# Patient Record
Sex: FEMALE | Race: WHITE | NOT HISPANIC OR LATINO | Employment: OTHER | URBAN - METROPOLITAN AREA
[De-identification: names, ages, dates, MRNs, and addresses within clinical notes are randomized per-mention and may not be internally consistent; named-entity substitution may affect disease eponyms.]

---

## 2017-01-09 RX ORDER — HYDROCHLOROTHIAZIDE 12.5 MG/1
CAPSULE ORAL
Qty: 30 CAPSULE | Refills: 0 | Status: SHIPPED | OUTPATIENT
Start: 2017-01-09 | End: 2017-02-05 | Stop reason: SDUPTHER

## 2017-01-17 ENCOUNTER — OFFICE VISIT (OUTPATIENT)
Dept: INTERNAL MEDICINE | Facility: CLINIC | Age: 47
End: 2017-01-17
Payer: MEDICARE

## 2017-01-17 VITALS
TEMPERATURE: 98 F | DIASTOLIC BLOOD PRESSURE: 88 MMHG | WEIGHT: 192.69 LBS | HEIGHT: 64 IN | BODY MASS INDEX: 32.9 KG/M2 | SYSTOLIC BLOOD PRESSURE: 130 MMHG | HEART RATE: 68 BPM | RESPIRATION RATE: 16 BRPM

## 2017-01-17 DIAGNOSIS — R60.9 EDEMA, UNSPECIFIED TYPE: ICD-10-CM

## 2017-01-17 DIAGNOSIS — M47.816 LUMBAR ARTHROPATHY: ICD-10-CM

## 2017-01-17 DIAGNOSIS — Z00.00 ANNUAL PHYSICAL EXAM: Primary | ICD-10-CM

## 2017-01-17 DIAGNOSIS — G89.29 CHRONIC LOW BACK PAIN WITH LEFT-SIDED SCIATICA, UNSPECIFIED BACK PAIN LATERALITY: ICD-10-CM

## 2017-01-17 DIAGNOSIS — G43.009 MIGRAINE WITHOUT AURA AND WITHOUT STATUS MIGRAINOSUS, NOT INTRACTABLE: ICD-10-CM

## 2017-01-17 DIAGNOSIS — M46.1 SACROILIITIS: ICD-10-CM

## 2017-01-17 DIAGNOSIS — F13.20 SEDATIVE DEPENDENCE: ICD-10-CM

## 2017-01-17 DIAGNOSIS — E66.9 OBESITY (BMI 30-39.9): ICD-10-CM

## 2017-01-17 DIAGNOSIS — F41.9 ANXIETY DISORDER, UNSPECIFIED TYPE: ICD-10-CM

## 2017-01-17 DIAGNOSIS — M54.42 CHRONIC LOW BACK PAIN WITH LEFT-SIDED SCIATICA, UNSPECIFIED BACK PAIN LATERALITY: ICD-10-CM

## 2017-01-17 PROCEDURE — 99499 UNLISTED E&M SERVICE: CPT | Mod: S$GLB,,, | Performed by: INTERNAL MEDICINE

## 2017-01-17 PROCEDURE — 99396 PREV VISIT EST AGE 40-64: CPT | Mod: S$GLB,,, | Performed by: INTERNAL MEDICINE

## 2017-01-17 PROCEDURE — 99999 PR PBB SHADOW E&M-EST. PATIENT-LVL IV: CPT | Mod: PBBFAC,,, | Performed by: INTERNAL MEDICINE

## 2017-01-17 NOTE — PROGRESS NOTES
Subjective:       Patient ID: Mary Sanches is a 46 y.o. female.    Chief Complaint: Annual Exam    HPI   46 y.o. with chronic LBP/arthropathy with right sided sciatica, Sacroiliitis, Migraines, Obesity, anxiety, sedative dependence is here for annual exam.     Cholesterol: (needs)  Vaccines: Influenza (declined); Tetanus (declined)  Eye exam: declined  Mammogram: 2016(normal per pt)  Gyn exam: 2016    Exercise: no  Diet: regular    Past Medical History:    Anxiety                                                       Chronic back pain                                             Migraines                                                     Sacroiliitis                                                  Scoliosis                                                     Tobacco use                                                 Past Surgical History:    HYSTERECTOMY                                                   HAND SURGERY                                    Left             Social History    Marital status:              Spouse name:                       Years of education:                 Number of children:               Occupational History  Occupation          Employer            Comment               Unemployed                                  Social History Main Topics    Smoking status: Current Some Day Smoker                                                      Packs/day: 0.00      Years: 0.00           Smokeless status: Not on file                       Alcohol use: No              Drug use: No              Sexual activity: Not Currently     Partners with: Male    Review of patient's allergies indicates:   -- Cranberry     --  Kidney Pain   -- Gabapentin -- Swelling    --  Throat Swelling, Hard to breathe   -- Hydroxyzine -- Swelling    --  Throat Swelling, shortness of breath   -- Ibuprofen -- Swelling    --  Stomach Bloated, Swelling Throat   -- Meloxicam -- Swelling    --  Swelling of the  Throat   -- Toradol [ketorolac] -- Swelling    --  Throat Swelling difficulty breathing   -- Amoxicillin -- Hives   -- Lasix [furosemide] -- Swelling    --  Bloating and swelling  Ms. Sanches does not currently have medications on file.    Review of Systems   Constitutional: Negative for activity change, appetite change, chills, diaphoresis, fatigue, fever and unexpected weight change.   HENT: Negative for postnasal drip, rhinorrhea, sinus pressure, sneezing, sore throat, trouble swallowing and voice change.    Respiratory: Negative for cough, shortness of breath and wheezing.    Cardiovascular: Negative for chest pain, palpitations and leg swelling.   Gastrointestinal: Negative for abdominal pain, blood in stool, constipation, diarrhea, nausea and vomiting.   Genitourinary: Negative for dysuria.   Musculoskeletal: Positive for arthralgias and back pain. Negative for myalgias.   Skin: Negative for rash and wound.   Allergic/Immunologic: Negative for environmental allergies and food allergies.   Hematological: Negative for adenopathy. Does not bruise/bleed easily.   Psychiatric/Behavioral: Negative for self-injury, sleep disturbance and suicidal ideas. The patient is nervous/anxious.        Objective:      Physical Exam   Constitutional: She is oriented to person, place, and time. She appears well-developed and well-nourished. No distress.   HENT:   Head: Normocephalic and atraumatic.   Right Ear: External ear normal.   Left Ear: External ear normal.   Nose: Nose normal.   Mouth/Throat: Oropharynx is clear and moist. No oropharyngeal exudate.   Eyes: Conjunctivae and EOM are normal. Pupils are equal, round, and reactive to light. Right eye exhibits no discharge. Left eye exhibits no discharge. No scleral icterus.   Neck: Neck supple. No JVD present. No thyromegaly present.   Cardiovascular: Normal rate, regular rhythm, normal heart sounds and intact distal pulses.    No murmur heard.  Pulmonary/Chest: Effort normal  and breath sounds normal. No respiratory distress. She has no wheezes. She has no rales. She exhibits no tenderness.   Abdominal: Soft. Bowel sounds are normal. She exhibits no distension. There is no tenderness. There is no guarding.   Musculoskeletal: She exhibits no edema.        Lumbar back: She exhibits tenderness.   Lymphadenopathy:     She has no cervical adenopathy.   Neurological: She is alert and oriented to person, place, and time.   Skin: Skin is warm and dry. No rash noted. She is not diaphoretic. No pallor.   Psychiatric: She has a normal mood and affect. Judgment normal.   Nursing note and vitals reviewed.      Assessment:       1. Annual physical exam    2. Chronic low back pain with left-sided sciatica, unspecified back pain laterality    3. Lumbar arthropathy    4. Migraine without aura and without status migrainosus, not intractable    5. Obesity (BMI 30-39.9)    6. Anxiety disorder, unspecified type    7. Edema, unspecified type    8. Sacroiliitis    9. Sedative dependence        Plan:    Complete blood work, UA   Vaccines: Influenza (declined); Tetanus (declined)   Eye exam: declined   Mammogram: 2016   Gyn exam: 2016      1. LBP/arthropathy with left sided sciatica- not well controlled on current meds(pt aware I will not treat her chronic pain)       Pt has been evaluated by Pain Management and is s/p PT/OT(no improvement)       Referral to PM&R   2. Migraines without aura- stable on Fioricet PRN   3. Obesity- pt advised on proper diet/exercise for weight loss   4. Anxiety- stable on Xanax, followed by Ps[facility]   5. Edema- Rx HCTZ 12.5 mg daily PRN   6. Sacroiliitis- stable, continue to monitor   7. Sedative dependence- stable, continue to monitor   8. F/u in 1 yr or PRN

## 2017-01-17 NOTE — MR AVS SNAPSHOT
[facility] Internal Medicine   [facility]  [place] [address]  Phone: [phone]  Fax: [phone]                  Mary Sanches   2017 3:00 PM   Office Visit    Description:  Female : 1970   Provider:  Ángel Lozano DO   Department:  [facility] - Internal Medicine           Reason for Visit     Annual Exam           Diagnoses this Visit        Comments    Annual physical exam    -  Primary     Chronic low back pain with left-sided sciatica, unspecified back pain laterality         Lumbar arthropathy         Migraine without aura and without status migrainosus, not intractable         Obesity (BMI 30-39.9)         Anxiety disorder, unspecified type         Edema, unspecified type         Sacroiliitis         Sedative dependence                To Do List           Future Appointments        Provider Department Dept Phone    2017 3:00 PM HRA, MET 1 [facility] Internal Medicine [phone]    2017 8:30 AM LAB, [facility] - Laboratory [phone]    2017 8:45 AM SPECIMEN, [facility] - Specimen Lab [phone]      Goals (5 Years of Data)     None      Follow-Up and Disposition     Return in about 1 year (around 2018), or if symptoms worsen or fail to improve.    Follow-up and Disposition History      [facility] On Call     [facility] On Call Nurse Care Line -  Assistance  Registered nurses in the [facility] On Call Center provide clinical advisement, health education, appointment booking, and other advisory services.  Call for this free service at [phone].             Medications           Message regarding Medications     Verify the changes and/or additions to your medication regime listed below are the same as discussed with your clinician today.  If any of these changes or additions are incorrect, please notify your healthcare provider.             Verify that the below list of medications is an accurate representation of the  "medications you are currently taking.  If none reported, the list may be blank. If incorrect, please contact your healthcare provider. Carry this list with you in case of emergency.           Current Medications     alprazolam (XANAX) 1 MG tablet Take 1 mg by mouth 3 (three) times daily.    butalbital-acetaminophen-caffeine -40 mg (FIORICET, ESGIC) -40 mg per tablet TAKE 1 TO 2 TABLETS BY MOUTH EVERY 6 HOURS AS NEEDED FOR PAIN    cloNIDine (CATAPRES) 0.2 MG tablet TAKE 1 TABLET BY MOUTH EVERY EVENING    cyclobenzaprine (FLEXERIL) 10 MG tablet Take 10 mg by mouth 3 (three) times daily as needed.    diphenhydrAMINE (BENADRYL) 50 MG capsule Take 50 mg by mouth every evening.    hydrochlorothiazide (MICROZIDE) 12.5 mg capsule TAKE ONE CAPSULE BY MOUTH EVERY DAY AS NEEDED FOR SWELLING    hydrocodone-acetaminophen 7.5-325mg (NORCO) 7.5-325 mg per tablet Take 1 tablet by mouth every 6 (six) hours as needed.    lidocaine-prilocaine (EMLA) cream     methocarbamol (ROBAXIN) 750 MG Tab TAKE 1 TABLET (750 MG TOTAL) BY MOUTH 4 (FOUR) TIMES DAILY.    metronidazole (METROGEL) 0.75 % vaginal gel Place 1 applicator vaginally every evening.    naproxen (NAPROSYN) 500 MG tablet TK 1 T PO BID FOR 10 DAYS           Clinical Reference Information           Vital Signs - Last Recorded  Most recent update: 1/17/2017  2:42 PM by Cheryle Ho MA    BP Pulse Temp Resp Ht Wt    130/88 (BP Location: Left arm, Patient Position: Sitting, BP Method: Manual) 68 98 °F (36.7 °C) (Oral) 16 5' 4" (1.626 m) 87.4 kg (192 lb 10.9 oz)    BMI                33.07 kg/m2          Blood Pressure          Most Recent Value    BP  130/88      Allergies as of 1/17/2017     Cranberry    Gabapentin    Hydroxyzine    Ibuprofen    Meloxicam    Toradol [Ketorolac]    Amoxicillin    Lasix [Furosemide]      Immunizations Administered on Date of Encounter - 1/17/2017     None      Orders Placed During Today's Visit      Normal Orders This Visit    " Ambulatory Referral to Physical Medicine Rehab     Future Labs/Procedures Expected by Expires    CBC auto differential  1/17/2017 1/17/2018    Lipid panel  1/17/2017 1/17/2018    TSH  1/17/2017 3/18/2018    Comprehensive metabolic panel  4/18/2017 4/18/2018    Urinalysis  As directed 1/17/2018      [facility] Sign-Up     Activating your [facility] account is as easy as 1-2-3!     1) Visit [email or web address], select Sign Up Now, enter this activation code and your date of birth, then select Next.  [email or web address]  Expires: 3/3/2017  3:04 PM      2) Create a username and password to use when you visit [facility] in the future and select a security question in case you lose your password and select Next.    3) Enter your e-mail address and click Sign Up!    Additional Information  If you have questions, please e-mail [email or web address] or call [phone] to talk to our [facility] staff. Remember, [facility] is NOT to be used for urgent needs. For medical emergencies, dial 911.         Smoking Cessation     If you would like to quit smoking:   You may be eligible for free services if you are a [place] resident and started smoking cigarettes before September 1, 1988.  Call the [employer]oking [employer]essation [employer] ([employer]) toll free at (256) 843-5525 or (372) 536-1906.   Call -800-QUIT-NOW if you do not meet the above criteria.

## 2017-01-18 ENCOUNTER — INITIAL CONSULT (OUTPATIENT)
Dept: PHYSICAL MEDICINE AND REHAB | Facility: CLINIC | Age: 47
End: 2017-01-18
Payer: MEDICARE

## 2017-01-18 VITALS
SYSTOLIC BLOOD PRESSURE: 127 MMHG | BODY MASS INDEX: 32.74 KG/M2 | DIASTOLIC BLOOD PRESSURE: 77 MMHG | HEART RATE: 60 BPM | HEIGHT: 64 IN | WEIGHT: 191.81 LBS

## 2017-01-18 DIAGNOSIS — M54.42 CHRONIC MIDLINE LOW BACK PAIN WITH LEFT-SIDED SCIATICA: Primary | ICD-10-CM

## 2017-01-18 DIAGNOSIS — E66.9 OBESITY (BMI 30.0-34.9): ICD-10-CM

## 2017-01-18 DIAGNOSIS — M70.62 TROCHANTERIC BURSITIS, LEFT HIP: ICD-10-CM

## 2017-01-18 DIAGNOSIS — G89.29 CHRONIC MIDLINE LOW BACK PAIN WITH LEFT-SIDED SCIATICA: Primary | ICD-10-CM

## 2017-01-18 DIAGNOSIS — M54.16 LEFT LUMBAR RADICULOPATHY: ICD-10-CM

## 2017-01-18 DIAGNOSIS — M47.26 OSTEOARTHRITIS OF SPINE WITH RADICULOPATHY, LUMBAR REGION: ICD-10-CM

## 2017-01-18 PROCEDURE — 99204 OFFICE O/P NEW MOD 45 MIN: CPT | Mod: S$GLB,,, | Performed by: PHYSICAL MEDICINE & REHABILITATION

## 2017-01-18 PROCEDURE — 1159F MED LIST DOCD IN RCRD: CPT | Mod: S$GLB,,, | Performed by: PHYSICAL MEDICINE & REHABILITATION

## 2017-01-18 PROCEDURE — 99999 PR PBB SHADOW E&M-EST. PATIENT-LVL III: CPT | Mod: PBBFAC,,, | Performed by: PHYSICAL MEDICINE & REHABILITATION

## 2017-01-18 RX ORDER — GABAPENTIN 300 MG/1
300 CAPSULE ORAL NIGHTLY
Qty: 90 CAPSULE | Refills: 1 | Status: SHIPPED | OUTPATIENT
Start: 2017-01-18 | End: 2017-02-20 | Stop reason: SDUPTHER

## 2017-01-18 RX ORDER — TRAMADOL HYDROCHLORIDE 50 MG/1
50 TABLET ORAL EVERY 6 HOURS PRN
Qty: 120 TABLET | Refills: 1 | Status: SHIPPED | OUTPATIENT
Start: 2017-01-18 | End: 2017-02-20 | Stop reason: SDUPTHER

## 2017-01-18 RX ORDER — MELOXICAM 15 MG/1
15 TABLET ORAL DAILY
Qty: 30 TABLET | Refills: 1 | Status: SHIPPED | OUTPATIENT
Start: 2017-01-18 | End: 2017-01-30 | Stop reason: SINTOL

## 2017-01-18 NOTE — LETTER
January 18, 2017      Ángel Lozano, DO  [address] [facility] [place] [address]           [facility]  [address]  [facility] [address]  Phone: [phone]          Patient: Mary Sanches   MR Number: [id number]   YOB: 1970   Date of Visit: 1/18/2017       Dear Dr. Ángel Lozano:    Thank you for referring Mary Sanches to me for evaluation. Attached you will find relevant portions of my assessment and plan of care.    If you have questions, please do not hesitate to call me. I look forward to following Mary Sanches along with you.    Sincerely,    Lilia Andrews MD    Enclosure  CC:  No Recipients    If you would like to receive this communication electronically, please contact [email or web address] or [phone] to request more information on [employer] access.    For providers and/or their staff who would like to refer a patient to [facility], please contact us through our one-stop-shop provider referral line, [facility], at [phone].    If you feel you have received this communication in error or would no longer like to receive these types of communications, please e-mail [email or web address]

## 2017-01-18 NOTE — MR AVS SNAPSHOT
[facility]  [address]  [facility] [address]  Phone: [phone]                  Mary Sanches   2017 1:00 PM   Initial consult    Description:  Female : 1970   Provider:  Lilia Andrews MD   Department:  [facility]           Diagnoses this Visit        Comments    Chronic midline low back pain with left-sided sciatica    -  Primary     Osteoarthritis of spine with radiculopathy, lumbar region         Left lumbar radiculopathy         Trochanteric bursitis, left hip         Obesity (BMI 30.0-34.9)                To Do List           Future Appointments        Provider Department Dept Phone    2017 3:00 PM HRA, [facility] 1 [place] - Internal Medicine [phone]    2017 8:30 AM LAB, [facility] [place] - Laboratory [phone]    2017 8:45 AM SPECIMEN, [facility] [place] - Specimen Lab [phone]    3/17/2017 11:40 AM Lilia Andrews MD [facility] [phone]      Goals (5 Years of Data)     None      Follow-Up and Disposition     Return in about 2 months (around 3/18/2017).       These Medications        Disp Refills Start End    meloxicam (MOBIC) 15 MG tablet 30 tablet 1 2017    Take 1 tablet (15 mg total) by mouth once daily. - Oral    Pharmacy: [facility]/pharmacy #[id number] - [place]  [address] [place] [address] Ph #: [phone]       gabapentin (NEURONTIN) 300 MG capsule 90 capsule 1 2017    Take 1 capsule (300 mg total) by mouth every evening. In 1 wk, if no relief, increase to twice daily.In another wk, may increase to 3 times. - Oral    Pharmacy: [facility]/pharmacy #[id number] [place]  [address] [facility] Ph #: [phone]       tramadol (ULTRAM) 50 mg tablet 120 tablet 1 2017    Take 1 tablet (50 mg total) by mouth every 6 (six) hours as needed for Pain. - Oral    Pharmacy: [facility]/pharmacy #[id number] - [place] Farias - [address] [facility] Ph #: [phone]          [facility]     [facility] - 24/7 Assistance  Registered nurses in the [facility] provide clinical advisement, health education, appointment booking, and other advisory services.  Call for this free service at [phone].             Medications           Message regarding Medications     Verify the changes and/or additions to your medication regime listed below are the same as discussed with your clinician today.  If any of these changes or additions are incorrect, please notify your healthcare provider.        START taking these NEW medications        Refills    meloxicam (MOBIC) 15 MG tablet 1    Sig: Take 1 tablet (15 mg total) by mouth once daily.    Class: Normal    Route: Oral    gabapentin (NEURONTIN) 300 MG capsule 1    Sig: Take 1 capsule (300 mg total) by mouth every evening. In 1 wk, if no relief, increase to twice daily.In another wk, may increase to 3 times.    Class: Normal    Route: Oral    tramadol (ULTRAM) 50 mg tablet 1    Sig: Take 1 tablet (50 mg total) by mouth every 6 (six) hours as needed for Pain.    Class: Normal    Route: Oral           Verify that the below list of medications is an accurate representation of the medications you are currently taking.  If none reported, the list may be blank. If incorrect, please contact your healthcare provider. Carry this list with you in case of emergency.           Current Medications     alprazolam (XANAX) 1 MG tablet Take 1 mg by mouth 3 (three) times daily.    butalbital-acetaminophen-caffeine -40 mg (FIORICET, ESGIC) -40 mg per tablet TAKE 1 TO 2 TABLETS BY MOUTH EVERY 6 HOURS AS NEEDED FOR PAIN    cloNIDine (CATAPRES) 0.2 MG tablet TAKE 1 TABLET BY MOUTH EVERY EVENING    cyclobenzaprine (FLEXERIL) 10 MG tablet Take 10 mg by mouth 3 (three) times daily as needed.    diphenhydrAMINE (BENADRYL) 50 MG capsule Take 50 mg by mouth every evening.    gabapentin (NEURONTIN) 300 MG capsule Take 1  "capsule (300 mg total) by mouth every evening. In 1 wk, if no relief, increase to twice daily.In another wk, may increase to 3 times.    hydrochlorothiazide (MICROZIDE) 12.5 mg capsule TAKE ONE CAPSULE BY MOUTH EVERY DAY AS NEEDED FOR SWELLING    hydrocodone-acetaminophen 7.5-325mg (NORCO) 7.5-325 mg per tablet Take 1 tablet by mouth every 6 (six) hours as needed.    lidocaine-prilocaine (EMLA) cream     meloxicam (MOBIC) 15 MG tablet Take 1 tablet (15 mg total) by mouth once daily.    methocarbamol (ROBAXIN) 750 MG Tab TAKE 1 TABLET (750 MG TOTAL) BY MOUTH 4 (FOUR) TIMES DAILY.    metronidazole (METROGEL) 0.75 % vaginal gel Place 1 applicator vaginally every evening.    naproxen (NAPROSYN) 500 MG tablet TK 1 T PO BID FOR 10 DAYS    tramadol (ULTRAM) 50 mg tablet Take 1 tablet (50 mg total) by mouth every 6 (six) hours as needed for Pain.           Clinical Reference Information           Vital Signs - Last Recorded  Most recent update: 1/18/2017  1:09 PM by Suzanne Molina MA    BP Pulse Ht Wt BMI    127/77 60 5' 4" (1.626 m) 87 kg (191 lb 12.8 oz) 32.92 kg/m2      Blood Pressure          Most Recent Value    BP  127/77      Allergies as of 1/18/2017     Cranberry    Gabapentin    Hydroxyzine    Ibuprofen    Meloxicam    Toradol [Ketorolac]    Amoxicillin    Lasix [Furosemide]      Immunizations Administered on Date of Encounter - 1/18/2017     None      [facility] Sign-Up     Activating your [facility] account is as easy as 1-2-3!     1) Visit [email or web address], select Sign Up Now, enter this activation code and your date of birth, then select Next.  [email or web address]  Expires: 3/3/2017  3:04 PM      2) Create a username and password to use when you visit [facility] in the future and select a security question in case you lose your password and select Next.    3) Enter your e-mail address and click Sign Up!    Additional Information  If you have questions, please e-mail [email or web address] or call [phone] to " talk to our [facility] staff. Remember, [facility] is NOT to be used for urgent needs. For medical emergencies, dial 911.         Instructions      Neck/Back Pain [General]    Both neck and back pain are usually caused by injury to the muscles or ligaments of the spine. Sometimes the disks that separate each bone of the spine may cause pain by putting pressure on a nearby nerve. Back and neck pain may appear after a sudden twisting/bending force (such as in a car accident), or sometimes after a simple awkward movement. In either case, muscle spasm is often present and adds to the pain.  Acute neck and back pain usually gets better in one to two weeks. Pain related to disk disease, arthritis in the spinal joints or spinal stenosis (narrowing of the spinal canal) can become chronic and last for months or years.  Home Care:  · FOR NECK PAIN: Use a comfortable pillow that supports the head and keeps the spine in a neutral position. The position of the head should not be tilted forward or backward.  · FOR BACK PAIN: You may need to stay in bed the first few days. But, as soon as possible, begin sitting or walking to avoid problems with prolonged bed rest (muscle weakness, worsening back stiffness and pain, blood clots in the legs).  · When in bed, try to find a position of comfort. A firm mattress is best. Try lying flat on your back with pillows under your knees. You can also try lying on your side with your knees bent up towards your chest and a pillow between your knees.  · Avoid prolonged sitting. This puts more stress on the lower back than standing or walking.  · During the first two days after injury, apply an ICE PACK to the painful area for 20 minutes every 2-4 hours. This will reduce swelling and pain. HEAT (hot shower, hot bath or heating pad) works well for muscle spasm. You can start with ice, then switch to heat after two days. Some patients feel best alternating ice and heat treatments. Use the one method  that feels the best to you.  · You may use acetaminophen (Tylenol) or ibuprofen (Motrin, Advil) to control pain, unless another pain medicine was prescribed. [NOTE: If you have chronic liver or kidney disease or ever had a stomach ulcer or GI bleeding, talk with your doctor before using these medicines.]  · Be aware of safe lifting methods and do not lift anything over 15 pounds until all the pain is gone.  Follow Up  with your physician or this facility if your symptoms do not start to improve after one week. Physical therapy or further tests may be needed.  [NOTE: If X-rays were taken, they will be reviewed by a radiologist. You will be notified of any new findings that may affect your care.]  Get Prompt Medical Attention  if any of the following occur:  · Pain becomes worse or spreads into your arms or legs  · Weakness, numbness or pain in one or both arms or legs  · Loss of bowel or bladder control  · Numbness in the groin area  · Difficulty walking  · Fever of 100.4ºF (38ºC) or higher, or as directed by your healthcare provider  © 7839-8515 [facility] [facility], [address] [place] [address]. All rights reserved. This information is not intended as a substitute for professional medical care. Always follow your healthcare professional's instructions.    Back Exercises: Back Press  Do this exercise on your hands and knees. Keep your knees under your hips and your hands under your shoulders. Keep your spine in a neutral position (not arched or sagging). Be sure to maintain your necks natural curve.  · Tighten your abdominal and buttocks muscles to press your back upward. Let your head drop slightly.  · Hold for 5 seconds. Return to starting position.  · Repeat 5 times.     © 1389-5670 [employer] [facility], [address] [place] [address]. All rights reserved. This information is not intended as a substitute for professional medical care. Always follow your healthcare professional's  instructions.    Back Exercises: Back Release  Do this exercise on your hands and knees. Keep your knees under your hips and your hands under your shoulders. Keep your spine in a neutral position (not arched or sagging). Be sure to maintain your necks natural curve.  · Relax your abdominal and buttocks muscles, lift your head, and let your back sag. Be sure to keep your weight evenly distributed. Dont sit back on your hips.   · Hold for 5 seconds.  · Return to starting position.  · Repeat 5 times.  © 9448-01[address] [place] [address]. All rights reserved. This information is not intended as a substitute for professional medical care. Always follow your healthcare professional's instructions.    Back Exercises: Bridge  The Bridge exercise strengthens your abdominal, buttocks, and hamstring muscles. This helps keep your back stable and aligned when you walk.  · Lie on the floor with your back and palms flat. Bend your knees. Keep your feet flat on the floor.  · Contract your abdominal and buttocks muscles. Slowly lift your buttocks off the floor until there is a straight line from your knees to your shoulders.  · Hold for 5  seconds. Repeat 10 times.    © 1085-9601 [address] [address]. All rights reserved. This information is not intended as a substitute for professional medical care. Always follow your healthcare professional's instructions.    Back Exercises: Elbow Press    To start, lie face down on your stomach, feet slightly apart, forehead on the floor. Breathe deeply. You should feel comfortable and relaxed in this position.  · Press up on your forearms. Keep your abdomen and hips on the floor.  · Hold for 20 seconds. Lower slowly.  · Repeat 2 times.  · Return to starting position.  © 1726-3280 [facility], [address] [place] [address]. All rights reserved. This information is not intended as a substitute for  professional medical care. Always follow your healthcare professional's instructions.    Back Exercises: Pelvic Tilt  To start, lie on your back with your knees bent and feet flat on the floor. Dont press your neck or lower back to the floor. Breathe deeply. You should feel comfortable and relaxed in this position.  · Tighten your abdomen and buttocks, and press your lower back toward the floor. This should be a small, subtle movement.  · Hold for 5 seconds. Release.  · Repeat 5 times.         © 9062-0820 [facility], [address] [place] [address]. All rights reserved. This information is not intended as a substitute for professional medical care. Always follow your healthcare professional's instructions.    Back Exercises: Partial Curl-Ups          To start, lie on your back with your knees bent and feet flat on the floor. Dont press your neck or lower back to the floor. Breathe deeply. You should feel comfortable and relaxed in this position.  · Cross your arms loosely.  · Tighten your abdomen and curl retirement up, keeping your head in line with your shoulders.  · Hold for 5 seconds. Uncurl to lie down.  · Repeat 5 times.   © 9420-5509 [facility], [address] [place] [address]. All rights reserved. This information is not intended as a substitute for professional medical care. Always follow your healthcare professional's instructions.    Back Exercises: Lower Back Stretch                            To start, sit in a chair with your feet flat on the floor. Shift your weight slightly forward to avoid rounding your back. Relax, and keep your ears, shoulders, and hips aligned.  · Sit with your feet well apart.  · Bend forward and touch the floor with the backs of your hands. Relax and let your body drop.  · Hold for 20 seconds. Return to starting position.  · Repeat 2 times.   © 6048-5606 [facility], [address] [place] [address]. All rights reserved. This  information is not intended as a substitute for professional medical care. Always follow your healthcare professional's instructions.    Back Exercises: Seated Rotation      To start, sit in a chair with your feet flat on the floor. Shift your weight slightly forward to avoid rounding your back. Relax, and keep your ears, shoulders, and hips aligned.  · Fold your arms, elbows just below shoulder height.  · Turn from the waist with hips forward. Turn your head last.  · Hold for a count of 5. Return to starting position.  · Repeat 5 times on one side. Then switch sides.  © 3003-3415 [facility], [address] [place] [address]. All rights reserved. This information is not intended as a substitute for professional medical care. Always follow your healthcare professional's instructions.    Back Exercises: Side Stretch  To start, sit in a chair with your feet flat on the floor. Shift your weight slightly forward to avoid rounding your back. Relax. Keep your ears, shoulders, and hips aligned.  · Stretch your right arm overhead.  · Slowly bend to the left. Dont twist your torso.  · Hold for 20 seconds. Return to starting position.  · Repeat 2 times. Then, switch to the other side.  © 4452-7567 [employer] [facility], [address] [place] [address]. All rights reserved. This information is not intended as a substitute for professional medical care. Always follow your healthcare professional's instructions         Smoking Cessation     If you would like to quit smoking:   You may be eligible for free services if you are a [place] resident and started smoking cigarettes before September 1, 1988.  Call the [employer]ess[employer] ([employer]) toll free at (402) 252-7834 or [phone]) [phone].   Call -800-QUIT-NOW if you do not meet the above criteria.

## 2017-01-18 NOTE — PATIENT INSTRUCTIONS
Neck/Back Pain [General]    Both neck and back pain are usually caused by injury to the muscles or ligaments of the spine. Sometimes the disks that separate each bone of the spine may cause pain by putting pressure on a nearby nerve. Back and neck pain may appear after a sudden twisting/bending force (such as in a car accident), or sometimes after a simple awkward movement. In either case, muscle spasm is often present and adds to the pain.  Acute neck and back pain usually gets better in one to two weeks. Pain related to disk disease, arthritis in the spinal joints or spinal stenosis (narrowing of the spinal canal) can become chronic and last for months or years.  Home Care:  · FOR NECK PAIN: Use a comfortable pillow that supports the head and keeps the spine in a neutral position. The position of the head should not be tilted forward or backward.  · FOR BACK PAIN: You may need to stay in bed the first few days. But, as soon as possible, begin sitting or walking to avoid problems with prolonged bed rest (muscle weakness, worsening back stiffness and pain, blood clots in the legs).  · When in bed, try to find a position of comfort. A firm mattress is best. Try lying flat on your back with pillows under your knees. You can also try lying on your side with your knees bent up towards your chest and a pillow between your knees.  · Avoid prolonged sitting. This puts more stress on the lower back than standing or walking.  · During the first two days after injury, apply an ICE PACK to the painful area for 20 minutes every 2-4 hours. This will reduce swelling and pain. HEAT (hot shower, hot bath or heating pad) works well for muscle spasm. You can start with ice, then switch to heat after two days. Some patients feel best alternating ice and heat treatments. Use the one method that feels the best to you.  · You may use acetaminophen (Tylenol) or ibuprofen (Motrin, Advil) to control pain, unless another pain medicine was  prescribed. [NOTE: If you have chronic liver or kidney disease or ever had a stomach ulcer or GI bleeding, talk with your doctor before using these medicines.]  · Be aware of safe lifting methods and do not lift anything over 15 pounds until all the pain is gone.  Follow Up  with your physician or this facility if your symptoms do not start to improve after one week. Physical therapy or further tests may be needed.  [NOTE: If X-rays were taken, they will be reviewed by a radiologist. You will be notified of any new findings that may affect your care.]  Get Prompt Medical Attention  if any of the following occur:  · Pain becomes worse or spreads into your arms or legs  · Weakness, numbness or pain in one or both arms or legs  · Loss of bowel or bladder control  · Numbness in the groin area  · Difficulty walking  · Fever of 100.4ºF (38ºC) or higher, or as directed by your healthcare provider  © 6878-1398 [facility] [facility], [address] [place] [address]. All rights reserved. This information is not intended as a substitute for professional medical care. Always follow your healthcare professional's instructions.    Back Exercises: Back Press  Do this exercise on your hands and knees. Keep your knees under your hips and your hands under your shoulders. Keep your spine in a neutral position (not arched or sagging). Be sure to maintain your necks natural curve.  · Tighten your abdominal and buttocks muscles to press your back upward. Let your head drop slightly.  · Hold for 5 seconds. Return to starting position.  · Repeat 5 times.     © 6068-3091 [employer] [facility], [address] [place] [address]. All rights reserved. This information is not intended as a substitute for professional medical care. Always follow your healthcare professional's instructions.    Back Exercises: Back Release  Do this exercise on your hands and knees. Keep your knees under your hips and your hands under your shoulders. Keep  your spine in a neutral position (not arched or sagging). Be sure to maintain your necks natural curve.  · Relax your abdominal and buttocks muscles, lift your head, and let your back sag. Be sure to keep your weight evenly distributed. Dont sit back on your hips.   · Hold for 5 seconds.  · Return to starting position.  · Repeat 5 times.  © 8571-0192 [facility] [place] [address]. All rights reserved. This information is not intended as a substitute for professional medical care. Always follow your healthcare professional's instructions.    Back Exercises: Bridge  The Bridge exercise strengthens your abdominal, buttocks, and hamstring muscles. This helps keep your back stable and aligned when you walk.  · Lie on the floor with your back and palms flat. Bend your knees. Keep your feet flat on the floor.  · Contract your abdominal and buttocks muscles. Slowly lift your buttocks off the floor until there is a straight line from your knees to your shoulders.  · Hold for 5  seconds. Repeat 10 times.    © 3726-0487 [place] [address]. All rights reserved. This information is not intended as a substitute for professional medical care. Always follow your healthcare professional's instructions.    Back Exercises: Elbow Press    To start, lie face down on your stomach, feet slightly apart, forehead on the floor. Breathe deeply. You should feel comfortable and relaxed in this position.  · Press up on your forearms. Keep your abdomen and hips on the floor.  · Hold for 20 seconds. Lower slowly.  · Repeat 2 times.  · Return to starting position.  © 3140-1387 [facility], [address] [place] [address]. All rights reserved. This information is not intended as a substitute for professional medical care. Always follow your healthcare professional's instructions.    Back Exercises: Pelvic Tilt  To start, lie on your back with your knees bent and  feet flat on the floor. Dont press your neck or lower back to the floor. Breathe deeply. You should feel comfortable and relaxed in this position.  · Tighten your abdomen and buttocks, and press your lower back toward the floor. This should be a small, subtle movement.  · Hold for 5 seconds. Release.  · Repeat 5 times.         © 8384-1891 [facility], [address] [place] [address]. All rights reserved. This information is not intended as a substitute for professional medical care. Always follow your healthcare professional's instructions.    Back Exercises: Partial Curl-Ups          To start, lie on your back with your knees bent and feet flat on the floor. Dont press your neck or lower back to the floor. Breathe deeply. You should feel comfortable and relaxed in this position.  · Cross your arms loosely.  · Tighten your abdomen and curl FDC up, keeping your head in line with your shoulders.  · Hold for 5 seconds. Uncurl to lie down.  · Repeat 5 times.   © 1558-2553 [employer] [facility], [address] [place] [address]. All rights reserved. This information is not intended as a substitute for professional medical care. Always follow your healthcare professional's instructions.    Back Exercises: Lower Back Stretch                            To start, sit in a chair with your feet flat on the floor. Shift your weight slightly forward to avoid rounding your back. Relax, and keep your ears, shoulders, and hips aligned.  · Sit with your feet well apart.  · Bend forward and touch the floor with the backs of your hands. Relax and let your body drop.  · Hold for 20 seconds. Return to starting position.  · Repeat 2 times.   © 1797-7580 [employer] [facility], [address] [place] [address]. All rights reserved. This information is not intended as a substitute for professional medical care. Always follow your healthcare professional's instructions.    Back Exercises: Seated Rotation      To  start, sit in a chair with your feet flat on the floor. Shift your weight slightly forward to avoid rounding your back. Relax, and keep your ears, shoulders, and hips aligned.  · Fold your arms, elbows just below shoulder height.  · Turn from the waist with hips forward. Turn your head last.  · Hold for a count of 5. Return to starting position.  · Repeat 5 times on one side. Then switch sides.  © 4166-2919 [employer], [address] [place] [address]. All rights reserved. This information is not intended as a substitute for professional medical care. Always follow your healthcare professional's instructions.    Back Exercises: Side Stretch  To start, sit in a chair with your feet flat on the floor. Shift your weight slightly forward to avoid rounding your back. Relax. Keep your ears, shoulders, and hips aligned.  · Stretch your right arm overhead.  · Slowly bend to the left. Dont twist your torso.  · Hold for 20 seconds. Return to starting position.  · Repeat 2 times. Then, switch to the other side.  © 6164-8641 [employer], [address] [place] [address]. All rights reserved. This information is not intended as a substitute for professional medical care. Always follow your healthcare professional's instructions

## 2017-01-23 RX ORDER — BUTALBITAL, ACETAMINOPHEN AND CAFFEINE 50; 325; 40 MG/1; MG/1; MG/1
TABLET ORAL
Qty: 30 TABLET | Refills: 3 | Status: SHIPPED | OUTPATIENT
Start: 2017-01-23 | End: 2017-03-27 | Stop reason: SDUPTHER

## 2017-01-30 ENCOUNTER — TELEPHONE (OUTPATIENT)
Dept: PHYSICAL MEDICINE AND REHAB | Facility: CLINIC | Age: 47
End: 2017-01-30

## 2017-01-30 DIAGNOSIS — M70.62 TROCHANTERIC BURSITIS, LEFT HIP: ICD-10-CM

## 2017-01-30 DIAGNOSIS — M47.26 OSTEOARTHRITIS OF SPINE WITH RADICULOPATHY, LUMBAR REGION: ICD-10-CM

## 2017-01-30 DIAGNOSIS — G89.29 CHRONIC MIDLINE LOW BACK PAIN WITH LEFT-SIDED SCIATICA: Primary | ICD-10-CM

## 2017-01-30 DIAGNOSIS — M54.42 CHRONIC MIDLINE LOW BACK PAIN WITH LEFT-SIDED SCIATICA: Primary | ICD-10-CM

## 2017-01-30 RX ORDER — CELECOXIB 200 MG/1
200 CAPSULE ORAL DAILY
Qty: 60 CAPSULE | Refills: 1 | Status: SHIPPED | OUTPATIENT
Start: 2017-01-30 | End: 2017-03-30 | Stop reason: SDUPTHER

## 2017-01-30 NOTE — TELEPHONE ENCOUNTER
----- Message from Suzanne Contreras MA sent at 1/30/2017  7:59 AM CST -----  Contact: pt      ----- Message -----     From: Agustin Miller     Sent: 1/27/2017   3:34 PM       To: [facility] Staff    The pt called because after taking Meloxicam, it is too strong for her, she think it may be because she lost weight. The pt can be reached at [phone]

## 2017-02-05 RX ORDER — HYDROCHLOROTHIAZIDE 12.5 MG/1
CAPSULE ORAL
Qty: 30 CAPSULE | Refills: 0 | Status: SHIPPED | OUTPATIENT
Start: 2017-02-05 | End: 2017-03-07 | Stop reason: SDUPTHER

## 2017-02-06 DIAGNOSIS — Z12.31 OTHER SCREENING MAMMOGRAM: ICD-10-CM

## 2017-02-20 DIAGNOSIS — M54.16 LEFT LUMBAR RADICULOPATHY: ICD-10-CM

## 2017-02-20 DIAGNOSIS — G89.29 CHRONIC MIDLINE LOW BACK PAIN WITH LEFT-SIDED SCIATICA: ICD-10-CM

## 2017-02-20 DIAGNOSIS — M47.26 OSTEOARTHRITIS OF SPINE WITH RADICULOPATHY, LUMBAR REGION: ICD-10-CM

## 2017-02-20 DIAGNOSIS — M70.62 TROCHANTERIC BURSITIS, LEFT HIP: ICD-10-CM

## 2017-02-20 DIAGNOSIS — M54.42 CHRONIC MIDLINE LOW BACK PAIN WITH LEFT-SIDED SCIATICA: ICD-10-CM

## 2017-02-20 RX ORDER — GABAPENTIN 300 MG/1
300 CAPSULE ORAL NIGHTLY
Qty: 90 CAPSULE | Refills: 1 | Status: SHIPPED | OUTPATIENT
Start: 2017-02-20 | End: 2017-03-17 | Stop reason: SDUPTHER

## 2017-02-20 RX ORDER — TRAMADOL HYDROCHLORIDE 50 MG/1
50 TABLET ORAL EVERY 6 HOURS PRN
Qty: 120 TABLET | Refills: 1 | Status: SHIPPED | OUTPATIENT
Start: 2017-02-20 | End: 2017-03-17 | Stop reason: SDUPTHER

## 2017-02-20 NOTE — TELEPHONE ENCOUNTER
----- Message from Anette Duvall sent at 2/20/2017 10:59 AM CST -----  Contact: self @ [phone]  Pt is calling to req a refill for gabapentin (NEURONTIN) 300 MG capsule and tramadol (ULTRAM) 50 mg tablet.  [facility] pharmacy [phone].

## 2017-03-07 RX ORDER — HYDROCHLOROTHIAZIDE 12.5 MG/1
CAPSULE ORAL
Qty: 30 CAPSULE | Refills: 0 | Status: SHIPPED | OUTPATIENT
Start: 2017-03-07 | End: 2017-04-14 | Stop reason: SDUPTHER

## 2017-03-10 DIAGNOSIS — M70.62 TROCHANTERIC BURSITIS, LEFT HIP: ICD-10-CM

## 2017-03-10 DIAGNOSIS — G89.29 CHRONIC MIDLINE LOW BACK PAIN WITH LEFT-SIDED SCIATICA: ICD-10-CM

## 2017-03-10 DIAGNOSIS — M54.42 CHRONIC MIDLINE LOW BACK PAIN WITH LEFT-SIDED SCIATICA: ICD-10-CM

## 2017-03-10 RX ORDER — MELOXICAM 15 MG/1
15 TABLET ORAL DAILY
Qty: 30 TABLET | Refills: 1 | Status: SHIPPED | OUTPATIENT
Start: 2017-03-10 | End: 2017-03-17 | Stop reason: SDUPTHER

## 2017-03-10 NOTE — TELEPHONE ENCOUNTER
01/18/17 last office visit  01/18/17 last Rx refill  03/17/17 rtc      Pt is req a new prescription for meloxicam.  Pt says it is not the medication that was hurting her stomach.         [facility]/pharmacy #[id number] - [place] - [address] [place] [address]   [place] [address]   Phone: [phone] Fax: [phone]

## 2017-03-17 ENCOUNTER — OFFICE VISIT (OUTPATIENT)
Dept: PHYSICAL MEDICINE AND REHAB | Facility: CLINIC | Age: 47
End: 2017-03-17
Payer: MEDICARE

## 2017-03-17 VITALS
DIASTOLIC BLOOD PRESSURE: 56 MMHG | BODY MASS INDEX: 33.49 KG/M2 | HEART RATE: 52 BPM | SYSTOLIC BLOOD PRESSURE: 81 MMHG | HEIGHT: 64 IN | WEIGHT: 196.19 LBS

## 2017-03-17 DIAGNOSIS — M70.62 TROCHANTERIC BURSITIS, LEFT HIP: ICD-10-CM

## 2017-03-17 DIAGNOSIS — M47.26 OSTEOARTHRITIS OF SPINE WITH RADICULOPATHY, LUMBAR REGION: ICD-10-CM

## 2017-03-17 DIAGNOSIS — M54.42 CHRONIC MIDLINE LOW BACK PAIN WITH LEFT-SIDED SCIATICA: Primary | ICD-10-CM

## 2017-03-17 DIAGNOSIS — M54.16 LEFT LUMBAR RADICULOPATHY: ICD-10-CM

## 2017-03-17 DIAGNOSIS — E66.9 OBESITY (BMI 30.0-34.9): ICD-10-CM

## 2017-03-17 DIAGNOSIS — G89.29 CHRONIC MIDLINE LOW BACK PAIN WITH LEFT-SIDED SCIATICA: Primary | ICD-10-CM

## 2017-03-17 PROCEDURE — 99214 OFFICE O/P EST MOD 30 MIN: CPT | Mod: S$GLB,,, | Performed by: PHYSICAL MEDICINE & REHABILITATION

## 2017-03-17 PROCEDURE — 1160F RVW MEDS BY RX/DR IN RCRD: CPT | Mod: S$GLB,,, | Performed by: PHYSICAL MEDICINE & REHABILITATION

## 2017-03-17 PROCEDURE — 99999 PR PBB SHADOW E&M-EST. PATIENT-LVL III: CPT | Mod: PBBFAC,,, | Performed by: PHYSICAL MEDICINE & REHABILITATION

## 2017-03-17 RX ORDER — TRAMADOL HYDROCHLORIDE 50 MG/1
50-100 TABLET ORAL 3 TIMES DAILY PRN
Qty: 150 TABLET | Refills: 1 | Status: SHIPPED | OUTPATIENT
Start: 2017-03-17 | End: 2017-05-16

## 2017-03-17 RX ORDER — GABAPENTIN 300 MG/1
300 CAPSULE ORAL 3 TIMES DAILY
Qty: 90 CAPSULE | Refills: 2 | Status: SHIPPED | OUTPATIENT
Start: 2017-03-17 | End: 2017-04-24 | Stop reason: SINTOL

## 2017-03-17 RX ORDER — MELOXICAM 15 MG/1
15 TABLET ORAL DAILY
Start: 2017-03-17 | End: 2017-05-11 | Stop reason: SDUPTHER

## 2017-03-17 NOTE — MR AVS SNAPSHOT
[facility]  [address]  [facility] [address]  Phone: [phone]                  Mary Sanches   3/17/2017 11:40 AM   Office Visit    Description:  Female : 1970   Provider:  Lilia Andrews MD   Department:  [facility]           Diagnoses this Visit        Comments    Chronic midline low back pain with left-sided sciatica    -  Primary     Osteoarthritis of spine with radiculopathy, lumbar region         Left lumbar radiculopathy         Trochanteric bursitis, left hip         Obesity (BMI 30.0-34.9)                To Do List           Goals (5 Years of Data)     None      Follow-Up and Disposition     Return in about 3 months (around 2017).       These Medications        Disp Refills Start End    gabapentin (NEURONTIN) 300 MG capsule 90 capsule 2 3/17/2017 2017    Take 1 capsule (300 mg total) by mouth 3 (three) times daily. - Oral    Pharmacy: [facility]/pharmacy #[id number] - [place] [address] Ph #: [phone]       tramadol (ULTRAM) 50 mg tablet 150 tablet 1 3/17/2017 2017    Take 1-2 tablets ( mg total) by mouth 3 (three) times daily as needed for Pain. - Oral    Pharmacy: [facility]/pharmacy #[id number]  [place] [id number] [facility] Ph #: [phone]       meloxicam (MOBIC) 15 MG tablet   3/17/2017     Take 1 tablet (15 mg total) by mouth once daily. - Oral    [id number]  [place]  [address] [facility] Ph #: [phone]         [facility] On Call     [facility] On Call Nurse Care Line -  Assistance  Registered nurses in the [facility] provide clinical advisement, health education, appointment booking, and other advisory services.  Call for this free service at [phone].             Medications           Message regarding Medications     Verify the changes and/or additions to your medication regime listed below are the same as discussed with your clinician today.  If  any of these changes or additions are incorrect, please notify your healthcare provider.        CHANGE how you are taking these medications     Start Taking Instead of    gabapentin (NEURONTIN) 300 MG capsule gabapentin (NEURONTIN) 300 MG capsule    Dosage:  Take 1 capsule (300 mg total) by mouth 3 (three) times daily. Dosage:  Take 1 capsule (300 mg total) by mouth every evening. In 1 wk, if no relief, increase to twice daily.In another wk, may increase to 3 times.    Reason for Change:  Reorder     tramadol (ULTRAM) 50 mg tablet tramadol (ULTRAM) 50 mg tablet    Dosage:  Take 1-2 tablets ( mg total) by mouth 3 (three) times daily as needed for Pain. Dosage:  Take 1 tablet (50 mg total) by mouth every 6 (six) hours as needed for Pain.    Reason for Change:  Reorder            Verify that the below list of medications is an accurate representation of the medications you are currently taking.  If none reported, the list may be blank. If incorrect, please contact your healthcare provider. Carry this list with you in case of emergency.           Current Medications     alprazolam (XANAX) 1 MG tablet Take 1 mg by mouth 3 (three) times daily.    butalbital-acetaminophen-caffeine -40 mg (FIORICET, ESGIC) -40 mg per tablet TAKE 1 TO 2 TABLETS BY MOUTH EVERY 6 HOURS AS NEEDED FOR PAIN    celecoxib (CELEBREX) 200 MG capsule Take 1 capsule (200 mg total) by mouth once daily. If no relief, may increase dose to twice per day.    cloNIDine (CATAPRES) 0.2 MG tablet TAKE 1 TABLET BY MOUTH EVERY EVENING    cyclobenzaprine (FLEXERIL) 10 MG tablet Take 10 mg by mouth 3 (three) times daily as needed.    diphenhydrAMINE (BENADRYL) 50 MG capsule Take 50 mg by mouth every evening.    gabapentin (NEURONTIN) 300 MG capsule Take 1 capsule (300 mg total) by mouth 3 (three) times daily.    hydrochlorothiazide (MICROZIDE) 12.5 mg capsule TAKE ONE CAPSULE BY MOUTH EVERY DAY AS NEEDED FOR SWELLING    hydrocodone-acetaminophen  "7.5-325mg (NORCO) 7.5-325 mg per tablet Take 1 tablet by mouth every 6 (six) hours as needed.    lidocaine-prilocaine (EMLA) cream     meloxicam (MOBIC) 15 MG tablet Take 1 tablet (15 mg total) by mouth once daily.    methocarbamol (ROBAXIN) 750 MG Tab TAKE 1 TABLET (750 MG TOTAL) BY MOUTH 4 (FOUR) TIMES DAILY.    metronidazole (METROGEL) 0.75 % vaginal gel Place 1 applicator vaginally every evening.    naproxen (NAPROSYN) 500 MG tablet TK 1 T PO BID FOR 10 DAYS    tramadol (ULTRAM) 50 mg tablet Take 1-2 tablets ( mg total) by mouth 3 (three) times daily as needed for Pain.           Clinical Reference Information           Your Vitals Were     BP Pulse Height Weight BMI    81/56 52 5' 4" (1.626 m) 89 kg (196 lb 3.4 oz) 33.68 kg/m2      Blood Pressure          Most Recent Value    BP  (!)  81/56      Allergies as of 3/17/2017     Cranberry    Gabapentin    Hydroxyzine    Ibuprofen    Meloxicam    Toradol [Ketorolac]    Amoxicillin    Lasix [Furosemide]      Immunizations Administered on Date of Encounter - 3/17/2017     None      Orders Placed During Today's Visit      Normal Orders This Visit    [facility] FOR HOME USE       [facility] Sign-Up     Activating your [facility] account is as easy as 1-2-3!     1) Visit [email or web address], select Sign Up Now, enter this activation code and your date of birth, then select Next.  [email or web address]  Expires: 5/1/2017 11:50 AM      2) Create a username and password to use when you visit [facility] in the future and select a security question in case you lose your password and select Next.    3) Enter your e-mail address and click Sign Up!    Additional Information  If you have questions, please e-mail [email or web address] or call [phone] to talk to our [facility] staff. Remember, [facility] is NOT to be used for urgent needs. For medical emergencies, dial 911.         Smoking Cessation     If you would like to quit smoking:   You may be eligible for free services if you are " a [place] resident and started smoking cigarettes before September 1, 1988.  Call the Smoking Cessation Trust (SCT) toll free at (405) 936-4862 or (571) 141-4416.   Call 1-800-QUIT-NOW if you do not meet the above criteria.            Language Assistance Services     ATTENTION: Language assistance services are available, free of charge. Please call [phone].      ATENCIÓN: Si habla español, tiene a willard disposición servicios gratuitos de asistencia lingüística. Llame al [phone].     CHÚ Ý: N?u b?n nói Ti?ng Vi?t, có các d?ch v? h? tr? ngôn ng? mi?n phí dành cho b?n. G?i s? [phone].         [facility] complies with applicable Federal civil rights laws and does not discriminate on the basis of race, color, national origin, age, disability, or sex.

## 2017-03-17 NOTE — PROGRESS NOTES
Subjective:       Patient ID: Mary Sanches is a 46 y.o. female.    Chief Complaint: No chief complaint on file.    HPI     HISTORY OF PRESENT ILLNESS:  Ms. Sanches is a 46-year-old white female who   presented to the Physical Medicine Clinic on 01/18/2017 for chronic low back   pain with left lumbar radiculopathy.  Her MRI was positive for mild multilevel   degenerative changes of the lumbar spine.  The patient was maintained on   meloxicam, gabapentin and p.r.n. tramadol.  She was advised to lose weight and   to do a regular exercise program.    The patient is coming today to the clinic for followup.  She reports that about   four weeks ago she fell from tripping and had blunt trauma to her right side.    She was treated at an urgent care clinic.  No x-rays were deemed necessary.  She   had to use more pain medication since.    Her back pain has been worse.  It is a constant throbbing and stabbing pain in   the lumbar spine and across her back.  She continues to have occasional shooting   pain to the left foot with numbness.  Her pain is worse with activity and   better with rest.  Her maximum pain is 5-6/10 and minimum 3-4/10.  Today, it is   5-6/10.  The patient complains of mild left lower extremity weakness.  She   denies any bowel or bladder incontinence.    She is currently taking meloxicam 15 mg p.o. daily.  She is taking gabapentin   300 mg p.o. b.i.d.  She takes tramadol 50 mg p.r.n., usually one to two tablets   a couple times per day.      MS/HN  dd: 03/17/2017 11:58:30 (CDT)  td: 03/18/2017 08:35:50 (CDT)  Doc ID   #[id number]  Job ID #[id number]    CC:         Review of Systems   Constitutional: Positive for fatigue.   Eyes: Negative for visual disturbance.   Respiratory: Negative for shortness of breath.    Cardiovascular: Negative for chest pain.   Gastrointestinal: Negative for blood in stool, constipation, nausea and vomiting.   Genitourinary: Negative for difficulty urinating.   Musculoskeletal:  Positive for back pain and neck pain. Negative for arthralgias, gait problem and joint swelling.   Neurological: Negative for dizziness and headaches.   Psychiatric/Behavioral: Negative for behavioral problems and sleep disturbance.       Objective:      Physical Exam   Constitutional: She is oriented to person, place, and time. She appears well-developed and well-nourished.   HENT:   Head: Normocephalic and atraumatic.   Neck: Normal range of motion.   Musculoskeletal:   BLE:  ROM:full.  Mild bilateral knee crepitus.   Strength:      RLE: 5/5 at hip flexion, knee extension, ankle DF/PF, EHL.     LLE:  5/5 at hip flexion, knee extension, ankle DF/PF, EHL.  Sensation to pinprick:     RLE: mild decrease.      LLE: mild decrease.    DTR:     RLE: +1 knee, +1 ankle.    LLE: +1 knee, +1 ankle.  SLR (sitting):      RLE: +ve.      LLE: +ve.     +ve moderate tenderness over lumbar spine to mild palaption  +ve moderate tenderness over Lt trochanteric area.       Neurological: She is alert and oriented to person, place, and time.   Skin: Skin is warm.   Psychiatric: She has a normal mood and affect.               Assessment:       1. Chronic midline low back pain with left-sided sciatica    2. Osteoarthritis of spine with radiculopathy, lumbar region    3. Left lumbar radiculopathy    4. Trochanteric bursitis, left hip    5. Obesity (BMI 30.0-34.9)        Plan:     Diagnoses and all orders for this visit:    Chronic midline low back pain with left-sided sciatica  -     gabapentin (NEURONTIN) 300 MG capsule; Take 1 capsule (300 mg total) by mouth 3 (three) times daily.  -     tramadol (ULTRAM) 50 mg tablet; Take 1-2 tablets ( mg total) by mouth 3 (three) times daily as needed for Pain.  -     meloxicam (MOBIC) 15 MG tablet; Take 1 tablet (15 mg total) by mouth once daily.    Osteoarthritis of spine with radiculopathy, lumbar region  -     gabapentin (NEURONTIN) 300 MG capsule; Take 1 capsule (300 mg total) by mouth 3  (three) times daily.    Left lumbar radiculopathy  -     gabapentin (NEURONTIN) 300 MG capsule; Take 1 capsule (300 mg total) by mouth 3 (three) times daily.    Trochanteric bursitis, left hip  -     tramadol (ULTRAM) 50 mg tablet; Take 1-2 tablets ( mg total) by mouth 3 (three) times daily as needed for Pain.  -     meloxicam (MOBIC) 15 MG tablet; Take 1 tablet (15 mg total) by mouth once daily.    Obesity (BMI 30.0-34.9)        -     Weight loss was encouraged. She gained 4.4 lbs since last visit.    - Regular home exercise program was encouraged.  - Return in about 3 months (around 6/17/2017).    - Weight loss was encouraged.  - The patient was encouraged to adopt a regular Home Exercise Program, and provided with printouts.  - No Follow-up on file.

## 2017-03-27 RX ORDER — BUTALBITAL, ACETAMINOPHEN AND CAFFEINE 50; 325; 40 MG/1; MG/1; MG/1
TABLET ORAL
Qty: 30 TABLET | Refills: 3 | Status: SHIPPED | OUTPATIENT
Start: 2017-03-27 | End: 2017-05-17 | Stop reason: SDUPTHER

## 2017-03-30 DIAGNOSIS — M47.26 OSTEOARTHRITIS OF SPINE WITH RADICULOPATHY, LUMBAR REGION: ICD-10-CM

## 2017-03-30 DIAGNOSIS — M70.62 TROCHANTERIC BURSITIS, LEFT HIP: ICD-10-CM

## 2017-03-30 DIAGNOSIS — M54.42 CHRONIC MIDLINE LOW BACK PAIN WITH LEFT-SIDED SCIATICA: ICD-10-CM

## 2017-03-30 DIAGNOSIS — G89.29 CHRONIC MIDLINE LOW BACK PAIN WITH LEFT-SIDED SCIATICA: ICD-10-CM

## 2017-03-30 RX ORDER — CELECOXIB 200 MG/1
200 CAPSULE ORAL DAILY
Qty: 60 CAPSULE | Refills: 1 | Status: SHIPPED | OUTPATIENT
Start: 2017-03-30 | End: 2017-04-24 | Stop reason: SINTOL

## 2017-04-07 ENCOUNTER — TELEPHONE (OUTPATIENT)
Dept: PHYSICAL MEDICINE AND REHAB | Facility: CLINIC | Age: 47
End: 2017-04-07

## 2017-04-07 RX ORDER — HYDROCODONE BITARTRATE AND ACETAMINOPHEN 7.5; 325 MG/1; MG/1
1 TABLET ORAL 3 TIMES DAILY PRN
Qty: 90 TABLET | Refills: 0 | Status: SHIPPED | OUTPATIENT
Start: 2017-04-07 | End: 2017-04-24 | Stop reason: DRUGHIGH

## 2017-04-08 NOTE — TELEPHONE ENCOUNTER
----- Message from Suzanne Contreras MA sent at 4/7/2017  9:05 AM CDT -----  Contact: self @ [phone]      ----- Message -----     From: Anette Duvall     Sent: 4/7/2017   8:31 AM       To: Darryl KRAUS Staff    Pt says she left a message yesterday requesting a different prescription because her current medication is no longer working.  Pt says nothing has been called in yet.  Pt says she fell again.  pls call.

## 2017-04-14 RX ORDER — HYDROCHLOROTHIAZIDE 12.5 MG/1
CAPSULE ORAL
Qty: 30 CAPSULE | Refills: 0 | Status: SHIPPED | OUTPATIENT
Start: 2017-04-14 | End: 2017-05-10 | Stop reason: SDUPTHER

## 2017-04-24 ENCOUNTER — OFFICE VISIT (OUTPATIENT)
Dept: PHYSICAL MEDICINE AND REHAB | Facility: CLINIC | Age: 47
End: 2017-04-24
Payer: MEDICARE

## 2017-04-24 ENCOUNTER — LAB VISIT (OUTPATIENT)
Dept: LAB | Facility: HOSPITAL | Age: 47
End: 2017-04-24
Attending: PHYSICAL MEDICINE & REHABILITATION
Payer: MEDICARE

## 2017-04-24 VITALS
HEART RATE: 73 BPM | DIASTOLIC BLOOD PRESSURE: 78 MMHG | WEIGHT: 185.19 LBS | BODY MASS INDEX: 31.62 KG/M2 | HEIGHT: 64 IN | SYSTOLIC BLOOD PRESSURE: 117 MMHG

## 2017-04-24 DIAGNOSIS — E66.9 OBESITY (BMI 30.0-34.9): ICD-10-CM

## 2017-04-24 DIAGNOSIS — G89.29 CHRONIC MIDLINE LOW BACK PAIN WITH LEFT-SIDED SCIATICA: ICD-10-CM

## 2017-04-24 DIAGNOSIS — M47.26 OSTEOARTHRITIS OF SPINE WITH RADICULOPATHY, LUMBAR REGION: ICD-10-CM

## 2017-04-24 DIAGNOSIS — G89.29 CHRONIC MIDLINE LOW BACK PAIN WITH LEFT-SIDED SCIATICA: Primary | ICD-10-CM

## 2017-04-24 DIAGNOSIS — M54.42 CHRONIC MIDLINE LOW BACK PAIN WITH LEFT-SIDED SCIATICA: Primary | ICD-10-CM

## 2017-04-24 DIAGNOSIS — M70.62 TROCHANTERIC BURSITIS, LEFT HIP: ICD-10-CM

## 2017-04-24 DIAGNOSIS — M54.42 CHRONIC MIDLINE LOW BACK PAIN WITH LEFT-SIDED SCIATICA: ICD-10-CM

## 2017-04-24 DIAGNOSIS — M54.16 LEFT LUMBAR RADICULOPATHY: ICD-10-CM

## 2017-04-24 LAB
AMPHET+METHAMPHET UR QL: NEGATIVE
BARBITURATES UR QL SCN>200 NG/ML: NEGATIVE
BENZODIAZ UR QL SCN>200 NG/ML: NEGATIVE
BZE UR QL SCN: NEGATIVE
CANNABINOIDS UR QL SCN: NEGATIVE
CREAT UR-MCNC: 242 MG/DL
ETHANOL UR-MCNC: <10 MG/DL
METHADONE UR QL SCN>300 NG/ML: NEGATIVE
OPIATES UR QL SCN: NEGATIVE
PCP UR QL SCN>25 NG/ML: NEGATIVE
TOXICOLOGY INFORMATION: NORMAL

## 2017-04-24 PROCEDURE — 80307 DRUG TEST PRSMV CHEM ANLYZR: CPT

## 2017-04-24 PROCEDURE — 99999 PR PBB SHADOW E&M-EST. PATIENT-LVL III: CPT | Mod: PBBFAC,,, | Performed by: PHYSICAL MEDICINE & REHABILITATION

## 2017-04-24 PROCEDURE — 99214 OFFICE O/P EST MOD 30 MIN: CPT | Mod: S$GLB,,, | Performed by: PHYSICAL MEDICINE & REHABILITATION

## 2017-04-24 PROCEDURE — 1160F RVW MEDS BY RX/DR IN RCRD: CPT | Mod: S$GLB,,, | Performed by: PHYSICAL MEDICINE & REHABILITATION

## 2017-04-24 RX ORDER — DULOXETIN HYDROCHLORIDE 30 MG/1
30 CAPSULE, DELAYED RELEASE ORAL DAILY
Qty: 30 CAPSULE | Refills: 1 | Status: SHIPPED | OUTPATIENT
Start: 2017-04-24 | End: 2017-05-16

## 2017-04-24 RX ORDER — HYDROCODONE BITARTRATE AND ACETAMINOPHEN 10; 325 MG/1; MG/1
1 TABLET ORAL 3 TIMES DAILY PRN
Qty: 90 TABLET | Refills: 0 | Status: SHIPPED | OUTPATIENT
Start: 2017-04-28 | End: 2017-05-16

## 2017-04-24 NOTE — PROGRESS NOTES
"Subjective:       Patient ID: Mary Sanches is a 47 y.o. female.    Chief Complaint: No chief complaint on file.    HPI     Ms. Sanches is a 47-year-old white female who is followed up in [facility] for chronic low back pain with left lumbar radiculopathy.  Her   last visit to the clinic was on 03/17/2017.  She was maintained on gabapentin,   meloxicam and p.r.n. tramadol.    The patient is coming today to the clinic for followup.  Since her last visit,   tramadol was switched to hydrocodone/APAP due to suboptimal pain control.  The   patient reports that her back pain got worse about 3 days ago after tripping and   falling at home.  Her pain is a constant aching sensation in the lumbar spine   and across her back.  She has occasional shooting pain to the left foot with   numbness.  Her pain is worse with activity and better with rest.  Her maximum   pain is 8/10 and minimum 5-6/10.  Today, it is 8/10.  The patient complains of   mild lower extremity weakness, but mostly secondary to pain.  She denies any   bowel or bladder incontinence.    She is currently taking meloxicam 15 mg p.o. daily.  She takes tramadol 50 mg   p.r.n., but with little help.  She takes hydrocodone/APAP 7.5/325 p.r.n.,   usually 3 times per day, but recently 4 to 5 times with exacerbation of her back   pain.  She was on gabapentin, but stopped it because she was having "kidney   pains," which apparently subsided after stopping it.  She has been dieting and   exercising and reports losing a few pounds since her last visit.      MS/HN  dd: 04/24/2017 15:40:44 (CDT)  td: 04/25/2017 09:29:02 (CDT)  Doc ID   #[id number]  Job ID #[id number]    CC:           Review of Systems   Constitutional: Positive for fatigue.   Eyes: Negative for visual disturbance.   Respiratory: Negative for shortness of breath.    Cardiovascular: Negative for chest pain.   Gastrointestinal: Negative for blood in stool, constipation, nausea and vomiting. "   Genitourinary: Negative for difficulty urinating.   Musculoskeletal: Positive for back pain and neck pain. Negative for arthralgias, gait problem and joint swelling.   Neurological: Negative for dizziness and headaches.   Psychiatric/Behavioral: Negative for behavioral problems and sleep disturbance.       Objective:      Physical Exam   Constitutional: She is oriented to person, place, and time. She appears well-developed and well-nourished.   HENT:   Head: Normocephalic and atraumatic.   Neck: Normal range of motion.   Musculoskeletal:   BLE:  ROM:full.  Mild bilateral knee crepitus.   Strength:      RLE: 5/5 at hip flexion, knee extension, ankle DF/PF, EHL.     LLE:  5/5 at hip flexion, knee extension, ankle DF/PF, EHL.  Sensation to pinprick:     RLE: mild decrease.      LLE: mild decrease.    DTR:     RLE: +1 knee, +1 ankle.    LLE: +1 knee, +1 ankle.  SLR (sitting):      RLE: +ve.      LLE: +ve.     +ve moderate tenderness over lumbar spine to mild palaption  +ve moderate tenderness over Lt trochanteric area.       Neurological: She is alert and oriented to person, place, and time.   Skin: Skin is warm.   Psychiatric: She has a normal mood and affect.           Assessment:       1. Chronic midline low back pain with left-sided sciatica    2. Osteoarthritis of spine with radiculopathy, lumbar region    3. Left lumbar radiculopathy    4. Trochanteric bursitis, left hip    5. Obesity (BMI 30.0-34.9)        Plan:     - Continue meloxicam (MOBIC) 15 MG tablet; Take 1 tablet (15 mg total) by mouth once daily.  - Start duloxetine (CYMBALTA) 30 MG capsule; Take 1 capsule (30 mg total) by mouth once daily. In 1-2 weeks, if no relief, may increase dose to 2 capsules once daily. Call for refills.  - Increase hydrocodone-acetaminophen 10-325mg (NORCO)  mg Tab; Take 1 tablet by mouth 3 (three) times daily as needed.  - Urine toxicology screen was ordered (for compliance).  - The patient was commended on her weight  loss (185.2 lbs today vs 196.2 lbs last visit) and encouraged to continue with weight loss efforts  - Regular home exercise program was encouraged.  - Return in about 2 months (around 6/24/2017).     This was a 25 minute visit, more than 50% of which was spent counseling the patient about the diagnosis and the treatment plan.

## 2017-04-26 ENCOUNTER — TELEPHONE (OUTPATIENT)
Dept: PHYSICAL MEDICINE AND REHAB | Facility: CLINIC | Age: 47
End: 2017-04-26

## 2017-04-27 NOTE — TELEPHONE ENCOUNTER
I called the pt.  I informed her that her urine toxicology was -ve for opiates, which she is supposed to be taking.  I told her I cannot prescribe opiates for her any longer.

## 2017-04-28 ENCOUNTER — TELEPHONE (OUTPATIENT)
Dept: PHYSICAL MEDICINE AND REHAB | Facility: CLINIC | Age: 47
End: 2017-04-28

## 2017-04-28 NOTE — TELEPHONE ENCOUNTER
Called and spoke with patient.  I will call her once the  Lab order is in If needed.    Kellee KRAUS Staff      Caller: Patient [phone] (Today,  9:54 AM)            Patient is calling to speak with nurse in regards to getting a test scheduled to test her blood or urine to prove that she is taking her medication, patient is very hurt by the fact that Dr. Andrews thinks she  is not taking her medication. Please call

## 2017-05-10 RX ORDER — HYDROCHLOROTHIAZIDE 12.5 MG/1
CAPSULE ORAL
Qty: 30 CAPSULE | Refills: 0 | Status: SHIPPED | OUTPATIENT
Start: 2017-05-10 | End: 2017-05-31 | Stop reason: SDUPTHER

## 2017-05-11 DIAGNOSIS — M54.42 CHRONIC MIDLINE LOW BACK PAIN WITH LEFT-SIDED SCIATICA: ICD-10-CM

## 2017-05-11 DIAGNOSIS — M70.62 TROCHANTERIC BURSITIS, LEFT HIP: ICD-10-CM

## 2017-05-11 DIAGNOSIS — G89.29 CHRONIC MIDLINE LOW BACK PAIN WITH LEFT-SIDED SCIATICA: ICD-10-CM

## 2017-05-11 RX ORDER — MELOXICAM 15 MG/1
15 TABLET ORAL DAILY
Start: 2017-05-11 | End: 2017-05-16

## 2017-05-11 NOTE — TELEPHONE ENCOUNTER
----- Message from Gilmer Hooks sent at 5/11/2017  9:49 AM CDT -----  Contact: Self [phone]  Patient is requesting a return call, the medication ( meloxicam (MOBIC) 15 MG tablet )( duloxetine (CYMBALTA) 30 MG capsule)( ? ) ( ?) she's having side effect, patient is requesting a refill ( tramadol (ULTRAM) 50 mg tablet )     [facility]/pharmacy #[id number] - [place] [address]  [place] [address]  Phone: [phone] Fax: [phone]

## 2017-05-11 NOTE — TELEPHONE ENCOUNTER
Called and spoke with patient.  She asked if the Doctor can give her a call.    Gilmer KRAUS Staff         Caller: Self [phone] (Today,  9:49 AM)                   Patient is requesting a return call, the medication ( meloxicam (MOBIC) 15 MG tablet )( duloxetine (CYMBALTA) 30 MG capsule)( ? ) ( ?) she's having side effect, patient is requesting a refill ( tramadol (ULTRAM) 50 mg tablet )     [facility]/pharmacy #[id number] - [place] - [address] [place] [address]   [place] [address]   Phone: [phone] Fax: [phone]                03/17/17 last RX refill  04/24/17 last Office visit  06/26/17 [facility]

## 2017-05-16 ENCOUNTER — OFFICE VISIT (OUTPATIENT)
Dept: INTERNAL MEDICINE | Facility: CLINIC | Age: 47
End: 2017-05-16
Payer: MEDICARE

## 2017-05-16 VITALS
RESPIRATION RATE: 14 BRPM | HEIGHT: 64 IN | WEIGHT: 185.44 LBS | SYSTOLIC BLOOD PRESSURE: 108 MMHG | HEART RATE: 68 BPM | TEMPERATURE: 98 F | BODY MASS INDEX: 31.66 KG/M2 | DIASTOLIC BLOOD PRESSURE: 69 MMHG

## 2017-05-16 DIAGNOSIS — E66.9 OBESITY (BMI 30-39.9): ICD-10-CM

## 2017-05-16 DIAGNOSIS — G43.009 MIGRAINE WITHOUT AURA AND WITHOUT STATUS MIGRAINOSUS, NOT INTRACTABLE: ICD-10-CM

## 2017-05-16 DIAGNOSIS — M47.26 OSTEOARTHRITIS OF SPINE WITH RADICULOPATHY, LUMBAR REGION: Primary | ICD-10-CM

## 2017-05-16 DIAGNOSIS — F41.9 ANXIETY DISORDER, UNSPECIFIED TYPE: ICD-10-CM

## 2017-05-16 DIAGNOSIS — R60.9 EDEMA, UNSPECIFIED TYPE: ICD-10-CM

## 2017-05-16 DIAGNOSIS — M46.1 SACROILIITIS: ICD-10-CM

## 2017-05-16 DIAGNOSIS — F13.20 SEDATIVE DEPENDENCE: ICD-10-CM

## 2017-05-16 PROCEDURE — 99214 OFFICE O/P EST MOD 30 MIN: CPT | Mod: S$GLB,,, | Performed by: INTERNAL MEDICINE

## 2017-05-16 PROCEDURE — 99999 PR PBB SHADOW E&M-EST. PATIENT-LVL III: CPT | Mod: PBBFAC,,, | Performed by: INTERNAL MEDICINE

## 2017-05-16 PROCEDURE — 99499 UNLISTED E&M SERVICE: CPT | Mod: S$GLB,,, | Performed by: INTERNAL MEDICINE

## 2017-05-16 PROCEDURE — 1160F RVW MEDS BY RX/DR IN RCRD: CPT | Mod: S$GLB,,, | Performed by: INTERNAL MEDICINE

## 2017-05-16 RX ORDER — TEMAZEPAM 7.5 MG/1
15 CAPSULE ORAL NIGHTLY PRN
Status: ON HOLD | COMMUNITY
End: 2019-12-31 | Stop reason: HOSPADM

## 2017-05-16 RX ORDER — TRAMADOL HYDROCHLORIDE 50 MG/1
50 TABLET ORAL EVERY 12 HOURS PRN
Qty: 60 TABLET | Refills: 3 | Status: SHIPPED | OUTPATIENT
Start: 2017-05-16 | End: 2017-05-26

## 2017-05-16 NOTE — PROGRESS NOTES
Subjective:       Patient ID: Mary Sanches is a 47 y.o. female.    Chief Complaint: Medication Problem    HPI   47 y.o. with chronic LBP/arthropathy with right sided sciatica, Sacroiliitis, Migraines, Obesity, anxiety, sedative dependence is here for f/u. Pt does not want to continue on the Gabapentin, Mobic, Celebrex or the Cymbalta as she feels it is making her fall and also causing lip swelling. She is requesting Tramadol only for the pain.   Review of Systems   Constitutional: Negative for activity change, appetite change, chills, diaphoresis, fatigue, fever and unexpected weight change.   HENT: Negative for postnasal drip, rhinorrhea, sinus pressure, sneezing, sore throat, trouble swallowing and voice change.    Respiratory: Negative for cough, shortness of breath and wheezing.    Cardiovascular: Negative for chest pain, palpitations and leg swelling.   Gastrointestinal: Negative for abdominal pain, blood in stool, constipation, diarrhea, nausea and vomiting.   Genitourinary: Negative for dysuria.   Musculoskeletal: Positive for arthralgias, back pain and myalgias.   Skin: Negative for rash and wound.   Allergic/Immunologic: Negative for environmental allergies and food allergies.   Hematological: Negative for adenopathy. Does not bruise/bleed easily.       Objective:      Physical Exam   Constitutional: She is oriented to person, place, and time. She appears well-developed and well-nourished. No distress.   HENT:   Head: Normocephalic and atraumatic.   Right Ear: External ear normal.   Left Ear: External ear normal.   Nose: Nose normal.   Mouth/Throat: Oropharynx is clear and moist. No oropharyngeal exudate.   Eyes: Conjunctivae and EOM are normal. Pupils are equal, round, and reactive to light. Right eye exhibits no discharge. Left eye exhibits no discharge. No scleral icterus.   Neck: Neck supple. No JVD present.   Cardiovascular: Normal rate, regular rhythm, normal heart sounds and intact distal  pulses.    Pulmonary/Chest: Effort normal and breath sounds normal. No respiratory distress. She has no wheezes. She has no rales.   Abdominal: Soft. Bowel sounds are normal. There is no tenderness.   Musculoskeletal: She exhibits no edema.        Lumbar back: She exhibits tenderness and pain.   Lymphadenopathy:     She has no cervical adenopathy.   Neurological: She is alert and oriented to person, place, and time.   Skin: Skin is warm and dry. No rash noted. She is not diaphoretic. No pallor.       Assessment:       1. Osteoarthritis of spine with radiculopathy, lumbar region    2. Migraine without aura and without status migrainosus, not intractable    3. Obesity (BMI 30-39.9)    4. Anxiety disorder, unspecified type    5. Edema, unspecified type    6. Sacroiliitis    7. Sedative dependence        Plan:    1. LBP/arthropathy with left sided sciatica- d/c Gabapentin/Cymbalta/Mobic       Rx Tramadol 50 mg BID PRN       Pt has been evaluated by Pain Management and is s/p PT/OT(no improvement)       Pt has seen PM&R   2. Migraines without aura- stable on Fioricet PRN   3. Obesity- pt advised on proper diet/exercise for weight loss   4. Anxiety- stable on Xanax, followed by Psyc   5. Edema- stable on HCTZ 12.5 mg daily PRN   6. Sacroiliitis- stable, continue to monitor   7. Sedative dependence- stable, continue to monitor

## 2017-05-17 RX ORDER — BUTALBITAL, ACETAMINOPHEN AND CAFFEINE 50; 325; 40 MG/1; MG/1; MG/1
TABLET ORAL
Qty: 30 TABLET | Refills: 3 | Status: SHIPPED | OUTPATIENT
Start: 2017-05-17 | End: 2017-08-22 | Stop reason: SDUPTHER

## 2017-05-19 ENCOUNTER — LAB VISIT (OUTPATIENT)
Dept: LAB | Facility: HOSPITAL | Age: 47
End: 2017-05-19
Attending: INTERNAL MEDICINE
Payer: MEDICARE

## 2017-05-19 DIAGNOSIS — F41.9 ANXIETY DISORDER, UNSPECIFIED TYPE: ICD-10-CM

## 2017-05-19 DIAGNOSIS — G43.009 MIGRAINE WITHOUT AURA AND WITHOUT STATUS MIGRAINOSUS, NOT INTRACTABLE: ICD-10-CM

## 2017-05-19 DIAGNOSIS — Z00.00 ANNUAL PHYSICAL EXAM: ICD-10-CM

## 2017-05-19 DIAGNOSIS — E66.9 OBESITY (BMI 30-39.9): ICD-10-CM

## 2017-05-19 DIAGNOSIS — M47.816 LUMBAR ARTHROPATHY: ICD-10-CM

## 2017-05-19 LAB
ALBUMIN SERPL BCP-MCNC: 3.4 G/DL
ALP SERPL-CCNC: 87 U/L
ALT SERPL W/O P-5'-P-CCNC: 37 U/L
ANION GAP SERPL CALC-SCNC: 11 MMOL/L
AST SERPL-CCNC: 34 U/L
BASOPHILS # BLD AUTO: 0.03 K/UL
BASOPHILS NFR BLD: 0.3 %
BILIRUB SERPL-MCNC: 0.4 MG/DL
BUN SERPL-MCNC: 12 MG/DL
CALCIUM SERPL-MCNC: 9.5 MG/DL
CHLORIDE SERPL-SCNC: 103 MMOL/L
CHOLEST/HDLC SERPL: 4.4 {RATIO}
CO2 SERPL-SCNC: 24 MMOL/L
CREAT SERPL-MCNC: 0.8 MG/DL
DIFFERENTIAL METHOD: ABNORMAL
EOSINOPHIL # BLD AUTO: 0.3 K/UL
EOSINOPHIL NFR BLD: 2.7 %
ERYTHROCYTE [DISTWIDTH] IN BLOOD BY AUTOMATED COUNT: 13.7 %
EST. GFR  (AFRICAN AMERICAN): >60 ML/MIN/1.73 M^2
EST. GFR  (NON AFRICAN AMERICAN): >60 ML/MIN/1.73 M^2
GLUCOSE SERPL-MCNC: 109 MG/DL
HCT VFR BLD AUTO: 40.8 %
HDL/CHOLESTEROL RATIO: 22.5 %
HDLC SERPL-MCNC: 160 MG/DL
HDLC SERPL-MCNC: 36 MG/DL
HGB BLD-MCNC: 14.2 G/DL
LDLC SERPL CALC-MCNC: 99.4 MG/DL
LYMPHOCYTES # BLD AUTO: 3.2 K/UL
LYMPHOCYTES NFR BLD: 33.1 %
MCH RBC QN AUTO: 32.3 PG
MCHC RBC AUTO-ENTMCNC: 34.8 %
MCV RBC AUTO: 93 FL
MONOCYTES # BLD AUTO: 0.7 K/UL
MONOCYTES NFR BLD: 7.5 %
NEUTROPHILS # BLD AUTO: 5.4 K/UL
NEUTROPHILS NFR BLD: 56.2 %
NONHDLC SERPL-MCNC: 124 MG/DL
PLATELET # BLD AUTO: 323 K/UL
PMV BLD AUTO: 10.7 FL
POTASSIUM SERPL-SCNC: 3.6 MMOL/L
PROT SERPL-MCNC: 8.2 G/DL
RBC # BLD AUTO: 4.4 M/UL
SODIUM SERPL-SCNC: 138 MMOL/L
TRIGL SERPL-MCNC: 123 MG/DL
TSH SERPL DL<=0.005 MIU/L-ACNC: 1.08 UIU/ML
WBC # BLD AUTO: 9.56 K/UL

## 2017-05-19 PROCEDURE — 84443 ASSAY THYROID STIM HORMONE: CPT

## 2017-05-19 PROCEDURE — 36415 COLL VENOUS BLD VENIPUNCTURE: CPT | Mod: PO

## 2017-05-19 PROCEDURE — 80061 LIPID PANEL: CPT

## 2017-05-19 PROCEDURE — 80053 COMPREHEN METABOLIC PANEL: CPT

## 2017-05-19 PROCEDURE — 85025 COMPLETE CBC W/AUTO DIFF WBC: CPT

## 2017-05-30 ENCOUNTER — TELEPHONE (OUTPATIENT)
Dept: INTERNAL MEDICINE | Facility: CLINIC | Age: 47
End: 2017-05-30

## 2017-05-30 NOTE — TELEPHONE ENCOUNTER
----- Message from Shani Davis sent at 5/30/2017  8:41 AM CDT -----  Contact: Mpio/[phone]  Pt would like to let Dr know that she fell yesterday and went to urgent care where they gave her a shot. Pt was just notifying Dr. Pt is requesting a call from nurse. Please advise.

## 2017-05-31 RX ORDER — HYDROCHLOROTHIAZIDE 12.5 MG/1
CAPSULE ORAL
Qty: 30 CAPSULE | Refills: 0 | Status: SHIPPED | OUTPATIENT
Start: 2017-05-31 | End: 2017-06-28 | Stop reason: SDUPTHER

## 2017-06-08 ENCOUNTER — TELEPHONE (OUTPATIENT)
Dept: INTERNAL MEDICINE | Facility: CLINIC | Age: 47
End: 2017-06-08

## 2017-06-08 NOTE — TELEPHONE ENCOUNTER
----- Message from Judie Bhatti sent at 6/8/2017  1:40 PM CDT -----  Contact: patient [phone]  Pt said that she is returning the nurse's call.

## 2017-06-08 NOTE — TELEPHONE ENCOUNTER
----- Message from Km Lobo sent at 6/8/2017 11:29 AM CDT -----  Contact: Self [phone]  Pt states she received her lab results in the mail and has questions,Please call

## 2017-06-28 RX ORDER — HYDROCHLOROTHIAZIDE 12.5 MG/1
CAPSULE ORAL
Qty: 30 CAPSULE | Refills: 0 | Status: SHIPPED | OUTPATIENT
Start: 2017-06-28 | End: 2017-07-26 | Stop reason: SDUPTHER

## 2017-06-28 RX ORDER — BUTALBITAL, ACETAMINOPHEN AND CAFFEINE 50; 325; 40 MG/1; MG/1; MG/1
TABLET ORAL
Qty: 30 TABLET | Refills: 3 | OUTPATIENT
Start: 2017-06-28

## 2017-06-30 ENCOUNTER — TELEPHONE (OUTPATIENT)
Dept: PHYSICAL MEDICINE AND REHAB | Facility: CLINIC | Age: 47
End: 2017-06-30

## 2017-06-30 ENCOUNTER — OFFICE VISIT (OUTPATIENT)
Dept: INTERNAL MEDICINE | Facility: CLINIC | Age: 47
End: 2017-06-30
Payer: MEDICARE

## 2017-06-30 VITALS — HEIGHT: 64 IN | BODY MASS INDEX: 32.78 KG/M2 | WEIGHT: 192 LBS

## 2017-06-30 DIAGNOSIS — M47.26 OSTEOARTHRITIS OF SPINE WITH RADICULOPATHY, LUMBAR REGION: ICD-10-CM

## 2017-06-30 DIAGNOSIS — R53.81 PHYSICAL DECONDITIONING: ICD-10-CM

## 2017-06-30 DIAGNOSIS — M46.1 SACROILIITIS: ICD-10-CM

## 2017-06-30 DIAGNOSIS — Z00.00 ENCOUNTER FOR PREVENTIVE HEALTH EXAMINATION: Primary | ICD-10-CM

## 2017-06-30 DIAGNOSIS — F13.20 SEDATIVE DEPENDENCE: ICD-10-CM

## 2017-06-30 DIAGNOSIS — E66.9 OBESITY (BMI 30-39.9): ICD-10-CM

## 2017-06-30 PROCEDURE — G0439 PPPS, SUBSEQ VISIT: HCPCS | Mod: S$GLB,,, | Performed by: NURSE PRACTITIONER

## 2017-06-30 PROCEDURE — 99999 PR PBB SHADOW E&M-EST. PATIENT-LVL III: CPT | Mod: PBBFAC,,, | Performed by: NURSE PRACTITIONER

## 2017-06-30 PROCEDURE — 99499 UNLISTED E&M SERVICE: CPT | Mod: S$GLB,,, | Performed by: NURSE PRACTITIONER

## 2017-06-30 NOTE — TELEPHONE ENCOUNTER
----- Message from Suzanne Contreras MA sent at 6/30/2017  3:22 PM CDT -----  Contact: self @ [phone]      ----- Message -----  From: Anette Duvall  Sent: 6/30/2017   3:14 PM  To: Darryl KRAUS Staff    Pt says her tramadol is no longer helping her back pain.  Pt is requesting something stronger.  Pt says she is having bad back pain.  pls call to discuss.        [facility]/pharmacy #[id number] - [place] - [address] [facility]  [address] [facility]  [place] [address]  Phone: [phone] Fax: [phone]

## 2017-06-30 NOTE — TELEPHONE ENCOUNTER
----- Message from Avis Baires sent at 6/30/2017  8:48 AM CDT -----  Contact: Self  Please contact pt ASAP, pt would like a sooner apt to discuss pain med. Pt can be contacted at [phone]    Thank You!

## 2017-06-30 NOTE — PATIENT INSTRUCTIONS
Counseling and Referral of Other Preventative  (Italic type indicates deductible and co-insurance are waived)    Patient Name: Mary Sanches  Today's Date: 6/30/2017      SERVICE LIMITATIONS RECOMMENDATION    Vaccines    · Pneumococcal (once after 65)    · Influenza (annually)    · Hepatitis B (if medium/high risk)    · Prevnar 13      Hepatitis B medium/high risk factors:       - End-stage renal disease       - Hemophiliacs who received Factor VII or         IX concentrates       - Clients of institutions for the mentally             retarded       - Persons who live in the same house as          a HepB carrier       - Homosexual men       - Illicit injectable drug abusers     Pneumococcal: N/A     Influenza: Recommended to patient, declined     Hepatitis B: N/A     Prevnar 13: Recommended to patient, declined    Mammogram (biennial age 50-74)  Annually (age 40 or over)  Last done 5/2016, recommend to repeat every 1  years    Pap (up to age 70 and after 70 if unknown history or abnormal study last 10 years)    Last done 2015, recommend to repeat every 3  years     The USPSTF recommends screening for cervical cancer in women age 21 to 65 years with cytology (Pap smear) every 3 years.  and The USPSTF recommends against screening for cervical cancer in women who have had a hysterectomy with removal of the cervix and who do not have a history of a high-grade precancerous lesion (cervical intraepithelial neoplasia [CASIMIRO] grade 2 or 3) or cervical cancer.     Colorectal cancer screening (to age 75)    · Fecal occult blood test (annual)  · Flexible sigmoidoscopy (5y)  · Screening colonoscopy (10y)  · Barium enema   N/A    Diabetes self-management training (no USPSTF recommendations)  Requires referral by treating physician for patient with diabetes or renal disease. 10 hours of initial DSMT sessions of no less than 30 minutes each in a continuous 12-month period. 2 hours of follow-up DSMT in subsequent years.  N/A     Bone mass measurements (age 65 & older, biennial)  Requires diagnosis related to osteoporosis or estrogen deficiency. Biennial benefit unless patient has history of long-term glucocorticoid  N/A    Glaucoma screening (no USPSTF recommendation)  Diabetes mellitus, family history   , age 50 or over    American, age 65 or over N/A    Medical nutrition therapy for diabetes or renal disease (no recommended schedule)  Requires referral by treating physician for patient with diabetes or renal disease or kidney transplant within the past 3 years.  Can be provided in same year as diabetes self-management training (DSMT), and CMS recommends medical nutrition therapy take place after DSMT. Up to 3 hours for initial year and 2 hours in subsequent years.  N/A    Cardiovascular screening blood tests (every 5 years)  · Fasting lipid panel  Order as a panel if possible  Done    Diabetes screening tests (at least every 3 years, Medicare covers annually or at 6-month intervals for prediabetic patients)  · Fasting blood sugar (FBS) or glucose tolerance test (GTT)  Patient must be diagnosed with one of the following:       - Hypertension       - Dyslipidemia       - Obesity (BMI 30kg/m2)       - Previous elevated impaired FBS or GTT       ... or any two of the following:       - Overweight (BMI 25 but <30)       - Family history of diabetes       - Age 65 or older       - History of gestational diabetes or birth of baby weighing more than 9 pounds  Done this year, repeat every year    Abdominal aortic aneurysm screening (once)  · Sonogram   Limited to patients who meet one of the following criteria:       - Men who are 65-75 years old and have smoked more than 100 cigarette in their lifetime       - Anyone with a family history of abdominal aortic aneurysm       - Anyone recommended for screening by the USPSTF  N/A    HIV screening (annually for increased risk patients)  · HIV-1 and HIV-2 by EIA, or FABIANA, rapid  antibody test or oral mucosa transudate  Patients must be at increased risk for HIV infection per USPSTF guidelines or pregnant. Tests covered annually for patient at increased risk or as requested by the patient. Pregnant patients may receive up to 3 tests during pregnancy.  Risks discussed, screening is not recommended    Smoking cessation counseling (up to 8 sessions per year)  Patients must be asymptomatic of tobacco-related conditions to receive as a preventative service.  Non-smoker    Subsequent annual wellness visit  At least 12 months since last AWV  Return in one year     The following information is provided to all patients.  This information is to help you find resources for any of the problems found today that may be affecting your health:                Living healthy guide: www.[facility].[place].[facility]      Understanding Diabetes: www.diabetes.org      Eating healthy: www.cdc.gov/healthyweight      CDC home safety checklist: www.cdc.gov/steadi/patient.html      Agency on Aging: www.goea.[place].[facility]      Alcoholics anonymous (AA): www.aa.org      Physical Activity: www.dar.nih.gov/iq7aifj      Tobacco use: www.quitwithusla.org

## 2017-06-30 NOTE — TELEPHONE ENCOUNTER
I called the pt.  I told her I am willing to give her another chance and consider restarting hydrocodone at next visit.  Meanwhile, she can take tramadol more frequently as needed.

## 2017-07-03 NOTE — PROGRESS NOTES
"Mary Sanches presented for a  Medicare AWV and comprehensive Health Risk Assessment today. The following components were reviewed and updated:    · Medical history  · Family History  · Social history  · Allergies and Current Medications  · Health Risk Assessment  · Health Maintenance  · Care Team     ** See Completed Assessments for Annual Wellness Visit within the encounter summary.**       The following assessments were completed:  · Living Situation  · CAGE  · Depression Screening  · Timed Get Up and Go  · Whisper Test  · Cognitive Function Screening  · Nutrition Screening  · ADL Screening  · PAQ Screening    Vitals:    06/30/17 0813   Weight: 87.1 kg (192 lb 0.3 oz)   Height: 5' 4" (1.626 m)     Body mass index is 32.96 kg/m².  Physical Exam   Constitutional: She is oriented to person, place, and time. She appears well-developed.   Unkept, obese   HENT:   Head: Normocephalic and atraumatic.   Nose: Nose normal.   Eyes: Conjunctivae and EOM are normal.   Neck: Normal range of motion. Neck supple.   Cardiovascular: Normal rate and regular rhythm.    No murmur heard.  Pulmonary/Chest: Effort normal and breath sounds normal.   Musculoskeletal: Normal range of motion.   Neurological: She is alert and oriented to person, place, and time.   Skin: Skin is warm and dry.   Psychiatric: Judgment and thought content normal. Her mood appears anxious. Her speech is delayed. She is agitated and hyperactive.   Nursing note and vitals reviewed.        Diagnoses and health risks identified today and associated recommendations/orders:    1. Encounter for preventive health examination  Assessment performed. Health maintenance updated. Chart review completed.    2. Sedative dependence  Chronic. Followed by Physical Medicine. Patient kept talking about tramadol and hydrocodone. She needed frequent redirection. She was focused on seeing Phys Med sooner. Reports chronic pain. Her significant other is present. After the visit, he " talked to me without her present and expressed his concern about the medications that she is taking. He notices a steady decline in her ability to function over the years. He will be present for the next appointment with Phys medicine and plans to discuss with treating doctor.    3. Sacroiliitis  Chronic. Followed by Phys Med and Pain management.    4. Osteoarthritis of spine with radiculopathy, lumbar region  Chronic. Pain not at goal. Followed by Phys Med and Pain management.    5. Obesity (BMI 30-39.9)  Sedentary lifestyle. Discussed exercise recommendations. Patient reports limitation due to pain.  Followed by PCP.    6. Physical deconditioning  Sedentary lifestyle. Discussed exercise recommendations. Patient reports limitation due to pain.  Followed by PCP.    Provided Mary with a 5-10 year written screening schedule and personal prevention plan. Recommendations were developed using the USPSTF age appropriate recommendations. Education, counseling, and referrals were provided as needed. After Visit Summary printed and given to patient which includes a list of additional screenings\tests needed.    Return for follow up with Primary Care Provider as instructed, ;sooner if problems, HRA in 1 year.    LILLIAM Troncoso

## 2017-07-26 RX ORDER — HYDROCHLOROTHIAZIDE 12.5 MG/1
CAPSULE ORAL
Qty: 30 CAPSULE | Refills: 0 | Status: SHIPPED | OUTPATIENT
Start: 2017-07-26 | End: 2017-08-22 | Stop reason: SDUPTHER

## 2017-08-01 ENCOUNTER — OFFICE VISIT (OUTPATIENT)
Dept: PHYSICAL MEDICINE AND REHAB | Facility: CLINIC | Age: 47
End: 2017-08-01
Payer: MEDICARE

## 2017-08-01 VITALS
DIASTOLIC BLOOD PRESSURE: 84 MMHG | SYSTOLIC BLOOD PRESSURE: 130 MMHG | HEART RATE: 64 BPM | HEIGHT: 64 IN | WEIGHT: 185 LBS | BODY MASS INDEX: 31.58 KG/M2

## 2017-08-01 DIAGNOSIS — G89.29 CHRONIC MIDLINE LOW BACK PAIN WITH LEFT-SIDED SCIATICA: Primary | ICD-10-CM

## 2017-08-01 DIAGNOSIS — M54.16 LEFT LUMBAR RADICULOPATHY: ICD-10-CM

## 2017-08-01 DIAGNOSIS — M47.26 OSTEOARTHRITIS OF SPINE WITH RADICULOPATHY, LUMBAR REGION: ICD-10-CM

## 2017-08-01 DIAGNOSIS — M54.42 CHRONIC MIDLINE LOW BACK PAIN WITH LEFT-SIDED SCIATICA: Primary | ICD-10-CM

## 2017-08-01 DIAGNOSIS — E66.9 OBESITY (BMI 30.0-34.9): ICD-10-CM

## 2017-08-01 PROCEDURE — 99999 PR PBB SHADOW E&M-EST. PATIENT-LVL III: CPT | Mod: PBBFAC,,, | Performed by: PHYSICAL MEDICINE & REHABILITATION

## 2017-08-01 PROCEDURE — 99213 OFFICE O/P EST LOW 20 MIN: CPT | Mod: S$GLB,,, | Performed by: PHYSICAL MEDICINE & REHABILITATION

## 2017-08-01 RX ORDER — TRAMADOL HYDROCHLORIDE 50 MG/1
50 TABLET ORAL EVERY 6 HOURS PRN
Qty: 120 TABLET | Refills: 1 | Status: SHIPPED | OUTPATIENT
Start: 2017-08-01 | End: 2017-10-02 | Stop reason: SDUPTHER

## 2017-08-01 RX ORDER — DULOXETIN HYDROCHLORIDE 30 MG/1
30 CAPSULE, DELAYED RELEASE ORAL DAILY
Qty: 30 CAPSULE | Refills: 1 | Status: SHIPPED | OUTPATIENT
Start: 2017-08-01 | End: 2017-09-04 | Stop reason: SDUPTHER

## 2017-08-01 NOTE — PROGRESS NOTES
Subjective:       Patient ID: Mary Sanches is a 47 y.o. female.    Chief Complaint: No chief complaint on file.    HPI     HISTORY OF PRESENT ILLNESS:  Mrs. Sanches is a 47-year-old white female who is   followed up in the Physical Medicine Clinic for chronic low back pain with left   lumbar radiculopathy.  Her last visit to the clinic was on 04/24/2017.  She was   maintained on meloxicam, Cymbalta, p.r.n. hydrocodone/APAP.    The patient continues to complain of back pain.  It is an almost constant aching   pain in the lumbar spine and across her back.  The pain radiates to the left   lateral knee and proximal calf with numbness.  Her pain is worse with activity   and better with rest.  Her maximum pain is 9-10/10 and minimum 5/10.  Today, it   is 6/10.  The patient denies any lower extremity weakness, bowel or bladder   incontinence.    She is currently taking tramadol 50 mg p.r.n., usually 2-4 times per day.  She   was on meloxicam, but it had to be stopped secondary to a rash.  She was on   hydrocodone/APAP, but was discontinued  secondary to compliance problems   demonstrated by the urine toxicology screen.      MS/IN  dd: 08/01/2017 20:01:37 (CDT)  td: 08/01/2017 23:53:40 (CDT)  Doc ID   #[id number]  Job ID #[id number]    CC:         Review of Systems   Constitutional: Negative for fatigue.   Eyes: Negative for visual disturbance.   Respiratory: Negative for shortness of breath.    Cardiovascular: Negative for chest pain.   Gastrointestinal: Negative for constipation, nausea and vomiting.   Genitourinary: Negative for difficulty urinating.   Musculoskeletal: Positive for back pain and neck pain. Negative for arthralgias, gait problem and joint swelling.   Neurological: Negative for dizziness and headaches.   Psychiatric/Behavioral: Negative for behavioral problems and sleep disturbance.       Objective:      Physical Exam   Constitutional: She is oriented to person, place, and time. She appears well-developed  and well-nourished.   HENT:   Head: Normocephalic and atraumatic.   Neck: Normal range of motion.   Musculoskeletal:   BUE:  ROM:full.  Strength:    RUE: 5/5 at shoulder abduction, 5 elbow flexion, 5 elbow extension, 5 hand .   LUE: 5/5 at shoulder abduction, 5 elbow flexion, 5 elbow extension, 5 hand .  Sensation to pinprick:   RUE: intact.   LUE: intact.  DTR:    RUE: +1 biceps, +1 triceps.   LUE:  +1 biceps, +1 triceps.    BLE:  ROM:full.  Mild bilateral knee crepitus.   Strength:      RLE: 5/5 at hip flexion, knee extension, ankle DF/PF, EHL.     LLE:  5/5 at hip flexion, knee extension, ankle DF/PF, EHL.  Sensation to pinprick:     RLE: intact.     LLE: intact.    DTR:     RLE: +1 knee, +1 ankle.    LLE: +1 knee, +1 ankle.  SLR (sitting):      RLE: -ve.      LLE: -ve.     +ve mild tenderness over lumbar spine.       Neurological: She is alert and oriented to person, place, and time.   Skin: Skin is warm.   Psychiatric: She has a normal mood and affect.           Assessment:       1. Chronic midline low back pain with left-sided sciatica    2. Osteoarthritis of spine with radiculopathy, lumbar region    3. Left lumbar radiculopathy    4. Obesity (BMI 30.0-34.9)        Plan:     - Continue meloxicam (MOBIC) 15 MG tablet; Take 1 tablet (15 mg total) by mouth once daily.  - Start duloxetine (CYMBALTA) 30 MG capsule; Take 1 capsule (30 mg total) by mouth once daily. In 1-2 weeks, if no relief, may increase dose to 2 capsules once daily. Call for refills.  - Increase tramadol (ULTRAM) 50 mg tablet; Take 1 tablet (50 mg total) by mouth every 6 (six) hours as needed for Pain.  - Weight loss was encouraged.  - Regular home exercise program was encouraged.  - Return in about 2 months (around 10/1/2017).     This was a 25 minute visit, more than 50% of which was spent counseling the patient about the diagnosis and the treatment plan.

## 2017-08-22 RX ORDER — HYDROCHLOROTHIAZIDE 12.5 MG/1
CAPSULE ORAL
Qty: 30 CAPSULE | Refills: 0 | Status: SHIPPED | OUTPATIENT
Start: 2017-08-22 | End: 2017-09-17 | Stop reason: SDUPTHER

## 2017-08-28 RX ORDER — BUTALBITAL, ACETAMINOPHEN AND CAFFEINE 50; 325; 40 MG/1; MG/1; MG/1
TABLET ORAL
Qty: 30 TABLET | Refills: 3 | Status: SHIPPED | OUTPATIENT
Start: 2017-08-28 | End: 2017-11-27 | Stop reason: SDUPTHER

## 2017-09-04 DIAGNOSIS — M54.42 CHRONIC MIDLINE LOW BACK PAIN WITH LEFT-SIDED SCIATICA: ICD-10-CM

## 2017-09-04 DIAGNOSIS — G89.29 CHRONIC MIDLINE LOW BACK PAIN WITH LEFT-SIDED SCIATICA: ICD-10-CM

## 2017-09-04 DIAGNOSIS — M54.16 LEFT LUMBAR RADICULOPATHY: ICD-10-CM

## 2017-09-04 RX ORDER — DULOXETIN HYDROCHLORIDE 30 MG/1
CAPSULE, DELAYED RELEASE ORAL
Qty: 30 CAPSULE | Refills: 1 | Status: SHIPPED | OUTPATIENT
Start: 2017-09-04 | End: 2017-10-02 | Stop reason: SDUPTHER

## 2017-09-17 DIAGNOSIS — Z78.0 MENOPAUSE: ICD-10-CM

## 2017-09-17 RX ORDER — CLONIDINE HYDROCHLORIDE 0.2 MG/1
TABLET ORAL
Qty: 30 TABLET | Refills: 11 | Status: SHIPPED | OUTPATIENT
Start: 2017-09-17 | End: 2018-06-21 | Stop reason: SDUPTHER

## 2017-09-17 RX ORDER — HYDROCHLOROTHIAZIDE 12.5 MG/1
CAPSULE ORAL
Qty: 30 CAPSULE | Refills: 0 | Status: SHIPPED | OUTPATIENT
Start: 2017-09-17 | End: 2017-10-15 | Stop reason: SDUPTHER

## 2017-10-02 ENCOUNTER — OFFICE VISIT (OUTPATIENT)
Dept: PHYSICAL MEDICINE AND REHAB | Facility: CLINIC | Age: 47
End: 2017-10-02
Payer: MEDICARE

## 2017-10-02 VITALS
SYSTOLIC BLOOD PRESSURE: 114 MMHG | HEIGHT: 64 IN | WEIGHT: 185.19 LBS | DIASTOLIC BLOOD PRESSURE: 79 MMHG | HEART RATE: 74 BPM | BODY MASS INDEX: 31.62 KG/M2

## 2017-10-02 DIAGNOSIS — G89.29 CHRONIC MIDLINE LOW BACK PAIN WITH BILATERAL SCIATICA: Primary | ICD-10-CM

## 2017-10-02 DIAGNOSIS — M54.42 CHRONIC MIDLINE LOW BACK PAIN WITH BILATERAL SCIATICA: Primary | ICD-10-CM

## 2017-10-02 DIAGNOSIS — M47.26 OSTEOARTHRITIS OF SPINE WITH RADICULOPATHY, LUMBAR REGION: ICD-10-CM

## 2017-10-02 DIAGNOSIS — M54.41 CHRONIC MIDLINE LOW BACK PAIN WITH BILATERAL SCIATICA: Primary | ICD-10-CM

## 2017-10-02 PROCEDURE — 99999 PR PBB SHADOW E&M-EST. PATIENT-LVL III: CPT | Mod: PBBFAC,,, | Performed by: PHYSICAL MEDICINE & REHABILITATION

## 2017-10-02 PROCEDURE — 3008F BODY MASS INDEX DOCD: CPT | Mod: S$GLB,,, | Performed by: PHYSICAL MEDICINE & REHABILITATION

## 2017-10-02 PROCEDURE — 99213 OFFICE O/P EST LOW 20 MIN: CPT | Mod: S$GLB,,, | Performed by: PHYSICAL MEDICINE & REHABILITATION

## 2017-10-02 RX ORDER — MELOXICAM 15 MG/1
15 TABLET ORAL DAILY
Qty: 30 TABLET | Refills: 1 | Status: SHIPPED | OUTPATIENT
Start: 2017-10-02 | End: 2017-11-25 | Stop reason: SDUPTHER

## 2017-10-02 RX ORDER — DULOXETIN HYDROCHLORIDE 60 MG/1
60 CAPSULE, DELAYED RELEASE ORAL DAILY
Qty: 30 CAPSULE | Refills: 2 | Status: SHIPPED | OUTPATIENT
Start: 2017-10-02 | End: 2018-01-07 | Stop reason: SDUPTHER

## 2017-10-02 RX ORDER — TRAMADOL HYDROCHLORIDE 50 MG/1
50 TABLET ORAL EVERY 6 HOURS PRN
Qty: 120 TABLET | Refills: 2 | Status: SHIPPED | OUTPATIENT
Start: 2017-10-02 | End: 2017-12-20 | Stop reason: SDUPTHER

## 2017-10-02 NOTE — PROGRESS NOTES
Subjective:       Patient ID: Mary Sanches is a 47 y.o. female.    Chief Complaint: No chief complaint on file.    HPI     Ms. Sanches is a 47-year-old white female who is [facility] for chronic low back pain with lumbar radiculopathy.  Her last   visit to the clinic was on 08/01/2017.  She was maintained on meloxicam,   Cymbalta and p.r.n. tramadol.    The patient is coming today to the clinic for followup.  Her back pain has been   under good control, although she had a recent flare which she attributes to   rainy weather.  Her pain is an almost constant aching sensation in the lumbar   spine and across her back.  Her pain occasionally radiates to the left ankle   with numbness.  She also had an frequent radiation to the right ankle.  Her pain   is aggravated by excess activity and better with rest and her medications.  Her   maximum pain is 7/10 and minimum 4-5/10.  Today, it is 6/10.  The patient   denies any lower extremity weakness.  She denies any bowel or bladder   incontinence.    She is currently taking meloxicam 15 mg p.o. once per day, Cymbalta 30 mg   capsules, two capsules once per daily and tramadol 50 mg p.r.n., usually 3-4   tablets daily.  She has been exercising regularly and noted improvement in her   strength and endurance.      MS/IN  dd: 10/02/2017 13:19:22 (CDT)  td: 10/03/2017 00:42:40 (CDT)  Doc ID   #[id number]  Job ID #[id number]    CC:         Review of Systems   Constitutional: Negative for fatigue.   Eyes: Negative for visual disturbance.   Respiratory: Negative for shortness of breath.    Cardiovascular: Negative for chest pain.   Gastrointestinal: Negative for blood in stool, constipation, nausea and vomiting.   Genitourinary: Negative for difficulty urinating.   Musculoskeletal: Positive for back pain and neck pain. Negative for arthralgias, gait problem and joint swelling.   Neurological: Negative for dizziness and headaches.    Psychiatric/Behavioral: Negative for behavioral problems and sleep disturbance.       Objective:      Physical Exam   Constitutional: She is oriented to person, place, and time. She appears well-developed and well-nourished.   HENT:   Head: Normocephalic and atraumatic.   Neck: Normal range of motion.   Musculoskeletal:   BLE:  ROM:full.  Mild bilateral knee crepitus.   Strength:      RLE: 5/5 at hip flexion, knee extension, ankle DF/PF, EHL.     LLE:  5/5 at hip flexion, knee extension, ankle DF/PF, EHL.  Sensation to pinprick:     RLE: intact.     LLE: intact.    DTR:     RLE: +1 knee, +1 ankle.    LLE: +1 knee, +1 ankle.  SLR (sitting):      RLE: -ve.      LLE: +ve.     -ve tenderness over lumbar spine.       Neurological: She is alert and oriented to person, place, and time.   Skin: Skin is warm.   Psychiatric: She has a normal mood and affect.           Assessment:       1. Chronic midline low back pain with bilateral sciatica    2. Osteoarthritis of spine with radiculopathy, lumbar region        Plan:     - Continue meloxicam (MOBIC) 15 MG tablet; Take 1 tablet (15 mg total) by mouth once daily.  - Continue duloxetine (CYMBALTA) 60 MG capsule; Take 1 capsule (60 mg total) by mouth once daily.  - Continue tramadol (ULTRAM) 50 mg tablet; Take 1 tablet (50 mg total) by mouth every 6 (six) hours as needed for Pain.  - Regular home exercise program was encouraged.  - Return in about 4 months (around 2/2/2018).

## 2017-10-08 DIAGNOSIS — M54.16 LEFT LUMBAR RADICULOPATHY: ICD-10-CM

## 2017-10-08 DIAGNOSIS — G89.29 CHRONIC MIDLINE LOW BACK PAIN WITH LEFT-SIDED SCIATICA: ICD-10-CM

## 2017-10-08 DIAGNOSIS — M54.42 CHRONIC MIDLINE LOW BACK PAIN WITH LEFT-SIDED SCIATICA: ICD-10-CM

## 2017-10-08 RX ORDER — DULOXETIN HYDROCHLORIDE 30 MG/1
CAPSULE, DELAYED RELEASE ORAL
Qty: 30 CAPSULE | Refills: 1 | Status: SHIPPED | OUTPATIENT
Start: 2017-10-08 | End: 2017-11-10 | Stop reason: SDUPTHER

## 2017-10-15 RX ORDER — HYDROCHLOROTHIAZIDE 12.5 MG/1
CAPSULE ORAL
Qty: 30 CAPSULE | Refills: 0 | Status: SHIPPED | OUTPATIENT
Start: 2017-10-15 | End: 2017-11-07 | Stop reason: SDUPTHER

## 2017-11-07 RX ORDER — HYDROCHLOROTHIAZIDE 12.5 MG/1
CAPSULE ORAL
Qty: 30 CAPSULE | Refills: 0 | Status: SHIPPED | OUTPATIENT
Start: 2017-11-07 | End: 2017-12-03 | Stop reason: SDUPTHER

## 2017-11-10 DIAGNOSIS — M54.16 LEFT LUMBAR RADICULOPATHY: ICD-10-CM

## 2017-11-10 DIAGNOSIS — G89.29 CHRONIC MIDLINE LOW BACK PAIN WITH LEFT-SIDED SCIATICA: ICD-10-CM

## 2017-11-10 DIAGNOSIS — M54.42 CHRONIC MIDLINE LOW BACK PAIN WITH LEFT-SIDED SCIATICA: ICD-10-CM

## 2017-11-10 RX ORDER — DULOXETIN HYDROCHLORIDE 30 MG/1
CAPSULE, DELAYED RELEASE ORAL
Qty: 30 CAPSULE | Refills: 1 | Status: SHIPPED | OUTPATIENT
Start: 2017-11-10 | End: 2017-12-14 | Stop reason: SDUPTHER

## 2017-11-26 RX ORDER — MELOXICAM 15 MG/1
15 TABLET ORAL DAILY
Qty: 30 TABLET | Refills: 1 | Status: SHIPPED | OUTPATIENT
Start: 2017-11-26 | End: 2017-12-26

## 2017-11-28 RX ORDER — BUTALBITAL, ACETAMINOPHEN AND CAFFEINE 50; 325; 40 MG/1; MG/1; MG/1
TABLET ORAL
Qty: 30 TABLET | Refills: 3 | Status: SHIPPED | OUTPATIENT
Start: 2017-11-28 | End: 2018-02-23 | Stop reason: SDUPTHER

## 2017-12-04 RX ORDER — HYDROCHLOROTHIAZIDE 12.5 MG/1
CAPSULE ORAL
Qty: 30 CAPSULE | Refills: 0 | Status: SHIPPED | OUTPATIENT
Start: 2017-12-04 | End: 2018-01-08 | Stop reason: SDUPTHER

## 2017-12-14 DIAGNOSIS — G89.29 CHRONIC MIDLINE LOW BACK PAIN WITH LEFT-SIDED SCIATICA: ICD-10-CM

## 2017-12-14 DIAGNOSIS — M54.16 LEFT LUMBAR RADICULOPATHY: ICD-10-CM

## 2017-12-14 DIAGNOSIS — M54.42 CHRONIC MIDLINE LOW BACK PAIN WITH LEFT-SIDED SCIATICA: ICD-10-CM

## 2017-12-14 RX ORDER — DULOXETIN HYDROCHLORIDE 30 MG/1
CAPSULE, DELAYED RELEASE ORAL
Qty: 30 CAPSULE | Refills: 1 | Status: CANCELLED | OUTPATIENT
Start: 2017-12-14

## 2017-12-14 RX ORDER — DULOXETIN HYDROCHLORIDE 60 MG/1
60 CAPSULE, DELAYED RELEASE ORAL DAILY
Start: 2017-12-14 | End: 2018-02-01

## 2017-12-15 DIAGNOSIS — M54.16 LEFT LUMBAR RADICULOPATHY: ICD-10-CM

## 2017-12-15 DIAGNOSIS — M54.42 CHRONIC MIDLINE LOW BACK PAIN WITH LEFT-SIDED SCIATICA: ICD-10-CM

## 2017-12-15 DIAGNOSIS — G89.29 CHRONIC MIDLINE LOW BACK PAIN WITH LEFT-SIDED SCIATICA: ICD-10-CM

## 2017-12-15 RX ORDER — DULOXETIN HYDROCHLORIDE 30 MG/1
CAPSULE, DELAYED RELEASE ORAL
Qty: 30 CAPSULE | Refills: 1 | OUTPATIENT
Start: 2017-12-15

## 2017-12-20 DIAGNOSIS — G89.29 CHRONIC MIDLINE LOW BACK PAIN WITH BILATERAL SCIATICA: ICD-10-CM

## 2017-12-20 DIAGNOSIS — M54.42 CHRONIC MIDLINE LOW BACK PAIN WITH BILATERAL SCIATICA: ICD-10-CM

## 2017-12-20 DIAGNOSIS — M54.41 CHRONIC MIDLINE LOW BACK PAIN WITH BILATERAL SCIATICA: ICD-10-CM

## 2017-12-20 RX ORDER — TRAMADOL HYDROCHLORIDE 50 MG/1
50 TABLET ORAL EVERY 6 HOURS PRN
Qty: 120 TABLET | Refills: 2 | Status: SHIPPED | OUTPATIENT
Start: 2017-12-27 | End: 2018-03-19 | Stop reason: SDUPTHER

## 2018-01-07 DIAGNOSIS — M54.42 CHRONIC MIDLINE LOW BACK PAIN WITH BILATERAL SCIATICA: ICD-10-CM

## 2018-01-07 DIAGNOSIS — M54.41 CHRONIC MIDLINE LOW BACK PAIN WITH BILATERAL SCIATICA: ICD-10-CM

## 2018-01-07 DIAGNOSIS — G89.29 CHRONIC MIDLINE LOW BACK PAIN WITH BILATERAL SCIATICA: ICD-10-CM

## 2018-01-07 RX ORDER — DULOXETIN HYDROCHLORIDE 60 MG/1
60 CAPSULE, DELAYED RELEASE ORAL DAILY
Qty: 30 CAPSULE | Refills: 2 | Status: SHIPPED | OUTPATIENT
Start: 2018-01-07 | End: 2018-03-13 | Stop reason: SDUPTHER

## 2018-01-08 RX ORDER — HYDROCHLOROTHIAZIDE 12.5 MG/1
CAPSULE ORAL
Qty: 30 CAPSULE | Refills: 0 | Status: SHIPPED | OUTPATIENT
Start: 2018-01-08 | End: 2018-02-09 | Stop reason: SDUPTHER

## 2018-02-01 ENCOUNTER — OFFICE VISIT (OUTPATIENT)
Dept: PHYSICAL MEDICINE AND REHAB | Facility: CLINIC | Age: 48
End: 2018-02-01
Payer: MEDICARE

## 2018-02-01 VITALS
HEART RATE: 70 BPM | BODY MASS INDEX: 32.97 KG/M2 | DIASTOLIC BLOOD PRESSURE: 71 MMHG | HEIGHT: 64 IN | SYSTOLIC BLOOD PRESSURE: 120 MMHG | WEIGHT: 193.13 LBS

## 2018-02-01 DIAGNOSIS — E66.9 OBESITY (BMI 30.0-34.9): ICD-10-CM

## 2018-02-01 DIAGNOSIS — M54.41 CHRONIC MIDLINE LOW BACK PAIN WITH BILATERAL SCIATICA: Primary | ICD-10-CM

## 2018-02-01 DIAGNOSIS — M47.26 OSTEOARTHRITIS OF SPINE WITH RADICULOPATHY, LUMBAR REGION: ICD-10-CM

## 2018-02-01 DIAGNOSIS — M54.42 CHRONIC MIDLINE LOW BACK PAIN WITH BILATERAL SCIATICA: Primary | ICD-10-CM

## 2018-02-01 DIAGNOSIS — G89.29 CHRONIC MIDLINE LOW BACK PAIN WITH BILATERAL SCIATICA: Primary | ICD-10-CM

## 2018-02-01 PROCEDURE — 3008F BODY MASS INDEX DOCD: CPT | Mod: S$GLB,,, | Performed by: PHYSICAL MEDICINE & REHABILITATION

## 2018-02-01 PROCEDURE — 99213 OFFICE O/P EST LOW 20 MIN: CPT | Mod: S$GLB,,, | Performed by: PHYSICAL MEDICINE & REHABILITATION

## 2018-02-01 PROCEDURE — 99999 PR PBB SHADOW E&M-EST. PATIENT-LVL III: CPT | Mod: PBBFAC,,, | Performed by: PHYSICAL MEDICINE & REHABILITATION

## 2018-02-01 RX ORDER — MELOXICAM 15 MG/1
15 TABLET ORAL DAILY
Qty: 90 TABLET | Refills: 1 | Status: SHIPPED | OUTPATIENT
Start: 2018-02-01 | End: 2018-03-03

## 2018-02-01 NOTE — PROGRESS NOTES
Subjective:       Patient ID: Mary Sanches is a 47 y.o. female.    Chief Complaint: No chief complaint on file.    HPI     Ms. Sanches is a 47-year-old white female who is [facility] in [facility] for chronic low back pain with lumbar radiculopathy.  Her last   visit to the clinic was on 10/02/2017.  She was maintained on meloxicam,   Cymbalta and p.r.n. tramadol.    The patient is coming today to the clinic for followup.  Her back pain has been   stable.  She does report a flare of her symptoms during the cold front.  Her   pain is a constant, aching sensation in the lumbar spine and across her back.    She has occasional shooting pain to the left ankle with numbness.  Her pain is   worse with activity and better with rest and her medications.  Her maximum pain   is 5-6/10 and minimum 3-4/10.  Today, it is 5-6/10.  The patient denies any   change in the lower extremity strength.  She continues to ambulate around the   house with a rollator or walker and is using a wheelchair for longer distances.    She denies any bowel or bladder incontinence.    She is currently taking meloxicam 15 mg p.o. once per day.  She takes Cymbalta   60 mg p.o. once per day.  She takes tramadol 50 mg p.r.n., usually three times   per day, but occasionally four times per day.  She is trying to stay active.      MS/HN  dd: 02/01/2018 08:58:31 (CST)  td: 02/02/2018 01:39:06 (CST)  Doc ID   #[id number]  Job ID #[id number]    CC:           Review of Systems   Constitutional: Positive for fatigue.   Eyes: Negative for visual disturbance.   Respiratory: Negative for shortness of breath.    Cardiovascular: Negative for chest pain.   Gastrointestinal: Negative for blood in stool, constipation, nausea and vomiting.   Genitourinary: Negative for difficulty urinating.   Musculoskeletal: Positive for back pain, gait problem and neck pain. Negative for arthralgias and joint swelling.   Neurological: Positive for headaches. Negative  for dizziness.   Psychiatric/Behavioral: Positive for sleep disturbance. Negative for behavioral problems.       Objective:      Physical Exam   Constitutional: She is oriented to person, place, and time. She appears well-developed and well-nourished.   HENT:   Head: Normocephalic and atraumatic.   Neck: Normal range of motion.   Musculoskeletal:   BLE:  ROM:full.  Mild bilateral knee crepitus.   Strength:      RLE: 5/5 at hip flexion, knee extension, ankle DF/PF.     LLE:  5/5 at hip flexion, knee extension, ankle DF/PF.  Sensation to pinprick:     RLE: intact.     LLE: intact.    DTR:     RLE: +1 knee, +1 ankle.    LLE: +1 knee, +1 ankle.  SLR (sitting):      RLE: -ve.      LLE: +ve.     Mild tenderness over lumbar spine.       Neurological: She is alert and oriented to person, place, and time.   Skin: Skin is warm.   Psychiatric: She has a normal mood and affect.           Assessment:       1. Chronic midline low back pain with bilateral sciatica    2. Osteoarthritis of spine with radiculopathy, lumbar region    3. Obesity (BMI 30.0-34.9)        Plan:     - Continue meloxicam (MOBIC) 15 MG tablet; Take 1 tablet (15 mg total) by mouth once daily.  - Continue duloxetine (CYMBALTA) 60 MG capsule; Take 1 capsule (60 mg total) by mouth once daily.  - Continue tramadol (ULTRAM) 50 mg tablet; Take 1 tablet (50 mg total) by mouth every 6 (six) hours as needed for Pain.  - Regular home exercise program was encouraged.  - Weight loss was encouraged.  - Follow-up in about 6 months (around 8/1/2018).

## 2018-02-05 RX ORDER — MELOXICAM 15 MG/1
15 TABLET ORAL DAILY
Qty: 30 TABLET | Refills: 1 | Status: SHIPPED | OUTPATIENT
Start: 2018-02-05 | End: 2018-06-04 | Stop reason: SDUPTHER

## 2018-02-09 RX ORDER — HYDROCHLOROTHIAZIDE 12.5 MG/1
CAPSULE ORAL
Qty: 30 CAPSULE | Refills: 0 | Status: SHIPPED | OUTPATIENT
Start: 2018-02-09 | End: 2018-03-13 | Stop reason: SDUPTHER

## 2018-02-22 NOTE — TELEPHONE ENCOUNTER
----- Message from Anette Duvall sent at 2/22/2018  1:20 PM CST -----  Contact: self @ [phone]  Pt is req a refill for butalbital-acetaminophen-caffeine -40 mg (FIORICET, ESGIC) -40 mg per tablet.  Pt would like brand name only.      [facility] #[id number] - [place], [address] [phone] (Phone)  [phone] (Fax)

## 2018-02-23 RX ORDER — BUTALBITAL, ACETAMINOPHEN AND CAFFEINE 50; 325; 40 MG/1; MG/1; MG/1
TABLET ORAL
Qty: 30 TABLET | Refills: 3 | Status: SHIPPED | OUTPATIENT
Start: 2018-02-23 | End: 2018-07-01 | Stop reason: SDUPTHER

## 2018-03-13 DIAGNOSIS — M54.42 CHRONIC MIDLINE LOW BACK PAIN WITH BILATERAL SCIATICA: ICD-10-CM

## 2018-03-13 DIAGNOSIS — M54.41 CHRONIC MIDLINE LOW BACK PAIN WITH BILATERAL SCIATICA: ICD-10-CM

## 2018-03-13 DIAGNOSIS — G89.29 CHRONIC MIDLINE LOW BACK PAIN WITH BILATERAL SCIATICA: ICD-10-CM

## 2018-03-13 RX ORDER — HYDROCHLOROTHIAZIDE 12.5 MG/1
CAPSULE ORAL
Qty: 30 CAPSULE | Refills: 0 | Status: SHIPPED | OUTPATIENT
Start: 2018-03-13 | End: 2018-03-19 | Stop reason: SDUPTHER

## 2018-03-13 RX ORDER — DULOXETIN HYDROCHLORIDE 60 MG/1
60 CAPSULE, DELAYED RELEASE ORAL DAILY
Qty: 30 CAPSULE | Refills: 2 | Status: SHIPPED | OUTPATIENT
Start: 2018-03-13 | End: 2018-06-17 | Stop reason: SDUPTHER

## 2018-03-19 DIAGNOSIS — G89.29 CHRONIC MIDLINE LOW BACK PAIN WITH BILATERAL SCIATICA: ICD-10-CM

## 2018-03-19 DIAGNOSIS — M54.42 CHRONIC MIDLINE LOW BACK PAIN WITH BILATERAL SCIATICA: ICD-10-CM

## 2018-03-19 DIAGNOSIS — M54.41 CHRONIC MIDLINE LOW BACK PAIN WITH BILATERAL SCIATICA: ICD-10-CM

## 2018-03-19 RX ORDER — TRAMADOL HYDROCHLORIDE 50 MG/1
50 TABLET ORAL EVERY 6 HOURS PRN
Qty: 120 TABLET | Refills: 2 | Status: SHIPPED | OUTPATIENT
Start: 2018-03-19 | End: 2018-06-22 | Stop reason: SDUPTHER

## 2018-03-19 RX ORDER — HYDROCHLOROTHIAZIDE 12.5 MG/1
CAPSULE ORAL
Qty: 30 CAPSULE | Refills: 0 | Status: SHIPPED | OUTPATIENT
Start: 2018-03-19 | End: 2018-05-22 | Stop reason: SDUPTHER

## 2018-04-06 ENCOUNTER — PES CALL (OUTPATIENT)
Dept: ADMINISTRATIVE | Facility: CLINIC | Age: 48
End: 2018-04-06

## 2018-05-22 DIAGNOSIS — G89.29 CHRONIC MIDLINE LOW BACK PAIN WITH LEFT-SIDED SCIATICA: ICD-10-CM

## 2018-05-22 DIAGNOSIS — M54.42 CHRONIC MIDLINE LOW BACK PAIN WITH LEFT-SIDED SCIATICA: ICD-10-CM

## 2018-05-22 DIAGNOSIS — M54.16 LEFT LUMBAR RADICULOPATHY: ICD-10-CM

## 2018-05-22 RX ORDER — HYDROCHLOROTHIAZIDE 12.5 MG/1
CAPSULE ORAL
Qty: 30 CAPSULE | Refills: 0 | Status: SHIPPED | OUTPATIENT
Start: 2018-05-22 | End: 2018-06-19 | Stop reason: SDUPTHER

## 2018-05-22 RX ORDER — DULOXETIN HYDROCHLORIDE 30 MG/1
CAPSULE, DELAYED RELEASE ORAL
Qty: 30 CAPSULE | Refills: 1 | Status: SHIPPED | OUTPATIENT
Start: 2018-05-22 | End: 2018-06-04 | Stop reason: SDUPTHER

## 2018-05-29 ENCOUNTER — TELEPHONE (OUTPATIENT)
Dept: OBSTETRICS AND GYNECOLOGY | Facility: CLINIC | Age: 48
End: 2018-05-29

## 2018-05-29 NOTE — TELEPHONE ENCOUNTER
----- Message from Aleyda Vickers sent at 5/29/2018 11:23 AM CDT -----  Contact: Bárbara - [phone]  Bárbara with [facility]  Pharmacy is requesting a refill on the below. Please advise           Metronidazole      Pharmacy     [id number] - [place], [place] - [address] [facility]

## 2018-06-04 ENCOUNTER — OFFICE VISIT (OUTPATIENT)
Dept: PHYSICAL MEDICINE AND REHAB | Facility: CLINIC | Age: 48
End: 2018-06-04
Payer: MEDICARE

## 2018-06-04 VITALS
DIASTOLIC BLOOD PRESSURE: 65 MMHG | HEART RATE: 56 BPM | BODY MASS INDEX: 32.2 KG/M2 | SYSTOLIC BLOOD PRESSURE: 99 MMHG | WEIGHT: 188.63 LBS | HEIGHT: 64 IN

## 2018-06-04 DIAGNOSIS — G89.29 CHRONIC MIDLINE LOW BACK PAIN WITH BILATERAL SCIATICA: Primary | ICD-10-CM

## 2018-06-04 DIAGNOSIS — M54.42 CHRONIC MIDLINE LOW BACK PAIN WITH LEFT-SIDED SCIATICA: ICD-10-CM

## 2018-06-04 DIAGNOSIS — G89.29 CHRONIC MIDLINE LOW BACK PAIN WITH LEFT-SIDED SCIATICA: ICD-10-CM

## 2018-06-04 DIAGNOSIS — M47.26 OSTEOARTHRITIS OF SPINE WITH RADICULOPATHY, LUMBAR REGION: ICD-10-CM

## 2018-06-04 DIAGNOSIS — M54.16 LEFT LUMBAR RADICULOPATHY: ICD-10-CM

## 2018-06-04 DIAGNOSIS — M54.41 CHRONIC MIDLINE LOW BACK PAIN WITH BILATERAL SCIATICA: Primary | ICD-10-CM

## 2018-06-04 DIAGNOSIS — E66.9 OBESITY (BMI 30.0-34.9): ICD-10-CM

## 2018-06-04 DIAGNOSIS — M54.42 CHRONIC MIDLINE LOW BACK PAIN WITH BILATERAL SCIATICA: Primary | ICD-10-CM

## 2018-06-04 PROCEDURE — 99214 OFFICE O/P EST MOD 30 MIN: CPT | Mod: S$GLB,,, | Performed by: PHYSICAL MEDICINE & REHABILITATION

## 2018-06-04 PROCEDURE — 3008F BODY MASS INDEX DOCD: CPT | Mod: CPTII,S$GLB,, | Performed by: PHYSICAL MEDICINE & REHABILITATION

## 2018-06-04 PROCEDURE — 99999 PR PBB SHADOW E&M-EST. PATIENT-LVL III: CPT | Mod: PBBFAC,,, | Performed by: PHYSICAL MEDICINE & REHABILITATION

## 2018-06-04 RX ORDER — DULOXETIN HYDROCHLORIDE 60 MG/1
60 CAPSULE, DELAYED RELEASE ORAL 2 TIMES DAILY
Qty: 30 CAPSULE | Refills: 3 | Status: SHIPPED | OUTPATIENT
Start: 2018-06-04 | End: 2018-07-29 | Stop reason: SDUPTHER

## 2018-06-04 RX ORDER — MELOXICAM 15 MG/1
15 TABLET ORAL DAILY
Qty: 90 TABLET | Refills: 1 | Status: SHIPPED | OUTPATIENT
Start: 2018-06-04 | End: 2019-04-05 | Stop reason: SDUPTHER

## 2018-06-04 NOTE — PATIENT INSTRUCTIONS
Neck/Back Pain [General]    Both neck and back pain are usually caused by injury to the muscles or ligaments of the spine. Sometimes the disks that separate each bone of the spine may cause pain by putting pressure on a nearby nerve. Back and neck pain may appear after a sudden twisting/bending force (such as in a car accident), or sometimes after a simple awkward movement. In either case, muscle spasm is often present and adds to the pain.  Acute neck and back pain usually gets better in one to two weeks. Pain related to disk disease, arthritis in the spinal joints or spinal stenosis (narrowing of the spinal canal) can become chronic and last for months or years.  Home Care:  · FOR NECK PAIN: Use a comfortable pillow that supports the head and keeps the spine in a neutral position. The position of the head should not be tilted forward or backward.  · FOR BACK PAIN: You may need to stay in bed the first few days. But, as soon as possible, begin sitting or walking to avoid problems with prolonged bed rest (muscle weakness, worsening back stiffness and pain, blood clots in the legs).  · When in bed, try to find a position of comfort. A firm mattress is best. Try lying flat on your back with pillows under your knees. You can also try lying on your side with your knees bent up towards your chest and a pillow between your knees.  · Avoid prolonged sitting. This puts more stress on the lower back than standing or walking.  · During the first two days after injury, apply an ICE PACK to the painful area for 20 minutes every 2-4 hours. This will reduce swelling and pain. HEAT (hot shower, hot bath or heating pad) works well for muscle spasm. You can start with ice, then switch to heat after two days. Some patients feel best alternating ice and heat treatments. Use the one method that feels the best to you.  · You may use acetaminophen (Tylenol) or ibuprofen (Motrin, Advil) to control pain, unless another pain medicine was  prescribed. [NOTE: If you have chronic liver or kidney disease or ever had a stomach ulcer or GI bleeding, talk with your doctor before using these medicines.]  · Be aware of safe lifting methods and do not lift anything over 15 pounds until all the pain is gone.  Follow Up  with your physician or this facility if your symptoms do not start to improve after one week. Physical therapy or further tests may be needed.  [NOTE: If X-rays were taken, they will be reviewed by a radiologist. You will be notified of any new findings that may affect your care.]  Get Prompt Medical Attention  if any of the following occur:  · Pain becomes worse or spreads into your arms or legs  · Weakness, numbness or pain in one or both arms or legs  · Loss of bowel or bladder control  · Numbness in the groin area  · Difficulty walking  · Fever of 100.4ºF (38ºC) or higher, or as directed by your healthcare provider  © 3332-8825 [facility] [facility], [address] [place] [address]. All rights reserved. This information is not intended as a substitute for professional medical care. Always follow your healthcare professional's instructions.    Back Exercises: Back Press  Do this exercise on your hands and knees. Keep your knees under your hips and your hands under your shoulders. Keep your spine in a neutral position (not arched or sagging). Be sure to maintain your necks natural curve.  · Tighten your abdominal and buttocks muscles to press your back upward. Let your head drop slightly.  · Hold for 5 seconds. Return to starting position.  · Repeat 5 times.     © 3544-6633 [employer] [facility], [address] [place] [address]. All rights reserved. This information is not intended as a substitute for professional medical care. Always follow your healthcare professional's instructions.    Back Exercises: Back Release  Do this exercise on your hands and knees. Keep your knees under your hips and your hands under your shoulders. Keep  your spine in a neutral position (not arched or sagging). Be sure to maintain your necks natural curve.  · Relax your abdominal and buttocks muscles, lift your head, and let your back sag. Be sure to keep your weight evenly distributed. Dont sit back on your hips.   · Hold for 5 seconds.  · Return to starting position.  · Repeat 5 times.  © 4199-1973 [facility] [place] [address]. All rights reserved. This information is not intended as a substitute for professional medical care. Always follow your healthcare professional's instructions.    Back Exercises: Bridge  The Bridge exercise strengthens your abdominal, buttocks, and hamstring muscles. This helps keep your back stable and aligned when you walk.  · Lie on the floor with your back and palms flat. Bend your knees. Keep your feet flat on the floor.  · Contract your abdominal and buttocks muscles. Slowly lift your buttocks off the floor until there is a straight line from your knees to your shoulders.  · Hold for 5  seconds. Repeat 10 times.    © 4106-4358 [place] [address]. All rights reserved. This information is not intended as a substitute for professional medical care. Always follow your healthcare professional's instructions.    Back Exercises: Elbow Press    To start, lie face down on your stomach, feet slightly apart, forehead on the floor. Breathe deeply. You should feel comfortable and relaxed in this position.  · Press up on your forearms. Keep your abdomen and hips on the floor.  · Hold for 20 seconds. Lower slowly.  · Repeat 2 times.  · Return to starting position.  © 2695-1844 [facility], [address] [place] [address]. All rights reserved. This information is not intended as a substitute for professional medical care. Always follow your healthcare professional's instructions.    Back Exercises: Pelvic Tilt  To start, lie on your back with your knees bent and  feet flat on the floor. Dont press your neck or lower back to the floor. Breathe deeply. You should feel comfortable and relaxed in this position.  · Tighten your abdomen and buttocks, and press your lower back toward the floor. This should be a small, subtle movement.  · Hold for 5 seconds. Release.  · Repeat 5 times.         © 7606-0996 [facility] [facility], [address] [place] [address]. All rights reserved. This information is not intended as a substitute for professional medical care. Always follow your healthcare professional's instructions.    Back Exercises: Partial Curl-Ups          To start, lie on your back with your knees bent and feet flat on the floor. Dont press your neck or lower back to the floor. Breathe deeply. You should feel comfortable and relaxed in this position.  · Cross your arms loosely.  · Tighten your abdomen and curl senior living up, keeping your head in line with your shoulders.  · Hold for 5 seconds. Uncurl to lie down.  · Repeat 5 times.   © 9002-7611 [facility], [address] [place] [address]. All rights reserved. This information is not intended as a substitute for professional medical care. Always follow your healthcare professional's instructions.    Back Exercises: Lower Back Stretch                            To start, sit in a chair with your feet flat on the floor. Shift your weight slightly forward to avoid rounding your back. Relax, and keep your ears, shoulders, and hips aligned.  · Sit with your feet well apart.  · Bend forward and touch the floor with the backs of your hands. Relax and let your body drop.  · Hold for 20 seconds. Return to starting position.  · Repeat 2 times.   © 4740-7352 [employer] [facility], [address] [place] [address]. All rights reserved. This information is not intended as a substitute for professional medical care. Always follow your healthcare professional's instructions.    Back Exercises: Seated Rotation      To  start, sit in a chair with your feet flat on the floor. Shift your weight slightly forward to avoid rounding your back. Relax, and keep your ears, shoulders, and hips aligned.  · Fold your arms, elbows just below shoulder height.  · Turn from the waist with hips forward. Turn your head last.  · Hold for a count of 5. Return to starting position.  · Repeat 5 times on one side. Then switch sides.  © 1290-8100 [employer], [address] [place] [address]. All rights reserved. This information is not intended as a substitute for professional medical care. Always follow your healthcare professional's instructions.    Back Exercises: Side Stretch  To start, sit in a chair with your feet flat on the floor. Shift your weight slightly forward to avoid rounding your back. Relax. Keep your ears, shoulders, and hips aligned.  · Stretch your right arm overhead.  · Slowly bend to the left. Dont twist your torso.  · Hold for 20 seconds. Return to starting position.  · Repeat 2 times. Then, switch to the other side.  © 3573-2093 [employer], [address] [place] [address]. All rights reserved. This information is not intended as a substitute for professional medical care. Always follow your healthcare professional's instructions

## 2018-06-04 NOTE — PROGRESS NOTES
Subjective:       Patient ID: Mary Sanches is a 48 y.o. female.    Chief Complaint: No chief complaint on file.    HPI     HISTORY OF PRESENT ILLNESS:  Ms. Sanches is a 48-year-old white female who is   followed up in the [facility] for chronic low back pain with   lumbar radiculopathy.  Her last visit to the clinic was on 02/01/2018.  She was   maintained on meloxicam, Cymbalta and p.r.n. tramadol.    The patient comes today to the clinic for followup.  Her back pain has been   worse.  It is a constant aching pain in the lumbar spine and across her back.    She has occasional shooting pain to the left foot with numbness.  She recently   had some shooting pain to the right foot.  Her pain is worse with activity.  Her   maximum pain is 5-6/10 and minimum 3/10.  Today, it is 5/10.  The patient   complains of mild left lower extremity weakness.  She believes it is   subjectively worse.  She denies any bowel or bladder incontinence.  She reports   occasional falls but without trauma.  She has also been having for the last 1   week of neck pain.  It is an intermittent, localized mild aching pain.    She is currently taking Cymbalta 30 mg once per day.  She was taking meloxicam   15 mg p.o. once per day.  However, she has been out for few weeks.  She takes   tramadol 50 mg p.r.n., usually two to three times per day when her pain is   worse.  She is trying to stay active.      MS/HN  dd: 06/04/2018 11:04:49 (CDT)  td: 06/05/2018 01:29:38 (CDT)  Doc ID   #[id number]  Job ID #[id number]    CC:           Review of Systems   Constitutional: Positive for fatigue.   Eyes: Negative for visual disturbance.   Respiratory: Negative for shortness of breath.    Cardiovascular: Negative for chest pain.   Gastrointestinal: Negative for blood in stool, constipation, nausea and vomiting.   Genitourinary: Negative for difficulty urinating.   Musculoskeletal: Positive for back pain, gait problem and neck pain. Negative for  arthralgias and joint swelling.   Neurological: Positive for dizziness and headaches.   Psychiatric/Behavioral: Positive for sleep disturbance. Negative for behavioral problems.       Objective:      Physical Exam   Constitutional: She is oriented to person, place, and time. She appears well-developed and well-nourished.   HENT:   Head: Normocephalic and atraumatic.   Neck: Normal range of motion.   Musculoskeletal:   BLE:  ROM:full.  Mild bilateral knee crepitus.   Strength:    RLE: 5-/5 at hip flexion, 5 knee extension, 5 ankle DF, 5 PF.   LLE: 5/5 at hip flexion, 5 knee extension, 5 ankle DF, 5 PF.  Sensation to pinprick:     RLE: mild decrease at dorsum.     LLE: mild decrease at dorsum.    SLR (sitting):      RLE: +ve.      LLE: +ve.     Mild tenderness over lumbar spine.       Neurological: She is alert and oriented to person, place, and time.   Skin: Skin is warm.   Psychiatric: She has a normal mood and affect.           Assessment:       1. Chronic midline low back pain with bilateral sciatica    2. Osteoarthritis of spine with radiculopathy, lumbar region    3. Obesity (BMI 30.0-34.9)        Plan:     - Restart meloxicam (MOBIC) 15 MG tablet; Take 1 tablet (15 mg total) by mouth once daily.  - Increase duloxetine (CYMBALTA) 60 MG capsule; Take 1 capsule (60 mg total) by mouth once daily.  - Continue tramadol (ULTRAM) 50 mg tablet; Take 1 tablet (50 mg total) by mouth every 6 (six) hours as needed for Pain.   She is not interested in ESIs at this point.  - The patient was encouraged to adopt a regular Home Exercise Program, and provided with printouts.  - The patient was commended on her weight loss (188.6 lbs today vs 193.1 lbs last visit) and encouraged to continue with weight loss efforts  - Follow-up in about 4 months (around 10/4/2018).      This was a 25 minute visit, more than 50% of which was spent counseling the patient about the diagnosis and the treatment plan.

## 2018-06-13 ENCOUNTER — PES CALL (OUTPATIENT)
Dept: ADMINISTRATIVE | Facility: CLINIC | Age: 48
End: 2018-06-13

## 2018-06-16 DIAGNOSIS — M54.41 CHRONIC MIDLINE LOW BACK PAIN WITH BILATERAL SCIATICA: ICD-10-CM

## 2018-06-16 DIAGNOSIS — M54.42 CHRONIC MIDLINE LOW BACK PAIN WITH BILATERAL SCIATICA: ICD-10-CM

## 2018-06-16 DIAGNOSIS — G89.29 CHRONIC MIDLINE LOW BACK PAIN WITH BILATERAL SCIATICA: ICD-10-CM

## 2018-06-17 RX ORDER — DULOXETIN HYDROCHLORIDE 60 MG/1
60 CAPSULE, DELAYED RELEASE ORAL DAILY
Qty: 30 CAPSULE | Refills: 2 | Status: SHIPPED | OUTPATIENT
Start: 2018-06-17 | End: 2018-07-17

## 2018-06-19 RX ORDER — HYDROCHLOROTHIAZIDE 12.5 MG/1
CAPSULE ORAL
Qty: 30 CAPSULE | Refills: 0 | Status: SHIPPED | OUTPATIENT
Start: 2018-06-19 | End: 2018-07-18 | Stop reason: SDUPTHER

## 2018-06-21 DIAGNOSIS — Z78.0 MENOPAUSE: ICD-10-CM

## 2018-06-21 RX ORDER — CLONIDINE HYDROCHLORIDE 0.2 MG/1
TABLET ORAL
Qty: 30 TABLET | Refills: 3 | Status: SHIPPED | OUTPATIENT
Start: 2018-06-21 | End: 2018-10-08 | Stop reason: SDUPTHER

## 2018-06-22 DIAGNOSIS — G89.29 CHRONIC MIDLINE LOW BACK PAIN WITH BILATERAL SCIATICA: ICD-10-CM

## 2018-06-22 DIAGNOSIS — M54.42 CHRONIC MIDLINE LOW BACK PAIN WITH BILATERAL SCIATICA: ICD-10-CM

## 2018-06-22 DIAGNOSIS — M54.41 CHRONIC MIDLINE LOW BACK PAIN WITH BILATERAL SCIATICA: ICD-10-CM

## 2018-06-22 RX ORDER — TRAMADOL HYDROCHLORIDE 50 MG/1
50 TABLET ORAL EVERY 6 HOURS PRN
Qty: 120 TABLET | Refills: 2 | Status: SHIPPED | OUTPATIENT
Start: 2018-06-22 | End: 2018-09-16 | Stop reason: SDUPTHER

## 2018-06-26 DIAGNOSIS — M54.42 CHRONIC MIDLINE LOW BACK PAIN WITH LEFT-SIDED SCIATICA: ICD-10-CM

## 2018-06-26 DIAGNOSIS — M54.16 LEFT LUMBAR RADICULOPATHY: ICD-10-CM

## 2018-06-26 DIAGNOSIS — G89.29 CHRONIC MIDLINE LOW BACK PAIN WITH LEFT-SIDED SCIATICA: ICD-10-CM

## 2018-06-26 RX ORDER — DULOXETIN HYDROCHLORIDE 60 MG/1
60 CAPSULE, DELAYED RELEASE ORAL DAILY
Qty: 30 CAPSULE | Refills: 1 | Status: SHIPPED | OUTPATIENT
Start: 2018-06-26 | End: 2018-10-04

## 2018-07-02 RX ORDER — BUTALBITAL, ACETAMINOPHEN AND CAFFEINE 50; 325; 40 MG/1; MG/1; MG/1
TABLET ORAL
Qty: 30 TABLET | Refills: 3 | Status: SHIPPED | OUTPATIENT
Start: 2018-07-02 | End: 2018-08-27 | Stop reason: SDUPTHER

## 2018-07-18 RX ORDER — HYDROCHLOROTHIAZIDE 12.5 MG/1
CAPSULE ORAL
Qty: 30 CAPSULE | Refills: 0 | Status: SHIPPED | OUTPATIENT
Start: 2018-07-18 | End: 2018-08-15 | Stop reason: SDUPTHER

## 2018-07-20 DIAGNOSIS — Z12.39 BREAST CANCER SCREENING: ICD-10-CM

## 2018-07-29 DIAGNOSIS — G89.29 CHRONIC MIDLINE LOW BACK PAIN WITH LEFT-SIDED SCIATICA: ICD-10-CM

## 2018-07-29 DIAGNOSIS — M54.16 LEFT LUMBAR RADICULOPATHY: ICD-10-CM

## 2018-07-29 DIAGNOSIS — M54.42 CHRONIC MIDLINE LOW BACK PAIN WITH LEFT-SIDED SCIATICA: ICD-10-CM

## 2018-07-29 RX ORDER — DULOXETIN HYDROCHLORIDE 60 MG/1
60 CAPSULE, DELAYED RELEASE ORAL 2 TIMES DAILY
Qty: 30 CAPSULE | Refills: 3 | Status: SHIPPED | OUTPATIENT
Start: 2018-07-29 | End: 2018-10-08 | Stop reason: SDUPTHER

## 2018-08-03 ENCOUNTER — PES CALL (OUTPATIENT)
Dept: ADMINISTRATIVE | Facility: CLINIC | Age: 48
End: 2018-08-03

## 2018-08-15 RX ORDER — HYDROCHLOROTHIAZIDE 12.5 MG/1
CAPSULE ORAL
Qty: 30 CAPSULE | Refills: 0 | Status: SHIPPED | OUTPATIENT
Start: 2018-08-15 | End: 2018-09-24 | Stop reason: SDUPTHER

## 2018-08-28 RX ORDER — BUTALBITAL, ACETAMINOPHEN AND CAFFEINE 50; 325; 40 MG/1; MG/1; MG/1
TABLET ORAL
Qty: 30 TABLET | Refills: 3 | Status: SHIPPED | OUTPATIENT
Start: 2018-08-28 | End: 2018-11-05 | Stop reason: SDUPTHER

## 2018-09-16 DIAGNOSIS — M54.41 CHRONIC MIDLINE LOW BACK PAIN WITH BILATERAL SCIATICA: ICD-10-CM

## 2018-09-16 DIAGNOSIS — M54.42 CHRONIC MIDLINE LOW BACK PAIN WITH BILATERAL SCIATICA: ICD-10-CM

## 2018-09-16 DIAGNOSIS — G89.29 CHRONIC MIDLINE LOW BACK PAIN WITH BILATERAL SCIATICA: ICD-10-CM

## 2018-09-17 RX ORDER — TRAMADOL HYDROCHLORIDE 50 MG/1
50 TABLET ORAL EVERY 6 HOURS PRN
Qty: 120 TABLET | Refills: 2 | Status: SHIPPED | OUTPATIENT
Start: 2018-09-17 | End: 2018-10-11 | Stop reason: SDUPTHER

## 2018-09-24 RX ORDER — HYDROCHLOROTHIAZIDE 12.5 MG/1
CAPSULE ORAL
Qty: 30 CAPSULE | Refills: 0 | Status: SHIPPED | OUTPATIENT
Start: 2018-09-24 | End: 2018-11-05 | Stop reason: SDUPTHER

## 2018-09-26 ENCOUNTER — PES CALL (OUTPATIENT)
Dept: ADMINISTRATIVE | Facility: CLINIC | Age: 48
End: 2018-09-26

## 2018-10-04 ENCOUNTER — OFFICE VISIT (OUTPATIENT)
Dept: PHYSICAL MEDICINE AND REHAB | Facility: CLINIC | Age: 48
End: 2018-10-04
Payer: MEDICARE

## 2018-10-04 VITALS
HEART RATE: 55 BPM | SYSTOLIC BLOOD PRESSURE: 131 MMHG | WEIGHT: 189.63 LBS | DIASTOLIC BLOOD PRESSURE: 76 MMHG | HEIGHT: 64 IN | BODY MASS INDEX: 32.37 KG/M2

## 2018-10-04 DIAGNOSIS — E66.9 OBESITY (BMI 30.0-34.9): ICD-10-CM

## 2018-10-04 DIAGNOSIS — G89.29 CHRONIC MIDLINE LOW BACK PAIN WITH LEFT-SIDED SCIATICA: ICD-10-CM

## 2018-10-04 DIAGNOSIS — M54.41 CHRONIC MIDLINE LOW BACK PAIN WITH BILATERAL SCIATICA: Primary | ICD-10-CM

## 2018-10-04 DIAGNOSIS — M54.42 CHRONIC MIDLINE LOW BACK PAIN WITH BILATERAL SCIATICA: Primary | ICD-10-CM

## 2018-10-04 DIAGNOSIS — M47.26 OSTEOARTHRITIS OF SPINE WITH RADICULOPATHY, LUMBAR REGION: ICD-10-CM

## 2018-10-04 DIAGNOSIS — G89.29 CHRONIC MIDLINE LOW BACK PAIN WITH BILATERAL SCIATICA: Primary | ICD-10-CM

## 2018-10-04 DIAGNOSIS — M54.16 LEFT LUMBAR RADICULOPATHY: ICD-10-CM

## 2018-10-04 DIAGNOSIS — M54.42 CHRONIC MIDLINE LOW BACK PAIN WITH LEFT-SIDED SCIATICA: ICD-10-CM

## 2018-10-04 PROCEDURE — 99213 OFFICE O/P EST LOW 20 MIN: CPT | Mod: PBBFAC | Performed by: PHYSICAL MEDICINE & REHABILITATION

## 2018-10-04 PROCEDURE — 99214 OFFICE O/P EST MOD 30 MIN: CPT | Mod: S$PBB,,, | Performed by: PHYSICAL MEDICINE & REHABILITATION

## 2018-10-04 PROCEDURE — 3008F BODY MASS INDEX DOCD: CPT | Mod: CPTII,,, | Performed by: PHYSICAL MEDICINE & REHABILITATION

## 2018-10-04 PROCEDURE — 99999 PR PBB SHADOW E&M-EST. PATIENT-LVL III: CPT | Mod: PBBFAC,,, | Performed by: PHYSICAL MEDICINE & REHABILITATION

## 2018-10-04 RX ORDER — DIAZEPAM 5 MG/1
5 TABLET ORAL SEE ADMIN INSTRUCTIONS
Qty: 2 TABLET | Refills: 0 | Status: SHIPPED | OUTPATIENT
Start: 2018-10-04 | End: 2019-12-17

## 2018-10-04 RX ORDER — DULOXETIN HYDROCHLORIDE 60 MG/1
60 CAPSULE, DELAYED RELEASE ORAL 2 TIMES DAILY
Start: 2018-10-04 | End: 2018-11-05

## 2018-10-04 NOTE — PROGRESS NOTES
Subjective:       Patient ID: Mary Sanches is a 48 y.o. female.    Chief Complaint: No chief complaint on file.    HPI     HISTORY OF PRESENT ILLNESS:  Ms. Sanches is a 48-year-old white female who is   followed up in the [facility] for chronic low back pain with   lumbar radiculopathy.  Her last visit to the clinic was on 06/04/2018.  She was   maintained on meloxicam, Cymbalta and p.r.n. tramadol.    The patient is coming today to the clinic for followup.  Her back pain has been   waxing and waning, but worse over the past two weeks.  She attributes this to   the rainy weather.  Her pain is a constant aching sensation in the lumbar spine   and across her back.  She continues to have occasional shooting pain to the left   foot with numbness.  His pain is worse with activity and better with rest.  Her   maximum pain is 5-6/10 and minimum 4/10.  Today, it is 5-6/10.  The patient   complains of left lower extremity weakness.  Her leg occasionally gives out on   her.  She sustained falls with mild trauma to her left thigh.  She has been   ambulating at times using a Rollator walker.  She denies any bowel or bladder   incontinence.  She used to have intermittent neck pain, but that has been under   fair control.    She is currently taking meloxicam 15 mg p.o. once per day, Cymbalta 60 mg p.o.   once per daily and tramadol 50 mg p.r.n., usually 3-4 times per day.      MS/HN  dd: 10/04/2018 08:58:49 (CDT)  td: 10/05/2018 00:19:56 (CDT)  Doc ID   #[id number]  Job ID #[id number]    CC:         Review of Systems   Constitutional: Positive for fatigue.   Eyes: Negative for visual disturbance.   Respiratory: Negative for shortness of breath.    Cardiovascular: Negative for chest pain.   Gastrointestinal: Negative for blood in stool, constipation, nausea and vomiting.   Genitourinary: Negative for difficulty urinating.   Musculoskeletal: Positive for back pain, gait problem and neck pain. Negative for arthralgias  and joint swelling.   Neurological: Positive for headaches. Negative for dizziness.   Psychiatric/Behavioral: Positive for sleep disturbance. Negative for behavioral problems.       Objective:      Physical Exam   Constitutional: She is oriented to person, place, and time. She appears well-developed and well-nourished.   HENT:   Head: Normocephalic and atraumatic.   Neck: Normal range of motion.   Musculoskeletal:     BUE:  ROM:full.  Strength:    RUE: 5-/5 at shoulder abduction, 5 elbow flexion, 5 elbow extension, 5 hand .   LUE: 5-/5 at shoulder abduction, 5 elbow flexion, 5 elbow extension, 5 hand .  Sensation to pinprick:   RUE: intact.   LUE: intact.    BLE:  ROM:full.  Mild bilateral knee crepitus.   Strength:    RLE: 5-/5 at hip flexion, 5 knee extension, 5 ankle DF, 5 PF.   LLE: 4/5 at hip flexion, 4 knee extension, 4 ankle DF, 4+ PF.  Sensation to pinprick:     RLE: mild decrease.     LLE: mild decrease.    SLR (sitting):      RLE: +ve.      LLE: +ve.     Mild tenderness over lumbar spine.       Neurological: She is alert and oriented to person, place, and time.   Skin: Skin is warm.   Psychiatric: She has a normal mood and affect.           Assessment:       1. Chronic midline low back pain with bilateral sciatica    2. Osteoarthritis of spine with radiculopathy, lumbar region        Plan:     - Continue meloxicam (MOBIC) 15 MG tablet; Take 1 tablet (15 mg total) by mouth once daily.   - Increase duloxetine (CYMBALTA) 60 MG capsule; Take 1 capsule (60 mg total) by mouth once daily.  - Continue tramadol (ULTRAM) 50 mg tablet; Take 1 tablet (50 mg total) by mouth every 6 (six) hours as needed for Pain.  - Regular home exercise program was encouraged.  - Weight loss was encouraged.  - Follow-up in about 3 months (around 1/4/2019).      This was a 25 minute visit, more than 50% of which was spent counseling the patient about the diagnosis and the treatment plan.

## 2018-10-08 DIAGNOSIS — G89.29 CHRONIC MIDLINE LOW BACK PAIN WITH LEFT-SIDED SCIATICA: ICD-10-CM

## 2018-10-08 DIAGNOSIS — M54.42 CHRONIC MIDLINE LOW BACK PAIN WITH LEFT-SIDED SCIATICA: ICD-10-CM

## 2018-10-08 DIAGNOSIS — M54.16 LEFT LUMBAR RADICULOPATHY: ICD-10-CM

## 2018-10-08 DIAGNOSIS — Z78.0 MENOPAUSE: ICD-10-CM

## 2018-10-08 RX ORDER — CLONIDINE HYDROCHLORIDE 0.2 MG/1
TABLET ORAL
Qty: 30 TABLET | Refills: 3 | Status: SHIPPED | OUTPATIENT
Start: 2018-10-08 | End: 2019-02-02 | Stop reason: SDUPTHER

## 2018-10-08 RX ORDER — DULOXETIN HYDROCHLORIDE 60 MG/1
60 CAPSULE, DELAYED RELEASE ORAL 2 TIMES DAILY
Qty: 30 CAPSULE | Refills: 3 | Status: SHIPPED | OUTPATIENT
Start: 2018-10-08 | End: 2018-11-05

## 2018-10-11 DIAGNOSIS — G89.29 CHRONIC MIDLINE LOW BACK PAIN WITH BILATERAL SCIATICA: ICD-10-CM

## 2018-10-11 DIAGNOSIS — M54.42 CHRONIC MIDLINE LOW BACK PAIN WITH BILATERAL SCIATICA: ICD-10-CM

## 2018-10-11 DIAGNOSIS — M54.41 CHRONIC MIDLINE LOW BACK PAIN WITH BILATERAL SCIATICA: ICD-10-CM

## 2018-10-11 RX ORDER — TRAMADOL HYDROCHLORIDE 50 MG/1
50 TABLET ORAL EVERY 6 HOURS PRN
Qty: 120 TABLET | Refills: 2 | Status: SHIPPED | OUTPATIENT
Start: 2018-10-17 | End: 2019-01-15 | Stop reason: SDUPTHER

## 2018-10-11 NOTE — TELEPHONE ENCOUNTER
09/17/18 last Rx refill  10/04/18 last office visit  01/28/19 RTC        ----- Message from Arabella Shay sent at 10/11/2018 12:20 PM CDT -----  Contact: pt@ [phone]  Says that she is always in pain, she feels like her leg pain is getting worse (she has been exercising). Please call.    Rx Refill/Request     Is this a Refill or New Rx:  refill  Rx Name and Strength:  traMADol (ULTRAM) 50 mg tablet  Preferred Pharmacy with phone number:     [facility]/pharmacy #[id number] - [place] - [address] AT [facility]  [address] [address]  Phone: [phone] Fax: [phone]    Communication Preference:pt @ [phone]  Additional Information: asking to have the prescription refill override for medication: traMADol (ULTRAM) 50 mg tablet. Says she is completely out of the medication

## 2018-10-22 ENCOUNTER — OFFICE VISIT (OUTPATIENT)
Dept: URGENT CARE | Facility: CLINIC | Age: 48
End: 2018-10-22
Payer: MEDICARE

## 2018-10-22 VITALS
SYSTOLIC BLOOD PRESSURE: 113 MMHG | BODY MASS INDEX: 30.82 KG/M2 | HEART RATE: 67 BPM | OXYGEN SATURATION: 94 % | RESPIRATION RATE: 19 BRPM | HEIGHT: 65 IN | TEMPERATURE: 98 F | WEIGHT: 185 LBS | DIASTOLIC BLOOD PRESSURE: 76 MMHG

## 2018-10-22 DIAGNOSIS — R52 BODY ACHES: ICD-10-CM

## 2018-10-22 DIAGNOSIS — R09.81 SINUS CONGESTION: ICD-10-CM

## 2018-10-22 DIAGNOSIS — J06.9 UPPER RESPIRATORY TRACT INFECTION, UNSPECIFIED TYPE: Primary | ICD-10-CM

## 2018-10-22 LAB
CTP QC/QA: YES
FLUAV AG NPH QL: NEGATIVE
FLUBV AG NPH QL: NEGATIVE

## 2018-10-22 PROCEDURE — 99214 OFFICE O/P EST MOD 30 MIN: CPT | Mod: 25,S$GLB,, | Performed by: FAMILY MEDICINE

## 2018-10-22 PROCEDURE — 3008F BODY MASS INDEX DOCD: CPT | Mod: CPTII,S$GLB,, | Performed by: FAMILY MEDICINE

## 2018-10-22 PROCEDURE — 96372 THER/PROPH/DIAG INJ SC/IM: CPT | Mod: S$GLB,,, | Performed by: FAMILY MEDICINE

## 2018-10-22 PROCEDURE — 87804 INFLUENZA ASSAY W/OPTIC: CPT | Mod: 59,QW,S$GLB, | Performed by: FAMILY MEDICINE

## 2018-10-22 RX ORDER — FLUTICASONE PROPIONATE 50 MCG
2 SPRAY, SUSPENSION (ML) NASAL DAILY
Qty: 1 BOTTLE | Refills: 0 | Status: SHIPPED | OUTPATIENT
Start: 2018-10-22 | End: 2018-11-21

## 2018-10-22 RX ORDER — BETAMETHASONE SODIUM PHOSPHATE AND BETAMETHASONE ACETATE 3; 3 MG/ML; MG/ML
6 INJECTION, SUSPENSION INTRA-ARTICULAR; INTRALESIONAL; INTRAMUSCULAR; SOFT TISSUE
Status: COMPLETED | OUTPATIENT
Start: 2018-10-22 | End: 2018-10-22

## 2018-10-22 RX ORDER — BENZONATATE 200 MG/1
200 CAPSULE ORAL 3 TIMES DAILY PRN
Qty: 30 CAPSULE | Refills: 0 | Status: SHIPPED | OUTPATIENT
Start: 2018-10-22 | End: 2018-11-01

## 2018-10-22 RX ADMIN — BETAMETHASONE SODIUM PHOSPHATE AND BETAMETHASONE ACETATE 6 MG: 3; 3 INJECTION, SUSPENSION INTRA-ARTICULAR; INTRALESIONAL; INTRAMUSCULAR; SOFT TISSUE at 12:10

## 2018-10-22 NOTE — PROGRESS NOTES
"Subjective:       Patient ID: Mary Sanches is a 48 y.o. female.    Vitals:  height is 5' 5" (1.651 m) and weight is 83.9 kg (185 lb). Her oral temperature is 97.7 °F (36.5 °C). Her blood pressure is 113/76 and her pulse is 67. Her respiration is 19 and oxygen saturation is 94 (abnormal)%.     Chief Complaint: URI    URI    This is a new problem. Episode onset: 3-4 days ago. The problem has been unchanged. Maximum temperature: at night  Associated symptoms include congestion, coughing and sinus pain. Pertinent negatives include no abdominal pain, chest pain, diarrhea, dysuria, ear pain, headaches, joint pain, joint swelling, nausea, neck pain, plugged ear sensation, rash, rhinorrhea, sneezing, sore throat, swollen glands, vomiting or wheezing. She has tried acetaminophen for the symptoms. The treatment provided no relief.     Review of Systems   Constitution: Positive for fever and malaise/fatigue. Negative for chills.   HENT: Positive for congestion and sinus pain. Negative for ear pain, hoarse voice, rhinorrhea, sneezing and sore throat.    Eyes: Negative for discharge and redness.   Cardiovascular: Negative for chest pain, dyspnea on exertion and leg swelling.   Respiratory: Positive for cough and sputum production. Negative for shortness of breath and wheezing.    Skin: Negative for rash.   Musculoskeletal: Positive for myalgias. Negative for joint pain and neck pain.   Gastrointestinal: Negative for abdominal pain, diarrhea, nausea and vomiting.   Genitourinary: Negative for dysuria.   Neurological: Negative for headaches.       Objective:      Physical Exam   Constitutional: She is oriented to person, place, and time. She appears well-developed and well-nourished.   HENT:   Head: Normocephalic and atraumatic.   Right Ear: External ear normal.   Left Ear: External ear normal.   Mouth/Throat: Oropharynx is clear and moist.   Eyes: EOM are normal. Pupils are equal, round, and reactive to light.   Neck: " Normal range of motion. Neck supple. No thyromegaly present.   Cardiovascular: Normal rate, regular rhythm and normal heart sounds. Exam reveals no gallop and no friction rub.   No murmur heard.  Pulmonary/Chest: Breath sounds normal. No respiratory distress. She has no wheezes. She has no rales. She exhibits no tenderness.   Abdominal: Soft. Bowel sounds are normal. She exhibits no distension and no mass. There is no tenderness. There is no rebound and no guarding. No hernia.   Musculoskeletal: Normal range of motion. She exhibits no edema, tenderness or deformity.   Lymphadenopathy:     She has no cervical adenopathy.   Neurological: She is alert and oriented to person, place, and time. She displays normal reflexes. No cranial nerve deficit. She exhibits normal muscle tone. Coordination normal.   Skin: Skin is warm. Capillary refill takes less than 2 seconds. No rash noted. No erythema. No pallor.   Psychiatric: She has a normal mood and affect. Her behavior is normal. Judgment and thought content normal.   Vitals reviewed.      Assessment:       1. Upper respiratory tract infection, unspecified type    2. Body aches    3. Sinus congestion        Plan:         Upper respiratory tract infection, unspecified type  -     benzonatate (TESSALON) 200 MG capsule; Take 1 capsule (200 mg total) by mouth 3 (three) times daily as needed for Cough.  Dispense: 30 capsule; Refill: 0    Body aches  -     POCT Influenza A/B  -     betamethasone acetate-betamethasone sodium phosphate injection 6 mg    Sinus congestion  -     betamethasone acetate-betamethasone sodium phosphate injection 6 mg  -     fluticasone (FLONASE) 50 mcg/actuation nasal spray; 2 sprays (100 mcg total) by Each Nare route once daily.  Dispense: 1 Bottle; Refill: 0      Patient Instructions       Preventing Common Respiratory Infections  Respiratory infections such as colds and influenza (the flu) are common in winter. These infections are often caused by  viruses. They may share some symptoms, but not all respiratory infections are the same. Some make you more sick than others. You can take steps to prevent common respiratory infections. And if you get sick, you can take care of yourself to keep the infection from getting worse.    What is a cold?  · Symptoms include runny nose, coughing and sneezing, and sore throat. Cold symptoms tend to be milder than flu symptoms.  · Symptoms tend to come on slowly. They last for a few days to about a week.  · With a cold, you can still do most of the things you usually do.  What is the flu?  · Symptoms include fever, headache, fatigue, cough, sore throat, runny nose, and muscle aches. Children may have upset stomach and vomiting, but adults usually dont.  · Symptoms tend to come on quickly. Some, such as fatigue and cough, can last a few weeks.  · With the flu, you may feel worn out and not able to do normal activities.  · Its most likely NOT the flu if an adult has vomiting or diarrhea for a day or two. This so-called stomach flu is probably a GI (gastrointestinal) infection.  When the infection gets worse  Without proper care, a respiratory infection can get worse. It can lead to serious complications and death. If you arent getting better, call your healthcare provider. Complications can include:  · Bronchitis (infection of the airways that leads to shortness of breath and coughing up thick yellow or green mucus)  · Pneumonia (infection of the lungs in which fluid and mucus settle in the lungs, making breathing difficult)  · Worsening of chronic conditions such as heart failure, chronic lung disease, asthma, or diabetes  · Severe dehydration (loss of fluids)  · Sinus problems  · Ear infections   Get a flu vaccine  A flu vaccine protects you from influenza (but not other colds or infections). Get a vaccine each fall, before flu season starts. This can be done at a clinic, healthcare providers office, drugstore, senior  center, or through your workplace.  Get pneumococcal vaccines  Pneumonia can be a complication of influenza. There are 2 pneumococcal pneumonia vaccines that protect against many types of pneumonia. Talk with your healthcare provider about these important vaccines.   Keep germs from spreading  No one likes getting sick. To protect yourself and others from cold and flu germs:  · Wash your hands often. Use alcohol-based hand  when you dont have access to soap and water.  · Dont touch your eyes, nose, and mouth. This may help you keep germs out of your body.  · Try to avoid people with respiratory infections. You may want to stay out of crowds during flu season (winter).  · Ask your healthcare provider if you should get a pneumonia vaccination.  How to wash your hands  · Use warm water and plenty of soap. Work up a good lather.  · Clean your whole hand, under your nails, between your fingers, and up your wrists. Wash for at least 15 to 20 seconds. Dont just wipe--rub well.  · Rinse. Let the water run down your fingertips, not up your wrists.  · In a public restroom, use a paper towel to turn off the faucet and open the door.   Date Last Reviewed: 12/1/2016  © 9953-3416 [employer]. [address] [place] [address], [place] [address]. All rights reserved. This information is not intended as a substitute for professional medical care. Always follow your healthcare professional's instructions.      Follow up with your doctor in a few days as needed.  Return to the urgent care or go to the ER if symptoms get worse.    Larry Alfonso MD

## 2018-10-22 NOTE — PATIENT INSTRUCTIONS
Preventing Common Respiratory Infections  Respiratory infections such as colds and influenza (the flu) are common in winter. These infections are often caused by viruses. They may share some symptoms, but not all respiratory infections are the same. Some make you more sick than others. You can take steps to prevent common respiratory infections. And if you get sick, you can take care of yourself to keep the infection from getting worse.    What is a cold?  · Symptoms include runny nose, coughing and sneezing, and sore throat. Cold symptoms tend to be milder than flu symptoms.  · Symptoms tend to come on slowly. They last for a few days to about a week.  · With a cold, you can still do most of the things you usually do.  What is the flu?  · Symptoms include fever, headache, fatigue, cough, sore throat, runny nose, and muscle aches. Children may have upset stomach and vomiting, but adults usually dont.  · Symptoms tend to come on quickly. Some, such as fatigue and cough, can last a few weeks.  · With the flu, you may feel worn out and not able to do normal activities.  · Its most likely NOT the flu if an adult has vomiting or diarrhea for a day or two. This so-called stomach flu is probably a GI (gastrointestinal) infection.  When the infection gets worse  Without proper care, a respiratory infection can get worse. It can lead to serious complications and death. If you arent getting better, call your healthcare provider. Complications can include:  · Bronchitis (infection of the airways that leads to shortness of breath and coughing up thick yellow or green mucus)  · Pneumonia (infection of the lungs in which fluid and mucus settle in the lungs, making breathing difficult)  · Worsening of chronic conditions such as heart failure, chronic lung disease, asthma, or diabetes  · Severe dehydration (loss of fluids)  · Sinus problems  · Ear infections   Get a flu vaccine  A flu vaccine protects you from influenza  (but not other colds or infections). Get a vaccine each fall, before flu season starts. This can be done at a clinic, healthcare providers office, drugstore, senior center, or through your workplace.  Get pneumococcal vaccines  Pneumonia can be a complication of influenza. There are 2 pneumococcal pneumonia vaccines that protect against many types of pneumonia. Talk with your healthcare provider about these important vaccines.   Keep germs from spreading  No one likes getting sick. To protect yourself and others from cold and flu germs:  · Wash your hands often. Use alcohol-based hand  when you dont have access to soap and water.  · Dont touch your eyes, nose, and mouth. This may help you keep germs out of your body.  · Try to avoid people with respiratory infections. You may want to stay out of crowds during flu season (winter).  · Ask your healthcare provider if you should get a pneumonia vaccination.  How to wash your hands  · Use warm water and plenty of soap. Work up a good lather.  · Clean your whole hand, under your nails, between your fingers, and up your wrists. Wash for at least 15 to 20 seconds. Dont just wipe--rub well.  · Rinse. Let the water run down your fingertips, not up your wrists.  · In a public restroom, use a paper towel to turn off the faucet and open the door.   Date Last Reviewed: 12/1/2016  © 0173-7543 [employer]. [address] [place] [address], [place] [address]. All rights reserved. This information is not intended as a substitute for professional medical care. Always follow your healthcare professional's instructions.      Follow up with your doctor in a few days as needed.  Return to the urgent care or go to the ER if symptoms get worse.    Larry Alfonso MD

## 2018-10-29 RX ORDER — BUTALBITAL, ACETAMINOPHEN AND CAFFEINE 50; 325; 40 MG/1; MG/1; MG/1
TABLET ORAL
Qty: 30 TABLET | Refills: 3 | OUTPATIENT
Start: 2018-10-29

## 2018-11-01 RX ORDER — BUTALBITAL, ACETAMINOPHEN AND CAFFEINE 50; 325; 40 MG/1; MG/1; MG/1
TABLET ORAL
Qty: 30 TABLET | Refills: 3 | OUTPATIENT
Start: 2018-11-01

## 2018-11-01 NOTE — TELEPHONE ENCOUNTER
----- Message from Arabella Shay sent at 11/1/2018  3:34 PM CDT -----  Rx Refill/Request     Is this a Refill or New Rx:  refill  Rx Name and Strength:  butalbital-acetaminophen-caffeine -40 mg (FIORICET, ESGIC) -40 mg per tablet  Preferred Pharmacy with phone number:     [facility]/pharmacy #[id number] - [place] - [address] AT [facility]  [address] [address]  Phone: [phone] Fax: [phone]    Communication Preference:pt @ [phone]  Additional Information:

## 2018-11-05 ENCOUNTER — OFFICE VISIT (OUTPATIENT)
Dept: NEUROLOGY | Facility: CLINIC | Age: 48
End: 2018-11-05
Payer: MEDICARE

## 2018-11-05 VITALS
DIASTOLIC BLOOD PRESSURE: 72 MMHG | BODY MASS INDEX: 31.26 KG/M2 | HEIGHT: 65 IN | SYSTOLIC BLOOD PRESSURE: 121 MMHG | HEART RATE: 59 BPM | WEIGHT: 187.63 LBS

## 2018-11-05 DIAGNOSIS — G43.009 MIGRAINE WITHOUT AURA AND WITHOUT STATUS MIGRAINOSUS, NOT INTRACTABLE: Primary | ICD-10-CM

## 2018-11-05 PROCEDURE — 3008F BODY MASS INDEX DOCD: CPT | Mod: CPTII,S$GLB,, | Performed by: NEUROMUSCULOSKELETAL MEDICINE & OMM

## 2018-11-05 PROCEDURE — 99204 OFFICE O/P NEW MOD 45 MIN: CPT | Mod: S$GLB,,, | Performed by: NEUROMUSCULOSKELETAL MEDICINE & OMM

## 2018-11-05 PROCEDURE — 99999 PR PBB SHADOW E&M-EST. PATIENT-LVL III: CPT | Mod: PBBFAC,,, | Performed by: NEUROMUSCULOSKELETAL MEDICINE & OMM

## 2018-11-05 RX ORDER — HYDROXYZINE HYDROCHLORIDE 10 MG/1
TABLET, FILM COATED ORAL
Refills: 0 | Status: ON HOLD | COMMUNITY
Start: 2018-09-26 | End: 2019-12-31 | Stop reason: HOSPADM

## 2018-11-05 RX ORDER — HYDROCHLOROTHIAZIDE 12.5 MG/1
CAPSULE ORAL
Qty: 30 CAPSULE | Refills: 0 | Status: SHIPPED | OUTPATIENT
Start: 2018-11-05 | End: 2018-12-14 | Stop reason: SDUPTHER

## 2018-11-05 RX ORDER — BUTALBITAL, ACETAMINOPHEN AND CAFFEINE 50; 325; 40 MG/1; MG/1; MG/1
TABLET ORAL
Qty: 30 TABLET | Refills: 3 | Status: SHIPPED | OUTPATIENT
Start: 2018-11-05 | End: 2019-01-12 | Stop reason: SDUPTHER

## 2018-11-05 NOTE — PROGRESS NOTES
This history is dictated on Dragon Natural Speaking  word recognition program. There are word recognition mistakes that are occasionally missed on review.  History:  Patient presents for follow-up for migraine headaches after several years.  She has occasional associated nausea.  Patient continues to have 2-3 headaches per month for which she takes Fioricet several pills per headache.  Headache intensity is 10/10 without medicines in with the medication it produces to 6-7/10 .  Patient typically has to lay down in the dark due to the severity of the headaches    Previous note:  5-21-14:  This is a 44-year-old white female presents for Long history of migraine headaches.Patient was previously treated by Dr. Preciado in private practice.  She presents today with complaints of headaches occurring 3 times per week starting in the back of the head radiating to the top predominantly on the left with intensity 9/10 associated with photophobia, nausea, and vomiting.  Patient was previously taken Fioricet tablets with good relief.  She relates that she had problems with Imitrex and was worried about her heart.  She is on disability for anxiety attacks and followed by the psychiatrist.  She complains of low back pain.     Gen. Appearance: Well-developed with no obvious deformities  Carotid arteries symmetrical pulses  Peripheral vascular shows symmetrical pulses with no obvious edema or tenderness  Neurological Exam:  Mental status-alert and oriented to person, place, and time; attention span and concentration is good.  Fund of knowledge-patient is aware of current events and able to give detailed history of the current problem.recent and remote memory seems intact.  Language function is normal with no evidence of aphasia     Cranial nerves:Visual acuity to hand chart -normal; visual fields to confrontation normal;pupils were equal and reactive to light ; funduscopic examination was normal with sharp disc margins. external  ocular movements were full with no nystagmus. Facial sensation to pinprick and corneal reflexes intact; Facial muscles were symmetrical. Hearing is unimpaired symmetrical finger rub; Tongue and palate movements were normal with swallowing unimpaired. Shoulder shrug was intact with good strength Speech was normal     Motor examination: Upper and Lower extremities - Normal and symmetrical;muscle tone was normal ;  right-handed  Sensory examination:Upper and lower extremities - Pinprick and soft touch were normal and symmetrical.  Vibration sense was normal at the toes for age 15-20 seconds  Deep tendon reflexes were 1-2+ symmetrical.  Both plantar responses were flexor  Cerebellar examination upper: Normal finger to nose and rapid alternating movements  Gait: Steady with no ataxia; heel and toe walk normal  Romberg test: negative  Tandem gait: Normal   Involuntary movements: Negative  Cervical examination: Full range of motion with no pain   Cervical tenderness:negative  Lumbar examination: Low back tenderness-negative   Sciatic notch tenderness-negative  Straight leg raising test-negative     Impression:Migraine without aura;Anxiety disorder; low back pain by history  Recommendations/plan:  Continue Fioricet tablets one q.8 p.r.n.#30 tablets ; Migraine; followup 6 months

## 2018-11-07 ENCOUNTER — TELEPHONE (OUTPATIENT)
Dept: INTERNAL MEDICINE | Facility: CLINIC | Age: 48
End: 2018-11-07

## 2018-11-07 NOTE — TELEPHONE ENCOUNTER
I explained dr required appt for rx.  We set up for tomorrow.  She doesn't want to take something otc.    Date/time/location read back and she wrote it all done.

## 2018-11-07 NOTE — TELEPHONE ENCOUNTER
Ls: 5-16-18    Good afternoon, above patient would like a call to get a prescription for upset stomach & cough, send to [email or web address]. Please call at your earliest convenience. Thanks in advance  (Routing comment

## 2018-11-18 DIAGNOSIS — R09.81 SINUS CONGESTION: ICD-10-CM

## 2018-11-19 ENCOUNTER — TELEPHONE (OUTPATIENT)
Dept: PHYSICAL MEDICINE AND REHAB | Facility: CLINIC | Age: 48
End: 2018-11-19

## 2018-11-19 DIAGNOSIS — M54.41 CHRONIC MIDLINE LOW BACK PAIN WITH BILATERAL SCIATICA: Primary | ICD-10-CM

## 2018-11-19 DIAGNOSIS — G89.29 CHRONIC MIDLINE LOW BACK PAIN WITH BILATERAL SCIATICA: Primary | ICD-10-CM

## 2018-11-19 DIAGNOSIS — M54.42 CHRONIC MIDLINE LOW BACK PAIN WITH BILATERAL SCIATICA: Primary | ICD-10-CM

## 2018-11-19 NOTE — TELEPHONE ENCOUNTER
I called the pt.  She slipped and  fell earlier today when walking with a walker. She landed on her back.  She is taking tramadol 50 mg prn. I told her she can take 2 tablets at a time if needed.

## 2018-11-19 NOTE — TELEPHONE ENCOUNTER
Pt is requesting a call back in regard to calling in a stronger rx due to a recent fall.     ----- Message from Kayla Willson sent at 11/19/2018  1:27 PM CST -----  Contact: Self/ [phone]  Patient called in requesting to speak with you. Patient prefers to speak with a nurse.     Please call and advise.

## 2018-11-20 RX ORDER — FLUTICASONE PROPIONATE 50 MCG
SPRAY, SUSPENSION (ML) NASAL
Qty: 16 ML | Refills: 0 | OUTPATIENT
Start: 2018-11-20

## 2018-11-23 ENCOUNTER — TELEPHONE (OUTPATIENT)
Dept: PHYSICAL MEDICINE AND REHAB | Facility: CLINIC | Age: 48
End: 2018-11-23

## 2018-11-23 DIAGNOSIS — M47.26 OSTEOARTHRITIS OF SPINE WITH RADICULOPATHY, LUMBAR REGION: ICD-10-CM

## 2018-11-23 DIAGNOSIS — M54.42 CHRONIC MIDLINE LOW BACK PAIN WITH LEFT-SIDED SCIATICA: Primary | ICD-10-CM

## 2018-11-23 DIAGNOSIS — G89.29 CHRONIC MIDLINE LOW BACK PAIN WITH LEFT-SIDED SCIATICA: Primary | ICD-10-CM

## 2018-11-23 NOTE — TELEPHONE ENCOUNTER
----- Message from Arabella Shay sent at 11/23/2018  1:52 PM CST -----  Contact: pt@ [phone]  Calling to speak with Dr. Andrews says medication: traMADol (ULTRAM) 50 mg tablet, is not working  (due to the cold weather), asking if the dosage can be adjusted, surgery or some type of injection. Please call.    [facility]/pharmacy #[id number] - [place] - [address] AT [facility]  [address] [address]  Phone: [phone] Fax: [phone]

## 2018-11-23 NOTE — TELEPHONE ENCOUNTER
Dr. Andrews can you please give the patient a call to discuss any other options she may have for medication and answer any question you may have.  Thanks

## 2018-11-26 RX ORDER — HYDROCODONE BITARTRATE AND ACETAMINOPHEN 10; 325 MG/1; MG/1
1 TABLET ORAL 3 TIMES DAILY PRN
Qty: 90 TABLET | Refills: 0 | Status: SHIPPED | OUTPATIENT
Start: 2018-11-26 | End: 2018-12-17 | Stop reason: SDUPTHER

## 2018-11-26 NOTE — TELEPHONE ENCOUNTER
I called the pt.  Her LBP is getting worse. She has LLE radicular pain.  I will reorder MRI of lumbar spine.  I will restart her on hydrocodone/apap 10/325 prn.

## 2018-12-14 RX ORDER — HYDROCHLOROTHIAZIDE 12.5 MG/1
CAPSULE ORAL
Qty: 30 CAPSULE | Refills: 0 | Status: SHIPPED | OUTPATIENT
Start: 2018-12-14 | End: 2019-01-11 | Stop reason: SDUPTHER

## 2018-12-17 ENCOUNTER — NURSE TRIAGE (OUTPATIENT)
Dept: ADMINISTRATIVE | Facility: CLINIC | Age: 48
End: 2018-12-17

## 2018-12-17 DIAGNOSIS — M54.42 CHRONIC MIDLINE LOW BACK PAIN WITH LEFT-SIDED SCIATICA: ICD-10-CM

## 2018-12-17 DIAGNOSIS — M47.26 OSTEOARTHRITIS OF SPINE WITH RADICULOPATHY, LUMBAR REGION: ICD-10-CM

## 2018-12-17 DIAGNOSIS — G89.29 CHRONIC MIDLINE LOW BACK PAIN WITH LEFT-SIDED SCIATICA: ICD-10-CM

## 2018-12-17 RX ORDER — HYDROCODONE BITARTRATE AND ACETAMINOPHEN 10; 325 MG/1; MG/1
1 TABLET ORAL 3 TIMES DAILY PRN
Qty: 90 TABLET | Refills: 0 | Status: SHIPPED | OUTPATIENT
Start: 2018-12-18 | End: 2019-01-17

## 2018-12-17 NOTE — TELEPHONE ENCOUNTER
Patient called threw Phone Staff:    11/26/18 last Rx refill  10/04/18 last office visit  01/28/19 RTC

## 2018-12-17 NOTE — TELEPHONE ENCOUNTER
"Request for Norco sent to Dr. Andrews by his PA, awaiting his signature.  Will send him a high priority message to address it asap.    Reason for Disposition   [1] Prescription not at pharmacy AND [2] was prescribed today by PCP    Answer Assessment - Initial Assessment Questions  1. SYMPTOMS: "Do you have any symptoms?"      She is having widespread pain  2. SEVERITY: If symptoms are present, ask "Are they mild, moderate or severe?"      Needs refill on [place] , s/w md office earlier and they said they would send the rx in for her.  Pharmacy has not received the rx.    Protocols used: ST MEDICATION QUESTION CALL-A-AH      "

## 2019-01-02 RX ORDER — DULOXETIN HYDROCHLORIDE 60 MG/1
CAPSULE, DELAYED RELEASE ORAL
Qty: 30 CAPSULE | Refills: 2 | Status: SHIPPED | OUTPATIENT
Start: 2019-01-02 | End: 2019-04-05

## 2019-01-08 DIAGNOSIS — G89.29 CHRONIC MIDLINE LOW BACK PAIN WITH LEFT-SIDED SCIATICA: ICD-10-CM

## 2019-01-08 DIAGNOSIS — M54.16 LEFT LUMBAR RADICULOPATHY: ICD-10-CM

## 2019-01-08 DIAGNOSIS — M54.42 CHRONIC MIDLINE LOW BACK PAIN WITH LEFT-SIDED SCIATICA: ICD-10-CM

## 2019-01-08 RX ORDER — DULOXETIN HYDROCHLORIDE 30 MG/1
CAPSULE, DELAYED RELEASE ORAL
Qty: 30 CAPSULE | Refills: 1 | Status: SHIPPED | OUTPATIENT
Start: 2019-01-08 | End: 2019-04-05

## 2019-01-11 RX ORDER — HYDROCHLOROTHIAZIDE 12.5 MG/1
CAPSULE ORAL
Qty: 30 CAPSULE | Refills: 0 | Status: SHIPPED | OUTPATIENT
Start: 2019-01-11 | End: 2019-02-10 | Stop reason: SDUPTHER

## 2019-01-14 RX ORDER — BUTALBITAL, ACETAMINOPHEN AND CAFFEINE 50; 325; 40 MG/1; MG/1; MG/1
TABLET ORAL
Qty: 30 TABLET | Refills: 1 | Status: SHIPPED | OUTPATIENT
Start: 2019-01-14 | End: 2019-02-13 | Stop reason: SDUPTHER

## 2019-01-15 DIAGNOSIS — G89.29 CHRONIC MIDLINE LOW BACK PAIN WITH BILATERAL SCIATICA: ICD-10-CM

## 2019-01-15 DIAGNOSIS — M54.41 CHRONIC MIDLINE LOW BACK PAIN WITH BILATERAL SCIATICA: ICD-10-CM

## 2019-01-15 DIAGNOSIS — M54.42 CHRONIC MIDLINE LOW BACK PAIN WITH BILATERAL SCIATICA: ICD-10-CM

## 2019-01-15 RX ORDER — TRAMADOL HYDROCHLORIDE 50 MG/1
TABLET ORAL
Qty: 120 TABLET | Refills: 1 | Status: SHIPPED | OUTPATIENT
Start: 2019-01-15 | End: 2019-02-08 | Stop reason: SDUPTHER

## 2019-01-15 NOTE — TELEPHONE ENCOUNTER
10/11/18 last Rx refill  10/04/18 last office visit  01/28/19 RTC      ----- Message from Anette Duvall sent at 1/15/2019  3:13 PM CST -----  Contact: self @ [phone]  Pt is req a refill for traMADol (ULTRAM) 50 mg tablet.    [facility]/pharmacy #[id number] - [place], [place] - [address] AT [facility]                 [phone] (Phone)                [phone] (Fax)

## 2019-01-23 ENCOUNTER — TELEPHONE (OUTPATIENT)
Dept: PHYSICAL MEDICINE AND REHAB | Facility: CLINIC | Age: 49
End: 2019-01-23

## 2019-01-23 NOTE — TELEPHONE ENCOUNTER
Patient placed on wait list.  No available appointment at this time.            ----- Message from Dorian Long sent at 1/23/2019  7:13 AM CST -----  Contact: self  Pt called in about wanting to change appt. Pt would like to come in Feb 6,7,8. Pt would like the nurse to give her a call back      Pt can be reached at [phone]      TY

## 2019-02-02 DIAGNOSIS — Z78.0 MENOPAUSE: ICD-10-CM

## 2019-02-02 RX ORDER — CLONIDINE HYDROCHLORIDE 0.2 MG/1
TABLET ORAL
Qty: 30 TABLET | Refills: 2 | Status: SHIPPED | OUTPATIENT
Start: 2019-02-02 | End: 2020-02-17

## 2019-02-04 ENCOUNTER — HOSPITAL ENCOUNTER (OUTPATIENT)
Dept: RADIOLOGY | Facility: HOSPITAL | Age: 49
Discharge: HOME | End: 2019-02-04
Attending: PHYSICAL MEDICINE & REHABILITATION
Payer: MEDICARE

## 2019-02-04 ENCOUNTER — TELEPHONE (OUTPATIENT)
Dept: PHYSICAL MEDICINE AND REHAB | Facility: CLINIC | Age: 49
End: 2019-02-04

## 2019-02-04 DIAGNOSIS — G89.29 CHRONIC MIDLINE LOW BACK PAIN WITH BILATERAL SCIATICA: ICD-10-CM

## 2019-02-04 DIAGNOSIS — G89.29 CHRONIC MIDLINE LOW BACK PAIN WITH LEFT-SIDED SCIATICA: ICD-10-CM

## 2019-02-04 DIAGNOSIS — M54.42 CHRONIC MIDLINE LOW BACK PAIN WITH LEFT-SIDED SCIATICA: Primary | ICD-10-CM

## 2019-02-04 DIAGNOSIS — M51.36 DDD (DEGENERATIVE DISC DISEASE), LUMBAR: ICD-10-CM

## 2019-02-04 DIAGNOSIS — M47.26 OSTEOARTHRITIS OF SPINE WITH RADICULOPATHY, LUMBAR REGION: ICD-10-CM

## 2019-02-04 DIAGNOSIS — M54.42 CHRONIC MIDLINE LOW BACK PAIN WITH BILATERAL SCIATICA: ICD-10-CM

## 2019-02-04 DIAGNOSIS — M54.41 CHRONIC MIDLINE LOW BACK PAIN WITH BILATERAL SCIATICA: ICD-10-CM

## 2019-02-04 DIAGNOSIS — G89.29 CHRONIC MIDLINE LOW BACK PAIN WITH LEFT-SIDED SCIATICA: Primary | ICD-10-CM

## 2019-02-04 DIAGNOSIS — M54.42 CHRONIC MIDLINE LOW BACK PAIN WITH LEFT-SIDED SCIATICA: ICD-10-CM

## 2019-02-04 PROCEDURE — 72148 MRI LUMBAR SPINE W/O DYE: CPT | Mod: 26,HCNC,, | Performed by: RADIOLOGY

## 2019-02-04 PROCEDURE — 72148 MRI LUMBAR SPINE W/O DYE: CPT | Mod: TC,HCNC

## 2019-02-04 PROCEDURE — 72148 MRI LUMBAR SPINE WITHOUT CONTRAST: ICD-10-PCS | Mod: 26,HCNC,, | Performed by: RADIOLOGY

## 2019-02-04 NOTE — TELEPHONE ENCOUNTER
----- Message from Gordo Wheatley sent at 2/4/2019  4:18 PM CST -----  Contact: Pt  Pt. is calling for MRI results.     Please call pt at [phone].    Thanks.

## 2019-02-06 ENCOUNTER — NURSE TRIAGE (OUTPATIENT)
Dept: ADMINISTRATIVE | Facility: CLINIC | Age: 49
End: 2019-02-06

## 2019-02-06 ENCOUNTER — TELEPHONE (OUTPATIENT)
Dept: PHYSICAL MEDICINE AND REHAB | Facility: CLINIC | Age: 49
End: 2019-02-06

## 2019-02-06 DIAGNOSIS — M47.26 OSTEOARTHRITIS OF SPINE WITH RADICULOPATHY, LUMBAR REGION: ICD-10-CM

## 2019-02-06 DIAGNOSIS — M51.36 DDD (DEGENERATIVE DISC DISEASE), LUMBAR: ICD-10-CM

## 2019-02-06 DIAGNOSIS — G89.29 CHRONIC MIDLINE LOW BACK PAIN WITH LEFT-SIDED SCIATICA: Primary | ICD-10-CM

## 2019-02-06 DIAGNOSIS — M54.42 CHRONIC MIDLINE LOW BACK PAIN WITH LEFT-SIDED SCIATICA: Primary | ICD-10-CM

## 2019-02-06 NOTE — TELEPHONE ENCOUNTER
I called the pt.  She still c/o severe LBP with sciatica.  I informed her of MRI findings.  I suggested referral to pain Clinic for ESIs. She is OK.

## 2019-02-06 NOTE — TELEPHONE ENCOUNTER
Unable to find an order or a note about an inject.  Pt has no appts.  Scheduled a f/u for 4/5/18.    Called pt to clarify, pt states she was told Dr Andrews was going to schedule a cortisone shot.  She wants to know where the shot will be done (her back?), whether she can take something to put her to sleep beforehand due to her high blood pressure and her being very nervous.    Informed pt that if the shot was being performed in clinic that she would not be given something to sleep, as we do not do that here.    Informed pt that we would f/u with details.    ----- Message from Karyn Sams sent at 2/6/2019 11:09 AM CST -----  Contact: isa  Name of Who is Calling: isa      What is the request in detail: Patient is requesting a call back to see if she will be giving something to put her to sleep prior to her injection and she states she has other questions as well       Can the clinic reply by [facility]: no      What Number to Call Back if not in [facility]: [phone]

## 2019-02-07 NOTE — TELEPHONE ENCOUNTER
Reason for Disposition   [1] Follow-up call from patient regarding patient's clinical status AND [2] information NON-URGENT    Answer Assessment - Initial Assessment Questions  Pt stated she spoke with Dr Andrews this pm. She has decided not to have injections in her back due to her anxiety. Stated she will just take her meds.    Protocols used:  PCP CALL - NO TRIAGE-A-AH

## 2019-02-08 ENCOUNTER — TELEPHONE (OUTPATIENT)
Dept: PHYSICAL MEDICINE AND REHAB | Facility: CLINIC | Age: 49
End: 2019-02-08

## 2019-02-08 RX ORDER — TRAMADOL HYDROCHLORIDE 50 MG/1
50-100 TABLET ORAL 3 TIMES DAILY PRN
Qty: 180 TABLET | Refills: 1 | Status: SHIPPED | OUTPATIENT
Start: 2019-02-08 | End: 2019-04-05 | Stop reason: SDUPTHER

## 2019-02-08 NOTE — TELEPHONE ENCOUNTER
I called the pt.  She said she spoke to her dad and she is not interested in getting referral for ESIs.  I will discontinue meloxicam. I will hold off on other NSAIDs at this time due to h/o intolerance.  I will increase tramadol to 50 mg, 1-2 po tid prn.

## 2019-02-08 NOTE — TELEPHONE ENCOUNTER
----- Message from Chris Sarmiento sent at 2/8/2019  8:17 AM CST -----  Contact: Pt. [phone]  Needs Advice    Reason for call:The patient would like to speak to someone regarding her medication and pain. She said she's in extreme pain. Please contact the patient to discuss further.          Communication Preference:PHONE     Additional Information:

## 2019-02-10 RX ORDER — HYDROCHLOROTHIAZIDE 12.5 MG/1
CAPSULE ORAL
Qty: 30 CAPSULE | Refills: 0 | Status: SHIPPED | OUTPATIENT
Start: 2019-02-10 | End: 2019-03-16 | Stop reason: SDUPTHER

## 2019-02-13 RX ORDER — BUTALBITAL, ACETAMINOPHEN AND CAFFEINE 50; 325; 40 MG/1; MG/1; MG/1
TABLET ORAL
Qty: 30 TABLET | Refills: 1 | Status: SHIPPED | OUTPATIENT
Start: 2019-02-13 | End: 2019-03-28 | Stop reason: SDUPTHER

## 2019-03-06 ENCOUNTER — TELEPHONE (OUTPATIENT)
Dept: PHYSICAL MEDICINE AND REHAB | Facility: CLINIC | Age: 49
End: 2019-03-06

## 2019-03-06 NOTE — TELEPHONE ENCOUNTER
No one from this office called patient.    ----- Message from Kamryn Paniagua sent at 3/6/2019  2:32 PM CST -----  Contact: self [phone]  Patient called in returning your call. Please advise.

## 2019-03-16 RX ORDER — HYDROCHLOROTHIAZIDE 12.5 MG/1
CAPSULE ORAL
Qty: 30 CAPSULE | Refills: 0 | Status: SHIPPED | OUTPATIENT
Start: 2019-03-16 | End: 2019-04-16 | Stop reason: SDUPTHER

## 2019-03-28 RX ORDER — BUTALBITAL, ACETAMINOPHEN AND CAFFEINE 50; 325; 40 MG/1; MG/1; MG/1
TABLET ORAL
Qty: 30 TABLET | Refills: 1 | Status: SHIPPED | OUTPATIENT
Start: 2019-03-28 | End: 2019-04-28 | Stop reason: SDUPTHER

## 2019-04-05 ENCOUNTER — OFFICE VISIT (OUTPATIENT)
Dept: PHYSICAL MEDICINE AND REHAB | Facility: CLINIC | Age: 49
End: 2019-04-05
Payer: MEDICARE

## 2019-04-05 VITALS
HEART RATE: 63 BPM | BODY MASS INDEX: 32.3 KG/M2 | SYSTOLIC BLOOD PRESSURE: 106 MMHG | WEIGHT: 193.88 LBS | DIASTOLIC BLOOD PRESSURE: 72 MMHG | HEIGHT: 65 IN

## 2019-04-05 DIAGNOSIS — E66.9 OBESITY (BMI 30.0-34.9): ICD-10-CM

## 2019-04-05 DIAGNOSIS — M54.42 CHRONIC MIDLINE LOW BACK PAIN WITH LEFT-SIDED SCIATICA: Primary | ICD-10-CM

## 2019-04-05 DIAGNOSIS — M47.26 OSTEOARTHRITIS OF SPINE WITH RADICULOPATHY, LUMBAR REGION: ICD-10-CM

## 2019-04-05 DIAGNOSIS — G89.29 CHRONIC NECK PAIN: ICD-10-CM

## 2019-04-05 DIAGNOSIS — G89.29 CHRONIC MIDLINE LOW BACK PAIN WITH LEFT-SIDED SCIATICA: Primary | ICD-10-CM

## 2019-04-05 DIAGNOSIS — M54.2 CHRONIC NECK PAIN: ICD-10-CM

## 2019-04-05 PROCEDURE — 3008F PR BODY MASS INDEX (BMI) DOCUMENTED: ICD-10-PCS | Mod: HCNC,CPTII,S$GLB, | Performed by: PHYSICAL MEDICINE & REHABILITATION

## 2019-04-05 PROCEDURE — 99999 PR PBB SHADOW E&M-EST. PATIENT-LVL III: ICD-10-PCS | Mod: PBBFAC,HCNC,, | Performed by: PHYSICAL MEDICINE & REHABILITATION

## 2019-04-05 PROCEDURE — 99999 PR PBB SHADOW E&M-EST. PATIENT-LVL III: CPT | Mod: PBBFAC,HCNC,, | Performed by: PHYSICAL MEDICINE & REHABILITATION

## 2019-04-05 PROCEDURE — 99214 OFFICE O/P EST MOD 30 MIN: CPT | Mod: HCNC,S$GLB,, | Performed by: PHYSICAL MEDICINE & REHABILITATION

## 2019-04-05 PROCEDURE — 3008F BODY MASS INDEX DOCD: CPT | Mod: HCNC,CPTII,S$GLB, | Performed by: PHYSICAL MEDICINE & REHABILITATION

## 2019-04-05 PROCEDURE — 99214 PR OFFICE/OUTPT VISIT, EST, LEVL IV, 30-39 MIN: ICD-10-PCS | Mod: HCNC,S$GLB,, | Performed by: PHYSICAL MEDICINE & REHABILITATION

## 2019-04-05 RX ORDER — MELOXICAM 15 MG/1
15 TABLET ORAL DAILY
Qty: 90 TABLET | Refills: 1 | Status: SHIPPED | OUTPATIENT
Start: 2019-04-05 | End: 2019-11-27 | Stop reason: SDUPTHER

## 2019-04-05 RX ORDER — DULOXETIN HYDROCHLORIDE 60 MG/1
60 CAPSULE, DELAYED RELEASE ORAL 2 TIMES DAILY
Qty: 60 CAPSULE | Refills: 3 | Status: SHIPPED | OUTPATIENT
Start: 2019-04-05 | End: 2019-07-01 | Stop reason: SDUPTHER

## 2019-04-05 RX ORDER — TRAMADOL HYDROCHLORIDE 50 MG/1
50-100 TABLET ORAL 3 TIMES DAILY PRN
Qty: 180 TABLET | Refills: 3 | Status: SHIPPED | OUTPATIENT
Start: 2019-04-05 | End: 2019-08-08 | Stop reason: SDUPTHER

## 2019-04-05 NOTE — PROGRESS NOTES
Subjective:       Patient ID: Mary Sanches is a 49 y.o. female.    Chief Complaint: No chief complaint on file.    HPI     HISTORY OF PRESENT ILLNESS:  Ms. Sanches is a 49-year-old white female with   osteoarthritis who is [facility] for chronic   low back pain with lumbar radiculopathy and chronic neck pain.  Her last visit   to the clinic was on 10/04/2018.  She was maintained on meloxicam, Cymbalta, and   p.r.n. tramadol.  Since her last visit, the patient's back pain has gotten worse.  MRI of   the lumbar spine was ordered and done on 02/04/2019.  It was positive for   multilevel disk bulges with facet arthropathy and mild neural foraminal   stenosis.  The patient was contacted, but was not interested in any referral for   epidural steroid injections.    The patient is coming to the clinic for followup.  Her back pain has been   slightly better.  There is an intermittent aching pain in the lumbar spine and   across her back.  She has occasional shooting pain from the left buttock to the   left foot with numbness.  Her maximum pain is 5-6/10 and minimum 4/10.  Today,   it is 5-6/10.  The patient has mild left lower extremity weakness that has not   changed from baseline.  She denies any bowel or bladder incontinence.  Her neck   pain has been under good control.  There is an intermittent, localized aching   pain in the cervical spine.    She is currently taking meloxicam 15 mg p.o. every day, Cymbalta 60 mg p.o.   twice per day and tramadol 50 mg p.r.n., usually 4-5 tablets daily.        MS/HN  dd: 04/05/2019 10:25:32 (CDT)  td: 04/05/2019 18:52:58 (CDT)  Doc ID   #[id number]  Job ID #[id number]    CC:           Review of Systems   Constitutional: Positive for fatigue.   Eyes: Negative for visual disturbance.   Respiratory: Negative for shortness of breath.    Cardiovascular: Negative for chest pain.   Gastrointestinal: Negative for blood in stool, constipation, nausea and  vomiting.   Genitourinary: Negative for difficulty urinating.   Musculoskeletal: Positive for back pain, gait problem and neck pain. Negative for arthralgias and joint swelling.   Neurological: Positive for headaches. Negative for dizziness.   Psychiatric/Behavioral: Positive for sleep disturbance. Negative for behavioral problems.       Objective:      Physical Exam   Constitutional: She is oriented to person, place, and time. She appears well-developed and well-nourished.   HENT:   Head: Normocephalic and atraumatic.   Neck: Normal range of motion.   Musculoskeletal:     BUE:  ROM:full.  Strength:    RUE: 5-/5 at shoulder abduction, 5 elbow flexion, 5 elbow extension, 5 hand .   LUE: 5-/5 at shoulder abduction, 5 elbow flexion, 5 elbow extension, 5 hand .  Sensation to pinprick:   RUE: intact.   LUE: decreased digit 1.    BLE:  ROM:full.  Mild bilateral knee crepitus.   Strength:    RLE: 5-/5 at hip flexion, 5 knee extension, 5 ankle DF, 5 PF.   LLE: 4/5 at hip flexion, 4 knee extension, 4 ankle DF, 4+ PF.  Sensation to pinprick:     RLE: mild decrease.     LLE: mild decrease.    SLR (sitting):      RLE: -ve.      LLE: +ve.     Mild tenderness over lumbar spine.       Neurological: She is alert and oriented to person, place, and time.   Skin: Skin is warm.   Psychiatric: She has a normal mood and affect.         IMAGING STUDIES:    MRI LUMBAR SPINE WITHOUT CONTRAST (2/4/19):    CLINICAL HISTORY:  Low back pain, >6wks conservative tx, persistent-progressive sx, surgical candidate; Lumbago with sciatica, left side    TECHNIQUE:  Multiplanar, multisequence MR images were acquired from the thoracolumbar junction to the sacrum without contrast.    COMPARISON:  MRI 11/27/2015.    FINDINGS:  Alignment: Grade 1 retrolisthesis of L5 on S1.    Vertebrae: L1 and S1 vertebral body hemangiomas.  No diffuse marrow replacement process.  No fracture.    Discs: Disc height loss at L5-S1.    Cord: Normal.  Conus terminates  at L2.    Degenerative findings:    T12-L1: No significant spinal canal stenosis or neural foraminal narrowing.    L1-L2: No significant spinal canal stenosis or neural foraminal narrowing.    L2-L3: Posterior disc bulge without significant spinal canal stenosis or neural foraminal narrowing.    L3-L4: Posterior disc bulge with annular fissure resulting in mild spinal canal stenosis as well mild left neural foraminal narrowing.    L4-L5: Posterior disc bulge and mild facet arthropathy resulting in mild left neural foraminal narrowing.    L5-S1: Posterior disc bulge and mild facet arthropathy resulting in mild neural foraminal narrowing bilaterally.    Paraspinal muscles & soft tissues: Right renal T2 hyperintensities, likely cysts.    Impression      Lumbar spondylosis, most pronounced at L3-4 where there is mild spinal canal stenosis.  Multilevel mild neural foraminal narrowing.        Assessment:       1. Chronic midline low back pain with left-sided sciatica    2. Osteoarthritis of spine with radiculopathy, lumbar region    3. Chronic neck pain    4. Obesity (BMI 30.0-34.9)        Plan:         - MRI findings were discussed with the patient.  - She is still not interested in referral for ESIs.  - Continue meloxicam (MOBIC) 15 MG tablet; Take 1 tablet (15 mg total) by mouth once daily.   - Continue duloxetine (CYMBALTA) 60 MG capsule; Take 1 capsule (60 mg total) by mouth once daily.  - Continue traMADol (ULTRAM) 50 mg tablet; Take 1-2 tablets ( mg total) by mouth 3 (three) times daily as needed.  - Regular home exercise program was encouraged.  - Weight loss was encouraged.  - Follow up in about 4 months (around 8/5/2019).      This was a 25 minute visit, more than 50% of which was spent counseling the patient about the diagnosis and the treatment plan.

## 2019-04-12 DIAGNOSIS — Z12.11 COLON CANCER SCREENING: ICD-10-CM

## 2019-04-16 RX ORDER — HYDROCHLOROTHIAZIDE 12.5 MG/1
CAPSULE ORAL
Qty: 30 CAPSULE | Refills: 0 | Status: SHIPPED | OUTPATIENT
Start: 2019-04-16 | End: 2019-12-26 | Stop reason: CLARIF

## 2019-04-30 RX ORDER — BUTALBITAL, ACETAMINOPHEN AND CAFFEINE 50; 325; 40 MG/1; MG/1; MG/1
TABLET ORAL
Qty: 30 TABLET | Refills: 1 | Status: SHIPPED | OUTPATIENT
Start: 2019-04-30 | End: 2019-06-15 | Stop reason: SDUPTHER

## 2019-06-18 RX ORDER — BUTALBITAL, ACETAMINOPHEN AND CAFFEINE 50; 325; 40 MG/1; MG/1; MG/1
TABLET ORAL
Qty: 30 TABLET | Refills: 0 | Status: SHIPPED | OUTPATIENT
Start: 2019-06-18 | End: 2019-07-07 | Stop reason: SDUPTHER

## 2019-06-19 ENCOUNTER — PES CALL (OUTPATIENT)
Dept: ADMINISTRATIVE | Facility: CLINIC | Age: 49
End: 2019-06-19

## 2019-07-01 RX ORDER — DULOXETIN HYDROCHLORIDE 60 MG/1
60 CAPSULE, DELAYED RELEASE ORAL 2 TIMES DAILY
Qty: 180 CAPSULE | Refills: 1 | Status: SHIPPED | OUTPATIENT
Start: 2019-07-01 | End: 2019-12-17

## 2019-07-08 RX ORDER — BUTALBITAL, ACETAMINOPHEN AND CAFFEINE 50; 325; 40 MG/1; MG/1; MG/1
TABLET ORAL
Qty: 30 TABLET | Refills: 0 | Status: SHIPPED | OUTPATIENT
Start: 2019-07-08 | End: 2019-07-25 | Stop reason: SDUPTHER

## 2019-07-09 ENCOUNTER — TELEPHONE (OUTPATIENT)
Dept: INTERNAL MEDICINE | Facility: CLINIC | Age: 49
End: 2019-07-09

## 2019-07-09 NOTE — TELEPHONE ENCOUNTER
----- Message from Ita Schneider sent at 7/9/2019 11:33 AM CDT -----  Contact: self/[phone]  Patient called in regards needing to talk with Dr Lozano about she keep falling and if is possible for her to get a scooter because she is unable to manage her body, also if something can call in because her body is retaining a lot of fluids.[id number] - [place], [place] - [address] [phone] (Phone) [phone] (Fax).   . Please call and advise. Thank you!!!

## 2019-07-25 ENCOUNTER — TELEPHONE (OUTPATIENT)
Dept: INTERNAL MEDICINE | Facility: CLINIC | Age: 49
End: 2019-07-25

## 2019-07-25 RX ORDER — BUTALBITAL, ACETAMINOPHEN AND CAFFEINE 50; 325; 40 MG/1; MG/1; MG/1
TABLET ORAL
Qty: 30 TABLET | Refills: 0 | Status: SHIPPED | OUTPATIENT
Start: 2019-07-25 | End: 2019-12-26 | Stop reason: CLARIF

## 2019-07-25 NOTE — TELEPHONE ENCOUNTER
Last seen 2017.    She is wondering if water is poisoned at her appt.. She has appt 8/1. She wanted to wait.  I told her if she continues to vomit she needs to see a dr sooner.  Has no transportation.    I told her drink bottled water and get to urgent care asap.    She wanted us to call in something for nausea and I explained dr can't do since hasn't seen since 2017.    She expressed understanding.

## 2019-07-25 NOTE — TELEPHONE ENCOUNTER
----- Message from Margaret Rodríguez sent at 7/25/2019  8:58 AM CDT -----  Contact: Self :  [phone]   Patient would like to get medical advice.  Symptoms (please be specific): diarrhea , vomiting, watery mouth, losing weigh, unable to keep food down. From drinking bad water around [place] in her appartments  How long has patient had these symptoms:  X 3 days  Pharmacy name and phone # (copy/paste from chart):  [facility] [phone] (Phone)  [phone] (Fax)  Any drug allergies (copy/paste from chart):      Cranberry  GabapentinSwelling  IbuprofenSwelling  MeloxicamSwelling  Toradol [Ketorolac]Swelling  AmoxicillinHives  Duloxetine  Lasix [Furosemide  Would the patient rather a call back or a response via [facility]?:  Phone   Comments:

## 2019-08-06 RX ORDER — BUTALBITAL, ACETAMINOPHEN AND CAFFEINE 50; 325; 40 MG/1; MG/1; MG/1
TABLET ORAL
Qty: 30 TABLET | Refills: 0 | Status: SHIPPED | OUTPATIENT
Start: 2019-08-06 | End: 2019-09-05 | Stop reason: SDUPTHER

## 2019-08-08 DIAGNOSIS — M54.42 CHRONIC MIDLINE LOW BACK PAIN WITH LEFT-SIDED SCIATICA: ICD-10-CM

## 2019-08-08 DIAGNOSIS — G89.29 CHRONIC MIDLINE LOW BACK PAIN WITH LEFT-SIDED SCIATICA: ICD-10-CM

## 2019-08-08 RX ORDER — TRAMADOL HYDROCHLORIDE 50 MG/1
TABLET ORAL
Qty: 180 TABLET | Refills: 2 | Status: SHIPPED | OUTPATIENT
Start: 2019-08-08 | End: 2019-10-30 | Stop reason: SDUPTHER

## 2019-08-16 ENCOUNTER — OFFICE VISIT (OUTPATIENT)
Dept: PHYSICAL MEDICINE AND REHAB | Facility: CLINIC | Age: 49
End: 2019-08-16
Payer: MEDICARE

## 2019-08-16 VITALS
DIASTOLIC BLOOD PRESSURE: 81 MMHG | BODY MASS INDEX: 33 KG/M2 | WEIGHT: 198.06 LBS | SYSTOLIC BLOOD PRESSURE: 127 MMHG | HEIGHT: 65 IN | HEART RATE: 70 BPM

## 2019-08-16 DIAGNOSIS — M54.42 CHRONIC MIDLINE LOW BACK PAIN WITH LEFT-SIDED SCIATICA: Primary | ICD-10-CM

## 2019-08-16 DIAGNOSIS — G89.29 CHRONIC MIDLINE LOW BACK PAIN WITH LEFT-SIDED SCIATICA: Primary | ICD-10-CM

## 2019-08-16 DIAGNOSIS — M47.26 OSTEOARTHRITIS OF SPINE WITH RADICULOPATHY, LUMBAR REGION: ICD-10-CM

## 2019-08-16 DIAGNOSIS — E66.9 OBESITY (BMI 30.0-34.9): ICD-10-CM

## 2019-08-16 PROCEDURE — 99214 OFFICE O/P EST MOD 30 MIN: CPT | Mod: HCNC,S$GLB,, | Performed by: PHYSICAL MEDICINE & REHABILITATION

## 2019-08-16 PROCEDURE — 3008F BODY MASS INDEX DOCD: CPT | Mod: HCNC,CPTII,S$GLB, | Performed by: PHYSICAL MEDICINE & REHABILITATION

## 2019-08-16 PROCEDURE — 99214 PR OFFICE/OUTPT VISIT, EST, LEVL IV, 30-39 MIN: ICD-10-PCS | Mod: HCNC,S$GLB,, | Performed by: PHYSICAL MEDICINE & REHABILITATION

## 2019-08-16 PROCEDURE — 99999 PR PBB SHADOW E&M-EST. PATIENT-LVL III: CPT | Mod: PBBFAC,HCNC,, | Performed by: PHYSICAL MEDICINE & REHABILITATION

## 2019-08-16 PROCEDURE — 3008F PR BODY MASS INDEX (BMI) DOCUMENTED: ICD-10-PCS | Mod: HCNC,CPTII,S$GLB, | Performed by: PHYSICAL MEDICINE & REHABILITATION

## 2019-08-16 PROCEDURE — 99999 PR PBB SHADOW E&M-EST. PATIENT-LVL III: ICD-10-PCS | Mod: PBBFAC,HCNC,, | Performed by: PHYSICAL MEDICINE & REHABILITATION

## 2019-08-16 RX ORDER — VENLAFAXINE 37.5 MG/1
37.5 TABLET ORAL 2 TIMES DAILY
Qty: 90 TABLET | Refills: 2 | Status: SHIPPED | OUTPATIENT
Start: 2019-08-16 | End: 2019-11-11 | Stop reason: SDUPTHER

## 2019-08-16 NOTE — PROGRESS NOTES
Subjective:       Patient ID: Mary Sanches is a 49 y.o. female.    Chief Complaint: No chief complaint on file.    HPI     HISTORY OF PRESENT ILLNESS:  Ms. Sanches is a 49-year-old white female with osteoarthritis who is [facility] in [facility] for chronic low back pain with lumbar radiculopathy and chronic neck pain.  Her last visit to the clinic was on 4/5/19.  She was maintained on meloxicam, Cymbalta, and p.r.n. tramadol.  MRI of the lumbar spine was previously done.  The findings of multilevel DJD with neural foraminal stenosis were discussed with the patient.  She was not interested in referral for epidural steroid injections.    The patient is coming to the clinic for followup.  Her back pain has been stable.  There is a constant aching and tightness pain in the lumbar spine and across her back.  She has occasional shooting pain from the left buttock to the left foot with numbness.  Her maximum pain is 6/10 and minimum 4/10.  Today, it is 5/10.  The patient has mild left lower extremity weakness that has not changed from baseline.  She denies any bowel or bladder incontinence.      Her neck pain has been stable with occasional flare ups.  There is an intermittent, localized aching pain in the cervical spine. Her maximum pain is 6/10 and minimum 3-4/10; today it is 5-6/10. She denies any upper extremity weakness. She has occasional numbness with excess arm use.    The patient was taking meloxicam but stopped it due to swelling.  She was also on Cymbalta and had to stop because of hurting her kidneys.  She continues to take tramadol 50 mg tablets, 2 tablets 3 times per day.  She has been exercising but not consistently.          Review of Systems   Constitutional: Positive for fatigue.   Eyes: Negative for visual disturbance.   Respiratory: Negative for shortness of breath.    Cardiovascular: Negative for chest pain.   Gastrointestinal: Positive for nausea. Negative for blood in  stool, constipation and vomiting.   Genitourinary: Negative for difficulty urinating.   Musculoskeletal: Positive for back pain, gait problem and neck pain. Negative for arthralgias and joint swelling.   Neurological: Positive for dizziness and headaches.   Psychiatric/Behavioral: Positive for sleep disturbance. Negative for behavioral problems.       Objective:      Physical Exam   Constitutional: She is oriented to person, place, and time. She appears well-developed and well-nourished.   HENT:   Head: Normocephalic and atraumatic.   Neck: Normal range of motion.   Musculoskeletal:     BUE:  ROM:full.  Strength:    RUE: 5-/5 at shoulder abduction, 5 elbow flexion, 5 elbow extension, 5 hand .   LUE: 5-/5 at shoulder abduction, 5 elbow flexion, 5 elbow extension, 5 hand .  Sensation to pinprick:   RUE: decreased.   LUE: decreased.    BLE:  ROM:full.  Mild bilateral knee crepitus.   Strength:    RLE: 5/5 at hip flexion, 5 knee extension, 5 ankle DF, 5 PF.   LLE: 5/5 at hip flexion, 5 knee extension, 5 ankle DF, 5 PF.  Sensation to pinprick:     RLE: mild decrease.     LLE: mild decrease.  DTR:     RLE: +1 knee, +1 ankle.    LLE: +1 knee, +1 ankle.  Clonus:    Rt ankle: -ve.    Lt ankle: -ve.    SLR (sitting):      RLE: +ve.      LLE: +ve.     Mild tenderness over lumbar spine.    Gait: waddling.       Neurological: She is alert and oriented to person, place, and time.   Skin: Skin is warm.   Psychiatric: She has a normal mood and affect.           Assessment:       1. Chronic midline low back pain with left-sided sciatica    2. Osteoarthritis of spine with radiculopathy, lumbar region    3. Obesity (BMI 30.0-34.9)        Plan:       - She is still not interested in referral for ESIs.  - Meloxicam was dicontinued.   - Duloxetine was disscontinued.  - Start venlafaxine (EFFEXOR) 37.5 MG Tab; Take 1 tablet (37.5 mg total) by mouth 2 (two) times daily. In 1-2 weeks, if no relief, may increase dose to 3 times per  day.  - Continue traMADol (ULTRAM) 50 mg tablet; Take 1-2 tablets ( mg total) by mouth 3 (three) times daily as needed.  - Regular home exercise program was encouraged.  - Weight loss was encouraged.  - Follow up in about 4 months (around 12/16/2019).      This was a 25 minute visit, more than 50% of which was spent counseling the patient about the diagnosis and the treatment plan.

## 2019-08-16 NOTE — PATIENT INSTRUCTIONS
Neck/Back Pain [General]    Both neck and back pain are usually caused by injury to the muscles or ligaments of the spine. Sometimes the disks that separate each bone of the spine may cause pain by putting pressure on a nearby nerve. Back and neck pain may appear after a sudden twisting/bending force (such as in a car accident), or sometimes after a simple awkward movement. In either case, muscle spasm is often present and adds to the pain.  Acute neck and back pain usually gets better in one to two weeks. Pain related to disk disease, arthritis in the spinal joints or spinal stenosis (narrowing of the spinal canal) can become chronic and last for months or years.  Home Care:  · FOR NECK PAIN: Use a comfortable pillow that supports the head and keeps the spine in a neutral position. The position of the head should not be tilted forward or backward.  · FOR BACK PAIN: You may need to stay in bed the first few days. But, as soon as possible, begin sitting or walking to avoid problems with prolonged bed rest (muscle weakness, worsening back stiffness and pain, blood clots in the legs).  · When in bed, try to find a position of comfort. A firm mattress is best. Try lying flat on your back with pillows under your knees. You can also try lying on your side with your knees bent up towards your chest and a pillow between your knees.  · Avoid prolonged sitting. This puts more stress on the lower back than standing or walking.  · During the first two days after injury, apply an ICE PACK to the painful area for 20 minutes every 2-4 hours. This will reduce swelling and pain. HEAT (hot shower, hot bath or heating pad) works well for muscle spasm. You can start with ice, then switch to heat after two days. Some patients feel best alternating ice and heat treatments. Use the one method that feels the best to you.  · You may use acetaminophen (Tylenol) or ibuprofen (Motrin, Advil) to control pain, unless another pain medicine was  prescribed. [NOTE: If you have chronic liver or kidney disease or ever had a stomach ulcer or GI bleeding, talk with your doctor before using these medicines.]  · Be aware of safe lifting methods and do not lift anything over 15 pounds until all the pain is gone.  Follow Up  with your physician or this facility if your symptoms do not start to improve after one week. Physical therapy or further tests may be needed.  [NOTE: If X-rays were taken, they will be reviewed by a radiologist. You will be notified of any new findings that may affect your care.]  Get Prompt Medical Attention  if any of the following occur:  · Pain becomes worse or spreads into your arms or legs  · Weakness, numbness or pain in one or both arms or legs  · Loss of bowel or bladder control  · Numbness in the groin area  · Difficulty walking  · Fever of 100.4ºF (38ºC) or higher, or as directed by your healthcare provider  © 2464-7838 [facility], [address] [place] [address]. All rights reserved. This information is not intended as a substitute for professional medical care. Always follow your healthcare professional's instructions.    Neck Exercises: Passive Neck Rotation          To start, lie on your back, knees bent and feet flat on the floor. Keep your ears, shoulders, and hips aligned, but dont press your lower back to the floor. Rest your hands on your pelvis. Breathe deeply and relax.  · With your neck relaxed, place the palm of one hand on your forehead. Use your hand to turn your head to one side until you feel a stretch in the neck muscles. Do not push through pain.  · Hold for 5 seconds. Then turn to the other side.  · Repeat 5 times on each side.   Note: Keep your shoulders on the floor. Dont lift your chin as you turn your head.  © 3312-4687 [facility], [address] [place] [address]. All rights reserved. This information is not intended as a substitute for professional medical care. Always follow  your healthcare professional's instructions.    Exercises: Neck Isometrics  To start, sit in a chair with your feet flat on the floor. Your weight should be slightly forward so that youre balanced evenly on your buttocks. Relax your shoulders and keep your head level. Using a chair with arms may help you keep your balance.  1. Press your palm against your forehead. Resist with your neck muscles. Hold for 10 seconds. Relax. Repeat 5 times.  2. Do the exercise again, pressing on the side of your head. Repeat 5 times. Switch sides.  3. Do the exercise again, pressing on the back of your head. Repeat 5 times.          For your safety, check with your healthcare provider before starting an exercise program.    © 2595-4696 [facility], [address] [place] [address], [place] [address]. All rights reserved. This information is not intended as a substitute for professional medical care. Always follow your healthcare professional's instructions.    Shoulder and Upper Back Stretch  To start, stand tall with your ears, shoulders, and hips in line. Your feet should be slightly apart, positioned just under your hips. Focus your eyes directly in front of you.  this position for a few seconds before starting your exercise. This helps increase your awareness of proper posture.  · Reach overhead and slightly back with both arms. Keep your shoulders and neck aligned and your elbows behind your shoulders.  · With your palms facing the ceiling, turn your fingers inward.  · Take a deep breath. Breathe out and lower your elbows toward your buttocks. Hold for 5 seconds, then return to starting position.  · Repeat 3 times.          © 4278-9014 [facility], [address] [place] [address]. All rights reserved. This information is not intended as a substitute for professional medical care. Always follow your healthcare professional's instructions.          Neck/Back Pain [General]    Both neck and back pain are usually  caused by injury to the muscles or ligaments of the spine. Sometimes the disks that separate each bone of the spine may cause pain by putting pressure on a nearby nerve. Back and neck pain may appear after a sudden twisting/bending force (such as in a car accident), or sometimes after a simple awkward movement. In either case, muscle spasm is often present and adds to the pain.  Acute neck and back pain usually gets better in one to two weeks. Pain related to disk disease, arthritis in the spinal joints or spinal stenosis (narrowing of the spinal canal) can become chronic and last for months or years.  Home Care:  · FOR NECK PAIN: Use a comfortable pillow that supports the head and keeps the spine in a neutral position. The position of the head should not be tilted forward or backward.  · FOR BACK PAIN: You may need to stay in bed the first few days. But, as soon as possible, begin sitting or walking to avoid problems with prolonged bed rest (muscle weakness, worsening back stiffness and pain, blood clots in the legs).  · When in bed, try to find a position of comfort. A firm mattress is best. Try lying flat on your back with pillows under your knees. You can also try lying on your side with your knees bent up towards your chest and a pillow between your knees.  · Avoid prolonged sitting. This puts more stress on the lower back than standing or walking.  · During the first two days after injury, apply an ICE PACK to the painful area for 20 minutes every 2-4 hours. This will reduce swelling and pain. HEAT (hot shower, hot bath or heating pad) works well for muscle spasm. You can start with ice, then switch to heat after two days. Some patients feel best alternating ice and heat treatments. Use the one method that feels the best to you.  · You may use acetaminophen (Tylenol) or ibuprofen (Motrin, Advil) to control pain, unless another pain medicine was prescribed. [NOTE: If you have chronic liver or kidney disease or  ever had a stomach ulcer or GI bleeding, talk with your doctor before using these medicines.]  · Be aware of safe lifting methods and do not lift anything over 15 pounds until all the pain is gone.  Follow Up  with your physician or this facility if your symptoms do not start to improve after one week. Physical therapy or further tests may be needed.  [NOTE: If X-rays were taken, they will be reviewed by a radiologist. You will be notified of any new findings that may affect your care.]  Get Prompt Medical Attention  if any of the following occur:  · Pain becomes worse or spreads into your arms or legs  · Weakness, numbness or pain in one or both arms or legs  · Loss of bowel or bladder control  · Numbness in the groin area  · Difficulty walking  · Fever of 100.4ºF (38ºC) or higher, or as directed by your healthcare provider  © 4719-2530 [facility], [address] [place] [address]. All rights reserved. This information is not intended as a substitute for professional medical care. Always follow your healthcare professional's instructions.    Back Exercises: Back Press  Do this exercise on your hands and knees. Keep your knees under your hips and your hands under your shoulders. Keep your spine in a neutral position (not arched or sagging). Be sure to maintain your necks natural curve.  · Tighten your abdominal and buttocks muscles to press your back upward. Let your head drop slightly.  · Hold for 5 seconds. Return to starting position.  · Repeat 5 times.     © 9610-6205 [facility], [address] [place] [address]. All rights reserved. This information is not intended as a substitute for professional medical care. Always follow your healthcare professional's instructions.    Back Exercises: Back Release  Do this exercise on your hands and knees. Keep your knees under your hips and your hands under your shoulders. Keep your spine in a neutral position (not arched or sagging). Be sure to  maintain your necks natural curve.  · Relax your abdominal and buttocks muscles, lift your head, and let your back sag. Be sure to keep your weight evenly distributed. Dont sit back on your hips.   · Hold for 5 seconds.  · Return to starting position.  · Repeat 5 times.  © 4731-4582 [place] [address]. All rights reserved. This information is not intended as a substitute for professional medical care. Always follow your healthcare professional's instructions.    Back Exercises: Bridge  The Bridge exercise strengthens your abdominal, buttocks, and hamstring muscles. This helps keep your back stable and aligned when you walk.  · Lie on the floor with your back and palms flat. Bend your knees. Keep your feet flat on the floor.  · Contract your abdominal and buttocks muscles. Slowly lift your buttocks off the floor until there is a straight line from your knees to your shoulders.  · Hold for 5  seconds. Repeat 10 times.    © 4185-1931 [place] [address]. All rights reserved. This information is not intended as a substitute for professional medical care. Always follow your healthcare professional's instructions.    Back Exercises: Elbow Press    To start, lie face down on your stomach, feet slightly apart, forehead on the floor. Breathe deeply. You should feel comfortable and relaxed in this position.  · Press up on your forearms. Keep your abdomen and hips on the floor.  · Hold for 20 seconds. Lower slowly.  · Repeat 2 times.  · Return to starting position.  © 7490-1789 [address] [address]. All rights reserved. This information is not intended as a substitute for professional medical care. Always follow your healthcare professional's instructions.    Back Exercises: Pelvic Tilt  To start, lie on your back with your knees bent and feet flat on the floor. Dont press your neck or lower back to the  floor. Breathe deeply. You should feel comfortable and relaxed in this position.  · Tighten your abdomen and buttocks, and press your lower back toward the floor. This should be a small, subtle movement.  · Hold for 5 seconds. Release.  · Repeat 5 times.         © 0893-6131 [facility], [address] [place] [address]. All rights reserved. This information is not intended as a substitute for professional medical care. Always follow your healthcare professional's instructions.    Back Exercises: Partial Curl-Ups          To start, lie on your back with your knees bent and feet flat on the floor. Dont press your neck or lower back to the floor. Breathe deeply. You should feel comfortable and relaxed in this position.  · Cross your arms loosely.  · Tighten your abdomen and curl FCI up, keeping your head in line with your shoulders.  · Hold for 5 seconds. Uncurl to lie down.  · Repeat 5 times.   © 5930-2559 [place] [address]. All rights reserved. This information is not intended as a substitute for professional medical care. Always follow your healthcare professional's instructions.    Back Exercises: Lower Back Stretch                            To start, sit in a chair with your feet flat on the floor. Shift your weight slightly forward to avoid rounding your back. Relax, and keep your ears, shoulders, and hips aligned.  · Sit with your feet well apart.  · Bend forward and touch the floor with the backs of your hands. Relax and let your body drop.  · Hold for 20 seconds. Return to starting position.  · Repeat 2 times.   © 3447-1155 [facility], [address] [place] [address]. All rights reserved. This information is not intended as a substitute for professional medical care. Always follow your healthcare professional's instructions.    Back Exercises: Seated Rotation      To start, sit in a chair with your feet flat on the floor. Shift your  weight slightly forward to avoid rounding your back. Relax, and keep your ears, shoulders, and hips aligned.  · Fold your arms, elbows just below shoulder height.  · Turn from the waist with hips forward. Turn your head last.  · Hold for a count of 5. Return to starting position.  · Repeat 5 times on one side. Then switch sides.  © 2392-4917 [facility], [address] [place] [address]. All rights reserved. This information is not intended as a substitute for professional medical care. Always follow your healthcare professional's instructions.    Back Exercises: Side Stretch  To start, sit in a chair with your feet flat on the floor. Shift your weight slightly forward to avoid rounding your back. Relax. Keep your ears, shoulders, and hips aligned.  · Stretch your right arm overhead.  · Slowly bend to the left. Dont twist your torso.  · Hold for 20 seconds. Return to starting position.  · Repeat 2 times. Then, switch to the other side.  © 0917-7548 [facility], [address] [place] [address]. All rights reserved. This information is not intended as a substitute for professional medical care. Always follow your healthcare professional's instructions

## 2019-09-05 RX ORDER — BUTALBITAL, ACETAMINOPHEN AND CAFFEINE 50; 325; 40 MG/1; MG/1; MG/1
TABLET ORAL
Qty: 30 TABLET | Refills: 0 | Status: SHIPPED | OUTPATIENT
Start: 2019-09-05 | End: 2019-11-04 | Stop reason: SDUPTHER

## 2019-10-15 ENCOUNTER — PES CALL (OUTPATIENT)
Dept: ADMINISTRATIVE | Facility: CLINIC | Age: 49
End: 2019-10-15

## 2019-10-17 ENCOUNTER — TELEPHONE (OUTPATIENT)
Dept: INTERNAL MEDICINE | Facility: CLINIC | Age: 49
End: 2019-10-17
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
## 2019-10-30 ENCOUNTER — TELEPHONE (OUTPATIENT)
Dept: PHYSICAL MEDICINE AND REHAB | Facility: CLINIC | Age: 49
End: 2019-10-30

## 2019-10-30 DIAGNOSIS — G89.29 CHRONIC MIDLINE LOW BACK PAIN WITH LEFT-SIDED SCIATICA: ICD-10-CM

## 2019-10-30 DIAGNOSIS — M54.42 CHRONIC MIDLINE LOW BACK PAIN WITH LEFT-SIDED SCIATICA: ICD-10-CM

## 2019-10-31 RX ORDER — TRAMADOL HYDROCHLORIDE 50 MG/1
TABLET ORAL
Qty: 180 TABLET | Refills: 2 | Status: SHIPPED | OUTPATIENT
Start: 2019-11-01 | End: 2019-12-17 | Stop reason: SDUPTHER

## 2019-10-31 NOTE — TELEPHONE ENCOUNTER
Medication completed.  Patient to check her Pharmacy.    Caitlyn KRAUS Staff   Caller: pt at [phone] (Today, 11:30 AM)             Rx Refill/Request       Is this a Refill or New Rx:  refill   Rx Name and Strength:  Tramadol   Preferred Pharmacy with phone number: [facility] [address] at [phone] or fax at [phone]   Communication Preference:call   Additional

## 2019-11-05 RX ORDER — BUTALBITAL, ACETAMINOPHEN AND CAFFEINE 50; 325; 40 MG/1; MG/1; MG/1
TABLET ORAL
Qty: 30 TABLET | Refills: 0 | Status: SHIPPED | OUTPATIENT
Start: 2019-11-05 | End: 2019-11-23 | Stop reason: SDUPTHER

## 2019-11-11 RX ORDER — VENLAFAXINE 37.5 MG/1
TABLET ORAL
Qty: 270 TABLET | Refills: 0 | Status: SHIPPED | OUTPATIENT
Start: 2019-11-11 | End: 2019-12-17

## 2019-11-26 RX ORDER — BUTALBITAL, ACETAMINOPHEN AND CAFFEINE 50; 325; 40 MG/1; MG/1; MG/1
TABLET ORAL
Qty: 30 TABLET | Refills: 0 | Status: SHIPPED | OUTPATIENT
Start: 2019-11-26 | End: 2019-12-26 | Stop reason: CLARIF

## 2019-11-27 RX ORDER — MELOXICAM 15 MG/1
TABLET ORAL
Qty: 90 TABLET | Refills: 1 | Status: SHIPPED | OUTPATIENT
Start: 2019-11-27 | End: 2019-12-17 | Stop reason: SINTOL

## 2019-12-10 RX ORDER — BUTALBITAL, ACETAMINOPHEN AND CAFFEINE 50; 325; 40 MG/1; MG/1; MG/1
TABLET ORAL
Qty: 30 TABLET | Refills: 0 | Status: SHIPPED | OUTPATIENT
Start: 2019-12-10 | End: 2019-12-26 | Stop reason: CLARIF

## 2019-12-17 ENCOUNTER — OFFICE VISIT (OUTPATIENT)
Dept: PHYSICAL MEDICINE AND REHAB | Facility: CLINIC | Age: 49
End: 2019-12-17
Payer: MEDICARE

## 2019-12-17 VITALS
HEART RATE: 69 BPM | DIASTOLIC BLOOD PRESSURE: 75 MMHG | WEIGHT: 200.75 LBS | SYSTOLIC BLOOD PRESSURE: 135 MMHG | HEIGHT: 65 IN | BODY MASS INDEX: 33.45 KG/M2

## 2019-12-17 DIAGNOSIS — M47.26 OSTEOARTHRITIS OF SPINE WITH RADICULOPATHY, LUMBAR REGION: ICD-10-CM

## 2019-12-17 DIAGNOSIS — M54.42 CHRONIC MIDLINE LOW BACK PAIN WITH LEFT-SIDED SCIATICA: Primary | ICD-10-CM

## 2019-12-17 DIAGNOSIS — E66.9 OBESITY (BMI 30.0-34.9): ICD-10-CM

## 2019-12-17 DIAGNOSIS — G89.29 CHRONIC NECK PAIN: ICD-10-CM

## 2019-12-17 DIAGNOSIS — M54.2 CHRONIC NECK PAIN: ICD-10-CM

## 2019-12-17 DIAGNOSIS — G89.29 CHRONIC MIDLINE LOW BACK PAIN WITH LEFT-SIDED SCIATICA: Primary | ICD-10-CM

## 2019-12-17 PROCEDURE — 3008F BODY MASS INDEX DOCD: CPT | Mod: HCNC,CPTII,S$GLB, | Performed by: PHYSICAL MEDICINE & REHABILITATION

## 2019-12-17 PROCEDURE — 99214 OFFICE O/P EST MOD 30 MIN: CPT | Mod: HCNC,S$GLB,, | Performed by: PHYSICAL MEDICINE & REHABILITATION

## 2019-12-17 PROCEDURE — 99999 PR PBB SHADOW E&M-EST. PATIENT-LVL III: ICD-10-PCS | Mod: PBBFAC,HCNC,, | Performed by: PHYSICAL MEDICINE & REHABILITATION

## 2019-12-17 PROCEDURE — 3008F PR BODY MASS INDEX (BMI) DOCUMENTED: ICD-10-PCS | Mod: HCNC,CPTII,S$GLB, | Performed by: PHYSICAL MEDICINE & REHABILITATION

## 2019-12-17 PROCEDURE — 99999 PR PBB SHADOW E&M-EST. PATIENT-LVL III: CPT | Mod: PBBFAC,HCNC,, | Performed by: PHYSICAL MEDICINE & REHABILITATION

## 2019-12-17 PROCEDURE — 99214 PR OFFICE/OUTPT VISIT, EST, LEVL IV, 30-39 MIN: ICD-10-PCS | Mod: HCNC,S$GLB,, | Performed by: PHYSICAL MEDICINE & REHABILITATION

## 2019-12-17 RX ORDER — TRAMADOL HYDROCHLORIDE 50 MG/1
TABLET ORAL
Qty: 180 TABLET | Refills: 2 | Status: SHIPPED | OUTPATIENT
Start: 2019-12-27 | End: 2020-01-13 | Stop reason: SDUPTHER

## 2019-12-17 RX ORDER — VENLAFAXINE 37.5 MG/1
37.5 TABLET ORAL 3 TIMES DAILY
Qty: 270 TABLET | Refills: 0 | Status: ON HOLD
Start: 2019-12-17 | End: 2019-12-31 | Stop reason: HOSPADM

## 2019-12-17 RX ORDER — FUROSEMIDE 20 MG/1
20 TABLET ORAL DAILY
Qty: 30 TABLET | Refills: 0 | Status: SHIPPED | OUTPATIENT
Start: 2019-12-17 | End: 2020-02-17

## 2019-12-17 NOTE — PROGRESS NOTES
Subjective:       Patient ID: Mary Sanches is a 49 y.o. female.    Chief Complaint: No chief complaint on file.    HPI     HISTORY OF PRESENT ILLNESS:  Ms. Sanches is a 49-year-old white female with osteoarthritis who is followed up in the [facility] for chronic low back pain with lumbar radiculopathy and chronic neck pain.  Her last visit to the clinic was on 8/16/19.  She was started on venlafaxine and maintained on Cymbalta, and p.r.n. tramadol.  Her MRI of the lumbar spine showed multilevel DJD with neural foraminal stenosis but she has not been interested in referral for epidural steroid injections.    The patient is coming to the clinic for followup.  Her back pain has been mildly worse.  She attributes this to the called weather. It is a constant aching and tightness pain in the lumbar spine and across her back.  She has occasional shooting pain from the left buttock to the left foot with numbness.  Her maximum pain is 6/10 and minimum 4/10.  Today, it is 6/10.  The patient has mild left lower extremity weakness that has gotten worse, reportedly due to pain.  She has occasional urinary urgency. She denies any bowel incontinence.      Her neck pain has been stable.  It is an intermittent aching pain in the cervical spine. She denies any radiation to her arms.  Her maximum pain is 4-5/10 and minimum 3/10; today it is 5/10. She denies any upper extremity weakness. She has occasional numbness with excess arm use.    The patient is taking venlafaxine 37.5 mg tablets, 1 tablet 2 but occasionally 3 times.  She takes tramadol 50 mg tablets, 1-2 tablets 3 times per day.            Review of Systems   Constitutional: Positive for fatigue.   Eyes: Negative for visual disturbance.   Respiratory: Negative for shortness of breath.    Cardiovascular: Negative for chest pain.   Gastrointestinal: Negative for constipation, nausea and vomiting.   Genitourinary: Negative for difficulty urinating.    Musculoskeletal: Positive for back pain, gait problem and neck pain. Negative for arthralgias and joint swelling.   Neurological: Positive for dizziness and headaches.   Psychiatric/Behavioral: Positive for sleep disturbance. Negative for behavioral problems.       Objective:      Physical Exam   Constitutional: She is oriented to person, place, and time. She appears well-developed and well-nourished.   HENT:   Head: Normocephalic and atraumatic.   Neck: Normal range of motion.   Musculoskeletal:     BUE:  ROM:full.  Strength:    RUE: 5-/5 at shoulder abduction, 5 elbow flexion, 5 elbow extension, 5 hand .   LUE: 5-/5 at shoulder abduction, 5- elbow flexion, 5- elbow extension, 5- hand .  Sensation to pinprick:   RUE: decreased.   LUE: decreased.  DTR:    RUE: +1 biceps, +1 triceps.   LUE:  +1 biceps, +1 triceps.    BLE:  ROM:full.  Mild bilateral knee crepitus.   Strength:    RLE: 5/5 at hip flexion, 5 knee extension, 5 ankle DF, 5 PF.   LLE: 5/5 at hip flexion, 5 knee extension, 5 ankle DF, 5 PF.  Sensation to pinprick:     RLE: mild decrease.     LLE: mild decrease.  DTR:     RLE: +1 knee, +1 ankle.    LLE: +1 knee, +1 ankle.  Clonus:    Rt ankle: -ve.    Lt ankle: -ve.    SLR (sitting):      RLE: +ve.      LLE: +ve.     Mild tenderness over lumbar spine.    Gait: waddling.       Neurological: She is alert and oriented to person, place, and time.   Skin: Skin is warm.   Psychiatric: She has a normal mood and affect.           Assessment:       1. Chronic midline low back pain with left-sided sciatica    2. Osteoarthritis of spine with radiculopathy, lumbar region    3. Obesity (BMI 30.0-34.9)    4. Chronic neck pain        Plan:       - She is still not interested in referral for ESIs.  - Take consistently: venlafaxine (EFFEXOR) 37.5 MG Tab; Take 1 tablet (37.5 mg total) by mouth 3 times per day.  - Continue traMADol (ULTRAM) 50 mg tablet; Take 1-2 tablets ( mg total) by mouth 3 (three) times  daily as needed.  - Regular home exercise program was encouraged.  - Weight loss was encouraged.  - Follow up in about 4 months (around 4/17/2020).      This was a 25 minute visit, more than 50% of which was spent counseling the patient about the diagnosis and the treatment plan.

## 2019-12-26 ENCOUNTER — HOSPITAL ENCOUNTER (EMERGENCY)
Facility: HOSPITAL | Age: 49
Discharge: PSYCHIATRIC OR SUBSTANCE USE FACILITY | End: 2019-12-26
Attending: EMERGENCY MEDICINE
Payer: MEDICARE

## 2019-12-26 VITALS
HEART RATE: 100 BPM | WEIGHT: 200 LBS | SYSTOLIC BLOOD PRESSURE: 159 MMHG | HEIGHT: 64 IN | TEMPERATURE: 99 F | OXYGEN SATURATION: 98 % | DIASTOLIC BLOOD PRESSURE: 84 MMHG | RESPIRATION RATE: 20 BRPM | BODY MASS INDEX: 34.15 KG/M2

## 2019-12-26 DIAGNOSIS — F30.10 MANIC BEHAVIOR: Primary | ICD-10-CM

## 2019-12-26 DIAGNOSIS — Z00.8 MEDICAL CLEARANCE FOR PSYCHIATRIC ADMISSION: ICD-10-CM

## 2019-12-26 PROBLEM — R45.851 SUICIDAL IDEATION: Status: ACTIVE | Noted: 2019-12-26

## 2019-12-26 LAB
ALBUMIN SERPL BCP-MCNC: 4 G/DL (ref 3.5–5.2)
ALP SERPL-CCNC: 104 U/L (ref 55–135)
ALT SERPL W/O P-5'-P-CCNC: 36 U/L (ref 10–44)
AMPHET+METHAMPHET UR QL: NEGATIVE
ANION GAP SERPL CALC-SCNC: 15 MMOL/L (ref 8–16)
APAP SERPL-MCNC: <3 UG/ML (ref 10–20)
AST SERPL-CCNC: 43 U/L (ref 10–40)
BACTERIA #/AREA URNS HPF: ABNORMAL /HPF
BARBITURATES UR QL SCN>200 NG/ML: NORMAL
BASOPHILS # BLD AUTO: 0.06 K/UL (ref 0–0.2)
BASOPHILS NFR BLD: 0.3 % (ref 0–1.9)
BENZODIAZ UR QL SCN>200 NG/ML: NORMAL
BILIRUB SERPL-MCNC: 0.6 MG/DL (ref 0.1–1)
BILIRUB UR QL STRIP: ABNORMAL
BUN SERPL-MCNC: 18 MG/DL (ref 6–20)
BZE UR QL SCN: NEGATIVE
CALCIUM SERPL-MCNC: 9.6 MG/DL (ref 8.7–10.5)
CANNABINOIDS UR QL SCN: NORMAL
CHLORIDE SERPL-SCNC: 103 MMOL/L (ref 95–110)
CLARITY UR: CLEAR
CO2 SERPL-SCNC: 18 MMOL/L (ref 23–29)
COLOR UR: ABNORMAL
CREAT SERPL-MCNC: 1 MG/DL (ref 0.5–1.4)
CREAT UR-MCNC: 215.4 MG/DL (ref 15–325)
DIFFERENTIAL METHOD: ABNORMAL
EOSINOPHIL # BLD AUTO: 0.1 K/UL (ref 0–0.5)
EOSINOPHIL NFR BLD: 0.4 % (ref 0–8)
ERYTHROCYTE [DISTWIDTH] IN BLOOD BY AUTOMATED COUNT: 13.7 % (ref 11.5–14.5)
EST. GFR  (AFRICAN AMERICAN): >60 ML/MIN/1.73 M^2
EST. GFR  (NON AFRICAN AMERICAN): >60 ML/MIN/1.73 M^2
ETHANOL SERPL-MCNC: <10 MG/DL
GLUCOSE SERPL-MCNC: 108 MG/DL (ref 70–110)
GLUCOSE UR QL STRIP: NEGATIVE
HCT VFR BLD AUTO: 40.3 % (ref 37–48.5)
HGB BLD-MCNC: 13.8 G/DL (ref 12–16)
HGB UR QL STRIP: ABNORMAL
HYALINE CASTS #/AREA URNS LPF: 1 /LPF
KETONES UR QL STRIP: ABNORMAL
LEUKOCYTE ESTERASE UR QL STRIP: NEGATIVE
LYMPHOCYTES # BLD AUTO: 4.9 K/UL (ref 1–4.8)
LYMPHOCYTES NFR BLD: 26.7 % (ref 18–48)
MCH RBC QN AUTO: 32.5 PG (ref 27–31)
MCHC RBC AUTO-ENTMCNC: 34.2 G/DL (ref 32–36)
MCV RBC AUTO: 95 FL (ref 82–98)
METHADONE UR QL SCN>300 NG/ML: NEGATIVE
MICROSCOPIC COMMENT: ABNORMAL
MONOCYTES # BLD AUTO: 1.3 K/UL (ref 0.3–1)
MONOCYTES NFR BLD: 7.1 % (ref 4–15)
NEUTROPHILS # BLD AUTO: 11.9 K/UL (ref 1.8–7.7)
NEUTROPHILS NFR BLD: 65.5 % (ref 38–73)
NITRITE UR QL STRIP: NEGATIVE
OPIATES UR QL SCN: NEGATIVE
PCP UR QL SCN>25 NG/ML: NEGATIVE
PH UR STRIP: 6 [PH] (ref 5–8)
PLATELET # BLD AUTO: 294 K/UL (ref 150–350)
PMV BLD AUTO: 11 FL (ref 9.2–12.9)
POTASSIUM SERPL-SCNC: 4 MMOL/L (ref 3.5–5.1)
PROT SERPL-MCNC: 8.5 G/DL (ref 6–8.4)
PROT UR QL STRIP: ABNORMAL
RBC # BLD AUTO: 4.25 M/UL (ref 4–5.4)
RBC #/AREA URNS HPF: 10 /HPF (ref 0–4)
SODIUM SERPL-SCNC: 136 MMOL/L (ref 136–145)
SP GR UR STRIP: >=1.03 (ref 1–1.03)
TOXICOLOGY INFORMATION: NORMAL
TSH SERPL DL<=0.005 MIU/L-ACNC: 0.69 UIU/ML (ref 0.4–4)
URN SPEC COLLECT METH UR: ABNORMAL
UROBILINOGEN UR STRIP-ACNC: NEGATIVE EU/DL
WBC # BLD AUTO: 18.28 K/UL (ref 3.9–12.7)
WBC #/AREA URNS HPF: 5 /HPF (ref 0–5)

## 2019-12-26 PROCEDURE — 96372 THER/PROPH/DIAG INJ SC/IM: CPT | Mod: HCNC,59

## 2019-12-26 PROCEDURE — 25000003 PHARM REV CODE 250: Mod: HCNC | Performed by: PSYCHIATRY & NEUROLOGY

## 2019-12-26 PROCEDURE — 99285 EMERGENCY DEPT VISIT HI MDM: CPT | Mod: 25,HCNC

## 2019-12-26 PROCEDURE — 84443 ASSAY THYROID STIM HORMONE: CPT | Mod: HCNC

## 2019-12-26 PROCEDURE — 85025 COMPLETE CBC W/AUTO DIFF WBC: CPT | Mod: HCNC

## 2019-12-26 PROCEDURE — 63600175 PHARM REV CODE 636 W HCPCS: Mod: HCNC | Performed by: EMERGENCY MEDICINE

## 2019-12-26 PROCEDURE — 25000003 PHARM REV CODE 250: Mod: HCNC | Performed by: EMERGENCY MEDICINE

## 2019-12-26 PROCEDURE — 80329 ANALGESICS NON-OPIOID 1 OR 2: CPT | Mod: HCNC

## 2019-12-26 PROCEDURE — 80307 DRUG TEST PRSMV CHEM ANLYZR: CPT | Mod: HCNC

## 2019-12-26 PROCEDURE — 80320 DRUG SCREEN QUANTALCOHOLS: CPT | Mod: HCNC

## 2019-12-26 PROCEDURE — 80053 COMPREHEN METABOLIC PANEL: CPT | Mod: HCNC

## 2019-12-26 PROCEDURE — S0166 INJ OLANZAPINE 2.5MG: HCPCS | Mod: HCNC | Performed by: EMERGENCY MEDICINE

## 2019-12-26 PROCEDURE — 96374 THER/PROPH/DIAG INJ IV PUSH: CPT | Mod: HCNC

## 2019-12-26 PROCEDURE — 81000 URINALYSIS NONAUTO W/SCOPE: CPT | Mod: HCNC,59

## 2019-12-26 RX ORDER — DIAZEPAM 5 MG/1
10 TABLET ORAL EVERY 8 HOURS
Status: DISCONTINUED | OUTPATIENT
Start: 2019-12-26 | End: 2019-12-26 | Stop reason: HOSPADM

## 2019-12-26 RX ORDER — OLANZAPINE 10 MG/2ML
10 INJECTION, POWDER, FOR SOLUTION INTRAMUSCULAR ONCE
Status: DISCONTINUED | OUTPATIENT
Start: 2019-12-26 | End: 2019-12-26

## 2019-12-26 RX ORDER — OLANZAPINE 10 MG/2ML
10 INJECTION, POWDER, FOR SOLUTION INTRAMUSCULAR ONCE
Status: COMPLETED | OUTPATIENT
Start: 2019-12-26 | End: 2019-12-26

## 2019-12-26 RX ORDER — DIPHENHYDRAMINE HCL 50 MG
50 CAPSULE ORAL EVERY 6 HOURS PRN
COMMUNITY
End: 2022-04-29 | Stop reason: SDUPTHER

## 2019-12-26 RX ORDER — ESCITALOPRAM OXALATE 20 MG/1
20 TABLET ORAL DAILY
COMMUNITY
End: 2020-02-17

## 2019-12-26 RX ORDER — FUROSEMIDE 20 MG/1
20 TABLET ORAL 2 TIMES DAILY
Status: ON HOLD | COMMUNITY
End: 2019-12-31 | Stop reason: HOSPADM

## 2019-12-26 RX ORDER — ALPRAZOLAM 1 MG/1
1 TABLET ORAL 3 TIMES DAILY
Status: ON HOLD | COMMUNITY
End: 2019-12-31 | Stop reason: HOSPADM

## 2019-12-26 RX ORDER — LITHIUM CARBONATE 150 MG/1
300 CAPSULE ORAL EVERY 8 HOURS
Status: DISCONTINUED | OUTPATIENT
Start: 2019-12-26 | End: 2019-12-26 | Stop reason: HOSPADM

## 2019-12-26 RX ADMIN — LORAZEPAM 1 MG: 2 INJECTION INTRAMUSCULAR; INTRAVENOUS at 12:12

## 2019-12-26 RX ADMIN — LITHIUM CARBONATE 300 MG: 150 CAPSULE, GELATIN COATED ORAL at 02:12

## 2019-12-26 RX ADMIN — DIAZEPAM 10 MG: 5 TABLET ORAL at 02:12

## 2019-12-26 RX ADMIN — OLANZAPINE 10 MG: 10 INJECTION, POWDER, FOR SOLUTION INTRAMUSCULAR at 02:12

## 2019-12-26 NOTE — ED PROVIDER NOTES
Encounter Date: 12/26/2019    SCRIBE #1 NOTE: I, Michelle Ahumada, am scribing for, and in the presence of,  Dr. García. I have scribed the entire note.       History     Chief Complaint   Patient presents with    Hallucinations      states patient has long hx of mental health problems but not taking medication as prescribed. Started hallucinating last night and has been manic all morning, denies SI and HI    Manic Behavior     Time seen by provider: 11:49 AM    This is a 49 y.o. female with a history of anxiety, migraines, chronic back pain, and sacroiliitis who presents to the ED with sister due to hallucinations and manic behavior for about 2-3 days.  reports the patient has been out of Xanax for about 3-4 days before 12/25. He notes that the patient complained of generalized pain, tremors, and started to hallucinate. Sister notes that on 12/24, the patient was hyperactive and ws having visual/auditory hallucinations.  was able to get her Xanax today from the pharmacy and gave her two doses. Patient's sister reports the patient is not compliant with her medications as she takes different doses of her Xanax or Cymbalta every day.  and sister report that the patient believes there are cameras on the floors, wires, and feathers. On exam, patient reports she wants to go out to eat and is looking for a leash around the room. Patient's psychiatrist is Dr. Orly Cagel.    The history is provided by the patient.     Review of patient's allergies indicates:   Allergen Reactions    Cranberry      Kidney Pain    Gabapentin Swelling     Throat Swelling, Hard to breathe    Ibuprofen Swelling     Stomach Bloated, Swelling Throat    Meloxicam Swelling     Swelling of the Throat    Toradol [ketorolac] Swelling     Throat Swelling difficulty breathing    Amoxicillin Hives    Duloxetine     Lasix [furosemide] Swelling     Bloating and swelling     Past Medical History:   Diagnosis Date     Anxiety     Chronic back pain     Migraines     Sacroiliitis     Scoliosis     Tobacco use      Past Surgical History:   Procedure Laterality Date    HAND SURGERY Left     HYSTERECTOMY       Family History   Problem Relation Age of Onset    No Known Problems Mother     Hypertension Father     Diabetes Sister         Type I    Cancer Maternal Grandmother     Heart disease Paternal Grandfather     Kidney disease Sister     Thyroid disease Sister     Stroke Neg Hx      Social History     Tobacco Use    Smoking status: Current Some Day Smoker    Smokeless tobacco: Never Used    Tobacco comment: every few days a cigarette   Substance Use Topics    Alcohol use: No    Drug use: No     Review of Systems   Unable to perform ROS: Acuity of condition       Physical Exam     Initial Vitals [12/26/19 1136]   BP Pulse Resp Temp SpO2   138/83 99 (!) 21 98 °F (36.7 °C) 96 %      MAP       --         Physical Exam    Nursing note and vitals reviewed.  Constitutional: She appears well-developed and well-nourished. No distress.   HENT:   Head: Normocephalic and atraumatic.   Mouth/Throat: Oropharynx is clear and moist.   Eyes: Conjunctivae and EOM are normal. Pupils are equal, round, and reactive to light.   Neck: Normal range of motion. Neck supple. No stridor present. No tracheal deviation present.   Cardiovascular: Normal rate, regular rhythm and normal heart sounds.   No murmur heard.  Pulmonary/Chest: Breath sounds normal. No respiratory distress.   Abdominal: Soft. Bowel sounds are normal. There is no tenderness. There is no rebound and no guarding.   Musculoskeletal: Normal range of motion. She exhibits no edema or tenderness.   Neurological: She is alert. No sensory deficit.   Skin: Skin is warm and dry. Capillary refill takes less than 2 seconds.   Psychiatric: She is actively hallucinating.   Flight of ideas  Auditory and visual hallucinations         ED Course   Procedures  Labs Reviewed   CBC W/ AUTO  DIFFERENTIAL - Abnormal; Notable for the following components:       Result Value    WBC 18.28 (*)     Mean Corpuscular Hemoglobin 32.5 (*)     Gran # (ANC) 11.9 (*)     Lymph # 4.9 (*)     Mono # 1.3 (*)     All other components within normal limits   COMPREHENSIVE METABOLIC PANEL - Abnormal; Notable for the following components:    CO2 18 (*)     Total Protein 8.5 (*)     AST 43 (*)     All other components within normal limits   URINALYSIS, REFLEX TO URINE CULTURE - Abnormal; Notable for the following components:    Color, UA Brown (*)     Specific Gravity, UA >=1.030 (*)     Protein, UA 1+ (*)     Ketones, UA 1+ (*)     Bilirubin (UA) 1+ (*)     Occult Blood UA 2+ (*)     All other components within normal limits    Narrative:     Preferred Collection Type->Urine, Clean Catch   ACETAMINOPHEN LEVEL - Abnormal; Notable for the following components:    Acetaminophen (Tylenol), Serum <3.0 (*)     All other components within normal limits   URINALYSIS MICROSCOPIC - Abnormal; Notable for the following components:    RBC, UA 10 (*)     Bacteria Few (*)     All other components within normal limits    Narrative:     Preferred Collection Type->Urine, Clean Catch   TSH   ALCOHOL,MEDICAL (ETHANOL)   DRUG SCREEN PANEL, URINE EMERGENCY             Medical Decision Making:   Clinical Tests:   Lab Tests: Ordered and Reviewed                   ED Course as of Dec 26 1635   Thu Dec 26, 2019   1503 The patient got Zyprexa injection.    [ST]   1633 The patient is medically cleared for psychiatric admission    [ST]      ED Course User Index  [ST] Niya García MD                Clinical Impression:     1. Manic behavior    2. Medical clearance for psychiatric admission             I, Niya García, personally performed the services described in this documentation. All medical record entries made by the scribe were at my direction and in my presence.  I have reviewed the chart and agree that the record reflects my personal  performance and is accurate and complete. Niya García M.D. 4:35 PM12/26/2019               Niya García MD  12/26/19 2127

## 2019-12-26 NOTE — ED NOTES
Pt informed pending olanzapine inj ordered. Pt verbally consented to take injection. Olanzapine IM given without incident. Pt anibal well.

## 2019-12-26 NOTE — CONSULTS
[facility] for Psychosis and Amna.  Lithium,Valium and Zyprexa as per order.

## 2019-12-26 NOTE — ED NOTES
Medications counted with ANDREW Crowe and placed in the secured cabinet    Tramadol: 100-   Alprozolam- 86-20

## 2019-12-27 NOTE — PSYCH
DATE OF ADMISSION:  12/26/2019    IDENTIFICATION DATA:  This is a 49-year-old  white female who was brought   to ER by family due to tiago, psychosis, noncompliance with medicine, and   unable to manage at home.  The patient was transferred to [facility].  The patient was seen at the request of Dr. Webster for psych   evaluation.  The patient is on a PEC status.    CHIEF COMPLAINT:  According to ER notes, the patient was delusional and had   auditory and visual hallucinations for four days.    HISTORY OF PRESENT ILLNESS:  The patient is actively hallucinating at this time.    The patient is talking to people who are not there and searching things under   her bed.  The patient states that she does not like sister to cry.  She showed   her food and said that is what happened, and she is going to kill him.  The   patient was showing some scar marks.  The patient stated her leg is hurting and   it is getting bigger.  The patient was playing with socks and trying to get   something out of the socks.  The patient is delirious at this time.  The patient   has fluctuation in the level of consciousness.  She is hyperverbal,   hyperactive, labile, and very restless.  The patient has poor attention and   concentration.  The patient is hearing voices of people next to the wall.  The   patient states that they like playing here.  The patient was calling for Dayna.    The patient states that she ____.  The patient is out of her Xanax, which she   was taking one three times a day and also Cymbalta.  The patient does not drink   nor do drugs.  The patient was not manageable at home and sister brought her to   the hospital.    PAST PSYCHIATRIC HISTORY:  The patient states that she has been to [facility]   hospital and first time was in 20s.  She does not remember the name of that   place.  The patient states that she sees Dr. Curiel.    MEDICATIONS:  She is on Lexapro, Vistaril, Effexor, Cymbalta, and Xanax.   The   patient states that she is not sure about suicide.    SOCIAL HISTORY:  The patient was born and raised in [place].  She described   her childhood as not good because dad was on drugs.  She is  and was   distracted to tell children.  The patient was looking all around and was   suspicious and worried.  The patient states that she is on disability and paid   $700 rent.    PAST MEDICAL HISTORY:  The patient has a ____, migraine headaches.  The patient   is on Lexapro 20 mg per day, Restoril 7.5 at nighttime, Xanax 1 mg three times a   day, Effexor 37.5 mg per day.    Her blood pressure is 138/83 with 99 pulse.    ALLERGIES:  SHE IS ALLERGIC TO CRANBERRY, TRAMADOL, IBUPROFEN, CYMBALTA, LASIX.    LABORATORY DATA:  Her WBC is 10.0, hemoglobin 10.8, hematocrit 40, MCV 95,   platelets 294, blood sugar 108.  Sodium 136, potassium 4.0, chloride 103, bicarb   18, AST 18, creatinine 1.0, bilirubin 0.6, AST 43, ALT 30.    MENTAL STATUS EXAMINATION:  This is a 49-year-old obese white female who is   attired in paper scrubs, exhibited fair eye contact.  She is alert and was not   sure where is at.  She was able to tell the year and was approximate with day   and date.  The patient is anxious and has worried affect.  She is fidgety.  Her   speech was intermittent with rapid and blocking.  The patient has no tremors,   sweating, nausea, or vomiting.  The patient is disoriented and disorganized.    The patient seems to be hearing voices, she was talking about people playing and   someone talking next to the wall.  Inside and judgment are impaired.  She is of   average intelligence person.    PSYCHIATRIC DIAGNOSES:  AXIS I:  Bipolar disorder, manic, with psychotic features.  AXIS II:  No diagnosis.  AXIS III:  Migraine headaches, back pain.  AXIS IV:  Obesity.  Unable to manage at home, chronic mental illness, out of   medications.  AXIS V:  GAF 35.    ASSESSMENT AND RECOMMENDATIONS AND PLAN:  We will transfer this  patient to   [facility] or [facility] for further management   of psychosis and tiago.  We will initiate lithium 300 mg three times a day,   Zyprexa 10 mg IM now.  We will give Valium 10 mg p.r.n. or b.i.d., first dose   now.    ASSETS:  The patient is verbal and has supportive family at home.      YENI/NATALYA  dd: 12/26/2019 14:17:30 (CST)  td: 12/26/2019 19:25:44 (CST)  Doc ID   #[id number]  Job ID #[id number]    CC:

## 2019-12-27 NOTE — ED NOTES
"Called to give report on patient, was asked to call back in 15 mins, patient packet "not yet received"   "

## 2019-12-30 PROBLEM — R45.851 SUICIDAL IDEATION: Status: RESOLVED | Noted: 2019-12-26 | Resolved: 2019-12-30

## 2020-01-06 RX ORDER — BUTALBITAL, ACETAMINOPHEN AND CAFFEINE 50; 325; 40 MG/1; MG/1; MG/1
TABLET ORAL
Qty: 30 TABLET | Refills: 0 | Status: SHIPPED | OUTPATIENT
Start: 2020-01-06 | End: 2020-02-17

## 2020-01-13 ENCOUNTER — TELEPHONE (OUTPATIENT)
Dept: PHYSICAL MEDICINE AND REHAB | Facility: CLINIC | Age: 50
End: 2020-01-13

## 2020-01-13 DIAGNOSIS — G89.29 CHRONIC MIDLINE LOW BACK PAIN WITH LEFT-SIDED SCIATICA: ICD-10-CM

## 2020-01-13 DIAGNOSIS — M54.42 CHRONIC MIDLINE LOW BACK PAIN WITH LEFT-SIDED SCIATICA: ICD-10-CM

## 2020-01-13 RX ORDER — TRAMADOL HYDROCHLORIDE 50 MG/1
50 TABLET ORAL 3 TIMES DAILY PRN
Qty: 90 TABLET | Refills: 1 | Status: SHIPPED | OUTPATIENT
Start: 2020-01-13 | End: 2020-02-17

## 2020-01-13 NOTE — TELEPHONE ENCOUNTER
----- Message from Arabella Jaspal sent at 1/13/2020  4:29 PM CST -----  Contact: Laurita (sister) @ [phone]  Calling to speak with someone in Dr. Andrews's office regarding patient's condition and to inform that the patient was admitted to a behavioral facility, but is home now and her medications are all missing. Says she is needing refills/new Rx for all of her medications. Please call.    [facility]/pharmacy #[id number] - [place] - [address] AT [facility]  [address] [address]  Phone: [phone] Fax: [phone]

## 2020-01-14 NOTE — TELEPHONE ENCOUNTER
I called and spoke to the patient, and later her sister.  The patient was admitted to the inpatient psychiatric facility.  Her sister reports that that was due to her being off the Xanax for over a week.  Her tramadol was taking up on admission.  The sister reports that the patient has been taking her medications consistently.  She can also help keep an eye on compliance.  I sent a prescription for tramadol 50 mg, 1 p.o. t.i.d. p.r.n. to her pharmacy pain

## 2020-01-26 ENCOUNTER — OFFICE VISIT (OUTPATIENT)
Dept: URGENT CARE | Facility: CLINIC | Age: 50
End: 2020-01-26
Payer: MEDICARE

## 2020-01-26 VITALS
BODY MASS INDEX: 33.12 KG/M2 | DIASTOLIC BLOOD PRESSURE: 87 MMHG | HEIGHT: 64 IN | OXYGEN SATURATION: 97 % | TEMPERATURE: 98 F | WEIGHT: 194 LBS | SYSTOLIC BLOOD PRESSURE: 140 MMHG | HEART RATE: 83 BPM | RESPIRATION RATE: 12 BRPM

## 2020-01-26 DIAGNOSIS — S39.012A LUMBAR STRAIN, INITIAL ENCOUNTER: Primary | ICD-10-CM

## 2020-01-26 PROCEDURE — 99214 PR OFFICE/OUTPT VISIT, EST, LEVL IV, 30-39 MIN: ICD-10-PCS | Mod: S$GLB,,, | Performed by: INTERNAL MEDICINE

## 2020-01-26 PROCEDURE — 99214 OFFICE O/P EST MOD 30 MIN: CPT | Mod: S$GLB,,, | Performed by: INTERNAL MEDICINE

## 2020-01-26 RX ORDER — TRAMADOL HYDROCHLORIDE 50 MG/1
50 TABLET ORAL EVERY 6 HOURS PRN
Qty: 20 TABLET | Refills: 0 | Status: SHIPPED | OUTPATIENT
Start: 2020-01-26 | End: 2020-01-26 | Stop reason: CLARIF

## 2020-01-26 RX ORDER — ALPRAZOLAM 0.5 MG/1
TABLET ORAL
COMMUNITY
Start: 2020-01-09 | End: 2020-07-21

## 2020-01-26 RX ORDER — OLANZAPINE 10 MG/1
TABLET ORAL
COMMUNITY
Start: 2020-01-09 | End: 2020-02-17

## 2020-01-26 RX ORDER — TEMAZEPAM 15 MG/1
CAPSULE ORAL NIGHTLY PRN
COMMUNITY
Start: 2020-01-06 | End: 2020-05-11

## 2020-01-26 NOTE — PROGRESS NOTES
"Subjective:       Patient ID: Mary Sanches is a 49 y.o. female.    Vitals:  height is 5' 4" (1.626 m) and weight is 88 kg (194 lb). Her temperature is 98.4 °F (36.9 °C). Her blood pressure is 140/87 (abnormal) and her pulse is 83. Her respiration is 12 and oxygen saturation is 97%.     Chief Complaint: No chief complaint on file.    Pt c/o left leg pain after fall. Fell 2 times today and states that she falls every day    Leg Pain    The incident occurred at home. The injury mechanism was a fall.       Musculoskeletal: Positive for pain.       Objective:      Physical Exam   Musculoskeletal:   Pain L lumbar area with ROM and palpation   Nursing note and vitals reviewed.        Assessment:       1. Lumbar strain, initial encounter        Plan:         Lumbar strain, initial encounter    Other orders  -     traMADol (ULTRAM) 50 mg tablet; Take 1 tablet (50 mg total) by mouth every 6 (six) hours as needed for Pain.  Dispense: 20 tablet; Refill: 0       had long conversation with patient. She should contact her pain management doctor and/ or psychiatrist or PCP in future for similar issues    "

## 2020-01-27 RX ORDER — FUROSEMIDE 20 MG/1
TABLET ORAL
Qty: 30 TABLET | Refills: 0 | OUTPATIENT
Start: 2020-01-27

## 2020-02-03 RX ORDER — VENLAFAXINE 37.5 MG/1
TABLET ORAL
Qty: 270 TABLET | Refills: 0 | Status: SHIPPED | OUTPATIENT
Start: 2020-02-03 | End: 2020-02-17

## 2020-02-10 ENCOUNTER — PATIENT OUTREACH (OUTPATIENT)
Dept: ADMINISTRATIVE | Facility: HOSPITAL | Age: 50
End: 2020-02-10

## 2020-02-10 DIAGNOSIS — Z12.39 BREAST CANCER SCREENING: ICD-10-CM

## 2020-02-10 DIAGNOSIS — Z12.31 ENCOUNTER FOR SCREENING MAMMOGRAM FOR BREAST CANCER: Primary | ICD-10-CM

## 2020-02-17 ENCOUNTER — TELEPHONE (OUTPATIENT)
Dept: INTERNAL MEDICINE | Facility: CLINIC | Age: 50
End: 2020-02-17

## 2020-02-17 ENCOUNTER — OFFICE VISIT (OUTPATIENT)
Dept: INTERNAL MEDICINE | Facility: CLINIC | Age: 50
End: 2020-02-17
Payer: MEDICARE

## 2020-02-17 ENCOUNTER — HOSPITAL ENCOUNTER (OUTPATIENT)
Dept: RADIOLOGY | Facility: HOSPITAL | Age: 50
Discharge: HOME | End: 2020-02-17
Attending: INTERNAL MEDICINE
Payer: MEDICARE

## 2020-02-17 ENCOUNTER — TELEPHONE (OUTPATIENT)
Dept: TRANSPLANT | Facility: CLINIC | Age: 50
End: 2020-02-17

## 2020-02-17 VITALS
DIASTOLIC BLOOD PRESSURE: 88 MMHG | TEMPERATURE: 99 F | WEIGHT: 200 LBS | RESPIRATION RATE: 16 BRPM | SYSTOLIC BLOOD PRESSURE: 118 MMHG | BODY MASS INDEX: 34.15 KG/M2 | HEART RATE: 64 BPM | HEIGHT: 64 IN

## 2020-02-17 DIAGNOSIS — G89.29 CHRONIC MIDLINE LOW BACK PAIN WITH LEFT-SIDED SCIATICA: ICD-10-CM

## 2020-02-17 DIAGNOSIS — G43.009 MIGRAINE WITHOUT AURA AND WITHOUT STATUS MIGRAINOSUS, NOT INTRACTABLE: ICD-10-CM

## 2020-02-17 DIAGNOSIS — R76.12 POSITIVE QUANTIFERON-TB GOLD TEST: ICD-10-CM

## 2020-02-17 DIAGNOSIS — F23 BRIEF PSYCHOTIC DISORDER: Primary | ICD-10-CM

## 2020-02-17 DIAGNOSIS — F13.20 SEDATIVE DEPENDENCE: ICD-10-CM

## 2020-02-17 DIAGNOSIS — M54.42 CHRONIC MIDLINE LOW BACK PAIN WITH LEFT-SIDED SCIATICA: ICD-10-CM

## 2020-02-17 DIAGNOSIS — B17.10 ACUTE HEPATITIS C VIRUS INFECTION WITHOUT HEPATIC COMA: ICD-10-CM

## 2020-02-17 DIAGNOSIS — E66.9 OBESITY (BMI 30-39.9): ICD-10-CM

## 2020-02-17 DIAGNOSIS — F41.9 ANXIETY DISORDER, UNSPECIFIED TYPE: ICD-10-CM

## 2020-02-17 DIAGNOSIS — M46.1 SACROILIITIS: ICD-10-CM

## 2020-02-17 PROCEDURE — 71046 XR CHEST PA AND LATERAL: ICD-10-PCS | Mod: 26,HCNC,, | Performed by: RADIOLOGY

## 2020-02-17 PROCEDURE — 99499 RISK ADDL DX/OHS AUDIT: ICD-10-PCS | Mod: HCNC,S$GLB,, | Performed by: INTERNAL MEDICINE

## 2020-02-17 PROCEDURE — 99215 OFFICE O/P EST HI 40 MIN: CPT | Mod: HCNC,S$GLB,, | Performed by: INTERNAL MEDICINE

## 2020-02-17 PROCEDURE — 99999 PR PBB SHADOW E&M-EST. PATIENT-LVL IV: ICD-10-PCS | Mod: PBBFAC,HCNC,, | Performed by: INTERNAL MEDICINE

## 2020-02-17 PROCEDURE — 99215 PR OFFICE/OUTPT VISIT, EST, LEVL V, 40-54 MIN: ICD-10-PCS | Mod: HCNC,S$GLB,, | Performed by: INTERNAL MEDICINE

## 2020-02-17 PROCEDURE — 3008F PR BODY MASS INDEX (BMI) DOCUMENTED: ICD-10-PCS | Mod: HCNC,CPTII,S$GLB, | Performed by: INTERNAL MEDICINE

## 2020-02-17 PROCEDURE — 71046 X-RAY EXAM CHEST 2 VIEWS: CPT | Mod: TC,HCNC,PO

## 2020-02-17 PROCEDURE — 99499 UNLISTED E&M SERVICE: CPT | Mod: HCNC,S$GLB,, | Performed by: INTERNAL MEDICINE

## 2020-02-17 PROCEDURE — 3008F BODY MASS INDEX DOCD: CPT | Mod: HCNC,CPTII,S$GLB, | Performed by: INTERNAL MEDICINE

## 2020-02-17 PROCEDURE — 99999 PR PBB SHADOW E&M-EST. PATIENT-LVL IV: CPT | Mod: PBBFAC,HCNC,, | Performed by: INTERNAL MEDICINE

## 2020-02-17 PROCEDURE — 71046 X-RAY EXAM CHEST 2 VIEWS: CPT | Mod: 26,HCNC,, | Performed by: RADIOLOGY

## 2020-02-17 RX ORDER — TRAMADOL HYDROCHLORIDE 50 MG/1
TABLET ORAL
Qty: 90 TABLET | Refills: 0 | Status: SHIPPED | OUTPATIENT
Start: 2020-03-03 | End: 2020-03-10 | Stop reason: SDUPTHER

## 2020-02-17 NOTE — TELEPHONE ENCOUNTER
I told her to call pharmacy for tramadol rx.    I told her that dept will call her to set up liver specialist.  No rx for that till they see to dariela.

## 2020-02-17 NOTE — TELEPHONE ENCOUNTER
----- Message from Lashaun Yun sent at 2/17/2020 10:43 AM CST -----  Contact: self [phone]  Patient would like to speak to the nurse in regards to her medication. Please call and advise.

## 2020-02-17 NOTE — PROGRESS NOTES
"Subjective:       Patient ID: Mary Sanches is a 49 y.o. female.    Chief Complaint: Hospital Follow Up (says left thigh is killing her pain at 6. )    HPI   49 y.o. with chronic LBP/arthropathy with right sided sciatica, Sacroiliitis, Migraines, Obesity, anxiety, sedative dependence is here for hospital f/u for manic behavior with hallucinations. Pt was admitted from 12/26/19-12/31/19 to the behavior unit.     Per records, " ED note:  presents to the ED with sister due to hallucinations and manic behavior for about 2-3 days.  reports the patient has been out of Xanax for about 3-4 days before 12/25. He notes that the patient complained of generalized pain, tremors, and started to hallucinate. Sister notes that on 12/24, the patient was hyperactive and ws having visual/auditory hallucinations.  was able to get her Xanax today from the pharmacy and gave her two doses. Patient's sister reports the patient is not compliant with her medications as she takes different doses of her Xanax or Cymbalta every day.  and sister report that the patient believes there are cameras on the floors, wires, and feathers. On exam, patient reports she wants to go out to eat and is looking for a leash around the room. Patient's psychiatrist is Dr. Orly Cagle.    From Initial Evaluation:  Ms. Sanches was a 50 y/o F w/ self-reported anxiety, presenting w/ acute-on-chronic onset of tiago/psychosis of unclear etiology. Collateral suggested a rapid decline in functioning in recent days leading up to hospitalization with AVH, confusion, and mood lability, but also in the setting of underlying mild delusional thinking chronically that did not appear to hinder her daily functioning.  This was her first inpatient psychiatric hospitalization. This clear decline in function the week leading up to hospitalization possibly precipitated by her running out early of her psychiatric meds (presumably xanax, but possibly other " "unknown psychiatric meds), causing acute psychosis/tiago w/ delusional thinking, paranoia, and some AVH."    Labs from her stay came back positive for Hepatitis C and also her Quantiferon gold was positive(no know hx of TB). They did not get a CXR while admitted.      Overall pt is feeling much better with no confusion or significant paranoia. No SI/HI.   Review of Systems   Constitutional: Negative for activity change, appetite change, chills, diaphoresis, fatigue, fever and unexpected weight change.   HENT: Negative for congestion, mouth sores, postnasal drip, rhinorrhea, sinus pressure, sneezing, sore throat, trouble swallowing and voice change.    Eyes: Negative for pain, discharge and visual disturbance.   Respiratory: Negative for cough, shortness of breath and wheezing.    Cardiovascular: Negative for chest pain, palpitations and leg swelling.   Gastrointestinal: Negative for abdominal pain, blood in stool, constipation, diarrhea, nausea and vomiting.   Endocrine: Negative for cold intolerance and heat intolerance.   Genitourinary: Negative for difficulty urinating, dysuria, frequency, hematuria and urgency.   Musculoskeletal: Negative for arthralgias and myalgias.   Skin: Negative for rash and wound.   Allergic/Immunologic: Negative for environmental allergies and food allergies.   Neurological: Positive for headaches. Negative for dizziness, tremors, seizures, syncope, weakness and light-headedness.   Hematological: Negative for adenopathy. Does not bruise/bleed easily.   Psychiatric/Behavioral: Positive for sleep disturbance. Negative for confusion, self-injury and suicidal ideas. The patient is nervous/anxious.        Objective:      Physical Exam   Constitutional: She is oriented to person, place, and time. She appears well-developed and well-nourished. No distress.   HENT:   Head: Normocephalic and atraumatic.   Right Ear: External ear normal.   Left Ear: External ear normal.   Nose: Nose normal. "   Mouth/Throat: Oropharynx is clear and moist. No oropharyngeal exudate.   Eyes: Pupils are equal, round, and reactive to light. Conjunctivae and EOM are normal. Right eye exhibits no discharge. Left eye exhibits no discharge. No scleral icterus.   Neck: Neck supple. No JVD present. No thyromegaly present.   Cardiovascular: Normal rate, regular rhythm, normal heart sounds and intact distal pulses.   No murmur heard.  Pulmonary/Chest: Effort normal and breath sounds normal. No respiratory distress. She has no wheezes. She has no rales. She exhibits no tenderness.   Abdominal: Soft. Bowel sounds are normal. She exhibits no distension. There is no tenderness. There is no guarding.   Musculoskeletal: She exhibits no edema.   Lymphadenopathy:     She has no cervical adenopathy.   Neurological: She is alert and oriented to person, place, and time. No cranial nerve deficit. Coordination normal.   Skin: Skin is warm and dry. No rash noted. She is not diaphoretic. No pallor.   Psychiatric: She has a normal mood and affect. Judgment normal.   Nursing note and vitals reviewed.      Assessment:       1. Brief psychotic disorder    2. Acute hepatitis C virus infection without hepatic coma    3. Positive QuantiFERON-TB Gold test    4. Migraine without aura and without status migrainosus, not intractable    5. Obesity (BMI 30-39.9)    6. Anxiety disorder, unspecified type    7. Sacroiliitis    8. Sedative dependence    9. Chronic midline low back pain with left-sided sciatica        Plan:    1. Stable, pt back to her baseline. Followed by Psyc   2. Referral to Hepatology   3. CXR and referral to ID   4. Migraines without aura- stable on Fioricet PRN   5. Obesity- pt advised on proper diet/exercise for weight loss   6. Anxiety- stable on Xanax, followed by Psyc   7. Sacroiliitis- stable, continue to monitor   8. Sedative dependence- stable, continue to monitor   9. Stable, will follow    Over 1/2 of 40 minute visit spent reviewing  pt's medical records, education/discussion of pt's medical conditions and medical management

## 2020-02-17 NOTE — TELEPHONE ENCOUNTER
----- Message from Ita Schneider sent at 2/17/2020  4:28 PM CST -----  Contact: self/[phone]  Name of test: xray    Date of test: 02/17/20    Ordering provider: Dr Lozano    Where was the test performed:[facility]    Comments:

## 2020-02-17 NOTE — TELEPHONE ENCOUNTER
----- Message from Ita Schneider sent at 2/17/2020  4:25 PM CST -----  Contact: self/[phone]  Patient called in regards needing to f/u on update for rx. Please call and advise. Thank you

## 2020-02-17 NOTE — TELEPHONE ENCOUNTER
----- Message from Ethan Ho sent at 2/17/2020 10:10 AM CST -----  Contact: Patient      Sent pt chart to ref nurse for review.      ----- Message -----  From: Roberto Gill  Sent: 2/17/2020  10:06 AM CST  To: [facility] Referral Pool    Good Morning,      has put in a referral for patient to be scheduled with Hepatology. Please call patient to schedule. Thank you in advanced.    Acute hepatitis C virus infection without hepatic coma [B17.10]

## 2020-02-18 ENCOUNTER — TELEPHONE (OUTPATIENT)
Dept: INTERNAL MEDICINE | Facility: CLINIC | Age: 50
End: 2020-02-18

## 2020-02-18 NOTE — TELEPHONE ENCOUNTER
Advise pt her CXR came back normal without any signs of tuberculosis, I still want her to f/u with infectious disease per + Quantiferon Gold

## 2020-02-18 NOTE — TELEPHONE ENCOUNTER
CXR results given ; keep the ID appointment, said she would.    Requesting fluid pills to be sent to her pharmacy, she reported that she retains fluids

## 2020-02-19 ENCOUNTER — TELEPHONE (OUTPATIENT)
Dept: HEPATOLOGY | Facility: CLINIC | Age: 50
End: 2020-02-19

## 2020-02-19 ENCOUNTER — TELEPHONE (OUTPATIENT)
Dept: TRANSPLANT | Facility: CLINIC | Age: 50
End: 2020-02-19

## 2020-02-19 NOTE — LETTER
February 19, 2020    Mary Sanches  [address]  [address]  [place] [address]      Dear Mary Sanches:    Your doctor has referred you to the [facility]. We are sending this letter to remind you to make an appointment with us to complete the referral process.     Please call us at [phone] to schedule an appointment. We look forward to seeing you soon.     If you received a call and have been scheduled, please disregard this letter.       Sincerely,        [facility]   [facility] [place] [address]  [phone]

## 2020-02-19 NOTE — TELEPHONE ENCOUNTER
----- Message from Ethan Ho sent at 2/17/2020 10:11 AM CST -----  Contact: Patient     Please review pt chart. Ref in Epic.      ----- Message -----  From: Roberto Gill  Sent: 2/17/2020  10:06 AM CST  To: [facility] Referral Pool    Good Morning,      has put in a referral for patient to be scheduled with Hepatology. Please call patient to schedule. Thank you in advanced.    Acute hepatitis C virus infection without hepatic coma [B17.10]

## 2020-02-19 NOTE — TELEPHONE ENCOUNTER
Pt records reviewed.   Pt will be referred to Hepatology.    Initial referral received  from the [facility]que.   Referring Provider/diagnosis  Ángel Lozano,     HCV + quant needs treatment.   Referral letter sent to patient.

## 2020-02-19 NOTE — TELEPHONE ENCOUNTER
----- Message from Enma Jiménez sent at 2/19/2020 10:27 AM CST -----  I know hui has been out and maybe backed up.  This pt is + quant and needs treatment.  Tks.

## 2020-02-20 ENCOUNTER — LAB VISIT (OUTPATIENT)
Dept: LAB | Facility: HOSPITAL | Age: 50
End: 2020-02-20
Payer: MEDICARE

## 2020-02-20 ENCOUNTER — OFFICE VISIT (OUTPATIENT)
Dept: HEPATOLOGY | Facility: CLINIC | Age: 50
End: 2020-02-20
Payer: MEDICARE

## 2020-02-20 VITALS
DIASTOLIC BLOOD PRESSURE: 65 MMHG | BODY MASS INDEX: 34.16 KG/M2 | HEART RATE: 68 BPM | HEIGHT: 65 IN | SYSTOLIC BLOOD PRESSURE: 143 MMHG | RESPIRATION RATE: 18 BRPM | OXYGEN SATURATION: 94 % | WEIGHT: 205 LBS

## 2020-02-20 DIAGNOSIS — Z11.4 ENCOUNTER FOR SCREENING FOR HUMAN IMMUNODEFICIENCY VIRUS (HIV): ICD-10-CM

## 2020-02-20 DIAGNOSIS — B18.2 CHRONIC HEPATITIS C WITHOUT HEPATIC COMA: ICD-10-CM

## 2020-02-20 DIAGNOSIS — B17.10 ACUTE HEPATITIS C VIRUS INFECTION WITHOUT HEPATIC COMA: ICD-10-CM

## 2020-02-20 DIAGNOSIS — B18.2 CHRONIC HEPATITIS C WITHOUT HEPATIC COMA: Primary | ICD-10-CM

## 2020-02-20 LAB
ALBUMIN SERPL BCP-MCNC: 3 G/DL (ref 3.5–5.2)
ALP SERPL-CCNC: 140 U/L (ref 55–135)
ALT SERPL W/O P-5'-P-CCNC: 65 U/L (ref 10–44)
ANION GAP SERPL CALC-SCNC: 8 MMOL/L (ref 8–16)
AST SERPL-CCNC: 103 U/L (ref 10–40)
BASOPHILS # BLD AUTO: 0.06 K/UL (ref 0–0.2)
BASOPHILS NFR BLD: 0.8 % (ref 0–1.9)
BILIRUB SERPL-MCNC: 0.4 MG/DL (ref 0.1–1)
BUN SERPL-MCNC: 9 MG/DL (ref 6–20)
CALCIUM SERPL-MCNC: 8.7 MG/DL (ref 8.7–10.5)
CHLORIDE SERPL-SCNC: 100 MMOL/L (ref 95–110)
CO2 SERPL-SCNC: 27 MMOL/L (ref 23–29)
CREAT SERPL-MCNC: 0.8 MG/DL (ref 0.5–1.4)
DIFFERENTIAL METHOD: ABNORMAL
EOSINOPHIL # BLD AUTO: 0.7 K/UL (ref 0–0.5)
EOSINOPHIL NFR BLD: 9.1 % (ref 0–8)
ERYTHROCYTE [DISTWIDTH] IN BLOOD BY AUTOMATED COUNT: 13.4 % (ref 11.5–14.5)
EST. GFR  (AFRICAN AMERICAN): >60 ML/MIN/1.73 M^2
EST. GFR  (NON AFRICAN AMERICAN): >60 ML/MIN/1.73 M^2
GLUCOSE SERPL-MCNC: 93 MG/DL (ref 70–110)
HBV CORE AB SERPL QL IA: NEGATIVE
HBV SURFACE AB SER-ACNC: POSITIVE M[IU]/ML
HBV SURFACE AG SERPL QL IA: NEGATIVE
HCT VFR BLD AUTO: 42.6 % (ref 37–48.5)
HEPATITIS A ANTIBODY, IGG: POSITIVE
HGB BLD-MCNC: 14.2 G/DL (ref 12–16)
HIV 1+2 AB+HIV1 P24 AG SERPL QL IA: NEGATIVE
IMM GRANULOCYTES # BLD AUTO: 0.01 K/UL (ref 0–0.04)
IMM GRANULOCYTES NFR BLD AUTO: 0.1 % (ref 0–0.5)
INR PPP: 1.1 (ref 0.8–1.2)
LYMPHOCYTES # BLD AUTO: 2.6 K/UL (ref 1–4.8)
LYMPHOCYTES NFR BLD: 35.5 % (ref 18–48)
MCH RBC QN AUTO: 32.9 PG (ref 27–31)
MCHC RBC AUTO-ENTMCNC: 33.3 G/DL (ref 32–36)
MCV RBC AUTO: 99 FL (ref 82–98)
MONOCYTES # BLD AUTO: 0.7 K/UL (ref 0.3–1)
MONOCYTES NFR BLD: 9.7 % (ref 4–15)
NEUTROPHILS # BLD AUTO: 3.3 K/UL (ref 1.8–7.7)
NEUTROPHILS NFR BLD: 44.8 % (ref 38–73)
NRBC BLD-RTO: 0 /100 WBC
PLATELET # BLD AUTO: 215 K/UL (ref 150–350)
PMV BLD AUTO: 11.2 FL (ref 9.2–12.9)
POTASSIUM SERPL-SCNC: 4.2 MMOL/L (ref 3.5–5.1)
PROT SERPL-MCNC: 8 G/DL (ref 6–8.4)
PROTHROMBIN TIME: 10.9 SEC (ref 9–12.5)
RBC # BLD AUTO: 4.31 M/UL (ref 4–5.4)
SODIUM SERPL-SCNC: 135 MMOL/L (ref 136–145)
WBC # BLD AUTO: 7.39 K/UL (ref 3.9–12.7)

## 2020-02-20 PROCEDURE — 99499 RISK ADDL DX/OHS AUDIT: ICD-10-PCS | Mod: HCNC,S$GLB,, | Performed by: PHYSICIAN ASSISTANT

## 2020-02-20 PROCEDURE — 99999 PR PBB SHADOW E&M-EST. PATIENT-LVL IV: ICD-10-PCS | Mod: PBBFAC,HCNC,, | Performed by: PHYSICIAN ASSISTANT

## 2020-02-20 PROCEDURE — 99499 UNLISTED E&M SERVICE: CPT | Mod: HCNC,S$GLB,, | Performed by: PHYSICIAN ASSISTANT

## 2020-02-20 PROCEDURE — 87902 NFCT AGT GNTYP ALYS HEP C: CPT | Mod: HCNC

## 2020-02-20 PROCEDURE — 86706 HEP B SURFACE ANTIBODY: CPT | Mod: HCNC

## 2020-02-20 PROCEDURE — 85610 PROTHROMBIN TIME: CPT | Mod: HCNC

## 2020-02-20 PROCEDURE — 86704 HEP B CORE ANTIBODY TOTAL: CPT | Mod: HCNC

## 2020-02-20 PROCEDURE — 99499 RISK ADDL DX/OHS AUDIT: ICD-10-PCS | Mod: HCNC,,, | Performed by: PHYSICIAN ASSISTANT

## 2020-02-20 PROCEDURE — 85025 COMPLETE CBC W/AUTO DIFF WBC: CPT | Mod: HCNC

## 2020-02-20 PROCEDURE — 86703 HIV-1/HIV-2 1 RESULT ANTBDY: CPT | Mod: HCNC

## 2020-02-20 PROCEDURE — 99499 UNLISTED E&M SERVICE: CPT | Mod: HCNC,,, | Performed by: PHYSICIAN ASSISTANT

## 2020-02-20 PROCEDURE — 87340 HEPATITIS B SURFACE AG IA: CPT | Mod: HCNC

## 2020-02-20 PROCEDURE — 86790 VIRUS ANTIBODY NOS: CPT | Mod: HCNC

## 2020-02-20 PROCEDURE — 99999 PR PBB SHADOW E&M-EST. PATIENT-LVL IV: CPT | Mod: PBBFAC,HCNC,, | Performed by: PHYSICIAN ASSISTANT

## 2020-02-20 PROCEDURE — 99214 OFFICE O/P EST MOD 30 MIN: CPT | Mod: HCNC,S$GLB,, | Performed by: PHYSICIAN ASSISTANT

## 2020-02-20 PROCEDURE — 81596 NFCT DS CHRNC HCV 6 ASSAYS: CPT | Mod: HCNC

## 2020-02-20 PROCEDURE — 3008F PR BODY MASS INDEX (BMI) DOCUMENTED: ICD-10-PCS | Mod: HCNC,CPTII,S$GLB, | Performed by: PHYSICIAN ASSISTANT

## 2020-02-20 PROCEDURE — 99214 PR OFFICE/OUTPT VISIT, EST, LEVL IV, 30-39 MIN: ICD-10-PCS | Mod: HCNC,S$GLB,, | Performed by: PHYSICIAN ASSISTANT

## 2020-02-20 PROCEDURE — 80321 ALCOHOLS BIOMARKERS 1OR 2: CPT | Mod: HCNC

## 2020-02-20 PROCEDURE — 80053 COMPREHEN METABOLIC PANEL: CPT | Mod: HCNC

## 2020-02-20 PROCEDURE — 36415 COLL VENOUS BLD VENIPUNCTURE: CPT | Mod: HCNC

## 2020-02-20 PROCEDURE — 3008F BODY MASS INDEX DOCD: CPT | Mod: HCNC,CPTII,S$GLB, | Performed by: PHYSICIAN ASSISTANT

## 2020-02-20 NOTE — LETTER
February 20, 2020      Ángel Lozano, DO  [address]  [place] [address]           [facility] - Hepatitis C  [address]  [facility] [address]  Phone: [phone]  Fax: [phone]          Patient: Mary Sanches   MR Number: [id number]   YOB: 1970   Date of Visit: 2/20/2020       Dear Dr. Ángel Lozano:    Thank you for referring Mary Sanches to me for evaluation. Attached you will find relevant portions of my assessment and plan of care.    If you have questions, please do not hesitate to call me. I look forward to following Mary Sanches along with you.    Sincerely,    Jennifer B. Scheuermann, PA    Enclosure  CC:  No Recipients    If you would like to receive this communication electronically, please contact [email or web address] or [phone] to request more information on [employer] access.    For providers and/or their staff who would like to refer a patient to [facility], please contact us through our one-stop-shop provider referral line, [facility], at [phone].    If you feel you have received this communication in error or would no longer like to receive these types of communications, please e-mail [email or web address]

## 2020-02-20 NOTE — PROGRESS NOTES
HEPATOLOGY CLINIC VISIT NOTE - HCV clinic    REFERRING PROVIDER: Ángel Lozano DO     CHIEF COMPLAINT: Hepatitis C   (here w/ )     HISTORY: This is a 49 y.o. White female with chronic hepatitis C, here for further eval / mngmt.     HCV history:  Recently diagnosed while inpt 2019  Prior icteric illnesses: None    Risks for HCV:  Blood transfusion - none    IVDA - none    Intranasal drug use - none    Tattoos - first one age 14    Prior incarceration - none  - Treatment naive  - Genotype ?  - HCV RNA <1.7 million IU/mL - 2019      Liver staging:  No formal liver staging  No liver imaging    Labs reveal well preserved liver function w/ no obvious evidence of advanced fibrosis however recently AST>ALT      Feels okay  Denies jaundice, dark urine, abdominal distention, hematemesis, melena, slowed mentation.   No abnormal skin rashes. No generalized joint pain.                     Past Medical History:   Diagnosis Date    Anxiety     Chronic back pain     Depression     Hallucination     History of psychiatric hospitalization     Hx of psychiatric care     Migraines     Neuropathy     Psychiatric exam requested by authority     Psychiatric problem     Psychosis     Sacroiliitis     Schizoaffective disorder     Scoliosis     Sleep difficulties     Therapy     Tobacco use        Past Surgical History:   Procedure Laterality Date    HAND SURGERY Left     HYSTERECTOMY         FAMILY HISTORY:   Paternal grandmother - cirrhosis,     SOCIAL HISTORY:     Social History     Tobacco Use   Smoking Status Current Some Day Smoker    Packs/day: 0.25    Years: 0.50    Pack years: 0.12   Smokeless Tobacco Never Used   Tobacco Comment    ev 3 days has 2 cigarettes.      Alcohol - denies  Drugs - denies      ROS:   No fever, chills, weight loss, fatigue  No chest pain, palpitations, dyspnea, cough  No abdominal pain, change in bowel pattern, nausea, vomiting, GERD  No dysuria,  hematuria   No skin rashes   No headaches  (+) intermittent lower extremity edema  No depression or anxiety      PHYSICAL EXAM:  Friendly White female, in no acute distress; alert and oriented to person, place and time  VITALS: reviewed  HEENT: Sclerae anicteric.   NECK: Supple  CVS: Regular rate and rhythm. No murmurs  LUNGS: Normal respiratory effort. Clear bilaterally  ABDOMEN: protuberant but soft, nontender. No organomegaly or masses. No ascites or hernias. Good bowel sounds.    SKIN: Warm and dry. No jaundice, No obvious rashes.   EXTREMITIES: No lower extremity edema  NEURO/PSYCH: Normal gate. Memory intact. Thought and speech pattern appropriate. Behavior normal. No depression or anxiety noted.    RECENT LABS:  Lab Results   Component Value Date    WBC 10.91 12/27/2019    HGB 13.4 12/27/2019     12/27/2019     No results found for: INR  Lab Results   Component Value Date    AST 57 (H) 12/27/2019    ALT 37 12/27/2019    BILITOT 0.8 12/27/2019    ALBUMIN 4.1 12/27/2019    ALKPHOS 123 12/27/2019    CREATININE 0.74 12/27/2019    BUN 14 12/27/2019     12/27/2019    K 3.5 12/27/2019       RECENT IMAGING:  None      ASSESSMENT:  49 y.o. White female with:  1. CHRONIC HEPATITIS C, GENOTYPE ? - treatment naive  -- Elevated transaminases  -- Unknown Immunity to HAV & HBV      EDUCATION:  The natural history of Hepatitis C, including potential progression to cirrhosis was reviewed. We discussed the increased progression of liver disease secondary to alcohol use; patient was advised to avoid alcohol completely.     Transmission of Hepatitis C was reviewed, including possible sexual transmission. Sexual contacts should be screened. Patient should avoid sharing personal products such as razors, toothbrushes, etc.     Recommend avoiding raw seafood.  Limit acetaminophen to 2000mg daily.        PLAN:  1. Labs today  2. FibroScan, U/S and f/u visit on same day in few weeks. Goal of antiviral therapy  Orders  Placed This Encounter   Procedures    FibroScan (Vibration Controlled Transient Elastography)    US Abdomen Complete    HIV 1/2 Ag/Ab (4th Gen)    Hepatitis B surface antibody    Hepatitis B core antibody, total    Hepatitis A antibody, IgG    Hepatitis B Surface Antigen    CBC auto differential    Comprehensive metabolic panel    Protime-INR    Phosphatidylethanol (PETH)    Hepatitis C Viral RNA Genotype, LIPA    HCV FibroSURE

## 2020-02-24 LAB
A2 MACROGLOB SERPL-MCNC: 394 MG/DL (ref 100–280)
ALT SERPL W P-5'-P-CCNC: 66 U/L (ref 7–45)
APO A-I SERPL-MCNC: 116 MG/DL
BILIRUB SERPL-MCNC: 0.3 MG/DL
BIOPREDICTIVE SERIAL NUMBER: ABNORMAL
FIBROSIS STAGE SERPL QL: ABNORMAL
FIBROTEST INTERPRETATION: ABNORMAL
FIBROTEST-ACTITEST COMMENT: ABNORMAL
GGT SERPL-CCNC: 301 U/L (ref 5–36)
HAPTOGLOB SERPL-MCNC: 85 MG/DL (ref 30–200)
HCV GENTYP SERPL NAA+PROBE: NORMAL
LIVER FIBR SCORE SERPL CALC.FIBROSURE: 0.74
NECROINFLAMMAT INTERP: ABNORMAL
NECROINFLAMMATORY ACT GRADE SERPL QL: ABNORMAL
NECROINFLAMMATORY ACT SCORE SERPL: 0.56

## 2020-02-26 LAB — PHOSPHATIDYLETHANOL (PETH): NEGATIVE NG/ML

## 2020-03-10 DIAGNOSIS — M54.42 CHRONIC MIDLINE LOW BACK PAIN WITH LEFT-SIDED SCIATICA: ICD-10-CM

## 2020-03-10 DIAGNOSIS — G89.29 CHRONIC MIDLINE LOW BACK PAIN WITH LEFT-SIDED SCIATICA: ICD-10-CM

## 2020-03-10 RX ORDER — TRAMADOL HYDROCHLORIDE 50 MG/1
50 TABLET ORAL 3 TIMES DAILY PRN
Qty: 90 TABLET | Refills: 1 | Status: SHIPPED | OUTPATIENT
Start: 2020-03-10 | End: 2020-05-12 | Stop reason: SDUPTHER

## 2020-03-10 NOTE — TELEPHONE ENCOUNTER
Last Rx refill-----02/17/20  Last office visit--12/17/19  Next office visit--04/30/20      ----- Message from Goldie Wade sent at 3/10/2020 10:28 AM CDT -----  Contact: Patient   Patient is in pain and not get out of the bed.  Patient needs a refill on her medication. ( traMADol (ULTRAM) 50 mg tablet.  Please send medication to [facility]  - [phone]     Partial Purse String (Intermediate) Text: Given the location of the defect and the characteristics of the surrounding skin an intermediate purse string closure was deemed most appropriate.  Undermining was performed circumferentially around the surgical defect.  A purse string suture was then placed and tightened. Wound tension of the circular defect prevented complete closure of the wound.

## 2020-03-17 ENCOUNTER — TELEPHONE (OUTPATIENT)
Dept: HEPATOLOGY | Facility: CLINIC | Age: 50
End: 2020-03-17

## 2020-03-17 DIAGNOSIS — B18.2 CHRONIC HEPATITIS C WITHOUT HEPATIC COMA: Primary | ICD-10-CM

## 2020-03-17 NOTE — TELEPHONE ENCOUNTER
I spoke with patient.  She asked that all appts be scheduled on one day.  Appts moved to 4/20; reminder notice mailed.

## 2020-03-17 NOTE — TELEPHONE ENCOUNTER
Scheduled for HCV f/u visit this Fri 3/20/20    pls call pt:  1. Her initial testing suggests that she has a lot of scarring in her liver, but her liver is working well  2. She definitely needs U/S. She should keep this appt (or if she wants to r/s I recommended she not put it off a long time)  3. Needs AFP, pls schedule  4. If at all possible, would like to see if she can get [facility] so we can do video visit on a smart phone or iPad. If this happens, just cancel FibroScan for now.  5. If not possible to do video visit, will see her in clinic and keep fibroscan appt. This can be Friday. Or if she wants to postpone some that is fine.

## 2020-03-24 RX ORDER — BUTALBITAL, ACETAMINOPHEN AND CAFFEINE 50; 325; 40 MG/1; MG/1; MG/1
TABLET ORAL
Qty: 30 TABLET | Refills: 0 | Status: SHIPPED | OUTPATIENT
Start: 2020-03-24 | End: 2020-05-12

## 2020-04-03 ENCOUNTER — TELEPHONE (OUTPATIENT)
Dept: HEPATOLOGY | Facility: CLINIC | Age: 50
End: 2020-04-03

## 2020-04-03 ENCOUNTER — TELEPHONE (OUTPATIENT)
Dept: RADIOLOGY | Facility: HOSPITAL | Age: 50
End: 2020-04-03

## 2020-04-03 NOTE — TELEPHONE ENCOUNTER
----- Message from Jennifer B. Scheuermann, PA sent at 4/3/2020  1:24 PM CDT -----  Contact: Blanca Wood to r/s for 5/2020  Need to push back visit, fibroscan also - need all results to discuss next step    thanks    ----- Message -----  From: Chapincito Cain RT  Sent: 4/3/2020   1:14 PM CDT  To: Jennifer B. Scheuermann, PA    The ultrasound exam for Ms. Mary Sanches has been rescheduled at the request of Radiology leadership due to CV-19 event to 5/11/20.     If this is acceptable for this patient's care plan, no response is needed. If not, and the exam is deemed emergent, please call the Imaging Center at extension [phone] and we will reschedule.

## 2020-04-17 ENCOUNTER — TELEPHONE (OUTPATIENT)
Dept: HEPATOLOGY | Facility: CLINIC | Age: 50
End: 2020-04-17

## 2020-04-17 NOTE — TELEPHONE ENCOUNTER
----- Message from Mary Mendez MA sent at 4/17/2020  9:02 AM CDT -----  Contact: Pt   I don't see any calls from us. Did you call her about Hep C medication?  ----- Message -----  From: Stephie Caal  Sent: 4/16/2020   7:26 PM CDT  To: Scheuermann Jennifer B Staff    Pt is requesting a call back regarding Medication for hepatitis C   Pt stated she received a call stating she could  medication and wanted to know from where     Pt can be reached at [phone]

## 2020-04-29 ENCOUNTER — OFFICE VISIT (OUTPATIENT)
Dept: PHYSICAL MEDICINE AND REHAB | Facility: CLINIC | Age: 50
End: 2020-04-29
Payer: MEDICARE

## 2020-04-29 DIAGNOSIS — M47.26 OSTEOARTHRITIS OF SPINE WITH RADICULOPATHY, LUMBAR REGION: ICD-10-CM

## 2020-04-29 DIAGNOSIS — M54.2 CHRONIC NECK PAIN: ICD-10-CM

## 2020-04-29 DIAGNOSIS — G89.29 CHRONIC MIDLINE LOW BACK PAIN WITH LEFT-SIDED SCIATICA: Primary | ICD-10-CM

## 2020-04-29 DIAGNOSIS — G89.29 CHRONIC NECK PAIN: ICD-10-CM

## 2020-04-29 DIAGNOSIS — E66.9 OBESITY (BMI 30.0-34.9): ICD-10-CM

## 2020-04-29 DIAGNOSIS — M54.42 CHRONIC MIDLINE LOW BACK PAIN WITH LEFT-SIDED SCIATICA: Primary | ICD-10-CM

## 2020-04-29 DIAGNOSIS — M51.36 DDD (DEGENERATIVE DISC DISEASE), LUMBAR: ICD-10-CM

## 2020-04-29 PROCEDURE — 99442 PR PHYSICIAN TELEPHONE EVALUATION 11-20 MIN: ICD-10-PCS | Mod: 95,HCNC,, | Performed by: PHYSICAL MEDICINE & REHABILITATION

## 2020-04-29 PROCEDURE — 99442 PR PHYSICIAN TELEPHONE EVALUATION 11-20 MIN: CPT | Mod: 95,HCNC,, | Performed by: PHYSICAL MEDICINE & REHABILITATION

## 2020-04-29 NOTE — PROGRESS NOTES
Subjective:       Patient ID: Mary Sanches is a 50 y.o. female.    Chief Complaint: No chief complaint on file.    HPI     A telemedicine Audio Visit is being done today (due to the COVID-19 restrictions).     The patient location is: home  The chief complaint leading to consultation is: back pain, neck pain.  Visit type: Virtual visit with synchronous audio  Total time spent with patient: 20 min  Each patient to whom he or she provides medical services by telemedicine is:  (1) informed of the relationship between the physician and patient and the respective role of any other health care provider with respect to management of the patient; and (2) notified that he or she may decline to receive medical services by telemedicine and may withdraw from such care at any time.  This service was not originating from a related E/M service provided within the previous 7 days nor will  to an E/M service or procedure within the next 24 hours or my soonest available appointment.  Prevailing standard of care was able to be met in this audio-only visit.        HISTORY OF PRESENT ILLNESS:  Ms. Sanches is a 50-year-old white female with osteoarthritis who is followed up in the Physical Medicine Clinic for chronic low back pain with lumbar radiculopathy and chronic neck pain.  Her last visit to the clinic was on 12/17/19.  She was maintained on  Venlafaxine and p.r.n. tramadol.  Referral for Epidural Steroid Injection was discussed with her but she was not interested.     The patient's back pain has been stable.  It is an intermittent aching and tightness pain in the lumbar spine and across her back.  She has occasional shooting pain from the left  foot with burning.  Her maximum pain is 6-7/10 and minimum 4/10.  Today, it is 6-7/10.  The patient has mild left lower extremity weakness.  She has occasional urinary urgency. She denies any bowel incontinence.      Her neck pain has been stable.  It is an intermittent aching  pain in the cervical spine. She denies any radiation to her arms.  Her maximum pain is 4/10 and minimum 1-2/10; today it is 4/10. She denies any upper extremity weakness and numbness.    The patient is taking  tramadol 50 mg tablets, 1 tablets 3 times per day, but  occasionally 4 times.  She is not sure but apparently is not taking venlafaxine.      Past Medical History:   Diagnosis Date    Anxiety     Chronic back pain     Depression     Hallucination     History of psychiatric hospitalization     Hx of psychiatric care     Migraines     Neuropathy     Psychiatric exam requested by authority     Psychiatric problem     Psychosis     Sacroiliitis     Schizoaffective disorder     Scoliosis     Sleep difficulties     Therapy     Tobacco use            Review of Systems   Constitutional: Positive for fatigue.   Eyes: Negative for visual disturbance.   Respiratory: Positive for shortness of breath.    Cardiovascular: Negative for chest pain.   Gastrointestinal: Negative for constipation, nausea and vomiting.   Genitourinary: Negative for difficulty urinating.   Musculoskeletal: Positive for back pain, gait problem and neck pain. Negative for arthralgias and joint swelling.   Neurological: Positive for dizziness and headaches.   Psychiatric/Behavioral: Positive for sleep disturbance. Negative for behavioral problems.       Objective:      Physical Exam      BUE:  The patient reports functional range of motion and good strength except mild weakness on left .    BLE:  The patient reports functional range of motion and good strength .      Assessment:       1. Chronic midline low back pain with left-sided sciatica    2. Osteoarthritis of spine with radiculopathy, lumbar region    3. Chronic neck pain    4. DDD (degenerative disc disease), lumbar    5. Obesity (BMI 30.0-34.9)        Plan:       - Continue traMADol (ULTRAM) 50 mg tablet; Take 1 tablet (50 mg total) by mouth 3 (three) times daily as needed.  -  Regular home exercise program was encouraged.  - Weight loss was encouraged.  - Follow up in about 4 months (around 8/29/2020).       This note was partly generated with [email or web address] voice recognition software. I apologize for any possible typographical errors.

## 2020-05-11 ENCOUNTER — OFFICE VISIT (OUTPATIENT)
Dept: HEPATOLOGY | Facility: CLINIC | Age: 50
End: 2020-05-11
Payer: MEDICARE

## 2020-05-11 ENCOUNTER — TELEPHONE (OUTPATIENT)
Dept: PHARMACY | Facility: CLINIC | Age: 50
End: 2020-05-11

## 2020-05-11 ENCOUNTER — HOSPITAL ENCOUNTER (OUTPATIENT)
Dept: RADIOLOGY | Facility: HOSPITAL | Age: 50
Discharge: HOME | End: 2020-05-11
Attending: PHYSICIAN ASSISTANT
Payer: MEDICARE

## 2020-05-11 DIAGNOSIS — R74.8 ABNORMAL TRANSAMINASES: ICD-10-CM

## 2020-05-11 DIAGNOSIS — B18.2 CHRONIC HEPATITIS C WITHOUT HEPATIC COMA: ICD-10-CM

## 2020-05-11 DIAGNOSIS — B18.2 CHRONIC HEPATITIS C WITHOUT HEPATIC COMA: Primary | ICD-10-CM

## 2020-05-11 DIAGNOSIS — K74.00 LIVER FIBROSIS: ICD-10-CM

## 2020-05-11 PROCEDURE — 99442 PR PHYSICIAN TELEPHONE EVALUATION 11-20 MIN: CPT | Mod: HCNC,95,, | Performed by: PHYSICIAN ASSISTANT

## 2020-05-11 PROCEDURE — 76700 US ABDOMEN COMPLETE: ICD-10-PCS | Mod: 26,HCNC,, | Performed by: RADIOLOGY

## 2020-05-11 PROCEDURE — 76700 US EXAM ABDOM COMPLETE: CPT | Mod: 26,HCNC,, | Performed by: RADIOLOGY

## 2020-05-11 PROCEDURE — 99442 PR PHYSICIAN TELEPHONE EVALUATION 11-20 MIN: ICD-10-PCS | Mod: HCNC,95,, | Performed by: PHYSICIAN ASSISTANT

## 2020-05-11 PROCEDURE — 76700 US EXAM ABDOM COMPLETE: CPT | Mod: TC,HCNC

## 2020-05-11 RX ORDER — VELPATASVIR AND SOFOSBUVIR 100; 400 MG/1; MG/1
1 TABLET, FILM COATED ORAL DAILY
Qty: 28 TABLET | Refills: 2 | Status: SHIPPED | OUTPATIENT
Start: 2020-05-11 | End: 2020-11-19 | Stop reason: ALTCHOICE

## 2020-05-11 NOTE — TELEPHONE ENCOUNTER
DOCUMENTATION ONLY:  Prior authorization for Epclusa approved from 5/11/20 to 8/3/20    Co-pay: $3.64    Patient Assistance is not required

## 2020-05-11 NOTE — PROGRESS NOTES
Established Patient - Audio Only Telehealth Visit  The patient location is: [place]  Visit type: Virtual visit with audio only (telephone)  Total time spent with patient: 12 minutes     The reason for the audio only service rather than synchronous audio and video virtual visit was related to technical difficulties or patient preference/necessity.     Each patient to whom I provide medical services by telemedicine is:  (1) informed of the relationship between the physician and patient and the respective role of any other health care provider with respect to management of the patient; and (2) notified that they may decline to receive medical services by telemedicine and may withdraw from such care at any time. Patient verbally consented to receive this service via voice-only telephone call.    CHIEF COMPLAINT: Hepatitis C      HISTORY: This is a 50 y.o. White female with chronic hepatitis C.    Following up today w/ additional labs, imaging, liver staging.   (+) immunity to HAV & HBV   HIV screen neg   Peth negative    Evidence of advanced fibrosis, noted below.    Feels well  Motivated to have HCV treated  Denies jaundice, dark urine, hematemesis, melena, slowed mentation, abdominal distention.        HCV history:  Recently diagnosed while inpt 12/2019  Prior icteric illnesses: None  Risks for HCV:  Blood transfusion - none                            IVDA - none                            Intranasal drug use - none                            Tattoos - first one age 14                            Prior incarceration - none  - Treatment naive  - Genotype 2  - HCV RNA <1.7 million IU/mL - 12/2019        Liver staging:  HCV FibroSURE 2/2020 - A2, F3-4  Labs 2/20/20  FIB-4 -- 3.03; Indeterminate   APRI -- 1.20; Significant fibrosis more likely, Cirrhosis indeterminate  Labs reveal mild low albumin and AST>ALT, seeming to support advanced fibrosis    Liver function is well-preserved, no evidence of portal HTN  No  HE, jaundice, ascites  Normal plt  Albumin low 3.0    MELD-Na score: 7 at 2/20/2020  8:28 AM  MELD score: 7 at 2/20/2020  8:28 AM  Calculated from:  Serum Creatinine: 0.8 mg/dL (Rounded to 1 mg/dL) at 2/20/2020  8:28 AM  Serum Sodium: 135 mmol/L at 2/20/2020  8:28 AM  Total Bilirubin: 0.4 mg/dL (Rounded to 1 mg/dL) at 2/20/2020  8:28 AM  INR(ratio): 1.1 at 2/20/2020  8:28 AM  Age: 49 years    Cirrhosis maintenance:  - HCC screening - U/S 5/2020 w/ no liver lesions, needs AFP  - Varices screening - not indicated  - HAV immunity - (+) HAVAB 2/2020  - HBV immunity -  (+) immunity (prior vaccine)      PMH, PSH, FAMILY HX, SOCIAL HX - reviewed in EPIC    ROS:   No fever, chills, weight loss, fatigue  No chest pain, palpitations, dyspnea, cough  No abdominal pain, nausea, vomiting, (+) rare GERD - TUMS every few months  No skin rashes   No headaches  (+) intermittent lower extremity edema  No depression or anxiety      PHYSICAL EXAM:  largely deferred due to audio visit  Friendly White female, in no acute distress; alert and oriented to person, place and time  NEURO/PSYCH: Memory intact. Thought and speech pattern appropriate. Behavior normal. No depression or anxiety noted.    LABS:  Lab Results   Component Value Date    WBC 7.39 02/20/2020    HGB 14.2 02/20/2020     02/20/2020    INR 1.1 02/20/2020     Lab Results   Component Value Date     (L) 02/20/2020    K 4.2 02/20/2020    BUN 9 02/20/2020    CREATININE 0.8 02/20/2020     (H) 02/20/2020    ALT 65 (H) 02/20/2020    ALKPHOS 140 (H) 02/20/2020    BILITOT 0.4 02/20/2020    ALBUMIN 3.0 (L) 02/20/2020       IMAGING:  Results for orders placed during the hospital encounter of 05/11/20   US Abdomen Complete    Narrative EXAMINATION:  US ABDOMEN COMPLETE    CLINICAL HISTORY:  Chronic viral hepatitis C    TECHNIQUE:  Complete abdominal ultrasound (including pancreas, aorta, liver, gallbladder, common bile duct, IVC, kidneys, and spleen) was  performed.    COMPARISON:  None    FINDINGS:  Pancreas: The visualized portions of pancreas appear normal.    Aorta: No aneurysm.    Liver: 15.5 cm, normal in size. Homogeneous parenchymal echotexture. No focal lesions.  The HRI is 1.3 (normal).    Gallbladder: No calculi, wall thickening, or pericholecystic fluid.  Negative sonographic Valentine's sign.    Biliary system: 4 mm common bile duct.  No intrahepatic ductal dilatation.    Inferior vena cava: Normal in appearance.    Right kidney: 11.1 cm. No hydronephrosis.    Left kidney: 9.5 cm. No hydronephrosis.    Spleen: 7.2 x 3.0 cm.  Normal in size with homogeneous echotexture.    Miscellaneous: No ascites.      Impression No sonographic abnormality.    Electronically signed by resident: Loni Alfonso  Date:    05/11/2020  Time:    08:00    Electronically signed by: Wild Ho MD  Date:    05/11/2020  Time:    08:05       ASSESSMENT:  50 y.o. White female with:  1. CHRONIC HEPATITIS C, GENOTYPE 2 - treatment naive  -- Elevated transaminases  -- (+) Immunity to HAV & HBV  2. ADVANCED FIBROSIS / EARLY CIRRHOSIS  -- FibroSURE - F3-4  -- HCC screening - U/S 5/2020 okay, needs AFP      EDUCATION:  ADVANCED FIBROSIS / CIRRHOSIS:  -- Discussed the basics about liver fibrosis /scarring and how that relates to liver function.   -- Signs and symptoms of hepatic decompensation were reviewed, including jaundice, ascites, and slowed mentation due to hepatic encephalopathy. The patient should seek medical attention if any of these things occur.   --  We discussed the potential for bleeding from esophageal varices with symptoms of hematemesis and melena. The patient should report to the Emergency Department for these symptoms.   -- We discussed the increased risk of hepatocellular carcinoma due to cirrhosis. Lifelong screening every six months with ultrasound and AFP is recommended.     -- Tylenol (acetaminophen) is okay up to 2,000mg daily  -- Avoid NSAIDs     Dietary  recommendations:  -- Avoid alcohol  -- Avoid raw seafood      HCV RX  Discussed goal of HCV eradication to prevent progression of liver disease.  Discussed use of Epclusa daily x 12 weeks w/ potential side effects of fatigue and headache.     Reviewed limitations on acid suppressant medications due to DDI w/ Epclusa:  -- Antacids - TUMS prn, must be  from Epclusa by 4 hours  -- H2 Receptor Antagonist - Pt not taking  -- PPI - not recommended while on Epclusa  Patient instructed to contact me if experiencing acid related symptoms so further recommendations can be made regarding acid suppression therapy.      Herbal / alternative therapies must be discontinued  Discussed importance of medication adherence and risk of treatment failure / viral resistance if not adherent. Pt has verbalized understanding.      PLAN:  1. Obtain authorization to treat HCV with Epclusa x 12 weeks  -- Rx will be routed to [facility]  -- Patient will notify me of exact treatment start date so appropriate lab f/u can be scheduled.  2. AFP w/ next labs        This service was not originating from a related E/M service provided within the previous 7 days nor will  to an E/M service or procedure within the next 24 hours or my soonest available appointment.  Prevailing standard of care was able to be met in this audio-only visit.

## 2020-05-12 ENCOUNTER — TELEPHONE (OUTPATIENT)
Dept: PHARMACY | Facility: CLINIC | Age: 50
End: 2020-05-12

## 2020-05-12 DIAGNOSIS — M54.42 CHRONIC MIDLINE LOW BACK PAIN WITH LEFT-SIDED SCIATICA: ICD-10-CM

## 2020-05-12 DIAGNOSIS — G89.29 CHRONIC MIDLINE LOW BACK PAIN WITH LEFT-SIDED SCIATICA: ICD-10-CM

## 2020-05-12 RX ORDER — BUTALBITAL, ACETAMINOPHEN AND CAFFEINE 50; 325; 40 MG/1; MG/1; MG/1
TABLET ORAL
Qty: 30 TABLET | Refills: 0 | Status: SHIPPED | OUTPATIENT
Start: 2020-05-12 | End: 2020-05-27

## 2020-05-12 RX ORDER — TRAMADOL HYDROCHLORIDE 50 MG/1
50 TABLET ORAL 3 TIMES DAILY PRN
Qty: 90 TABLET | Refills: 1 | Status: SHIPPED | OUTPATIENT
Start: 2020-05-12 | End: 2020-07-06 | Stop reason: SDUPTHER

## 2020-05-12 NOTE — TELEPHONE ENCOUNTER
Last Rx refill-----03/10/20  Last office visit--04/29/20  Next office visit--08/31/20        ----- Message from Gilmer Hooks sent at 5/12/2020 12:58 PM CDT -----  Contact: Patient   Rx Refill/Request     Is this a Refill or New Rx:  Yes   Rx Name and Strength:  traMADoL (ULTRAM) 50 mg tablet  Preferred Pharmacy with phone number:     [facility]/pharmacy #[id number] - [place] - [facility]  [place] [address]  Phone: [phone] Fax: [phone]

## 2020-05-12 NOTE — TELEPHONE ENCOUNTER
Initial Epclusa consult completed on . Epclusa will be shipped on  to arrive at patient's home on 5/15 via [employer]. $3.64 copay. Patient will start Epclusa on . Address confirmed, CC on file. Confirmed 2 patient identifiers - name and . Therapy Appropriate.     Epclusa 400/100mg- Take one tablet by mouth daily x 12 weeks  Counseling was reviewed:   1. Patient MUST take Epclusa at the SAME time every day.   2. Patient MUST avoid acid reducers without consulting with myself or provider first. Antacids are to be spaced out at least 4 hours apart from Epclusa.     3. Potential Side effects include: headaches and fatigue.   Headache: Patient may treat with OTC remedies. If Tylenol is used, dose should not exceed 2000mg per day.    4. Medication list reviewed. No DDIs or allergies noted. Patient MUST contact myself or provider prior to starting any new OTC, herbal, or prescription drugs to avoid potential DDIs.    DDI: Medication list reviewed and potential DDIs addressed.    Discussed the importance of staying well hydrated while on therapy. Compliance stressed - patient to take missed doses as soon as remembered, but NOT to take 2 doses in one day. Patient will report questions or concerns to myself or practitioner. Patient verbalizes understanding. Patient plans to start Epclusa on .  Consultation included: indication; goals of treatment; administration; storage and handling; side effects; how to handle side effects; the importance of compliance; how to handle missed doses; the importance of laboratory monitoring; the importance of keeping all follow up appointments.  Patient understands to report any medication changes to [facility] and provider. All questions answered and addressed to patients satisfaction. I will f/u with her in 1 week from start, [facility] to contact patient in 3 weeks for refills.

## 2020-05-14 ENCOUNTER — TELEPHONE (OUTPATIENT)
Dept: HEPATOLOGY | Facility: CLINIC | Age: 50
End: 2020-05-14

## 2020-05-14 DIAGNOSIS — B18.2 CHRONIC HEPATITIS C WITHOUT HEPATIC COMA: Primary | ICD-10-CM

## 2020-05-14 NOTE — TELEPHONE ENCOUNTER
Pt beginning 12 weeks of Epclusa on 5/16/20  Veronica 2, naive  F3-4    1.) 5/11/20 AFP 17  Please tell pt tumor marker blood test looked okay. It was slightly elevated but I think this is from the Hep C. We will monitor it while on Hep C treatment.     2.) Pls schedule:  - CMP, HCV RNA, AFP at week 4 - 6/12/20  - CMP, HCV RNA - SVR12 - 10/30/20

## 2020-05-15 DIAGNOSIS — Z12.11 COLON CANCER SCREENING: ICD-10-CM

## 2020-05-19 ENCOUNTER — EPISODE CHANGES (OUTPATIENT)
Dept: HEPATOLOGY | Facility: CLINIC | Age: 50
End: 2020-05-19

## 2020-05-19 NOTE — TELEPHONE ENCOUNTER
Attempted to contact pt by phone. No answer. VM left. Awaiting call back. PA Scheuermann msg relayed via mail. Pt appointments scheduled as instructed. Reminder notices mailed to pt.

## 2020-05-22 ENCOUNTER — TELEPHONE (OUTPATIENT)
Dept: PHARMACY | Facility: CLINIC | Age: 50
End: 2020-05-22

## 2020-05-22 NOTE — TELEPHONE ENCOUNTER
Initial touch base conducted for Epclusa - Name/ confirmed.  Confirmed patient started medication as instructed on .  Patient confirms that they are taking Epclusa every day at 5pm.  Patient denies skipping or missing doses.  Patient reports that after she takes her dose of Epclusa about 15 minutes later she is ready to sleep. The patient reports feeling like the medication is helping her sleep through the night. She states this is welcomed because she normally has difficulty sleeping at night.  Patient reports no new medications, otc remedies, or allergies. Patient reminded of lab appointments.  Patient counseled on importance of maintaining adherence and keeping lab appointments which were scheduled.  Advised to call [facility] and provider if any issues arise.  No questions or concerns. Aware [facility] will call for refills when patient has 7 days of medication on hand.

## 2020-05-24 ENCOUNTER — NURSE TRIAGE (OUTPATIENT)
Dept: ADMINISTRATIVE | Facility: CLINIC | Age: 50
End: 2020-05-24

## 2020-05-24 NOTE — TELEPHONE ENCOUNTER
"Pt waiting on delivery of Epclusa medication. Pt takes medication daily and has 16 pills left, pt just wants to make sure gets her new prescription in time. Pt takes medication daily at 1700. Pt reports abd distention has improved since taking medication and stop taking OTC meds.     Reason for Disposition   Caller has medication question only, adult not sick, and triager answers question    Additional Information   Negative: Drug overdose and nurse unable to answer question   Negative: Caller requesting information not related to medicine   Negative: Caller requesting a prescription for Strep throat and has a positive culture result   Negative: Rash while taking a medication or within 3 days of stopping it   Negative: Immunization reaction suspected   Negative: [1] Asthma AND [2] having symptoms of asthma (cough, wheezing, etc)   Negative: MORE THAN A DOUBLE DOSE of a prescription or over-the-counter (OTC) drug   Negative: [1] DOUBLE DOSE (an extra dose or lesser amount) of over-the-counter (OTC) drug AND [2] any symptoms (e.g., dizziness, nausea, pain, sleepiness)   Negative: [1] DOUBLE DOSE (an extra dose or lesser amount) of prescription drug AND [2] any symptoms (e.g., dizziness, nausea, pain, sleepiness)   Negative: Took another person's prescription drug   Negative: [1] DOUBLE DOSE (an extra dose or lesser amount) of prescription drug AND [2] NO symptoms (Exception: a double dose of antibiotics)   Negative: Diabetes drug error or overdose (e.g., insulin or extra dose)   Negative: [1] Request for URGENT new prescription or refill of "essential" medication (i.e., likelihood of harm to patient if not taken) AND [2] triager unable to fill per unit policy   Negative: [1] Prescription not at pharmacy AND [2] was prescribed today by PCP   Negative: Pharmacy calling with prescription questions and triager unable to answer question   Negative: Caller has urgent medication question about med that PCP " prescribed and triager unable to answer question   Negative: Caller has NON-URGENT medication question about med that PCP prescribed and triager unable to answer question   Negative: Caller requesting a NON-URGENT new prescription or refill and triager unable to refill per unit policy   Negative: Caller has medication question about med not prescribed by PCP and triager unable to answer question (e.g., compatibility with other med, storage)   Negative: [1] DOUBLE DOSE (an extra dose or lesser amount) of over-the-counter (OTC) drug AND [2] NO symptoms   Negative: [1] DOUBLE DOSE (an extra dose or lesser amount) of antibiotic drug AND [2] NO symptoms    Protocols used: MEDICATION QUESTION CALL-A-AH

## 2020-05-25 ENCOUNTER — EPISODE CHANGES (OUTPATIENT)
Dept: HEPATOLOGY | Facility: CLINIC | Age: 50
End: 2020-05-25

## 2020-05-27 RX ORDER — BUTALBITAL, ACETAMINOPHEN AND CAFFEINE 50; 325; 40 MG/1; MG/1; MG/1
TABLET ORAL
Qty: 30 TABLET | Refills: 0 | Status: SHIPPED | OUTPATIENT
Start: 2020-05-27 | End: 2020-06-09

## 2020-05-29 NOTE — TELEPHONE ENCOUNTER
[facility] contacted patient to return VM left this morning for Epclusa refill. Epclusa refill (2 of 3) RTS 6/2. Patient states she has 14 doses remaining and takes her doses every day at 4:00 pm daily. This dose count is correct based on start start date of 5/16. Patient stated she has been experiencing fatigue. Patient advised this is a common side effect of Epclusa. Patient advised to ensure staying well-hydrated and drinking plenty of water throughout the day. Patient verbalized understanding. Patient also stated that she experienced nausea for the first time since she's been on treatment yesterday. She also thinks the nausea could be stemming from her anxiety from watching the news. Patient states she took Phenergan to help with nausea. Patient advised there are no DDIs with Phenergan and Epclusa. Patient advised to try eating a meal before taking Epclusa dose to help with nausea and to maybe turn off the news for today if that is making her anxiety worse. Patient verbalized understanding. [facility] will f/u with side effects and refill in 1 week. Patient advised to call [facility]  if nausea gets worse or continues. Patient verbalized understanding.

## 2020-05-30 RX ORDER — VENLAFAXINE 37.5 MG/1
TABLET ORAL
Qty: 270 TABLET | Refills: 0 | Status: SHIPPED | OUTPATIENT
Start: 2020-05-30 | End: 2020-09-28 | Stop reason: SINTOL

## 2020-06-04 ENCOUNTER — TELEPHONE (OUTPATIENT)
Dept: PHARMACY | Facility: CLINIC | Age: 50
End: 2020-06-04

## 2020-06-04 NOTE — TELEPHONE ENCOUNTER
Epclusa refill (2 of 3) confirmed and reassessment complete. We will ship Epclusa refill on  via fedex to arrive on . $0.00 copay- 004. Confirmed 2 patient identifiers - name and . Therapy appropriate.     Patient has 7 doses of Epclusa remaining and takes it around 4:00 pm daily. Pt reports N/V has gotten significantly better and is much more tolerable. No missed doses, no new medications, no new allergies or health conditions reported at this time. Allergies reviewed and medication reconciliation complete (reviewed and documented in [facility]). Patient also stated she began taking Effexor again. Patient advised there are no DDIs with Epclusa and is appropriate to continue. Patient verbalized understanding. Disease education reviewed (including transmission and prevention). Patient counseled on importance of maintaining adherence and keeping lab appointments which were scheduled. All questions answered and addressed to patients satisfaction. Advised to call [facility] and provider if any issues arise. Pt verbalized understanding.

## 2020-06-11 ENCOUNTER — TELEPHONE (OUTPATIENT)
Dept: INTERNAL MEDICINE | Facility: CLINIC | Age: 50
End: 2020-06-11

## 2020-06-12 ENCOUNTER — LAB VISIT (OUTPATIENT)
Dept: LAB | Facility: HOSPITAL | Age: 50
End: 2020-06-12
Attending: PHYSICIAN ASSISTANT
Payer: MEDICARE

## 2020-06-12 DIAGNOSIS — B18.2 CHRONIC HEPATITIS C WITHOUT HEPATIC COMA: ICD-10-CM

## 2020-06-12 LAB
AFP SERPL-MCNC: 8.7 NG/ML (ref 0–8.4)
ALBUMIN SERPL BCP-MCNC: 3.2 G/DL (ref 3.5–5.2)
ALP SERPL-CCNC: 100 U/L (ref 55–135)
ALT SERPL W/O P-5'-P-CCNC: 62 U/L (ref 10–44)
ANION GAP SERPL CALC-SCNC: 8 MMOL/L (ref 8–16)
AST SERPL-CCNC: 55 U/L (ref 10–40)
BILIRUB SERPL-MCNC: 0.3 MG/DL (ref 0.1–1)
BUN SERPL-MCNC: 14 MG/DL (ref 6–20)
CALCIUM SERPL-MCNC: 9 MG/DL (ref 8.7–10.5)
CHLORIDE SERPL-SCNC: 105 MMOL/L (ref 95–110)
CO2 SERPL-SCNC: 25 MMOL/L (ref 23–29)
CREAT SERPL-MCNC: 0.9 MG/DL (ref 0.5–1.4)
EST. GFR  (AFRICAN AMERICAN): >60 ML/MIN/1.73 M^2
EST. GFR  (NON AFRICAN AMERICAN): >60 ML/MIN/1.73 M^2
GLUCOSE SERPL-MCNC: 193 MG/DL (ref 70–110)
POTASSIUM SERPL-SCNC: 4 MMOL/L (ref 3.5–5.1)
PROT SERPL-MCNC: 7.8 G/DL (ref 6–8.4)
SODIUM SERPL-SCNC: 138 MMOL/L (ref 136–145)

## 2020-06-12 PROCEDURE — 87522 HEPATITIS C REVRS TRNSCRPJ: CPT | Mod: HCNC

## 2020-06-12 PROCEDURE — 82105 ALPHA-FETOPROTEIN SERUM: CPT | Mod: HCNC

## 2020-06-12 PROCEDURE — 36415 COLL VENOUS BLD VENIPUNCTURE: CPT | Mod: HCNC,PO

## 2020-06-12 PROCEDURE — 80053 COMPREHEN METABOLIC PANEL: CPT | Mod: HCNC

## 2020-06-17 ENCOUNTER — TELEPHONE (OUTPATIENT)
Dept: HEPATOLOGY | Facility: CLINIC | Age: 50
End: 2020-06-17

## 2020-06-17 DIAGNOSIS — K74.60 HEPATIC CIRRHOSIS, UNSPECIFIED HEPATIC CIRRHOSIS TYPE, UNSPECIFIED WHETHER ASCITES PRESENT: Primary | ICD-10-CM

## 2020-06-17 LAB
HCV RNA SERPL NAA+PROBE-LOG IU: <1.08 LOG (10) IU/ML
HCV RNA SERPL QL NAA+PROBE: NOT DETECTED IU/ML
HCV RNA SPEC NAA+PROBE-ACNC: <12 IU/ML

## 2020-06-18 ENCOUNTER — EPISODE CHANGES (OUTPATIENT)
Dept: HEPATOLOGY | Facility: CLINIC | Age: 50
End: 2020-06-18

## 2020-06-18 ENCOUNTER — TELEPHONE (OUTPATIENT)
Dept: INTERNAL MEDICINE | Facility: CLINIC | Age: 50
End: 2020-06-18

## 2020-06-18 NOTE — TELEPHONE ENCOUNTER
I spoke with patient and msg from PA Scheuermann relayed.  HCC testing scheduled 11/23/20; appt notice mailed.

## 2020-06-18 NOTE — TELEPHONE ENCOUNTER
HCV LAB REVIEW  Week 4 of Epclusa, planning on 12 weeks treatment  G2, naive  F3-4    Pertinent labs:  6/12/20  CMP stable  AFP 8.7  HCV neg      Pls tell pt  - Labs look good:  HCV is now negative in blood. Very important to continue treatment since it is likely still in the liver.  Liver cancer tumor marker blood test looked fine. Next liver cancer screening due 11/2020.   - Continue Epclusa - 1 pill daily - don't miss any doses.  - Should be finishing treatment in about 2 months  - There is a small chance the virus can return during the 3 months after treatment ends. We will monitor blood for this.     Next labs to see if Hep C is cured are due:  - CMP, HCV RNA - SVR12 - 10/30/20    Next liver cancer screening due 11/2020 - pls schedule CBC, CMP, INR, AFP, U/S 11/2020

## 2020-06-19 ENCOUNTER — OFFICE VISIT (OUTPATIENT)
Dept: INTERNAL MEDICINE | Facility: CLINIC | Age: 50
End: 2020-06-19
Payer: MEDICARE

## 2020-06-19 VITALS
WEIGHT: 212.75 LBS | HEIGHT: 63 IN | HEART RATE: 60 BPM | BODY MASS INDEX: 37.7 KG/M2 | DIASTOLIC BLOOD PRESSURE: 86 MMHG | SYSTOLIC BLOOD PRESSURE: 132 MMHG

## 2020-06-19 DIAGNOSIS — F25.9 SCHIZOAFFECTIVE DISORDER, UNSPECIFIED TYPE: ICD-10-CM

## 2020-06-19 DIAGNOSIS — F39 MOOD DISORDER: ICD-10-CM

## 2020-06-19 DIAGNOSIS — F41.9 ANXIETY DISORDER, UNSPECIFIED TYPE: ICD-10-CM

## 2020-06-19 DIAGNOSIS — G43.001 MIGRAINE WITHOUT AURA AND WITH STATUS MIGRAINOSUS, NOT INTRACTABLE: ICD-10-CM

## 2020-06-19 DIAGNOSIS — Z74.09 OTHER REDUCED MOBILITY: ICD-10-CM

## 2020-06-19 DIAGNOSIS — R26.9 ABNORMALITY OF GAIT AND MOBILITY: ICD-10-CM

## 2020-06-19 DIAGNOSIS — F13.20 SEDATIVE DEPENDENCE: ICD-10-CM

## 2020-06-19 DIAGNOSIS — Z00.00 ENCOUNTER FOR PREVENTIVE HEALTH EXAMINATION: Primary | ICD-10-CM

## 2020-06-19 DIAGNOSIS — K74.00 LIVER FIBROSIS: ICD-10-CM

## 2020-06-19 DIAGNOSIS — M47.26 OSTEOARTHRITIS OF SPINE WITH RADICULOPATHY, LUMBAR REGION: ICD-10-CM

## 2020-06-19 DIAGNOSIS — B18.2 CHRONIC HEPATITIS C WITHOUT HEPATIC COMA: ICD-10-CM

## 2020-06-19 DIAGNOSIS — M46.1 SACROILIITIS: ICD-10-CM

## 2020-06-19 DIAGNOSIS — E66.01 SEVERE OBESITY WITH BODY MASS INDEX (BMI) OF 35.0 TO 35.9 AND COMORBIDITY: ICD-10-CM

## 2020-06-19 PROCEDURE — 99499 UNLISTED E&M SERVICE: CPT | Mod: HCNC,S$GLB,, | Performed by: NURSE PRACTITIONER

## 2020-06-19 PROCEDURE — 99999 PR PBB SHADOW E&M-EST. PATIENT-LVL IV: ICD-10-PCS | Mod: PBBFAC,HCNC,, | Performed by: NURSE PRACTITIONER

## 2020-06-19 PROCEDURE — 99999 PR PBB SHADOW E&M-EST. PATIENT-LVL IV: CPT | Mod: PBBFAC,HCNC,, | Performed by: NURSE PRACTITIONER

## 2020-06-19 PROCEDURE — G0439 PPPS, SUBSEQ VISIT: HCPCS | Mod: HCNC,S$GLB,, | Performed by: NURSE PRACTITIONER

## 2020-06-19 PROCEDURE — G0439 PR MEDICARE ANNUAL WELLNESS SUBSEQUENT VISIT: ICD-10-PCS | Mod: HCNC,S$GLB,, | Performed by: NURSE PRACTITIONER

## 2020-06-19 PROCEDURE — 99499 RISK ADDL DX/OHS AUDIT: ICD-10-PCS | Mod: HCNC,S$GLB,, | Performed by: NURSE PRACTITIONER

## 2020-06-19 RX ORDER — ASCORBIC ACID 250 MG
250 TABLET ORAL DAILY
COMMUNITY
End: 2021-05-24

## 2020-06-19 RX ORDER — CHOLECALCIFEROL (VITAMIN D3) 25 MCG
1000 TABLET ORAL DAILY
Status: ON HOLD | COMMUNITY
End: 2023-10-03 | Stop reason: HOSPADM

## 2020-06-19 RX ORDER — ESCITALOPRAM OXALATE 20 MG/1
20 TABLET ORAL DAILY
Status: ON HOLD | COMMUNITY
End: 2023-10-03 | Stop reason: HOSPADM

## 2020-06-19 NOTE — PATIENT INSTRUCTIONS
Counseling and Referral of Other Preventative  (Italic type indicates deductible and co-insurance are waived)    Patient Name: Mary Sanches  Today's Date: 6/19/2020    Health Maintenance       Date Due Completion Date    Colorectal Cancer Screening 1970 Complete test when receive in mail    Pneumococcal Vaccine (Medium Risk) (1 of 1 - PPSV23) 03/23/1988 Check with [facility] for date    Mammogram 08/02/2020 (Originally 5/1/2018) 5/1/2016 (Done)    Override on 5/1/2016: Done    Shingles Vaccine (1 of 2) 06/19/2021 (Originally 3/23/2019) Consider new Shingles vaccine    Lipid Panel 12/27/2024 12/27/2019    TETANUS VACCINE 05/16/2027 5/16/2017 (Declined)    Override on 5/16/2017: Declined        No orders of the defined types were placed in this encounter.    The following information is provided to all patients.  This information is to help you find resources for any of the problems found today that may be affecting your health:                Living healthy guide: www.[facility].[place].gov      Understanding Diabetes: www.diabetes.org      Eating healthy: www.cdc.gov/healthyweight      CDC home safety checklist: www.cdc.gov/steadi/patient.html      Agency on Aging: www.goea.[place].gov      Alcoholics anonymous (AA): www.aa.org      Physical Activity: www.dar.nih.gov/gx6bcqw      Tobacco use: www.quitwithusla.org

## 2020-06-23 ENCOUNTER — TELEPHONE (OUTPATIENT)
Dept: INTERNAL MEDICINE | Facility: CLINIC | Age: 50
End: 2020-06-23

## 2020-06-23 NOTE — TELEPHONE ENCOUNTER
----- Message from Josy Thrasher sent at 6/23/2020 12:18 PM CDT -----  Contact: Self [phone]  Would like to get medical advice.  Symptoms (please be specific):  severe headaches  How long has patient had these symptoms:    Pharmacy name and phone #:    Any drug allergies (copy from chart):      Would the patient rather a call back or a response via [facility]?:  call back  Comments:

## 2020-06-23 NOTE — TELEPHONE ENCOUNTER
----- Message from Antonette Barnes sent at 6/23/2020 12:38 PM CDT -----  Contact: self [phone]  Pt states she would like [facility] [address] [place] [address]. MARKO Farias [phone] added to her chart as her preferred pharmacy. Pt states to remove the [facility] pharmacy from the chart.

## 2020-06-23 NOTE — TELEPHONE ENCOUNTER
Spoke to patient ,she will call first thing in the morning to schedule with Dr. Lozano, an urgent visit for severe headache

## 2020-07-02 ENCOUNTER — TELEPHONE (OUTPATIENT)
Dept: PHARMACY | Facility: CLINIC | Age: 50
End: 2020-07-02

## 2020-07-02 NOTE — TELEPHONE ENCOUNTER
Epclusa refill (3 of 3) confirmed and reassessment complete. We will ship Epclusa refill on  via fedex to arrive on . $0.00 copay- 004. Confirmed 2 patient identifiers - name and . Therapy appropriate.     Patient has 8 doses of Epclusa remaining and takes it around 4:00 pm daily. Pt reports N/V is significantly better from before. No missed doses, no new medications, no new allergies or health conditions reported at this time. Allergies reviewed and medication reconciliation complete (reviewed and documented in [facility]). Patient stated she is no longer taking Effexor but is now taking Lexapro - no DDIs with Epclusa. Patient verbalized understanding. Disease education reviewed (including transmission and prevention). Patient counseled on importance of maintaining adherence and keeping lab appointments which were scheduled. All questions answered and addressed to patients satisfaction. Advised to call [facility] and provider if any issues arise. Pt verbalized understanding.

## 2020-07-06 DIAGNOSIS — M54.42 CHRONIC MIDLINE LOW BACK PAIN WITH LEFT-SIDED SCIATICA: ICD-10-CM

## 2020-07-06 DIAGNOSIS — G89.29 CHRONIC MIDLINE LOW BACK PAIN WITH LEFT-SIDED SCIATICA: ICD-10-CM

## 2020-07-06 RX ORDER — TRAMADOL HYDROCHLORIDE 50 MG/1
50 TABLET ORAL 3 TIMES DAILY PRN
Qty: 90 TABLET | Refills: 1 | Status: SHIPPED | OUTPATIENT
Start: 2020-07-09 | End: 2020-09-28 | Stop reason: SDUPTHER

## 2020-07-06 NOTE — TELEPHONE ENCOUNTER
----- Message from Aleah Puentes sent at 7/6/2020  4:05 PM CDT -----  Regarding: Pharmacy    Caller: Patient  Update Pharmacy:  [facility]  [address] [place] [address]  [phone]  Reason: says [facility] is too far to walk, so she wants to update pharmacy so that refill can go here instead  Callback number: [phone]

## 2020-07-10 ENCOUNTER — TELEPHONE (OUTPATIENT)
Dept: NEUROLOGY | Facility: CLINIC | Age: 50
End: 2020-07-10

## 2020-07-10 NOTE — TELEPHONE ENCOUNTER
----- Message from Rosaura Galarza sent at 7/10/2020 12:55 PM CDT -----  Regarding: Refill  Pt is calling to speak with Staff regarding a medication refill of butalbital-acetaminophen-caffeine -40 mg (FIORICET, ESGIC) -40 mg per tablet.    She can be reached at [phone].    Thank you.

## 2020-07-10 NOTE — TELEPHONE ENCOUNTER
Spoke with patient and informed her that Bryce is out of the office.Scheduled her an appt for 7/15/2020 @ 4:00 pm.

## 2020-07-14 ENCOUNTER — PATIENT OUTREACH (OUTPATIENT)
Dept: ADMINISTRATIVE | Facility: OTHER | Age: 50
End: 2020-07-14

## 2020-07-14 NOTE — PROGRESS NOTES
Care Everywhere: updated  Immunization: updated  Health Maintenance: updated  Media Review: reviewed for outside colon cancer report  Legacy Review:   Order placed:   Upcoming appts:

## 2020-07-15 ENCOUNTER — TELEPHONE (OUTPATIENT)
Dept: NEUROLOGY | Facility: CLINIC | Age: 50
End: 2020-07-15

## 2020-07-15 NOTE — TELEPHONE ENCOUNTER
Spoke with patient and notified her that unfortunately another patient has been scheduled in the time slot she cancelled.

## 2020-07-15 NOTE — TELEPHONE ENCOUNTER
----- Message from Natalya Everett sent at 7/15/2020 11:14 AM CDT -----  Contact: Mary  Pt stated she's not feeling well and wanted to know if Dr. Wu. Pt stated she's having real bad headaches. Pt contact number is [phone]. Pt stated just until she see him again.

## 2020-07-15 NOTE — TELEPHONE ENCOUNTER
----- Message from Stanford Vaughn sent at 7/15/2020  1:09 PM CDT -----  Regarding: Pt ride is available today  Pt would like to be called back regarding having ride available today and pt would liek to speak with nurse.    Pt can be reached at [phone].    Thank You.

## 2020-07-21 ENCOUNTER — OFFICE VISIT (OUTPATIENT)
Dept: NEUROLOGY | Facility: CLINIC | Age: 50
End: 2020-07-21
Payer: MEDICARE

## 2020-07-21 VITALS
DIASTOLIC BLOOD PRESSURE: 75 MMHG | HEART RATE: 61 BPM | BODY MASS INDEX: 37.85 KG/M2 | WEIGHT: 213.63 LBS | HEIGHT: 63 IN | SYSTOLIC BLOOD PRESSURE: 123 MMHG

## 2020-07-21 DIAGNOSIS — G43.009 MIGRAINE WITHOUT AURA AND WITHOUT STATUS MIGRAINOSUS, NOT INTRACTABLE: Primary | ICD-10-CM

## 2020-07-21 PROCEDURE — 99214 PR OFFICE/OUTPT VISIT, EST, LEVL IV, 30-39 MIN: ICD-10-PCS | Mod: HCNC,S$GLB,, | Performed by: NEUROMUSCULOSKELETAL MEDICINE & OMM

## 2020-07-21 PROCEDURE — 99214 OFFICE O/P EST MOD 30 MIN: CPT | Mod: HCNC,S$GLB,, | Performed by: NEUROMUSCULOSKELETAL MEDICINE & OMM

## 2020-07-21 PROCEDURE — 99499 RISK ADDL DX/OHS AUDIT: ICD-10-PCS | Mod: S$GLB,,, | Performed by: NEUROMUSCULOSKELETAL MEDICINE & OMM

## 2020-07-21 PROCEDURE — 3008F BODY MASS INDEX DOCD: CPT | Mod: HCNC,CPTII,S$GLB, | Performed by: NEUROMUSCULOSKELETAL MEDICINE & OMM

## 2020-07-21 PROCEDURE — 99999 PR PBB SHADOW E&M-EST. PATIENT-LVL III: ICD-10-PCS | Mod: PBBFAC,HCNC,, | Performed by: NEUROMUSCULOSKELETAL MEDICINE & OMM

## 2020-07-21 PROCEDURE — 3008F PR BODY MASS INDEX (BMI) DOCUMENTED: ICD-10-PCS | Mod: HCNC,CPTII,S$GLB, | Performed by: NEUROMUSCULOSKELETAL MEDICINE & OMM

## 2020-07-21 PROCEDURE — 99499 UNLISTED E&M SERVICE: CPT | Mod: S$GLB,,, | Performed by: NEUROMUSCULOSKELETAL MEDICINE & OMM

## 2020-07-21 PROCEDURE — 99999 PR PBB SHADOW E&M-EST. PATIENT-LVL III: CPT | Mod: PBBFAC,HCNC,, | Performed by: NEUROMUSCULOSKELETAL MEDICINE & OMM

## 2020-07-21 RX ORDER — OLANZAPINE 10 MG/1
TABLET ORAL
COMMUNITY
Start: 2020-07-01 | End: 2021-03-08

## 2020-07-21 RX ORDER — OLANZAPINE 20 MG/1
TABLET ORAL
COMMUNITY
Start: 2020-07-08 | End: 2020-11-19

## 2020-07-21 RX ORDER — BUTALBITAL, ACETAMINOPHEN AND CAFFEINE 50; 325; 40 MG/1; MG/1; MG/1
TABLET ORAL
Qty: 30 TABLET | Refills: 0 | Status: SHIPPED | OUTPATIENT
Start: 2020-07-21 | End: 2020-08-04

## 2020-07-21 RX ORDER — BUTALBITAL, ACETAMINOPHEN AND CAFFEINE 50; 325; 40 MG/1; MG/1; MG/1
TABLET ORAL
Qty: 30 TABLET | Refills: 0 | Status: SHIPPED | OUTPATIENT
Start: 2020-07-21 | End: 2020-07-21 | Stop reason: SDUPTHER

## 2020-07-21 RX ORDER — ALPRAZOLAM 1 MG/1
TABLET ORAL
Status: ON HOLD | COMMUNITY
Start: 2020-07-08 | End: 2023-10-03 | Stop reason: HOSPADM

## 2020-07-21 NOTE — PROGRESS NOTES
This history is dictated on [employer]  word recognition program. There are word recognition mistakes that are occasionally missed on review.  History:   patient presents for follow-up for her headaches.  She has been taking Fioricet 2-3 per day, however states that she can make it on the 30 pills per month.  She is having 1-2 headaches per week.  She says the tramadol does not help and she stopping that.  She continues to take Celexa and Zyprexa.  She states that she took some Norco from a relative 1 time not help the headaches considerably, however of told her I cannot prescribe narcotic she would have to go to pain management.  She has agreed that the Fioricet will help relieve the headache and she does not need take more than 30 per month since the headaches are variable in terms of frequency.  Previous note:  11-5-18.  Patient presents for follow-up for migraine headaches after several years.  She has occasional associated nausea.  Patient continues to have 2-3 headaches per month for which she takes Fioricet several pills per headache.  Headache intensity is 10/10 without medicines in with the medication it produces to 6-7/10 .  Patient typically has to lay down in the dark due to the severity of the headaches     Previous note:  5-21-14:  This is a 44-year-old white female presents for Long history of migraine headaches.Patient was previously treated by Dr. Preciado in private practice.  She presents today with complaints of headaches occurring 3 times per week starting in the back of the head radiating to the top predominantly on the left with intensity 9/10 associated with photophobia, nausea, and vomiting.  Patient was previously taken Fioricet tablets with good relief.  She relates that she had problems with Imitrex and was worried about her heart.  She is on disability for anxiety attacks and followed by the psychiatrist.  She complains of low back pain.     Gen. Appearance: Well-developed  with no obvious deformities  Carotid arteries symmetrical pulses  Peripheral vascular shows symmetrical pulses with no obvious edema or tenderness  Neurological Exam:  Mental status-alert and oriented to person, place, and time; attention span and concentration is good.  Fund of knowledge-patient is aware of current events and able to give detailed history of the current problem.recent and remote memory seems intact.  Language function is normal with no evidence of aphasia     Cranial nerves:Visual acuity to hand chart -normal; visual fields to confrontation normal;pupils were equal and reactive to light ; funduscopic examination was normal with sharp disc margins. external ocular movements were full with no nystagmus. Facial sensation to pinprick and corneal reflexes intact; Facial muscles were symmetrical. Hearing is unimpaired symmetrical finger rub; Tongue and palate movements were normal with swallowing unimpaired. Shoulder shrug was intact with good strength Speech was normal     Motor examination: Upper and Lower extremities - Normal and symmetrical;muscle tone was normal ;  right-handed  Sensory examination:Upper and lower extremities - Pinprick and soft touch were normal and symmetrical.  Vibration sense was normal at the toes for age 15-20 seconds  Deep tendon reflexes were 1-2+ symmetrical.  Both plantar responses were flexor  Cerebellar examination upper: Normal finger to nose and rapid alternating movements  Gait: Steady with no ataxia; heel and toe walk normal  Romberg test: negative  Tandem gait: Normal   Involuntary movements: Negative  Cervical examination: Full range of motion with no pain   Cervical tenderness:negative  Lumbar examination: Low back tenderness-negative   Sciatic notch tenderness-negative  Straight leg raising test-negative     Impression:Migraine without aura;Anxiety disorder; low back pain by history  Recommendations/plan:  Continue Fioricet tablets one q.8 p.r.n.#30 tablets ;  Migraine; followup 6 months

## 2020-08-27 ENCOUNTER — PATIENT OUTREACH (OUTPATIENT)
Dept: ADMINISTRATIVE | Facility: OTHER | Age: 50
End: 2020-08-27

## 2020-08-27 NOTE — PROGRESS NOTES
Requested updates within Care Everywhere.  Patient's chart was reviewed for overdue JOSUE topics.  Orders placed:n/a  Tasked appts:n/a  Labs Linked:n/a

## 2020-09-01 ENCOUNTER — TELEPHONE (OUTPATIENT)
Dept: PHYSICAL MEDICINE AND REHAB | Facility: CLINIC | Age: 50
End: 2020-09-01

## 2020-09-01 NOTE — TELEPHONE ENCOUNTER
----- Message from Laura Robins sent at 9/1/2020  2:24 PM CDT -----  Regarding: Appt  Pt is wanting to reschedule an appt with Dr. Andrews as soon as possible. Pt was sick with a cold and could not come into the office.     Pt can be advised at [phone]    Thank You   Laura Vega

## 2020-09-08 ENCOUNTER — TELEPHONE (OUTPATIENT)
Dept: PHYSICAL MEDICINE AND REHAB | Facility: CLINIC | Age: 50
End: 2020-09-08

## 2020-09-08 NOTE — TELEPHONE ENCOUNTER
----- Message from Aniyah Barrera sent at 9/4/2020  6:10 PM CDT -----  Regarding: Stool Sample  Contact: PT  PT wanted advice on where to drop off stool sample.   CBN# [phone]

## 2020-09-08 NOTE — TELEPHONE ENCOUNTER
----- Message from Stephie Caal sent at 9/7/2020 10:13 AM CDT -----  Regarding: Orders  Contact: Patient  Patient is requesting orders be submitted to have blood work completed before appt scheduled on 09/28/2020   Patient stated she would like to have blood work completed for appt   Patient stated she received a package from [employer] to test stool   Patient would like to know if physician would accept her stool  sample also to be tested      Patient can be reached at [phone]

## 2020-09-08 NOTE — TELEPHONE ENCOUNTER
Called and spoke with patient.  Patient asked for our office to take care of her lab orders from [facility].  Patient was informed to contact the Doctor who ordered the stool and blood test.  Patient stated Dr Lozano placed the order and she will contact them.

## 2020-09-15 ENCOUNTER — TELEPHONE (OUTPATIENT)
Dept: INTERNAL MEDICINE | Facility: CLINIC | Age: 50
End: 2020-09-15

## 2020-09-15 NOTE — TELEPHONE ENCOUNTER
----- Message from Judie Bhatti sent at 9/15/2020 12:16 PM CDT -----  Contact: Lindsey [phone] ext [phone]  [employer]  Lindsey called to ask if someone would call patient  regarding increased pain and is also requesting a motorized scooter. Please call patient.

## 2020-09-28 ENCOUNTER — OFFICE VISIT (OUTPATIENT)
Dept: PHYSICAL MEDICINE AND REHAB | Facility: CLINIC | Age: 50
End: 2020-09-28
Payer: MEDICARE

## 2020-09-28 ENCOUNTER — PATIENT OUTREACH (OUTPATIENT)
Dept: ADMINISTRATIVE | Facility: OTHER | Age: 50
End: 2020-09-28

## 2020-09-28 VITALS
HEIGHT: 63 IN | SYSTOLIC BLOOD PRESSURE: 153 MMHG | WEIGHT: 211.63 LBS | DIASTOLIC BLOOD PRESSURE: 89 MMHG | BODY MASS INDEX: 37.5 KG/M2 | HEART RATE: 68 BPM

## 2020-09-28 DIAGNOSIS — G89.29 CHRONIC MIDLINE LOW BACK PAIN WITH LEFT-SIDED SCIATICA: Primary | ICD-10-CM

## 2020-09-28 DIAGNOSIS — E66.9 OBESITY (BMI 30.0-34.9): ICD-10-CM

## 2020-09-28 DIAGNOSIS — M51.36 DDD (DEGENERATIVE DISC DISEASE), LUMBAR: ICD-10-CM

## 2020-09-28 DIAGNOSIS — M54.42 CHRONIC MIDLINE LOW BACK PAIN WITH LEFT-SIDED SCIATICA: Primary | ICD-10-CM

## 2020-09-28 DIAGNOSIS — M47.26 OSTEOARTHRITIS OF SPINE WITH RADICULOPATHY, LUMBAR REGION: ICD-10-CM

## 2020-09-28 DIAGNOSIS — G89.29 CHRONIC NECK PAIN: ICD-10-CM

## 2020-09-28 DIAGNOSIS — M54.2 CHRONIC NECK PAIN: ICD-10-CM

## 2020-09-28 PROCEDURE — 99999 PR PBB SHADOW E&M-EST. PATIENT-LVL III: CPT | Mod: PBBFAC,HCNC,, | Performed by: PHYSICAL MEDICINE & REHABILITATION

## 2020-09-28 PROCEDURE — 3008F BODY MASS INDEX DOCD: CPT | Mod: HCNC,CPTII,S$GLB, | Performed by: PHYSICAL MEDICINE & REHABILITATION

## 2020-09-28 PROCEDURE — 3008F PR BODY MASS INDEX (BMI) DOCUMENTED: ICD-10-PCS | Mod: HCNC,CPTII,S$GLB, | Performed by: PHYSICAL MEDICINE & REHABILITATION

## 2020-09-28 PROCEDURE — 99214 PR OFFICE/OUTPT VISIT, EST, LEVL IV, 30-39 MIN: ICD-10-PCS | Mod: HCNC,S$GLB,, | Performed by: PHYSICAL MEDICINE & REHABILITATION

## 2020-09-28 PROCEDURE — 99214 OFFICE O/P EST MOD 30 MIN: CPT | Mod: HCNC,S$GLB,, | Performed by: PHYSICAL MEDICINE & REHABILITATION

## 2020-09-28 PROCEDURE — 99999 PR PBB SHADOW E&M-EST. PATIENT-LVL III: ICD-10-PCS | Mod: PBBFAC,HCNC,, | Performed by: PHYSICAL MEDICINE & REHABILITATION

## 2020-09-28 RX ORDER — TRAMADOL HYDROCHLORIDE 50 MG/1
50 TABLET ORAL 3 TIMES DAILY PRN
Qty: 90 TABLET | Refills: 3 | Status: SHIPPED | OUTPATIENT
Start: 2020-09-28 | End: 2020-11-13 | Stop reason: SDUPTHER

## 2020-09-28 RX ORDER — DICLOFENAC SODIUM 10 MG/G
2 GEL TOPICAL 3 TIMES DAILY PRN
Qty: 1 TUBE | COMMUNITY
Start: 2020-09-28 | End: 2021-02-22 | Stop reason: SDUPTHER

## 2020-09-28 NOTE — PROGRESS NOTES
Subjective:       Patient ID: Mary Sanches is a 50 y.o. female.    Chief Complaint: No chief complaint on file.    HPI       HISTORY OF PRESENT ILLNESS:  Ms. Sanches is a 50-year-old white female with osteoarthritis who is followed up in the Physical Medicine Clinic for chronic low back pain with lumbar radiculopathy and chronic neck pain.  Her last visit to the clinic was on 4/29/2020 (a telemedicine audio visit due to COVID-19).  She was maintained on  p.r.n. tramadol.  He was not interested in referral for Epidural Steroid Injections.    The patient is coming to the clinic for follow-up.  Her low back pain has been worse .  She attributes this to rainy weather.  It is an intermittent aching and tightness pain in the lumbar spine and across her back.  She has occasional shooting pain to the left  foot with burning.  Her maximum pain is 6/10 and minimum 4-5/10.  Today, it is 4-5/10.  The patient has mild left lower extremity weakness.  She has occasional urinary urgency. She denies any bowel incontinence.      Her neck pain has been worse.  It is an intermittent aching pain in the cervical spine. She denies any radiation to her arms.  Her maximum pain is 4-5/10 and minimum 2-3/10; today it is 3/10. She denies any upper extremity weakness and numbness.    The patient is taking  tramadol 50 mg tablets, 1 tablets 3 times per day, but  occasionally 4 times.  She is not taking venlafaxine due to vomiting.  She quit smoking recently.  She has not lost significant weight and attributes this to retaining fluid.  She was asked to talk to her Primary Care Provider about diuretics.      Past Medical History:   Diagnosis Date    Anxiety     Chronic back pain     Chronic hepatitis C 6/19/2020    Depression     Hallucination     History of psychiatric hospitalization     Hx of psychiatric care     Migraines     Neuropathy     Obesity     Psychiatric exam requested by authority     Psychiatric problem      Psychosis     Sacroiliitis     Schizoaffective disorder     Scoliosis     Sleep difficulties     Therapy     Tobacco use            Review of Systems   Constitutional: Positive for fatigue. Negative for chills and fever.   Eyes: Negative for visual disturbance.   Respiratory: Negative for shortness of breath.    Cardiovascular: Negative for chest pain.   Gastrointestinal: Negative for constipation, nausea and vomiting.   Genitourinary: Negative for difficulty urinating.   Musculoskeletal: Positive for back pain, gait problem and neck pain. Negative for arthralgias and joint swelling.   Neurological: Positive for headaches. Negative for dizziness.   Psychiatric/Behavioral: Negative for behavioral problems and sleep disturbance.       Objective:      Physical Exam  Constitutional:       Appearance: She is well-developed.   HENT:      Head: Normocephalic and atraumatic.   Neck:      Musculoskeletal: Normal range of motion.   Musculoskeletal:      Comments:   BUE:  ROM:full.  Strength:    RUE: 5-/5 at shoulder abduction, 5 elbow flexion, 5- elbow extension, 5- hand .   LUE: 5-/5 at shoulder abduction, 5- elbow flexion, 5- elbow extension, 5- hand .  Sensation to pinprick:   RUE: decreased.   LUE: decreased.  DTR:    RUE: +1 biceps, +1 triceps.   LUE:  +1 biceps, +1 triceps.    BLE:  ROM:full.  Mild bilateral knee crepitus.   Strength:    RLE: 5 to 5-/5 at hip flexion,  knee extension,  ankle DF,  PF.   LLE: 5 to 5-/5 at hip flexion,  knee extension,  ankle DF,  PF.  Sensation to pinprick:     RLE: normal.     LLE: normal.  DTR:     RLE: +1 knee, +1 ankle.    LLE: +1 knee, +1 ankle.  Clonus:    Rt ankle: -ve.    Lt ankle: -ve.    SLR (sitting):      RLE: +ve.      LLE: +ve.     Gait: waddling.       Skin:     General: Skin is warm.   Neurological:      Mental Status: She is alert and oriented to person, place, and time.                 Assessment:       1. Chronic midline low back pain with left-sided  sciatica    2. Osteoarthritis of spine with radiculopathy, lumbar region    3. Chronic neck pain    4. DDD (degenerative disc disease), lumbar    5. Obesity (BMI 30.0-34.9)        Plan:       - Continue traMADol (ULTRAM) 50 mg tablet; Take 1 tablet (50 mg total) by mouth 3 (three) times daily as needed.  - Start diclofenac sodium (VOLTAREN ARTHRITIS PAIN) 1 % Gel; Apply 2 g topically 3 (three) times daily as needed.  - Regular home exercise program was encouraged.  - Weight loss was encouraged.  - Follow up in about 4 months (around 1/28/2021).     This was a 25 minute visit, more than 50% of which was spent counseling the patient about the diagnosis and the treatment plan.    This note was partly generated with [employer] voice recognition software. I apologize for any possible typographical errors.

## 2020-09-28 NOTE — PROGRESS NOTES
Care Everywhere: updated  Immunization: no profile in links   Health Maintenance: updated  Media Review: review for outside colon cancer and mammogram report   Legacy Review:   Order placed:   Upcoming appts:

## 2020-11-04 ENCOUNTER — TELEPHONE (OUTPATIENT)
Dept: HEPATOLOGY | Facility: CLINIC | Age: 50
End: 2020-11-04

## 2020-11-04 NOTE — TELEPHONE ENCOUNTER
Pt really needs an appt to be seen in clinic.  I recommend she have all the labs and u/s and a visit now - any chance she can come tomorrow?

## 2020-11-04 NOTE — TELEPHONE ENCOUNTER
I spoke with patient and msg from PA Scheuermann relayed.  She states that she is feeling better and only has a cough today.  She denied having a fever.   She states that she can't come for visit with testing on 11/5/20.  Labs and US scheduled 11/11/20 and 1st available f/u visit scheduled 11/19/20; appt notice mailed.

## 2020-11-04 NOTE — TELEPHONE ENCOUNTER
----- Message from Peyton Gray RN sent at 11/3/2020 12:43 PM CST -----  Patient reports having the flu and asked that I added SVR 12 labs to [facility] labs on 11/23/20; done.  She asked that new reminder notices be mailed to her at [address] [place] [address] Alexa CharlesMARKO [place] [id number].      She reports having swelling in abdomen, both legs, feet and hands for about 1 mth. On 9/28/20 weight 211 lbs in computer.  Today she reports weighing 216.5 lbs. She states that she feels that she just can't get all of the urine out of her.  Sometimes she can't make it on time and looses urine before she can get to restroom.  She denied having UTI.  She denied having SOB, N/V, yellowing of skin/eyes, coughing up blood, seeing blood in stool, or having black tarry stool.  She reports having some confusion.  She reports having bruises on arms and legs. Patient states that she needs something prescribed to help with edema.  Preferred pharmacy verified.

## 2020-11-11 ENCOUNTER — HOSPITAL ENCOUNTER (OUTPATIENT)
Dept: RADIOLOGY | Facility: HOSPITAL | Age: 50
Discharge: HOME | End: 2020-11-11
Attending: PHYSICIAN ASSISTANT
Payer: MEDICARE

## 2020-11-11 DIAGNOSIS — K74.60 HEPATIC CIRRHOSIS, UNSPECIFIED HEPATIC CIRRHOSIS TYPE, UNSPECIFIED WHETHER ASCITES PRESENT: ICD-10-CM

## 2020-11-11 PROCEDURE — 76705 ECHO EXAM OF ABDOMEN: CPT | Mod: TC,HCNC

## 2020-11-11 PROCEDURE — 76705 US ABDOMEN LIMITED: ICD-10-PCS | Mod: 26,HCNC,, | Performed by: RADIOLOGY

## 2020-11-11 PROCEDURE — 76705 ECHO EXAM OF ABDOMEN: CPT | Mod: 26,HCNC,, | Performed by: RADIOLOGY

## 2020-11-13 DIAGNOSIS — G89.29 CHRONIC MIDLINE LOW BACK PAIN WITH LEFT-SIDED SCIATICA: ICD-10-CM

## 2020-11-13 DIAGNOSIS — M54.42 CHRONIC MIDLINE LOW BACK PAIN WITH LEFT-SIDED SCIATICA: ICD-10-CM

## 2020-11-13 RX ORDER — TRAMADOL HYDROCHLORIDE 50 MG/1
50 TABLET ORAL 3 TIMES DAILY PRN
Qty: 90 TABLET | Refills: 3 | Status: SHIPPED | OUTPATIENT
Start: 2020-11-25 | End: 2021-02-02 | Stop reason: SDUPTHER

## 2020-11-13 NOTE — TELEPHONE ENCOUNTER
----- Message from Krissy Whittaker sent at 11/13/2020  6:55 AM CST -----  Regarding: Results  Contact: Mary  Type:  Test Results    Who Called: Pt  Name of Test (Lab/Mammo/Etc):  lab and ultrasound  Date of Test: 11/11/2020  Ordering Provider: Darryl  Where the test was performed: [facility]  Would the patient rather a call back or a response via [facility]? Call back   Best Call Back Number: [phone]  Additional Information:  Pt is requesting a callback from office need to get her results

## 2020-11-18 ENCOUNTER — PATIENT OUTREACH (OUTPATIENT)
Dept: ADMINISTRATIVE | Facility: OTHER | Age: 50
End: 2020-11-18

## 2020-11-19 ENCOUNTER — OFFICE VISIT (OUTPATIENT)
Dept: HEPATOLOGY | Facility: CLINIC | Age: 50
End: 2020-11-19
Payer: MEDICARE

## 2020-11-19 ENCOUNTER — LAB VISIT (OUTPATIENT)
Dept: LAB | Facility: HOSPITAL | Age: 50
End: 2020-11-19
Payer: MEDICARE

## 2020-11-19 VITALS
SYSTOLIC BLOOD PRESSURE: 156 MMHG | WEIGHT: 221.56 LBS | BODY MASS INDEX: 39.26 KG/M2 | DIASTOLIC BLOOD PRESSURE: 73 MMHG | HEIGHT: 63 IN | HEART RATE: 76 BPM | OXYGEN SATURATION: 95 %

## 2020-11-19 DIAGNOSIS — R74.8 ABNORMAL TRANSAMINASES: ICD-10-CM

## 2020-11-19 DIAGNOSIS — Z86.19 HISTORY OF HEPATITIS C: ICD-10-CM

## 2020-11-19 DIAGNOSIS — R74.8 ABNORMAL TRANSAMINASES: Primary | ICD-10-CM

## 2020-11-19 DIAGNOSIS — K74.60 HEPATIC CIRRHOSIS, UNSPECIFIED HEPATIC CIRRHOSIS TYPE, UNSPECIFIED WHETHER ASCITES PRESENT: ICD-10-CM

## 2020-11-19 LAB
CERULOPLASMIN SERPL-MCNC: 39 MG/DL (ref 15–45)
FERRITIN SERPL-MCNC: 89 NG/ML (ref 20–300)
IGG SERPL-MCNC: 2330 MG/DL (ref 650–1600)
IRON SERPL-MCNC: 98 UG/DL (ref 30–160)
SATURATED IRON: 20 % (ref 20–50)
TOTAL IRON BINDING CAPACITY: 481 UG/DL (ref 250–450)
TRANSFERRIN SERPL-MCNC: 325 MG/DL (ref 200–375)

## 2020-11-19 PROCEDURE — 1125F PR PAIN SEVERITY QUANTIFIED, PAIN PRESENT: ICD-10-PCS | Mod: HCNC,S$GLB,, | Performed by: PHYSICIAN ASSISTANT

## 2020-11-19 PROCEDURE — 83540 ASSAY OF IRON: CPT | Mod: HCNC

## 2020-11-19 PROCEDURE — 82390 ASSAY OF CERULOPLASMIN: CPT | Mod: HCNC

## 2020-11-19 PROCEDURE — 82784 ASSAY IGA/IGD/IGG/IGM EACH: CPT | Mod: HCNC

## 2020-11-19 PROCEDURE — 36415 COLL VENOUS BLD VENIPUNCTURE: CPT | Mod: HCNC

## 2020-11-19 PROCEDURE — 99213 OFFICE O/P EST LOW 20 MIN: CPT | Mod: HCNC,S$GLB,, | Performed by: PHYSICIAN ASSISTANT

## 2020-11-19 PROCEDURE — 1125F AMNT PAIN NOTED PAIN PRSNT: CPT | Mod: HCNC,S$GLB,, | Performed by: PHYSICIAN ASSISTANT

## 2020-11-19 PROCEDURE — 99213 PR OFFICE/OUTPT VISIT, EST, LEVL III, 20-29 MIN: ICD-10-PCS | Mod: HCNC,S$GLB,, | Performed by: PHYSICIAN ASSISTANT

## 2020-11-19 PROCEDURE — 86256 FLUORESCENT ANTIBODY TITER: CPT | Mod: HCNC

## 2020-11-19 PROCEDURE — 3008F PR BODY MASS INDEX (BMI) DOCUMENTED: ICD-10-PCS | Mod: HCNC,CPTII,S$GLB, | Performed by: PHYSICIAN ASSISTANT

## 2020-11-19 PROCEDURE — 82728 ASSAY OF FERRITIN: CPT | Mod: HCNC

## 2020-11-19 PROCEDURE — 82103 ALPHA-1-ANTITRYPSIN TOTAL: CPT | Mod: HCNC

## 2020-11-19 PROCEDURE — 99999 PR PBB SHADOW E&M-EST. PATIENT-LVL III: ICD-10-PCS | Mod: PBBFAC,HCNC,, | Performed by: PHYSICIAN ASSISTANT

## 2020-11-19 PROCEDURE — 86038 ANTINUCLEAR ANTIBODIES: CPT | Mod: HCNC

## 2020-11-19 PROCEDURE — 99999 PR PBB SHADOW E&M-EST. PATIENT-LVL III: CPT | Mod: PBBFAC,HCNC,, | Performed by: PHYSICIAN ASSISTANT

## 2020-11-19 PROCEDURE — 3008F BODY MASS INDEX DOCD: CPT | Mod: HCNC,CPTII,S$GLB, | Performed by: PHYSICIAN ASSISTANT

## 2020-11-19 PROCEDURE — 80321 ALCOHOLS BIOMARKERS 1OR 2: CPT | Mod: HCNC

## 2020-11-19 PROCEDURE — 86235 NUCLEAR ANTIGEN ANTIBODY: CPT | Mod: 91,HCNC

## 2020-11-19 NOTE — PROGRESS NOTES
HCV CLINIC VISIT NOTE    CHIEF COMPLAINT: Hepatitis C      HISTORY: This is a 50 y.o. White female here for f/u    Seen initially 2/2020 for HCV  Was due to return for liver staging w/ FibroScan 5/2020, but due to COVID had FibroSURE and telemed visit instead. FibroSURE revealed F3-4 (labs supported this w/ AST>ALT, hypoalbuminemia); FibroScan never done. Ultimately decided to pursue HCV rx w/o FibroScan.    Returns today for f/u. HCV now cured following 12 weeks Epclusa. Transaminases trended down but still elevated, again w/ AST>ALT   · Denies herbal, alternative supplements  · Denies alcohol (prior peth neg)  · No concerning meds on med list  · ?Fatty liver - BMI 39. (noted she had normal HbA1c and lipid panel on last check 12/2019)  Liver function intact. No evidence of decompensation or portal HTN.    Feels okay. Only c/o left leg pain following recent fall  Also noted she has had significant anxiety about HCV. Very tearful during visit. Relieved virus is cured. Very worried about lver  Denies jaundice, dark urine, hematemesis, melena, slowed mentation, abdominal distention.        Advanced liver fibrosis:  HCV FibroSURE 2/2020 - A2, F3-4  Labs reveal mild hypoalbuminemia, AST>ALT: seems to support advanced fibrosis    Liver function is well-preserved, no evidence of portal HTN  · No HE, jaundice, ascites  · Normal plt  · Albumin low 3.0-3.2    MELD-Na score: 6 at 11/11/2020  7:34 AM  MELD score: 6 at 11/11/2020  7:34 AM  Calculated from:  Serum Creatinine: 0.7 mg/dL (Rounded to 1 mg/dL) at 11/11/2020  7:34 AM  Serum Sodium: 137 mmol/L at 11/11/2020  7:34 AM  Total Bilirubin: 0.3 mg/dL (Rounded to 1 mg/dL) at 11/11/2020  7:34 AM  INR(ratio): 1.0 at 11/11/2020  7:34 AM  Age: 50 years 7 months    Cirrhosis maintenance:  - HCC screening - 11/2020 U/S w/ no liver lesions, normal AFP (in teens pre HCV rx, now normal)  - Varices screening - likely not indicated (normal spleen and plt; can reassess need for this w/  fibroscan / kPa assessment)  - HAV immunity - (+) HAVAB 2/2020  - HBV immunity -  (+) immunity (prior vaccine)    HCV history:  S/p 12 weeks Epclusa 5/2020 - 8/2020 w/ cure (SVR documented 11/2020)  Diagnosed while inpt 12/2019. Risk was tattoo age 14  - Treatment naive prior to epclusa  - Genotype 2    PMH, PSH, FAMILY HX, SOCIAL HX - reviewed in EPIC    ROS:   Review of Systems   Constitutional: Negative for chills, fever and weight loss.   Respiratory: Negative for cough and shortness of breath.    Cardiovascular: Positive for leg swelling (intermittent). Negative for chest pain.   Gastrointestinal: Negative for abdominal pain.   Musculoskeletal: Positive for falls (recent fall, hurt left leg).   Neurological: Positive for headaches.   Psychiatric/Behavioral: The patient is nervous/anxious.      10+ ROS reviewed, pertinent findings noted in HPI and ROS    PHYSICAL EXAM: Friendly WF in no acute distress. Alert and oriented.   VITALS: reviewed.   HEENT: Sclerae anicteric.   LUNGS: Normal respiratory effort. Clear to auscultation bilaterally w/ good air exchange   CVS: Regular rate and rhythm w/ no murmurs  ABDOMEN: Protuberant but soft. Nontender. No organomegaly or masses. Good bowel sounds. No ascites, hernias, masses.   EXTREMITIES: trace edema bilat  SKIN: No jaundice or spider telangectasias. No rashes on exposed skin  NEURO/PSYCH: Normal gait. Memory intact. Thought and speech patterns appropriate. Emotionally labile, cried easily during visit.       LABS:  Lab Results   Component Value Date    WBC 7.28 11/11/2020    HGB 14.8 11/11/2020     11/11/2020    INR 1.0 11/11/2020     Lab Results   Component Value Date     11/11/2020    K 4.1 11/11/2020    BUN 8 11/11/2020    CREATININE 0.7 11/11/2020    AST 70 (H) 11/11/2020    ALT 49 (H) 11/11/2020    ALKPHOS 122 11/11/2020    BILITOT 0.3 11/11/2020    ALBUMIN 3.2 (L) 11/11/2020       IMAGING:  Results for orders placed during the hospital encounter of  11/11/20   US Abdomen Limited    Narrative EXAMINATION:  US ABDOMEN LIMITED    CLINICAL HISTORY:  Unspecified cirrhosis of liver    TECHNIQUE:  Limited ultrasound of the right upper quadrant of the abdomen (including pancreas, liver, gallbladder, common bile duct, and spleen) was performed.    COMPARISON:  Ultrasound abdomen complete dated 05/11/2020.    FINDINGS:  Pancreas: Much of the pancreas is obscured by overlying bowel gas.  Visualized portions of the head and body of the pancreas are unremarkable.    Liver: Mildly enlarged measuring 18.6 cm. Homogeneous echotexture. No focal hepatic lesions.    Gallbladder: No calculi, wall thickening, or pericholecystic fluid.  No sonographic Valentine's sign.    Biliary system: The common duct is not dilated, measuring 4 mm.  No intrahepatic ductal dilatation.    Spleen: Normal in size and echotexture, measuring 8.8 x 2.7 cm.    Miscellaneous: No upper abdominal ascites.      Impression Mild hepatomegaly with no focal liver lesions identified.      Electronically signed by: Davon Hill MD  Date:    11/11/2020  Time:    10:35           ASSESSMENT:  50 y.o. White female with:  1. HISTORY OF CHRONIC HEPATITIS C, GENOTYPE 2 - treated / cured  -- s/p 12 weeks Epclusa 2020 w/ SVR12  -- (+) Immunity to HAV & HBV    2. PRESUMED ADVANCED FIBROSIS / EARLY CIRRHOSIS  -- FibroSURE - F3-4  -- HCC screening - U/S & AFP okay 11/2020  -- MELD score 11/2020 - 7    3. ELEVATED TRANSAMINASES  -- trended down w/ HCV cure but still elevated  -- elevated BMI -> ?fatty liver  -- needs serology for further eval      EDUCATION:  ADVANCED FIBROSIS / CIRRHOSIS:  -- Again discussed the basics about liver fibrosis /scarring and how that relates to liver function.   -- Signs and symptoms of hepatic decompensation were reviewed, including jaundice, ascites, and slowed mentation due to hepatic encephalopathy. The patient should seek medical attention if any of these things occur.   -- We discussed the  increased risk of hepatocellular carcinoma due to cirrhosis. Lifelong screening every six months with ultrasound and AFP is recommended.     -- Tylenol (acetaminophen) is okay up to 2,000mg daily  -- Avoid NSAIDs     HCV   Discussed that SVR12 is equivalent to CURE. Relapse would not be expected at that point. No immunity will be conferred. Could be reinfected.      PLAN:  1. Labs today:  Orders Placed This Encounter   Procedures    Ceruloplasmin    Alpha 1 Antitrypsin Phenotype    Ferritin    Iron and TIBC    MELONIE Screen w/Reflex    Antimitochondrial Antibody    Anti-Smooth Muscle Antibody    IgG    Phosphatidylethanol (PETH)     2. FibroScan and visit in General Hepatology w/ [facility]A in couple weeks

## 2020-11-20 LAB — ANA SER QL IF: NORMAL

## 2020-11-24 LAB
A1AT PHENOTYP SERPL-IMP: ABNORMAL BANDS
A1AT SERPL NEPH-MCNC: 221 MG/DL (ref 100–190)
MITOCHONDRIA AB TITR SER IF: NORMAL {TITER}

## 2020-11-25 LAB — PHOSPHATIDYLETHANOL (PETH): NEGATIVE NG/ML

## 2020-11-27 LAB — SMOOTH MUSCLE AB TITR SER IF: NORMAL {TITER}

## 2020-11-30 ENCOUNTER — TELEPHONE (OUTPATIENT)
Dept: HEPATOLOGY | Facility: CLINIC | Age: 50
End: 2020-11-30

## 2020-11-30 NOTE — TELEPHONE ENCOUNTER
----- Message from Jennifer B. Scheuermann, PA sent at 11/27/2020  4:21 PM CST -----  pls tell pt all the recent lab she did after her visit were okay  Keep zander kern/ Yadiel LANGSTON in Hepatology clinic in Dec.  thanks

## 2020-12-03 ENCOUNTER — OFFICE VISIT (OUTPATIENT)
Dept: HEPATOLOGY | Facility: CLINIC | Age: 50
End: 2020-12-03
Payer: MEDICARE

## 2020-12-03 ENCOUNTER — PROCEDURE VISIT (OUTPATIENT)
Dept: HEPATOLOGY | Facility: CLINIC | Age: 50
End: 2020-12-03
Payer: MEDICARE

## 2020-12-03 VITALS
HEIGHT: 65 IN | BODY MASS INDEX: 35.81 KG/M2 | OXYGEN SATURATION: 96 % | DIASTOLIC BLOOD PRESSURE: 76 MMHG | SYSTOLIC BLOOD PRESSURE: 126 MMHG | HEART RATE: 69 BPM | TEMPERATURE: 98 F | WEIGHT: 214.94 LBS | RESPIRATION RATE: 20 BRPM

## 2020-12-03 DIAGNOSIS — B18.2 CHRONIC HEPATITIS C WITHOUT HEPATIC COMA: ICD-10-CM

## 2020-12-03 DIAGNOSIS — R76.8 HIGH TOTAL IGG: ICD-10-CM

## 2020-12-03 DIAGNOSIS — K76.0 FATTY LIVER: ICD-10-CM

## 2020-12-03 DIAGNOSIS — R74.8 ABNORMAL TRANSAMINASES: Primary | ICD-10-CM

## 2020-12-03 DIAGNOSIS — E66.9 OBESITY (BMI 30-39.9): ICD-10-CM

## 2020-12-03 DIAGNOSIS — K74.00 LIVER FIBROSIS: ICD-10-CM

## 2020-12-03 PROCEDURE — 99499 RISK ADDL DX/OHS AUDIT: ICD-10-PCS | Mod: S$GLB,,, | Performed by: PHYSICIAN ASSISTANT

## 2020-12-03 PROCEDURE — 91200 FIBROSCAN (VIBRATION CONTROLLED TRANSIENT ELASTOGRAPHY): ICD-10-PCS | Mod: HCNC,S$GLB,, | Performed by: PHYSICIAN ASSISTANT

## 2020-12-03 PROCEDURE — 99214 PR OFFICE/OUTPT VISIT, EST, LEVL IV, 30-39 MIN: ICD-10-PCS | Mod: HCNC,S$GLB,, | Performed by: PHYSICIAN ASSISTANT

## 2020-12-03 PROCEDURE — 99999 PR PBB SHADOW E&M-EST. PATIENT-LVL IV: ICD-10-PCS | Mod: PBBFAC,HCNC,, | Performed by: PHYSICIAN ASSISTANT

## 2020-12-03 PROCEDURE — 99499 UNLISTED E&M SERVICE: CPT | Mod: S$GLB,,, | Performed by: PHYSICIAN ASSISTANT

## 2020-12-03 PROCEDURE — 3008F PR BODY MASS INDEX (BMI) DOCUMENTED: ICD-10-PCS | Mod: HCNC,CPTII,S$GLB, | Performed by: PHYSICIAN ASSISTANT

## 2020-12-03 PROCEDURE — 1125F PR PAIN SEVERITY QUANTIFIED, PAIN PRESENT: ICD-10-PCS | Mod: HCNC,S$GLB,, | Performed by: PHYSICIAN ASSISTANT

## 2020-12-03 PROCEDURE — 3008F BODY MASS INDEX DOCD: CPT | Mod: HCNC,CPTII,S$GLB, | Performed by: PHYSICIAN ASSISTANT

## 2020-12-03 PROCEDURE — 91200 LIVER ELASTOGRAPHY: CPT | Mod: HCNC,S$GLB,, | Performed by: PHYSICIAN ASSISTANT

## 2020-12-03 PROCEDURE — 99214 OFFICE O/P EST MOD 30 MIN: CPT | Mod: HCNC,S$GLB,, | Performed by: PHYSICIAN ASSISTANT

## 2020-12-03 PROCEDURE — 1125F AMNT PAIN NOTED PAIN PRSNT: CPT | Mod: HCNC,S$GLB,, | Performed by: PHYSICIAN ASSISTANT

## 2020-12-03 PROCEDURE — 99999 PR PBB SHADOW E&M-EST. PATIENT-LVL IV: CPT | Mod: PBBFAC,HCNC,, | Performed by: PHYSICIAN ASSISTANT

## 2020-12-03 NOTE — PROGRESS NOTES
[facility] - Established Patient     Last Clinic Visit: 11/19/2020 with Jen Scheuermann, PA-C     CHIEF COMPLAINT: Elevated liver enzymes, with HCV (s/p tx w/ 12 wk Epclusa w/ cure SVR 12)    HPI: This is a 50 y.o. White female with PMH as listed below returning for continued elevation of liver enzymes in the context of possible advanced liver fibrosis with cured HCV (s/p tx and cure 2020). Prior Fibrosure suggesting advance fibrosis F3/F4, well compensated. The patient did not receive Fibroscan due to COVID-19 pandemic at the time of HCV treatment, opted for Fibrosure instead.    The patient was previously followed by Jen Scheuermann, PA-C, is new to me today.     Risk factors for fatty liver include obesity.    MELD-Na score: 6 at 11/11/2020  7:34 AM  MELD score: 6 at 11/11/2020  7:34 AM  Calculated from:  Serum Creatinine: 0.7 mg/dL (Rounded to 1 mg/dL) at 11/11/2020  7:34 AM  Serum Sodium: 137 mmol/L at 11/11/2020  7:34 AM  Total Bilirubin: 0.3 mg/dL (Rounded to 1 mg/dL) at 11/11/2020  7:34 AM  INR(ratio): 1.0 at 11/11/2020  7:34 AM  Age: 50 years 7 months    Cirrhosis/Advanced Fibrosis Health Maintenance:  -- HCC screening:  U/S11/2020 no lesions, next due 5/2021   AFP WNL, next due 5/2021  -- Variceal screening: EGD early without thrombocytopenia or signs of portal HTN   -- Hepatitis A & B vaccination:    Interval history:  She is here today with  Errol. The patient is very anxious about her liver disease. Today she denies symptoms of hepatic decompensation including jaundice, ascites, cognitive problems to suggest hepatic encephalopathy, or GI bleeding. She denies abdominal pain, fever,chills. Denies muscle pain, increasing fatigue. She denies recent LE edema, but reports that if she stands for a long time she does get mild swelling around her ankles.     She struggles with chronic back pain which limits her mobility.    Also struggles with migraines at least monthly. Is taking Fioricet  for this. Denies OTC medications such as tylenol or naproxen.     Admits that she does not take the lexapro, voltaren, or zyprexa as she is prescibed.    She admits to poor diet and eating a high-carbohydrate diet. She has fluctuating weight gain.     Denies supplements or herbals.   Denies drug use  Denies ETOH use   She reports her paternal grandmother had cirrhosis of the liver, but  from a head injury.      Fibrosis staging:   HCV FibroSURE 2020:A2, F3-4   Fibroscan today 12/3/2020: S2,F2      Ultrasound:  US 2020:  FINDINGS:  Pancreas: Much of the pancreas is obscured by overlying bowel gas.  Visualized portions of the head and body of the pancreas are unremarkable.  Liver: Mildly enlarged measuring 18.6 cm. Homogeneous echotexture. No focal hepatic lesions.  Gallbladder: No calculi, wall thickening, or pericholecystic fluid.  No sonographic Valentine's sign.  Biliary system: The common duct is not dilated, measuring 4 mm.  No intrahepatic ductal dilatation.  Spleen: Normal in size and echotexture, measuring 8.8 x 2.7 cm.  Miscellaneous: No upper abdominal ascites.      Labs:  Lab Results   Component Value Date    AFP 6.8 2020     Lab Results   Component Value Date    ALT 49 (H) 2020    AST 70 (H) 2020    ALKPHOS 122 2020    BILITOT 0.3 2020    ALBUMIN 3.2 (L) 2020    INR 1.0 2020     2020     Prior serologic workup:   Lab Results   Component Value Date    SMOOTHMUSCAB Negative 1:40 2020    AMAIFA Negative 1:40 2020    IGGSERUM 2330 (H) 2020    ANASCREEN Negative <1:80 2020    FERRITIN 89 2020    FESATURATED 20 2020    PETH Negative 2020    PYCGO0MUSADA MM 2020    GCYVG1GDGTRL 221 (H) 2020    CERULOPLSM 39.0 2020    HEPBSAG Negative 2020    HEPCAB Positive (A) 2019    HEPAIGM Negative 2019           Review of patient's allergies indicates:   Allergen Reactions     Cranberry      Kidney Pain    Gabapentin Swelling     Throat Swelling, Hard to breathe    Ibuprofen Swelling     Stomach Bloated, Swelling Throat    Meloxicam Swelling     Swelling of the Throat    Toradol [ketorolac] Swelling     Throat Swelling difficulty breathing    Amoxicillin Hives    Duloxetine         Current Outpatient Medications on File Prior to Visit   Medication Sig Dispense Refill    ALPRAZolam (XANAX) 1 MG tablet       butalbital-acetaminophen-caffeine -40 mg (FIORICET, ESGIC) -40 mg per tablet TAKE 1 OR 2 TABLETS BY MOUTH EVERY 6 HOURS AS NEEDED FOR PAIN 30 tablet 0    diclofenac sodium (VOLTAREN ARTHRITIS PAIN) 1 % Gel Apply 2 g topically 3 (three) times daily as needed. 1 Tube prn    diphenhydrAMINE (BENADRYL) 50 MG capsule Take 50 mg by mouth every 6 (six) hours as needed for Itching.      escitalopram oxalate (LEXAPRO) 20 MG tablet Take 20 mg by mouth once daily.      traMADoL (ULTRAM) 50 mg tablet Take 1 tablet (50 mg total) by mouth 3 (three) times daily as needed for Pain. 90 tablet 3    ascorbic acid, vitamin C, (VITAMIN C) 250 MG tablet Take 250 mg by mouth once daily.      OLANZapine (ZYPREXA) 10 MG tablet       vitamin D (VITAMIN D3) 1000 units Tab Take 1,000 Units by mouth once daily.       No current facility-administered medications on file prior to visit.        PMHX:  has a past medical history of Anxiety, Chronic back pain, Chronic hepatitis C (6/19/2020), Depression, Hallucination, History of psychiatric hospitalization, psychiatric care, Migraines, Neuropathy, Obesity, Psychiatric exam requested by authority, Psychiatric problem, Psychosis, Sacroiliitis, Schizoaffective disorder, Scoliosis, Sleep difficulties, Therapy, and Tobacco use.     PSHX:  has a past surgical history that includes Hysterectomy and Hand surgery (Left).     FAMILY HISTORY:   Family History   Problem Relation Age of Onset    No Known Problems Mother     Hypertension Father      Diabetes Sister         Type I    Cancer Maternal Grandmother     Heart disease Paternal Grandfather     Kidney disease Sister     Thyroid disease Sister     Stroke Neg Hx      SOCIAL HISTORY:   Social History     Substance and Sexual Activity   Alcohol Use No      Social History     Substance and Sexual Activity   Drug Use No       Review of Systems   Constitutional: Negative for appetite change, chills, fatigue, fever and unexpected weight change.   Eyes: Negative for visual disturbance.   Respiratory: Negative for cough and shortness of breath.    Cardiovascular: Negative for chest pain, palpitations and leg swelling.   Gastrointestinal: Negative for abdominal distention, abdominal pain, blood in stool, constipation, diarrhea, nausea and vomiting. No change in stool color.  Genitourinary: Negative for dysuria and hematuria. Denies dark urine.   Musculoskeletal: Negative for arthralgias, gait problem, joint swelling and myalgias.   Skin: Negative for color change, rash and wound. Denies itching.   Neurological: Negative for dizziness, tremors, speech difficulty, and headaches.   Hematological: Does not bruise/bleed easily.   Psychiatric/Behavioral: Negative for confusion, decreased concentration and sleep disturbance. Denies memory loss. Denies depression.        DATA   Physical Exam   Constitutional: Tearful white woman, obese. No distress. Alert and oriented to person, place, and time.  Eyes: No scleral icterus.   Cardiovascular: Normal rate, regular rhythm and normal heart sounds. No murmur heard.  Pulmonary/Chest: Respiratory effort and breath sounds normal. No respiratory distress.   Abdominal: Soft, non-tender. No distension; no ascites appreciated. There is no palpable hepatosplenomegaly. No hernia or mass.   Musculoskeletal: No edema.   Neurological: No tremor. Ambulating in wheelchair. No asterixis.    Skin: Skin is warm and dry. No rash or erythema. No jaundice. No telangiectasias or palmar erythema  "noted.  Psychiatric: Normal mood and affect. Speech, behavior, and thought content normal. Anxiety noted.    /76 (BP Location: Right arm, Patient Position: Sitting, BP Method: Medium (Automatic))   Pulse 69   Temp 97.5 °F (36.4 °C) (Oral)   Resp 20   Ht 5' 5" (1.651 m)   Wt 97.5 kg (214 lb 15.2 oz)   SpO2 96%   BMI 35.77 kg/m²       DIAGNOSTIC STUDIES:    Abdominal Ultrasound -   Results for orders placed during the hospital encounter of 05/11/20   US Abdomen Complete    Narrative EXAMINATION:  US ABDOMEN COMPLETE    CLINICAL HISTORY:  Chronic viral hepatitis C    TECHNIQUE:  Complete abdominal ultrasound (including pancreas, aorta, liver, gallbladder, common bile duct, IVC, kidneys, and spleen) was performed.    COMPARISON:  None    FINDINGS:  Pancreas: The visualized portions of pancreas appear normal.    Aorta: No aneurysm.    Liver: 15.5 cm, normal in size. Homogeneous parenchymal echotexture. No focal lesions.  The HRI is 1.3 (normal).    Gallbladder: No calculi, wall thickening, or pericholecystic fluid.  Negative sonographic Valentine's sign.    Biliary system: 4 mm common bile duct.  No intrahepatic ductal dilatation.    Inferior vena cava: Normal in appearance.    Right kidney: 11.1 cm. No hydronephrosis.    Left kidney: 9.5 cm. No hydronephrosis.    Spleen: 7.2 x 3.0 cm.  Normal in size with homogeneous echotexture.    Miscellaneous: No ascites.      Impression No sonographic abnormality.    Electronically signed by resident: Loni Alfonso  Date:    05/11/2020  Time:    08:00    Electronically signed by: Wild Ho MD  Date:    05/11/2020  Time:    08:05     Results for orders placed during the hospital encounter of 11/11/20   US Abdomen Limited    Narrative EXAMINATION:  US ABDOMEN LIMITED    CLINICAL HISTORY:  Unspecified cirrhosis of liver    TECHNIQUE:  Limited ultrasound of the right upper quadrant of the abdomen (including pancreas, liver, gallbladder, common bile duct, and spleen) " was performed.    COMPARISON:  Ultrasound abdomen complete dated 05/11/2020.    FINDINGS:  Pancreas: Much of the pancreas is obscured by overlying bowel gas.  Visualized portions of the head and body of the pancreas are unremarkable.    Liver: Mildly enlarged measuring 18.6 cm. Homogeneous echotexture. No focal hepatic lesions.    Gallbladder: No calculi, wall thickening, or pericholecystic fluid.  No sonographic Avlentine's sign.    Biliary system: The common duct is not dilated, measuring 4 mm.  No intrahepatic ductal dilatation.    Spleen: Normal in size and echotexture, measuring 8.8 x 2.7 cm.    Miscellaneous: No upper abdominal ascites.      Impression Mild hepatomegaly with no focal liver lesions identified.      Electronically signed by: Davon Hill MD  Date:    11/11/2020  Time:    10:35       ASSESSMENT & PLAN     50 y.o. White female with:    1. Abnormal transaminases  - serologic workup showing IgG high otherwise unrevealing workup  - negative for viral hep B & A; negative for genetic causes of liver disease  - family history of cirrhosis in paternal grandmother of unknown etiology  - HCV treated and cured  - DILI meds: voltaren, zyprexa & lexapro, stopped last week  - will monitor w/ repeat labs in 3 months  - possibly secondary to fatty liver    2. Fatty liver  - as seen on Fibroscan today  - recommend weight loss ~10% of body weight, low carb diet, increasing exercise   - could be elevating enzymes    3. Chronic hepatitis C without hepatic coma  - s/p tx with epclusa svr12 6/2020    4. Liver fibrosis  - F2 on fibroscan today unfortunately unable to perform scan prior to Hep C tx however FibroSure indicated advanced fibrosis  - no signs or symptoms of hepatic decompensation or portal HTN, medically early for EGD & plt normal  - immunized against Hep A &B  MELD-Na score: 6 at 11/11/2020  7:34 AM  MELD score: 6 at 11/11/2020  7:34 AM  Calculated from:  Serum Creatinine: 0.7 mg/dL (Rounded to 1 mg/dL) at  11/11/2020  7:34 AM  Serum Sodium: 137 mmol/L at 11/11/2020  7:34 AM  Total Bilirubin: 0.3 mg/dL (Rounded to 1 mg/dL) at 11/11/2020  7:34 AM  INR(ratio): 1.0 at 11/11/2020  7:34 AM  Age: 50 years 7 months      5. Obesity (BMI 30-39.9)  - RF for fatty liver  - discussion as per AVS  - we discussed weight loss low carb diet and increased activity is imperative.    6. High IgG  - without other autoimmune markers high   - recommend monitoring at this time as her enzymes are not high enough to warrant biopsy  - however, if fatty liver risk factors are improved and she still has elevated enzymes, would consider biopsy. Discussed with patient       Orders Placed This Encounter   Procedures    Hepatic Function Panel    Phosphatidylethanol (PETH)    CK       · Fibroscan reveals F2 fibrosis with S2 steatosis, with room for improvement re: weight loss, diet, blood sugars. IgG is elevated, other AIH markers normal. The medications she is taking at this time are not suspicious for DILI, as she has already discontinued Voltaren, Lexapro and Zyprexa. At this time, recommend improving fatty liver risk factors and weight loss of 10 lbs over next few months and rechecking labs in 3 months. If her enzymes continue to elevate or worsen, we will consider a liver biopsy.   · Follow up in about 3 months (around 3/3/2021).        Thank you for allowing me to participate in the care of Mary Torres PA-C  Hepatology

## 2020-12-03 NOTE — PROCEDURES
FibroScan (Vibration Controlled Transient Elastography)    Date/Time: 12/3/2020 9:00 AM  Performed by: Cierra Torres PA-C  Authorized by: Jennifer B. Scheuermann, PA     Diagnosis:  HCV and NAFLD    Probe:  XL    Universal Protocol: Patient's identity, procedure and site were verified, confirmatory pause was performed.  Discussed procedure including risks and potential complications.  Questions answered.  Patient verbalizes understanding and wishes to proceed with VCTE.     Procedure: After providing explanations of the procedure, patient was placed in the supine position with right arm in maximum abduction to allow optimal exposure of right lateral abdomen.  Patient was briefly assessed, Testing was performed in the mid-axillary location, 50Hz Shear Wave pulses were applied and the resulting Shear Wave and Propagation Speed detected with a 3.5 MHz ultrasonic signal, using the FibroScan probe, Skin to liver capsule distance and liver parenchyma were accessed during the entire examination with the FibroScan probe, Patient was instructed to breathe normally and to abstain from sudden movements during the procedure, allowing for random measurements of liver stiffness. At least 10 Shear Waves were produced, Individual measurements of each Shear Wave were calculated.  Patient tolerated the procedure well with no complications.  Meets discharge criteria as was dismissed.  Rates pain 0 out of 10.  Patient will follow up with ordering provider to review results.      Findings  Median liver stiffness score:  8.4  CAP Reading: dB/m:  266    IQR/med %:  13  Interpretation  Fibrosis interpretation is based on medial liver stiffness - Kilopascal (kPa).    Fibrosis Stage:  F2  Steatosis interpretation is based on controlled attenuation parameter - (dB/m).    Steatosis Grade:  S2

## 2020-12-03 NOTE — PATIENT INSTRUCTIONS
1. Fibroscan today to look for fat or scar tissue in the liver shows moderate fatty liver and moderate scar tissue of the liver.   2. Ultrasound of the liver showed fatty, enlarged liver.  3. I will discuss your case with   4. Recommend weight loss of 20 lbs, bettering your diet and follow-up in 6 months  5. Stay away from alcohol and sugary drinks completely.   6. Increase your activity.         There is no FDA approved therapy for non-alcoholic fatty liver disease. Therefore, these things are important:  1. Weight loss goal of 20 lbs  2. Low carb/sugar, high fiber and protein diet.Try to limit your carb intake to LESS than 30-45 grams of carbs with a meal or LESS than 5-10 grams with any snack (total of any snack foods eaten during that time). Use MyFitness Pal maria isabel to add up your carbs through the day. Do NOT drink any beverages with calories or carbs (this can lead to high blood sugar and weight gain). Also, some of our patients have been very successful with weight loss using the pre made/planned meal planning services that limit calories and portion size (one example is Sensible Meals but there are many out there)  3. Exercise, 5 days per week, 30 minutes per day, as tolerated  4. Recommend good cholesterol, blood pressure, blood sugar levels     *If statins are being considered for HLD, statins are safe in patients with liver disease. Statins can be used to treat dyslipidemia in patients with both NAFLD and ENG.    In some people, fatty liver can progress to steatohepatitis (inflamatory fatty liver) and possibly to cirrhosis, putting one at increased risk for liver cancer, liver failure, and death. Therefore, the lifestyle changes are very important to decrease this risk.     Website with information about fatty liver and inflammation related to fatty liver (ENG) = www.nashtruth.com  AND www.NASHactually.com    Tips for low/moderate carbohydrate diet:  3 meals a day made up of the following:  -- Unlimited  green vegetables, tomatoes, mushrooms, spaghetti squash, cauliflower, meat, poultry, seafood, eggs and hard cheeses.   -- Milk and plain yogurt  -- Dressings, seasonings, condiments, etc should have less than 2 g sugars.   -- Beans (1-1.5 cups) or nuts (1/4 cup): can have 1 x a day.   -- 1-2 servings of citrus fruits, berries, pineapple or melon a day (1/2 cup)  -- Avoid fried foods    *No grains, rice, pasta, potatoes, bread, corn, peas, oatmeal, grits, tortillas, crackers, chips    *No soda, sweet tea, juices or lemonade    Try www.dietdoctor.We Tribute for recipes (moderate carb intake)

## 2021-02-02 DIAGNOSIS — G89.29 CHRONIC MIDLINE LOW BACK PAIN WITH LEFT-SIDED SCIATICA: ICD-10-CM

## 2021-02-02 DIAGNOSIS — M54.42 CHRONIC MIDLINE LOW BACK PAIN WITH LEFT-SIDED SCIATICA: ICD-10-CM

## 2021-02-02 RX ORDER — TRAMADOL HYDROCHLORIDE 50 MG/1
50 TABLET ORAL 3 TIMES DAILY PRN
Qty: 90 TABLET | Refills: 3 | Status: SHIPPED | OUTPATIENT
Start: 2021-02-13 | End: 2021-05-17 | Stop reason: SDUPTHER

## 2021-02-20 ENCOUNTER — PATIENT OUTREACH (OUTPATIENT)
Dept: ADMINISTRATIVE | Facility: OTHER | Age: 51
End: 2021-02-20

## 2021-02-22 ENCOUNTER — OFFICE VISIT (OUTPATIENT)
Dept: PHYSICAL MEDICINE AND REHAB | Facility: CLINIC | Age: 51
End: 2021-02-22
Payer: MEDICARE

## 2021-02-22 ENCOUNTER — TELEPHONE (OUTPATIENT)
Dept: PHYSICAL MEDICINE AND REHAB | Facility: CLINIC | Age: 51
End: 2021-02-22

## 2021-02-22 VITALS
DIASTOLIC BLOOD PRESSURE: 83 MMHG | WEIGHT: 217.38 LBS | BODY MASS INDEX: 36.22 KG/M2 | SYSTOLIC BLOOD PRESSURE: 131 MMHG | HEIGHT: 65 IN | HEART RATE: 73 BPM

## 2021-02-22 DIAGNOSIS — G89.29 CHRONIC MIDLINE LOW BACK PAIN WITH LEFT-SIDED SCIATICA: Primary | ICD-10-CM

## 2021-02-22 DIAGNOSIS — M54.2 CHRONIC NECK PAIN: ICD-10-CM

## 2021-02-22 DIAGNOSIS — M54.42 CHRONIC MIDLINE LOW BACK PAIN WITH LEFT-SIDED SCIATICA: Primary | ICD-10-CM

## 2021-02-22 DIAGNOSIS — G89.29 CHRONIC NECK PAIN: ICD-10-CM

## 2021-02-22 DIAGNOSIS — M51.36 DDD (DEGENERATIVE DISC DISEASE), LUMBAR: ICD-10-CM

## 2021-02-22 DIAGNOSIS — E66.9 OBESITY (BMI 30.0-34.9): ICD-10-CM

## 2021-02-22 DIAGNOSIS — M47.26 OSTEOARTHRITIS OF SPINE WITH RADICULOPATHY, LUMBAR REGION: ICD-10-CM

## 2021-02-22 PROCEDURE — 3008F PR BODY MASS INDEX (BMI) DOCUMENTED: ICD-10-PCS | Mod: CPTII,S$GLB,, | Performed by: PHYSICAL MEDICINE & REHABILITATION

## 2021-02-22 PROCEDURE — 99214 OFFICE O/P EST MOD 30 MIN: CPT | Mod: S$GLB,,, | Performed by: PHYSICAL MEDICINE & REHABILITATION

## 2021-02-22 PROCEDURE — 1125F AMNT PAIN NOTED PAIN PRSNT: CPT | Mod: S$GLB,,, | Performed by: PHYSICAL MEDICINE & REHABILITATION

## 2021-02-22 PROCEDURE — 99999 PR PBB SHADOW E&M-EST. PATIENT-LVL III: ICD-10-PCS | Mod: PBBFAC,,, | Performed by: PHYSICAL MEDICINE & REHABILITATION

## 2021-02-22 PROCEDURE — 1125F PR PAIN SEVERITY QUANTIFIED, PAIN PRESENT: ICD-10-PCS | Mod: S$GLB,,, | Performed by: PHYSICAL MEDICINE & REHABILITATION

## 2021-02-22 PROCEDURE — 99214 PR OFFICE/OUTPT VISIT, EST, LEVL IV, 30-39 MIN: ICD-10-PCS | Mod: S$GLB,,, | Performed by: PHYSICAL MEDICINE & REHABILITATION

## 2021-02-22 PROCEDURE — 3008F BODY MASS INDEX DOCD: CPT | Mod: CPTII,S$GLB,, | Performed by: PHYSICAL MEDICINE & REHABILITATION

## 2021-02-22 PROCEDURE — 99999 PR PBB SHADOW E&M-EST. PATIENT-LVL III: CPT | Mod: PBBFAC,,, | Performed by: PHYSICAL MEDICINE & REHABILITATION

## 2021-02-22 RX ORDER — GABAPENTIN 300 MG/1
300 CAPSULE ORAL NIGHTLY
Qty: 90 CAPSULE | Refills: 2 | Status: SHIPPED | OUTPATIENT
Start: 2021-02-22 | End: 2021-02-23

## 2021-02-22 RX ORDER — DICLOFENAC SODIUM 10 MG/G
2 GEL TOPICAL 3 TIMES DAILY PRN
Qty: 3 TUBE | Refills: 1 | Status: SHIPPED | OUTPATIENT
Start: 2021-02-22 | End: 2021-07-13 | Stop reason: SDUPTHER

## 2021-03-08 ENCOUNTER — OFFICE VISIT (OUTPATIENT)
Dept: HEPATOLOGY | Facility: CLINIC | Age: 51
End: 2021-03-08
Payer: MEDICARE

## 2021-03-08 ENCOUNTER — LAB VISIT (OUTPATIENT)
Dept: LAB | Facility: HOSPITAL | Age: 51
End: 2021-03-08
Payer: MEDICARE

## 2021-03-08 VITALS
RESPIRATION RATE: 18 BRPM | DIASTOLIC BLOOD PRESSURE: 75 MMHG | HEART RATE: 58 BPM | BODY MASS INDEX: 35.18 KG/M2 | TEMPERATURE: 97 F | WEIGHT: 211.19 LBS | HEIGHT: 65 IN | SYSTOLIC BLOOD PRESSURE: 170 MMHG

## 2021-03-08 DIAGNOSIS — R74.8 ABNORMAL TRANSAMINASES: Primary | ICD-10-CM

## 2021-03-08 DIAGNOSIS — K74.00 HEPATIC FIBROSIS, UNSPECIFIED: ICD-10-CM

## 2021-03-08 DIAGNOSIS — R74.8 ABNORMAL TRANSAMINASES: ICD-10-CM

## 2021-03-08 LAB
ALBUMIN SERPL BCP-MCNC: 3.6 G/DL (ref 3.5–5.2)
ALP SERPL-CCNC: 113 U/L (ref 55–135)
ALT SERPL W/O P-5'-P-CCNC: 51 U/L (ref 10–44)
AST SERPL-CCNC: 80 U/L (ref 10–40)
BILIRUB DIRECT SERPL-MCNC: 0.4 MG/DL (ref 0.1–0.3)
BILIRUB SERPL-MCNC: 0.9 MG/DL (ref 0.1–1)
PROT SERPL-MCNC: 9 G/DL (ref 6–8.4)

## 2021-03-08 PROCEDURE — 80076 HEPATIC FUNCTION PANEL: CPT | Performed by: PHYSICIAN ASSISTANT

## 2021-03-08 PROCEDURE — 99999 PR PBB SHADOW E&M-EST. PATIENT-LVL IV: ICD-10-PCS | Mod: PBBFAC,,, | Performed by: PHYSICIAN ASSISTANT

## 2021-03-08 PROCEDURE — 99213 OFFICE O/P EST LOW 20 MIN: CPT | Mod: S$GLB,,, | Performed by: PHYSICIAN ASSISTANT

## 2021-03-08 PROCEDURE — 1125F AMNT PAIN NOTED PAIN PRSNT: CPT | Mod: S$GLB,,, | Performed by: PHYSICIAN ASSISTANT

## 2021-03-08 PROCEDURE — 99213 PR OFFICE/OUTPT VISIT, EST, LEVL III, 20-29 MIN: ICD-10-PCS | Mod: S$GLB,,, | Performed by: PHYSICIAN ASSISTANT

## 2021-03-08 PROCEDURE — 3008F PR BODY MASS INDEX (BMI) DOCUMENTED: ICD-10-PCS | Mod: CPTII,S$GLB,, | Performed by: PHYSICIAN ASSISTANT

## 2021-03-08 PROCEDURE — 99999 PR PBB SHADOW E&M-EST. PATIENT-LVL IV: CPT | Mod: PBBFAC,,, | Performed by: PHYSICIAN ASSISTANT

## 2021-03-08 PROCEDURE — 3008F BODY MASS INDEX DOCD: CPT | Mod: CPTII,S$GLB,, | Performed by: PHYSICIAN ASSISTANT

## 2021-03-08 PROCEDURE — 1125F PR PAIN SEVERITY QUANTIFIED, PAIN PRESENT: ICD-10-PCS | Mod: S$GLB,,, | Performed by: PHYSICIAN ASSISTANT

## 2021-03-08 PROCEDURE — 36415 COLL VENOUS BLD VENIPUNCTURE: CPT | Performed by: PHYSICIAN ASSISTANT

## 2021-03-10 ENCOUNTER — TELEPHONE (OUTPATIENT)
Dept: HEPATOLOGY | Facility: CLINIC | Age: 51
End: 2021-03-10

## 2021-03-15 ENCOUNTER — PES CALL (OUTPATIENT)
Dept: ADMINISTRATIVE | Facility: CLINIC | Age: 51
End: 2021-03-15

## 2021-04-01 ENCOUNTER — TELEPHONE (OUTPATIENT)
Dept: ADMINISTRATIVE | Facility: CLINIC | Age: 51
End: 2021-04-01

## 2021-04-27 ENCOUNTER — TELEPHONE (OUTPATIENT)
Dept: PHYSICAL MEDICINE AND REHAB | Facility: CLINIC | Age: 51
End: 2021-04-27

## 2021-05-03 ENCOUNTER — PES CALL (OUTPATIENT)
Dept: ADMINISTRATIVE | Facility: CLINIC | Age: 51
End: 2021-05-03

## 2021-05-07 ENCOUNTER — PATIENT OUTREACH (OUTPATIENT)
Dept: ADMINISTRATIVE | Facility: HOSPITAL | Age: 51
End: 2021-05-07

## 2021-05-07 DIAGNOSIS — Z12.11 COLON CANCER SCREENING: Primary | ICD-10-CM

## 2021-05-17 DIAGNOSIS — G89.29 CHRONIC MIDLINE LOW BACK PAIN WITH LEFT-SIDED SCIATICA: ICD-10-CM

## 2021-05-17 DIAGNOSIS — M54.42 CHRONIC MIDLINE LOW BACK PAIN WITH LEFT-SIDED SCIATICA: ICD-10-CM

## 2021-05-17 RX ORDER — TRAMADOL HYDROCHLORIDE 50 MG/1
50 TABLET ORAL 3 TIMES DAILY PRN
Qty: 90 TABLET | Refills: 3 | Status: SHIPPED | OUTPATIENT
Start: 2021-05-18 | End: 2021-07-19

## 2021-05-19 DIAGNOSIS — G43.009 MIGRAINE WITHOUT AURA AND WITHOUT STATUS MIGRAINOSUS, NOT INTRACTABLE: Primary | ICD-10-CM

## 2021-05-19 RX ORDER — BUTALBITAL, ACETAMINOPHEN AND CAFFEINE 50; 325; 40 MG/1; MG/1; MG/1
TABLET ORAL
Qty: 30 TABLET | Refills: 0 | Status: SHIPPED | OUTPATIENT
Start: 2021-05-19 | End: 2021-05-26

## 2021-05-20 ENCOUNTER — PES CALL (OUTPATIENT)
Dept: ADMINISTRATIVE | Facility: CLINIC | Age: 51
End: 2021-05-20

## 2021-05-24 ENCOUNTER — OFFICE VISIT (OUTPATIENT)
Dept: URGENT CARE | Facility: CLINIC | Age: 51
End: 2021-05-24
Payer: MEDICARE

## 2021-05-24 VITALS
HEART RATE: 61 BPM | DIASTOLIC BLOOD PRESSURE: 82 MMHG | WEIGHT: 196 LBS | SYSTOLIC BLOOD PRESSURE: 153 MMHG | RESPIRATION RATE: 18 BRPM | BODY MASS INDEX: 33.46 KG/M2 | OXYGEN SATURATION: 96 % | TEMPERATURE: 98 F | HEIGHT: 64 IN

## 2021-05-24 DIAGNOSIS — S52.125A CLOSED NONDISPLACED FRACTURE OF HEAD OF LEFT RADIUS, INITIAL ENCOUNTER: Primary | ICD-10-CM
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
## 2021-05-24 DIAGNOSIS — M79.602 PAIN OF LEFT UPPER EXTREMITY: ICD-10-CM

## 2021-05-24 DIAGNOSIS — S52.135A CLOSED NONDISPLACED FRACTURE OF NECK OF LEFT RADIUS, INITIAL ENCOUNTER: ICD-10-CM

## 2021-05-24 PROCEDURE — 73110 X-RAY EXAM OF WRIST: CPT | Mod: FY,LT,S$GLB, | Performed by: RADIOLOGY

## 2021-05-24 PROCEDURE — 73080 XR ELBOW COMPLETE 3 VIEW LEFT: ICD-10-PCS | Mod: FY,LT,S$GLB, | Performed by: RADIOLOGY

## 2021-05-24 PROCEDURE — 73110 XR WRIST COMPLETE 3 VIEWS LEFT: ICD-10-PCS | Mod: FY,LT,S$GLB, | Performed by: RADIOLOGY

## 2021-05-24 PROCEDURE — 3008F BODY MASS INDEX DOCD: CPT | Mod: CPTII,S$GLB,, | Performed by: NURSE PRACTITIONER

## 2021-05-24 PROCEDURE — 73130 XR HAND COMPLETE 3 VIEW LEFT: ICD-10-PCS | Mod: FY,LT,S$GLB, | Performed by: RADIOLOGY

## 2021-05-24 PROCEDURE — 3008F PR BODY MASS INDEX (BMI) DOCUMENTED: ICD-10-PCS | Mod: CPTII,S$GLB,, | Performed by: NURSE PRACTITIONER

## 2021-05-24 PROCEDURE — 99214 OFFICE O/P EST MOD 30 MIN: CPT | Mod: S$GLB,,, | Performed by: NURSE PRACTITIONER

## 2021-05-24 PROCEDURE — 99214 PR OFFICE/OUTPT VISIT, EST, LEVL IV, 30-39 MIN: ICD-10-PCS | Mod: S$GLB,,, | Performed by: NURSE PRACTITIONER

## 2021-05-24 PROCEDURE — 73130 X-RAY EXAM OF HAND: CPT | Mod: FY,LT,S$GLB, | Performed by: RADIOLOGY

## 2021-05-24 PROCEDURE — 73080 X-RAY EXAM OF ELBOW: CPT | Mod: FY,LT,S$GLB, | Performed by: RADIOLOGY

## 2021-05-24 PROCEDURE — 73090 XR FOREARM LEFT: ICD-10-PCS | Mod: FY,LT,S$GLB, | Performed by: RADIOLOGY

## 2021-05-24 PROCEDURE — 73090 X-RAY EXAM OF FOREARM: CPT | Mod: FY,LT,S$GLB, | Performed by: RADIOLOGY

## 2021-05-24 RX ORDER — OLANZAPINE 10 MG/1
TABLET ORAL
Status: ON HOLD | COMMUNITY
Start: 2021-03-11 | End: 2023-10-03 | Stop reason: HOSPADM

## 2021-05-25 ENCOUNTER — OFFICE VISIT (OUTPATIENT)
Dept: ORTHOPEDICS | Facility: CLINIC | Age: 51
End: 2021-05-25
Payer: MEDICARE

## 2021-05-25 VITALS
DIASTOLIC BLOOD PRESSURE: 80 MMHG | WEIGHT: 206.13 LBS | SYSTOLIC BLOOD PRESSURE: 128 MMHG | HEIGHT: 65 IN | HEART RATE: 76 BPM | BODY MASS INDEX: 34.34 KG/M2

## 2021-05-25 DIAGNOSIS — S52.135A CLOSED NONDISPLACED FRACTURE OF NECK OF LEFT RADIUS, INITIAL ENCOUNTER: Primary | ICD-10-CM

## 2021-05-25 PROCEDURE — 3008F BODY MASS INDEX DOCD: CPT | Mod: CPTII,S$GLB,, | Performed by: ORTHOPAEDIC SURGERY

## 2021-05-25 PROCEDURE — 1125F PR PAIN SEVERITY QUANTIFIED, PAIN PRESENT: ICD-10-PCS | Mod: S$GLB,,, | Performed by: ORTHOPAEDIC SURGERY

## 2021-05-25 PROCEDURE — 99999 PR PBB SHADOW E&M-EST. PATIENT-LVL III: CPT | Mod: PBBFAC,,, | Performed by: ORTHOPAEDIC SURGERY

## 2021-05-25 PROCEDURE — 99999 PR PBB SHADOW E&M-EST. PATIENT-LVL III: ICD-10-PCS | Mod: PBBFAC,,, | Performed by: ORTHOPAEDIC SURGERY

## 2021-05-25 PROCEDURE — 3008F PR BODY MASS INDEX (BMI) DOCUMENTED: ICD-10-PCS | Mod: CPTII,S$GLB,, | Performed by: ORTHOPAEDIC SURGERY

## 2021-05-25 PROCEDURE — 1125F AMNT PAIN NOTED PAIN PRSNT: CPT | Mod: S$GLB,,, | Performed by: ORTHOPAEDIC SURGERY

## 2021-05-25 PROCEDURE — 99204 OFFICE O/P NEW MOD 45 MIN: CPT | Mod: S$GLB,,, | Performed by: ORTHOPAEDIC SURGERY

## 2021-05-25 PROCEDURE — 99204 PR OFFICE/OUTPT VISIT, NEW, LEVL IV, 45-59 MIN: ICD-10-PCS | Mod: S$GLB,,, | Performed by: ORTHOPAEDIC SURGERY

## 2021-05-27 ENCOUNTER — NURSE TRIAGE (OUTPATIENT)
Dept: ADMINISTRATIVE | Facility: CLINIC | Age: 51
End: 2021-05-27

## 2021-06-19 ENCOUNTER — TELEPHONE (OUTPATIENT)
Dept: INTERNAL MEDICINE | Facility: CLINIC | Age: 51
End: 2021-06-19
Payer: MEDICAID

## 2021-07-05 LAB — HEMOCCULT STL QL IA: NEGATIVE

## 2021-07-09 ENCOUNTER — TELEPHONE (OUTPATIENT)
Dept: ORTHOPEDICS | Facility: CLINIC | Age: 51
End: 2021-07-09

## 2021-07-09 DIAGNOSIS — S52.135A CLOSED NONDISPLACED FRACTURE OF NECK OF LEFT RADIUS, INITIAL ENCOUNTER: Primary | ICD-10-CM

## 2021-07-13 ENCOUNTER — OFFICE VISIT (OUTPATIENT)
Dept: PHYSICAL MEDICINE AND REHAB | Facility: CLINIC | Age: 51
End: 2021-07-13
Payer: MEDICARE

## 2021-07-13 VITALS
BODY MASS INDEX: 33.43 KG/M2 | HEART RATE: 68 BPM | HEIGHT: 65 IN | DIASTOLIC BLOOD PRESSURE: 90 MMHG | SYSTOLIC BLOOD PRESSURE: 146 MMHG | WEIGHT: 200.63 LBS

## 2021-07-13 DIAGNOSIS — M51.36 DDD (DEGENERATIVE DISC DISEASE), LUMBAR: ICD-10-CM

## 2021-07-13 DIAGNOSIS — M54.2 CHRONIC NECK PAIN: ICD-10-CM

## 2021-07-13 DIAGNOSIS — E66.9 OBESITY (BMI 30.0-34.9): ICD-10-CM

## 2021-07-13 DIAGNOSIS — M47.26 OSTEOARTHRITIS OF SPINE WITH RADICULOPATHY, LUMBAR REGION: ICD-10-CM

## 2021-07-13 DIAGNOSIS — G89.29 CHRONIC MIDLINE LOW BACK PAIN WITH RIGHT-SIDED SCIATICA: Primary | ICD-10-CM

## 2021-07-13 DIAGNOSIS — M54.41 CHRONIC MIDLINE LOW BACK PAIN WITH RIGHT-SIDED SCIATICA: Primary | ICD-10-CM

## 2021-07-13 DIAGNOSIS — G89.29 CHRONIC NECK PAIN: ICD-10-CM

## 2021-07-13 DIAGNOSIS — M79.602 LEFT ARM PAIN: ICD-10-CM

## 2021-07-13 PROCEDURE — 99999 PR PBB SHADOW E&M-EST. PATIENT-LVL II: ICD-10-PCS | Mod: PBBFAC,HCNC,, | Performed by: PHYSICAL MEDICINE & REHABILITATION

## 2021-07-13 PROCEDURE — 99999 PR PBB SHADOW E&M-EST. PATIENT-LVL II: CPT | Mod: PBBFAC,HCNC,, | Performed by: PHYSICAL MEDICINE & REHABILITATION

## 2021-07-13 PROCEDURE — 99215 OFFICE O/P EST HI 40 MIN: CPT | Mod: HCNC,S$GLB,, | Performed by: PHYSICAL MEDICINE & REHABILITATION

## 2021-07-13 PROCEDURE — 99215 PR OFFICE/OUTPT VISIT, EST, LEVL V, 40-54 MIN: ICD-10-PCS | Mod: HCNC,S$GLB,, | Performed by: PHYSICAL MEDICINE & REHABILITATION

## 2021-07-13 PROCEDURE — 99212 OFFICE O/P EST SF 10 MIN: CPT | Mod: PBBFAC,HCNC | Performed by: PHYSICAL MEDICINE & REHABILITATION

## 2021-07-13 RX ORDER — PREGABALIN 50 MG/1
50 CAPSULE ORAL 2 TIMES DAILY
Qty: 90 CAPSULE | Refills: 2 | Status: SHIPPED | OUTPATIENT
Start: 2021-07-13 | End: 2022-02-08 | Stop reason: SDUPTHER

## 2021-07-13 RX ORDER — DICLOFENAC SODIUM 10 MG/G
2 GEL TOPICAL 3 TIMES DAILY PRN
Qty: 3 TUBE | Refills: 1 | Status: SHIPPED | OUTPATIENT
Start: 2021-07-13 | End: 2022-04-27

## 2021-09-09 ENCOUNTER — HOSPITAL ENCOUNTER (OUTPATIENT)
Dept: RADIOLOGY | Facility: HOSPITAL | Age: 51
Discharge: HOME | End: 2021-09-09
Attending: PHYSICIAN ASSISTANT
Payer: MEDICARE

## 2021-09-09 DIAGNOSIS — R74.8 ABNORMAL TRANSAMINASES: ICD-10-CM

## 2021-09-09 PROCEDURE — 76705 US ABDOMEN LIMITED: ICD-10-PCS | Mod: 26,,, | Performed by: RADIOLOGY

## 2021-09-09 PROCEDURE — 76705 ECHO EXAM OF ABDOMEN: CPT | Mod: 26,,, | Performed by: RADIOLOGY

## 2021-09-09 PROCEDURE — 76705 ECHO EXAM OF ABDOMEN: CPT | Mod: TC

## 2021-09-23 ENCOUNTER — TELEPHONE (OUTPATIENT)
Dept: HEPATOLOGY | Facility: CLINIC | Age: 51
End: 2021-09-23

## 2021-09-23 DIAGNOSIS — K74.00 HEPATIC FIBROSIS, UNSPECIFIED: Primary | ICD-10-CM

## 2021-09-27 ENCOUNTER — TELEPHONE (OUTPATIENT)
Dept: INTERNAL MEDICINE | Facility: CLINIC | Age: 51
End: 2021-09-27

## 2021-09-27 DIAGNOSIS — R53.83 FATIGUE, UNSPECIFIED TYPE: ICD-10-CM

## 2021-09-27 DIAGNOSIS — F25.9 SCHIZOAFFECTIVE DISORDER, UNSPECIFIED TYPE: ICD-10-CM

## 2021-09-27 DIAGNOSIS — E66.01 SEVERE OBESITY WITH BODY MASS INDEX (BMI) OF 35.0 TO 35.9 AND COMORBIDITY: ICD-10-CM

## 2021-09-27 DIAGNOSIS — K76.0 FATTY LIVER: ICD-10-CM

## 2021-09-27 DIAGNOSIS — B18.2 CHRONIC HEPATITIS C WITHOUT HEPATIC COMA: ICD-10-CM

## 2021-09-27 DIAGNOSIS — Z00.00 ENCOUNTER FOR PREVENTIVE HEALTH EXAMINATION: Primary | ICD-10-CM

## 2021-09-27 DIAGNOSIS — Z00.00 ANNUAL PHYSICAL EXAM: ICD-10-CM

## 2021-09-28 ENCOUNTER — LAB VISIT (OUTPATIENT)
Dept: LAB | Facility: HOSPITAL | Age: 51
End: 2021-09-28
Attending: PHYSICIAN ASSISTANT
Payer: MEDICARE

## 2021-09-28 ENCOUNTER — OFFICE VISIT (OUTPATIENT)
Dept: INTERNAL MEDICINE | Facility: CLINIC | Age: 51
End: 2021-09-28
Payer: MEDICARE

## 2021-09-28 VITALS
BODY MASS INDEX: 32.69 KG/M2 | RESPIRATION RATE: 16 BRPM | TEMPERATURE: 98 F | HEART RATE: 62 BPM | SYSTOLIC BLOOD PRESSURE: 164 MMHG | HEIGHT: 65 IN | WEIGHT: 196.19 LBS | DIASTOLIC BLOOD PRESSURE: 88 MMHG

## 2021-09-28 DIAGNOSIS — K76.0 FATTY LIVER: ICD-10-CM

## 2021-09-28 DIAGNOSIS — T14.8XXA MULTIPLE WOUNDS OF SKIN: Primary | ICD-10-CM

## 2021-09-28 DIAGNOSIS — K74.00 LIVER FIBROSIS: ICD-10-CM

## 2021-09-28 DIAGNOSIS — R63.4 WEIGHT LOSS: ICD-10-CM

## 2021-09-28 DIAGNOSIS — B95.8 STAPH INFECTION: ICD-10-CM

## 2021-09-28 DIAGNOSIS — T14.8XXA MULTIPLE WOUNDS OF SKIN: ICD-10-CM

## 2021-09-28 LAB
ALBUMIN SERPL BCP-MCNC: 3.2 G/DL (ref 3.5–5.2)
ALP SERPL-CCNC: 100 U/L (ref 55–135)
ALT SERPL W/O P-5'-P-CCNC: 23 U/L (ref 10–44)
ANION GAP SERPL CALC-SCNC: 12 MMOL/L (ref 8–16)
AST SERPL-CCNC: 32 U/L (ref 10–40)
BILIRUB SERPL-MCNC: 0.3 MG/DL (ref 0.1–1)
BUN SERPL-MCNC: 8 MG/DL (ref 6–20)
CALCIUM SERPL-MCNC: 9.2 MG/DL (ref 8.7–10.5)
CHLORIDE SERPL-SCNC: 105 MMOL/L (ref 95–110)
CO2 SERPL-SCNC: 21 MMOL/L (ref 23–29)
CREAT SERPL-MCNC: 0.8 MG/DL (ref 0.5–1.4)
EST. GFR  (AFRICAN AMERICAN): >60 ML/MIN/1.73 M^2
EST. GFR  (NON AFRICAN AMERICAN): >60 ML/MIN/1.73 M^2
GLUCOSE SERPL-MCNC: 105 MG/DL (ref 70–110)
POTASSIUM SERPL-SCNC: 4 MMOL/L (ref 3.5–5.1)
PROT SERPL-MCNC: 8.1 G/DL (ref 6–8.4)
SODIUM SERPL-SCNC: 138 MMOL/L (ref 136–145)

## 2021-09-28 PROCEDURE — 99999 PR PBB SHADOW E&M-EST. PATIENT-LVL IV: ICD-10-PCS | Mod: PBBFAC,,, | Performed by: NURSE PRACTITIONER

## 2021-09-28 PROCEDURE — 3008F PR BODY MASS INDEX (BMI) DOCUMENTED: ICD-10-PCS | Mod: CPTII,S$GLB,, | Performed by: NURSE PRACTITIONER

## 2021-09-28 PROCEDURE — 87389 HIV-1 AG W/HIV-1&-2 AB AG IA: CPT | Performed by: NURSE PRACTITIONER

## 2021-09-28 PROCEDURE — 3079F DIAST BP 80-89 MM HG: CPT | Mod: CPTII,S$GLB,, | Performed by: NURSE PRACTITIONER

## 2021-09-28 PROCEDURE — 99214 OFFICE O/P EST MOD 30 MIN: CPT | Mod: S$GLB,,, | Performed by: NURSE PRACTITIONER

## 2021-09-28 PROCEDURE — 99214 PR OFFICE/OUTPT VISIT, EST, LEVL IV, 30-39 MIN: ICD-10-PCS | Mod: S$GLB,,, | Performed by: NURSE PRACTITIONER

## 2021-09-28 PROCEDURE — 3077F PR MOST RECENT SYSTOLIC BLOOD PRESSURE >= 140 MM HG: ICD-10-PCS | Mod: CPTII,S$GLB,, | Performed by: NURSE PRACTITIONER

## 2021-09-28 PROCEDURE — 36415 COLL VENOUS BLD VENIPUNCTURE: CPT | Mod: PO | Performed by: NURSE PRACTITIONER

## 2021-09-28 PROCEDURE — 3008F BODY MASS INDEX DOCD: CPT | Mod: CPTII,S$GLB,, | Performed by: NURSE PRACTITIONER

## 2021-09-28 PROCEDURE — 99999 PR PBB SHADOW E&M-EST. PATIENT-LVL IV: CPT | Mod: PBBFAC,,, | Performed by: NURSE PRACTITIONER

## 2021-09-28 PROCEDURE — 3079F PR MOST RECENT DIASTOLIC BLOOD PRESSURE 80-89 MM HG: ICD-10-PCS | Mod: CPTII,S$GLB,, | Performed by: NURSE PRACTITIONER

## 2021-09-28 PROCEDURE — 85025 COMPLETE CBC W/AUTO DIFF WBC: CPT | Performed by: NURSE PRACTITIONER

## 2021-09-28 PROCEDURE — 80053 COMPREHEN METABOLIC PANEL: CPT | Performed by: NURSE PRACTITIONER

## 2021-09-28 PROCEDURE — 3077F SYST BP >= 140 MM HG: CPT | Mod: CPTII,S$GLB,, | Performed by: NURSE PRACTITIONER

## 2021-09-28 RX ORDER — SULFAMETHOXAZOLE AND TRIMETHOPRIM 800; 160 MG/1; MG/1
1 TABLET ORAL 2 TIMES DAILY
Qty: 14 TABLET | Refills: 0 | Status: SHIPPED | OUTPATIENT
Start: 2021-09-28 | End: 2021-10-05

## 2021-09-28 RX ORDER — MUPIROCIN 20 MG/G
OINTMENT TOPICAL 3 TIMES DAILY
Qty: 30 G | Refills: 0 | Status: SHIPPED | OUTPATIENT
Start: 2021-09-28 | End: 2021-10-22 | Stop reason: SDUPTHER

## 2021-09-29 ENCOUNTER — TELEPHONE (OUTPATIENT)
Dept: INTERNAL MEDICINE | Facility: CLINIC | Age: 51
End: 2021-09-29

## 2021-09-29 LAB
BASOPHILS # BLD AUTO: 0.05 K/UL (ref 0–0.2)
BASOPHILS NFR BLD: 0.7 % (ref 0–1.9)
DIFFERENTIAL METHOD: ABNORMAL
EOSINOPHIL # BLD AUTO: 0.2 K/UL (ref 0–0.5)
EOSINOPHIL NFR BLD: 3.5 % (ref 0–8)
ERYTHROCYTE [DISTWIDTH] IN BLOOD BY AUTOMATED COUNT: 13.5 % (ref 11.5–14.5)
HCT VFR BLD AUTO: 38.5 % (ref 37–48.5)
HGB BLD-MCNC: 13.1 G/DL (ref 12–16)
HIV 1+2 AB+HIV1 P24 AG SERPL QL IA: NEGATIVE
IMM GRANULOCYTES # BLD AUTO: 0.01 K/UL (ref 0–0.04)
IMM GRANULOCYTES NFR BLD AUTO: 0.1 % (ref 0–0.5)
LYMPHOCYTES # BLD AUTO: 3 K/UL (ref 1–4.8)
LYMPHOCYTES NFR BLD: 43 % (ref 18–48)
MCH RBC QN AUTO: 32.4 PG (ref 27–31)
MCHC RBC AUTO-ENTMCNC: 34 G/DL (ref 32–36)
MCV RBC AUTO: 95 FL (ref 82–98)
MONOCYTES # BLD AUTO: 0.5 K/UL (ref 0.3–1)
MONOCYTES NFR BLD: 7.5 % (ref 4–15)
NEUTROPHILS # BLD AUTO: 3.1 K/UL (ref 1.8–7.7)
NEUTROPHILS NFR BLD: 45.2 % (ref 38–73)
NRBC BLD-RTO: 0 /100 WBC
PLATELET # BLD AUTO: 172 K/UL (ref 150–450)
PMV BLD AUTO: 11.9 FL (ref 9.2–12.9)
RBC # BLD AUTO: 4.04 M/UL (ref 4–5.4)
WBC # BLD AUTO: 6.89 K/UL (ref 3.9–12.7)

## 2021-09-30 ENCOUNTER — TELEPHONE (OUTPATIENT)
Dept: INTERNAL MEDICINE | Facility: CLINIC | Age: 51
End: 2021-09-30

## 2021-10-01 ENCOUNTER — PATIENT OUTREACH (OUTPATIENT)
Dept: ADMINISTRATIVE | Facility: HOSPITAL | Age: 51
End: 2021-10-01

## 2021-10-20 ENCOUNTER — PES CALL (OUTPATIENT)
Dept: ADMINISTRATIVE | Facility: CLINIC | Age: 51
End: 2021-10-20

## 2021-10-20 ENCOUNTER — TELEPHONE (OUTPATIENT)
Dept: HEPATOLOGY | Facility: CLINIC | Age: 51
End: 2021-10-20
Payer: MEDICARE

## 2021-10-20 DIAGNOSIS — R59.1 LYMPHADENOPATHY: Primary | ICD-10-CM

## 2021-10-22 ENCOUNTER — OFFICE VISIT (OUTPATIENT)
Dept: INTERNAL MEDICINE | Facility: CLINIC | Age: 51
End: 2021-10-22
Payer: MEDICARE

## 2021-10-22 VITALS
HEIGHT: 65 IN | OXYGEN SATURATION: 96 % | BODY MASS INDEX: 32.51 KG/M2 | HEART RATE: 76 BPM | WEIGHT: 195.13 LBS | DIASTOLIC BLOOD PRESSURE: 100 MMHG | SYSTOLIC BLOOD PRESSURE: 160 MMHG | RESPIRATION RATE: 16 BRPM | TEMPERATURE: 98 F

## 2021-10-22 DIAGNOSIS — B95.8 STAPH INFECTION: Primary | ICD-10-CM

## 2021-10-22 DIAGNOSIS — R03.0 ELEVATED BP WITHOUT DIAGNOSIS OF HYPERTENSION: ICD-10-CM

## 2021-10-22 PROCEDURE — 99999 PR PBB SHADOW E&M-EST. PATIENT-LVL IV: CPT | Mod: PBBFAC,HCNC,, | Performed by: HOSPITALIST

## 2021-10-22 PROCEDURE — 99213 PR OFFICE/OUTPT VISIT, EST, LEVL III, 20-29 MIN: ICD-10-PCS | Mod: HCNC,S$GLB,, | Performed by: HOSPITALIST

## 2021-10-22 PROCEDURE — 99999 PR PBB SHADOW E&M-EST. PATIENT-LVL IV: ICD-10-PCS | Mod: PBBFAC,HCNC,, | Performed by: HOSPITALIST

## 2021-10-22 PROCEDURE — 99214 OFFICE O/P EST MOD 30 MIN: CPT | Mod: PBBFAC,HCNC,PO | Performed by: HOSPITALIST

## 2021-10-22 PROCEDURE — 99213 OFFICE O/P EST LOW 20 MIN: CPT | Mod: HCNC,S$GLB,, | Performed by: HOSPITALIST

## 2021-10-22 RX ORDER — MUPIROCIN 20 MG/G
OINTMENT TOPICAL 3 TIMES DAILY PRN
Qty: 30 G | Refills: 0 | Status: SHIPPED | OUTPATIENT
Start: 2021-10-22 | End: 2021-12-28

## 2021-11-05 DIAGNOSIS — G89.29 CHRONIC MIDLINE LOW BACK PAIN WITH LEFT-SIDED SCIATICA: ICD-10-CM

## 2021-11-05 DIAGNOSIS — M54.42 CHRONIC MIDLINE LOW BACK PAIN WITH LEFT-SIDED SCIATICA: ICD-10-CM

## 2021-11-05 RX ORDER — TRAMADOL HYDROCHLORIDE 50 MG/1
50 TABLET ORAL 3 TIMES DAILY PRN
Qty: 90 TABLET | Refills: 3 | Status: SHIPPED | OUTPATIENT
Start: 2021-11-12 | End: 2021-11-29 | Stop reason: SDUPTHER

## 2021-11-19 ENCOUNTER — OFFICE VISIT (OUTPATIENT)
Dept: INTERNAL MEDICINE | Facility: CLINIC | Age: 51
End: 2021-11-19
Payer: MEDICARE

## 2021-11-19 VITALS
TEMPERATURE: 97 F | HEIGHT: 65 IN | RESPIRATION RATE: 20 BRPM | SYSTOLIC BLOOD PRESSURE: 130 MMHG | OXYGEN SATURATION: 97 % | BODY MASS INDEX: 32.65 KG/M2 | HEART RATE: 94 BPM | DIASTOLIC BLOOD PRESSURE: 90 MMHG | WEIGHT: 196 LBS

## 2021-11-19 DIAGNOSIS — G89.29 CHRONIC RIGHT SHOULDER PAIN: Primary | ICD-10-CM

## 2021-11-19 DIAGNOSIS — L98.9 SKIN SORE: ICD-10-CM

## 2021-11-19 DIAGNOSIS — M25.511 CHRONIC RIGHT SHOULDER PAIN: Primary | ICD-10-CM

## 2021-11-19 DIAGNOSIS — K74.00 LIVER FIBROSIS: ICD-10-CM

## 2021-11-19 DIAGNOSIS — F39 MOOD DISORDER: ICD-10-CM

## 2021-11-19 DIAGNOSIS — F25.9 SCHIZOAFFECTIVE DISORDER, UNSPECIFIED TYPE: ICD-10-CM

## 2021-11-19 DIAGNOSIS — B18.2 CHRONIC HEPATITIS C WITHOUT HEPATIC COMA: ICD-10-CM

## 2021-11-19 DIAGNOSIS — K76.0 FATTY LIVER: ICD-10-CM

## 2021-11-19 DIAGNOSIS — E66.01 SEVERE OBESITY WITH BODY MASS INDEX (BMI) OF 35.0 TO 35.9 AND COMORBIDITY: ICD-10-CM

## 2021-11-19 PROCEDURE — 99215 OFFICE O/P EST HI 40 MIN: CPT | Mod: PBBFAC,PO | Performed by: INTERNAL MEDICINE

## 2021-11-19 PROCEDURE — 99999 PR PBB SHADOW E&M-EST. PATIENT-LVL V: CPT | Mod: PBBFAC,,, | Performed by: INTERNAL MEDICINE

## 2021-11-19 PROCEDURE — 3080F PR MOST RECENT DIASTOLIC BLOOD PRESSURE >= 90 MM HG: ICD-10-PCS | Mod: CPTII,S$GLB,, | Performed by: INTERNAL MEDICINE

## 2021-11-19 PROCEDURE — 3075F PR MOST RECENT SYSTOLIC BLOOD PRESS GE 130-139MM HG: ICD-10-PCS | Mod: CPTII,S$GLB,, | Performed by: INTERNAL MEDICINE

## 2021-11-19 PROCEDURE — 3008F PR BODY MASS INDEX (BMI) DOCUMENTED: ICD-10-PCS | Mod: CPTII,S$GLB,, | Performed by: INTERNAL MEDICINE

## 2021-11-19 PROCEDURE — 3008F BODY MASS INDEX DOCD: CPT | Mod: CPTII,S$GLB,, | Performed by: INTERNAL MEDICINE

## 2021-11-19 PROCEDURE — 3080F DIAST BP >= 90 MM HG: CPT | Mod: CPTII,S$GLB,, | Performed by: INTERNAL MEDICINE

## 2021-11-19 PROCEDURE — 99999 PR PBB SHADOW E&M-EST. PATIENT-LVL V: ICD-10-PCS | Mod: PBBFAC,,, | Performed by: INTERNAL MEDICINE

## 2021-11-19 PROCEDURE — 99214 OFFICE O/P EST MOD 30 MIN: CPT | Mod: S$GLB,,, | Performed by: INTERNAL MEDICINE

## 2021-11-19 PROCEDURE — 99214 PR OFFICE/OUTPT VISIT, EST, LEVL IV, 30-39 MIN: ICD-10-PCS | Mod: S$GLB,,, | Performed by: INTERNAL MEDICINE

## 2021-11-19 PROCEDURE — 3075F SYST BP GE 130 - 139MM HG: CPT | Mod: CPTII,S$GLB,, | Performed by: INTERNAL MEDICINE

## 2021-11-19 RX ORDER — MELOXICAM 15 MG/1
15 TABLET ORAL DAILY
COMMUNITY
Start: 2021-06-29 | End: 2022-01-12

## 2021-11-22 ENCOUNTER — TELEPHONE (OUTPATIENT)
Dept: PHYSICAL MEDICINE AND REHAB | Facility: CLINIC | Age: 51
End: 2021-11-22
Payer: MEDICARE

## 2021-11-23 ENCOUNTER — TELEPHONE (OUTPATIENT)
Dept: PHYSICAL MEDICINE AND REHAB | Facility: CLINIC | Age: 51
End: 2021-11-23
Payer: MEDICARE

## 2021-11-23 RX ORDER — CETIRIZINE HYDROCHLORIDE 5 MG/1
5 TABLET ORAL DAILY
Qty: 30 TABLET | Refills: 5 | Status: SHIPPED | OUTPATIENT
Start: 2021-11-23 | End: 2022-01-12

## 2021-11-24 ENCOUNTER — TELEPHONE (OUTPATIENT)
Dept: PHYSICAL MEDICINE AND REHAB | Facility: CLINIC | Age: 51
End: 2021-11-24
Payer: MEDICARE

## 2021-11-29 ENCOUNTER — TELEPHONE (OUTPATIENT)
Dept: PHYSICAL MEDICINE AND REHAB | Facility: CLINIC | Age: 51
End: 2021-11-29
Payer: MEDICARE

## 2021-11-29 DIAGNOSIS — M54.42 CHRONIC MIDLINE LOW BACK PAIN WITH LEFT-SIDED SCIATICA: ICD-10-CM

## 2021-11-29 DIAGNOSIS — G89.29 CHRONIC MIDLINE LOW BACK PAIN WITH LEFT-SIDED SCIATICA: ICD-10-CM

## 2021-11-29 RX ORDER — TRAMADOL HYDROCHLORIDE 50 MG/1
50 TABLET ORAL EVERY 6 HOURS PRN
Qty: 120 TABLET | Refills: 1 | Status: SHIPPED | OUTPATIENT
Start: 2021-11-30 | End: 2022-02-08 | Stop reason: SDUPTHER

## 2021-11-30 ENCOUNTER — OFFICE VISIT (OUTPATIENT)
Dept: DERMATOLOGY | Facility: CLINIC | Age: 51
End: 2021-11-30
Payer: MEDICARE

## 2021-11-30 ENCOUNTER — TELEPHONE (OUTPATIENT)
Dept: SPORTS MEDICINE | Facility: CLINIC | Age: 51
End: 2021-11-30
Payer: MEDICARE

## 2021-11-30 ENCOUNTER — LAB VISIT (OUTPATIENT)
Dept: LAB | Facility: HOSPITAL | Age: 51
End: 2021-11-30
Payer: MEDICARE

## 2021-11-30 DIAGNOSIS — L28.1 PRURIGO NODULARIS: Primary | ICD-10-CM

## 2021-11-30 DIAGNOSIS — L29.9 PRURITUS: ICD-10-CM

## 2021-11-30 DIAGNOSIS — M25.511 RIGHT SHOULDER PAIN, UNSPECIFIED CHRONICITY: Primary | ICD-10-CM

## 2021-11-30 DIAGNOSIS — L28.1 PRURIGO NODULARIS: ICD-10-CM

## 2021-11-30 LAB
ALBUMIN SERPL BCP-MCNC: 3.4 G/DL (ref 3.5–5.2)
ALP SERPL-CCNC: 113 U/L (ref 55–135)
ALT SERPL W/O P-5'-P-CCNC: 31 U/L (ref 10–44)
ANION GAP SERPL CALC-SCNC: 8 MMOL/L (ref 8–16)
AST SERPL-CCNC: 42 U/L (ref 10–40)
BASOPHILS # BLD AUTO: 0.03 K/UL (ref 0–0.2)
BASOPHILS NFR BLD: 0.4 % (ref 0–1.9)
BILIRUB SERPL-MCNC: 0.4 MG/DL (ref 0.1–1)
BUN SERPL-MCNC: 10 MG/DL (ref 6–20)
CALCIUM SERPL-MCNC: 9.3 MG/DL (ref 8.7–10.5)
CHLORIDE SERPL-SCNC: 102 MMOL/L (ref 95–110)
CO2 SERPL-SCNC: 24 MMOL/L (ref 23–29)
CREAT SERPL-MCNC: 0.7 MG/DL (ref 0.5–1.4)
DIFFERENTIAL METHOD: ABNORMAL
EOSINOPHIL # BLD AUTO: 0.3 K/UL (ref 0–0.5)
EOSINOPHIL NFR BLD: 3.7 % (ref 0–8)
ERYTHROCYTE [DISTWIDTH] IN BLOOD BY AUTOMATED COUNT: 13.8 % (ref 11.5–14.5)
EST. GFR  (AFRICAN AMERICAN): >60 ML/MIN/1.73 M^2
EST. GFR  (NON AFRICAN AMERICAN): >60 ML/MIN/1.73 M^2
GLUCOSE SERPL-MCNC: 81 MG/DL (ref 70–110)
HCT VFR BLD AUTO: 41.9 % (ref 37–48.5)
HGB BLD-MCNC: 13.9 G/DL (ref 12–16)
IMM GRANULOCYTES # BLD AUTO: 0.03 K/UL (ref 0–0.04)
IMM GRANULOCYTES NFR BLD AUTO: 0.4 % (ref 0–0.5)
LYMPHOCYTES # BLD AUTO: 3.2 K/UL (ref 1–4.8)
LYMPHOCYTES NFR BLD: 43.8 % (ref 18–48)
MCH RBC QN AUTO: 31.7 PG (ref 27–31)
MCHC RBC AUTO-ENTMCNC: 33.2 G/DL (ref 32–36)
MCV RBC AUTO: 95 FL (ref 82–98)
MONOCYTES # BLD AUTO: 0.6 K/UL (ref 0.3–1)
MONOCYTES NFR BLD: 7.6 % (ref 4–15)
NEUTROPHILS # BLD AUTO: 3.3 K/UL (ref 1.8–7.7)
NEUTROPHILS NFR BLD: 44.1 % (ref 38–73)
NRBC BLD-RTO: 0 /100 WBC
PLATELET # BLD AUTO: 206 K/UL (ref 150–450)
PMV BLD AUTO: 11 FL (ref 9.2–12.9)
POTASSIUM SERPL-SCNC: 3.9 MMOL/L (ref 3.5–5.1)
PROT SERPL-MCNC: 8.4 G/DL (ref 6–8.4)
RBC # BLD AUTO: 4.39 M/UL (ref 4–5.4)
SODIUM SERPL-SCNC: 134 MMOL/L (ref 136–145)
T4 FREE SERPL-MCNC: 1.09 NG/DL (ref 0.71–1.51)
TSH SERPL DL<=0.005 MIU/L-ACNC: 0.05 UIU/ML (ref 0.4–4)
WBC # BLD AUTO: 7.36 K/UL (ref 3.9–12.7)

## 2021-11-30 PROCEDURE — 99204 PR OFFICE/OUTPT VISIT, NEW, LEVL IV, 45-59 MIN: ICD-10-PCS | Mod: HCNC,S$GLB,, | Performed by: STUDENT IN AN ORGANIZED HEALTH CARE EDUCATION/TRAINING PROGRAM

## 2021-11-30 PROCEDURE — 99999 PR PBB SHADOW E&M-EST. PATIENT-LVL III: ICD-10-PCS | Mod: PBBFAC,HCNC,,

## 2021-11-30 PROCEDURE — 99999 PR PBB SHADOW E&M-EST. PATIENT-LVL III: CPT | Mod: PBBFAC,HCNC,,

## 2021-11-30 PROCEDURE — 84443 ASSAY THYROID STIM HORMONE: CPT | Mod: HCNC | Performed by: INTERNAL MEDICINE

## 2021-11-30 PROCEDURE — 80053 COMPREHEN METABOLIC PANEL: CPT | Mod: HCNC | Performed by: INTERNAL MEDICINE

## 2021-11-30 PROCEDURE — 84439 ASSAY OF FREE THYROXINE: CPT | Mod: HCNC | Performed by: INTERNAL MEDICINE

## 2021-11-30 PROCEDURE — 85025 COMPLETE CBC W/AUTO DIFF WBC: CPT | Mod: HCNC | Performed by: INTERNAL MEDICINE

## 2021-11-30 PROCEDURE — 99204 OFFICE O/P NEW MOD 45 MIN: CPT | Mod: HCNC,S$GLB,, | Performed by: STUDENT IN AN ORGANIZED HEALTH CARE EDUCATION/TRAINING PROGRAM

## 2021-11-30 PROCEDURE — 36415 COLL VENOUS BLD VENIPUNCTURE: CPT | Mod: HCNC | Performed by: INTERNAL MEDICINE

## 2021-11-30 RX ORDER — TRIAMCINOLONE ACETONIDE 1 MG/G
CREAM TOPICAL 2 TIMES DAILY
Qty: 454 G | Refills: 1 | Status: ON HOLD | OUTPATIENT
Start: 2021-11-30 | End: 2023-10-03 | Stop reason: HOSPADM

## 2021-12-08 ENCOUNTER — LAB VISIT (OUTPATIENT)
Dept: LAB | Facility: HOSPITAL | Age: 51
End: 2021-12-08
Attending: PHYSICIAN ASSISTANT
Payer: MEDICARE

## 2021-12-08 ENCOUNTER — OFFICE VISIT (OUTPATIENT)
Dept: INTERNAL MEDICINE | Facility: CLINIC | Age: 51
End: 2021-12-08
Payer: MEDICARE

## 2021-12-08 VITALS
HEIGHT: 63 IN | BODY MASS INDEX: 33.25 KG/M2 | HEART RATE: 79 BPM | WEIGHT: 187.63 LBS | DIASTOLIC BLOOD PRESSURE: 82 MMHG | OXYGEN SATURATION: 96 % | TEMPERATURE: 98 F | SYSTOLIC BLOOD PRESSURE: 138 MMHG | RESPIRATION RATE: 18 BRPM

## 2021-12-08 DIAGNOSIS — R74.8 ABNORMAL TRANSAMINASES: ICD-10-CM

## 2021-12-08 DIAGNOSIS — Z00.00 ANNUAL PHYSICAL EXAM: Primary | ICD-10-CM

## 2021-12-08 DIAGNOSIS — R53.83 FATIGUE, UNSPECIFIED TYPE: ICD-10-CM

## 2021-12-08 DIAGNOSIS — E66.01 SEVERE OBESITY WITH BODY MASS INDEX (BMI) OF 35.0 TO 35.9 AND COMORBIDITY: ICD-10-CM

## 2021-12-08 DIAGNOSIS — Z00.00 ENCOUNTER FOR PREVENTIVE HEALTH EXAMINATION: ICD-10-CM

## 2021-12-08 DIAGNOSIS — M46.1 SACROILIITIS: ICD-10-CM

## 2021-12-08 DIAGNOSIS — M47.26 OSTEOARTHRITIS OF SPINE WITH RADICULOPATHY, LUMBAR REGION: ICD-10-CM

## 2021-12-08 DIAGNOSIS — K74.00 HEPATIC FIBROSIS, UNSPECIFIED: ICD-10-CM

## 2021-12-08 DIAGNOSIS — B18.2 CHRONIC HEPATITIS C WITHOUT HEPATIC COMA: ICD-10-CM

## 2021-12-08 DIAGNOSIS — Z00.00 ANNUAL PHYSICAL EXAM: ICD-10-CM

## 2021-12-08 DIAGNOSIS — F25.9 SCHIZOAFFECTIVE DISORDER, UNSPECIFIED TYPE: ICD-10-CM

## 2021-12-08 DIAGNOSIS — K76.0 FATTY LIVER: ICD-10-CM

## 2021-12-08 DIAGNOSIS — F13.20 SEDATIVE DEPENDENCE: ICD-10-CM

## 2021-12-08 DIAGNOSIS — G43.019 INTRACTABLE MIGRAINE WITHOUT AURA AND WITHOUT STATUS MIGRAINOSUS: ICD-10-CM

## 2021-12-08 DIAGNOSIS — F41.1 GENERALIZED ANXIETY DISORDER: ICD-10-CM

## 2021-12-08 LAB
AFP SERPL-MCNC: 6.2 NG/ML (ref 0–8.4)
CHOLEST SERPL-MCNC: 173 MG/DL (ref 120–199)
CHOLEST/HDLC SERPL: 5.1 {RATIO} (ref 2–5)
CK SERPL-CCNC: 61 U/L (ref 20–180)
HDLC SERPL-MCNC: 34 MG/DL (ref 40–75)
HDLC SERPL: 19.7 % (ref 20–50)
IGG SERPL-MCNC: 2653 MG/DL (ref 650–1600)
LDLC SERPL CALC-MCNC: 123 MG/DL (ref 63–159)
NONHDLC SERPL-MCNC: 139 MG/DL
T4 FREE SERPL-MCNC: 1.57 NG/DL (ref 0.71–1.51)
THYROPEROXIDASE IGG SERPL-ACNC: <6 IU/ML
TRIGL SERPL-MCNC: 80 MG/DL (ref 30–150)
TSH SERPL DL<=0.005 MIU/L-ACNC: 0.02 UIU/ML (ref 0.4–4)

## 2021-12-08 PROCEDURE — 99396 PR PREVENTIVE VISIT,EST,40-64: ICD-10-PCS | Mod: HCNC,S$GLB,, | Performed by: INTERNAL MEDICINE

## 2021-12-08 PROCEDURE — 80061 LIPID PANEL: CPT | Mod: HCNC | Performed by: INTERNAL MEDICINE

## 2021-12-08 PROCEDURE — 99214 OFFICE O/P EST MOD 30 MIN: CPT | Mod: PBBFAC,PO | Performed by: INTERNAL MEDICINE

## 2021-12-08 PROCEDURE — 99999 PR PBB SHADOW E&M-EST. PATIENT-LVL IV: ICD-10-PCS | Mod: PBBFAC,HCNC,, | Performed by: INTERNAL MEDICINE

## 2021-12-08 PROCEDURE — 99999 PR PBB SHADOW E&M-EST. PATIENT-LVL IV: CPT | Mod: PBBFAC,HCNC,, | Performed by: INTERNAL MEDICINE

## 2021-12-08 PROCEDURE — 84443 ASSAY THYROID STIM HORMONE: CPT | Mod: HCNC | Performed by: INTERNAL MEDICINE

## 2021-12-08 PROCEDURE — 36415 COLL VENOUS BLD VENIPUNCTURE: CPT | Mod: HCNC,PO | Performed by: PHYSICIAN ASSISTANT

## 2021-12-08 PROCEDURE — 84439 ASSAY OF FREE THYROXINE: CPT | Mod: HCNC | Performed by: INTERNAL MEDICINE

## 2021-12-08 PROCEDURE — 82105 ALPHA-FETOPROTEIN SERUM: CPT | Mod: HCNC | Performed by: PHYSICIAN ASSISTANT

## 2021-12-08 PROCEDURE — 99499 RISK ADDL DX/OHS AUDIT: ICD-10-PCS | Mod: HCNC,S$GLB,, | Performed by: INTERNAL MEDICINE

## 2021-12-08 PROCEDURE — 99499 UNLISTED E&M SERVICE: CPT | Mod: HCNC,S$GLB,, | Performed by: INTERNAL MEDICINE

## 2021-12-08 PROCEDURE — 99396 PREV VISIT EST AGE 40-64: CPT | Mod: HCNC,S$GLB,, | Performed by: INTERNAL MEDICINE

## 2021-12-08 PROCEDURE — 86376 MICROSOMAL ANTIBODY EACH: CPT | Mod: HCNC | Performed by: INTERNAL MEDICINE

## 2021-12-08 PROCEDURE — 82784 ASSAY IGA/IGD/IGG/IGM EACH: CPT | Mod: HCNC | Performed by: PHYSICIAN ASSISTANT

## 2021-12-08 PROCEDURE — 82550 ASSAY OF CK (CPK): CPT | Mod: HCNC | Performed by: PHYSICIAN ASSISTANT

## 2021-12-16 ENCOUNTER — TELEPHONE (OUTPATIENT)
Dept: INTERNAL MEDICINE | Facility: CLINIC | Age: 51
End: 2021-12-16
Payer: MEDICARE

## 2021-12-16 DIAGNOSIS — E05.90 HYPERTHYROIDISM: Primary | ICD-10-CM

## 2021-12-17 ENCOUNTER — HOSPITAL ENCOUNTER (OUTPATIENT)
Dept: RADIOLOGY | Facility: HOSPITAL | Age: 51
Discharge: HOME | End: 2021-12-17
Attending: PHYSICIAN ASSISTANT
Payer: MEDICARE

## 2021-12-17 ENCOUNTER — TELEPHONE (OUTPATIENT)
Dept: INTERNAL MEDICINE | Facility: CLINIC | Age: 51
End: 2021-12-17
Payer: MEDICARE

## 2021-12-17 DIAGNOSIS — R59.1 LYMPHADENOPATHY: ICD-10-CM

## 2021-12-17 PROCEDURE — 74160 CT ABDOMEN W/CONTRAST: CPT | Mod: TC,HCNC

## 2021-12-17 PROCEDURE — 25500020 PHARM REV CODE 255: Mod: HCNC | Performed by: PHYSICIAN ASSISTANT

## 2021-12-17 PROCEDURE — 74160 CT ABDOMEN WITH CONTRAST: ICD-10-PCS | Mod: 26,HCNC,, | Performed by: RADIOLOGY

## 2021-12-17 PROCEDURE — 74160 CT ABDOMEN W/CONTRAST: CPT | Mod: 26,HCNC,, | Performed by: RADIOLOGY

## 2021-12-17 RX ADMIN — IOHEXOL 100 ML: 350 INJECTION, SOLUTION INTRAVENOUS at 05:12

## 2021-12-17 NOTE — TELEPHONE ENCOUNTER
The patient is requesting a refill on her Rx cream that was prescribed  By Dr Álvarez.  She states that she is having more sores popping up.

## 2021-12-17 NOTE — TELEPHONE ENCOUNTER
----- Message from Ana M Cox sent at 12/17/2021 11:09 AM CST -----  Type:  RX Refill Request    Who Called: Mary   Refill or New Rx:refill   RX Name and Strength:mupirocin (BACTROBAN) 2 % ointment  How is the patient currently taking it? (ex. 1XDay): Apply topically 3 (three) times daily as needed    Preferred Pharmacy with phone number: [facility] - [facility] [address]  Local or Mail Order:local   Ordering Provider:CASA Arroyo   Would the patient rather a call back or a response via [facility]? Call back   Best Call Back Number:[phone]  Additional Information: Pt called and would like a refill on the medication because her sores are getting worst to please call her back as well     Her dermatology appt is until January

## 2021-12-20 ENCOUNTER — TELEPHONE (OUTPATIENT)
Dept: HEPATOLOGY | Facility: CLINIC | Age: 51
End: 2021-12-20
Payer: MEDICARE

## 2021-12-21 ENCOUNTER — TELEPHONE (OUTPATIENT)
Dept: HEPATOLOGY | Facility: CLINIC | Age: 51
End: 2021-12-21
Payer: MEDICARE

## 2021-12-21 DIAGNOSIS — K76.9 LIVER DISEASE, UNSPECIFIED: ICD-10-CM

## 2021-12-23 ENCOUNTER — TELEPHONE (OUTPATIENT)
Dept: HEPATOLOGY | Facility: CLINIC | Age: 51
End: 2021-12-23
Payer: MEDICARE

## 2021-12-27 ENCOUNTER — PATIENT OUTREACH (OUTPATIENT)
Dept: ADMINISTRATIVE | Facility: OTHER | Age: 51
End: 2021-12-27
Payer: MEDICARE

## 2021-12-28 DIAGNOSIS — B95.8 STAPH INFECTION: ICD-10-CM

## 2021-12-28 RX ORDER — MUPIROCIN 20 MG/G
OINTMENT TOPICAL 3 TIMES DAILY PRN
Qty: 30 G | Refills: 11 | Status: SHIPPED | OUTPATIENT
Start: 2021-12-28 | End: 2022-07-07 | Stop reason: SDUPTHER

## 2021-12-29 ENCOUNTER — OFFICE VISIT (OUTPATIENT)
Dept: ENDOCRINOLOGY | Facility: CLINIC | Age: 51
End: 2021-12-29
Payer: MEDICARE

## 2021-12-29 ENCOUNTER — LAB VISIT (OUTPATIENT)
Dept: LAB | Facility: HOSPITAL | Age: 51
End: 2021-12-29
Attending: INTERNAL MEDICINE
Payer: MEDICARE

## 2021-12-29 VITALS
HEIGHT: 63 IN | DIASTOLIC BLOOD PRESSURE: 82 MMHG | HEART RATE: 86 BPM | OXYGEN SATURATION: 99 % | SYSTOLIC BLOOD PRESSURE: 128 MMHG | BODY MASS INDEX: 34.73 KG/M2 | WEIGHT: 196 LBS

## 2021-12-29 DIAGNOSIS — E05.90 HYPERTHYROIDISM: Primary | ICD-10-CM

## 2021-12-29 DIAGNOSIS — E05.90 HYPERTHYROIDISM: ICD-10-CM

## 2021-12-29 LAB
ALBUMIN SERPL BCP-MCNC: 3 G/DL (ref 3.5–5.2)
ALP SERPL-CCNC: 108 U/L (ref 55–135)
ALT SERPL W/O P-5'-P-CCNC: 24 U/L (ref 10–44)
ANION GAP SERPL CALC-SCNC: 7 MMOL/L (ref 8–16)
AST SERPL-CCNC: 32 U/L (ref 10–40)
BILIRUB SERPL-MCNC: 0.4 MG/DL (ref 0.1–1)
BUN SERPL-MCNC: 12 MG/DL (ref 6–20)
CALCIUM SERPL-MCNC: 8.8 MG/DL (ref 8.7–10.5)
CHLORIDE SERPL-SCNC: 103 MMOL/L (ref 95–110)
CO2 SERPL-SCNC: 27 MMOL/L (ref 23–29)
CREAT SERPL-MCNC: 0.7 MG/DL (ref 0.5–1.4)
EST. GFR  (AFRICAN AMERICAN): >60 ML/MIN/1.73 M^2
EST. GFR  (NON AFRICAN AMERICAN): >60 ML/MIN/1.73 M^2
GLUCOSE SERPL-MCNC: 85 MG/DL (ref 70–110)
POTASSIUM SERPL-SCNC: 3.9 MMOL/L (ref 3.5–5.1)
PROT SERPL-MCNC: 7.9 G/DL (ref 6–8.4)
SODIUM SERPL-SCNC: 137 MMOL/L (ref 136–145)
T3 SERPL-MCNC: 224 NG/DL (ref 60–180)
T4 FREE SERPL-MCNC: 1.2 NG/DL (ref 0.71–1.51)
TSH SERPL DL<=0.005 MIU/L-ACNC: <0.01 UIU/ML (ref 0.4–4)

## 2021-12-29 PROCEDURE — 3074F PR MOST RECENT SYSTOLIC BLOOD PRESSURE < 130 MM HG: ICD-10-PCS | Mod: HCNC,CPTII,S$GLB, | Performed by: INTERNAL MEDICINE

## 2021-12-29 PROCEDURE — 3008F PR BODY MASS INDEX (BMI) DOCUMENTED: ICD-10-PCS | Mod: HCNC,CPTII,S$GLB, | Performed by: INTERNAL MEDICINE

## 2021-12-29 PROCEDURE — 1160F PR REVIEW ALL MEDS BY PRESCRIBER/CLIN PHARMACIST DOCUMENTED: ICD-10-PCS | Mod: HCNC,CPTII,S$GLB, | Performed by: INTERNAL MEDICINE

## 2021-12-29 PROCEDURE — 3074F SYST BP LT 130 MM HG: CPT | Mod: HCNC,CPTII,S$GLB, | Performed by: INTERNAL MEDICINE

## 2021-12-29 PROCEDURE — 99204 OFFICE O/P NEW MOD 45 MIN: CPT | Mod: HCNC,S$GLB,, | Performed by: INTERNAL MEDICINE

## 2021-12-29 PROCEDURE — 1160F RVW MEDS BY RX/DR IN RCRD: CPT | Mod: HCNC,CPTII,S$GLB, | Performed by: INTERNAL MEDICINE

## 2021-12-29 PROCEDURE — 1159F PR MEDICATION LIST DOCUMENTED IN MEDICAL RECORD: ICD-10-PCS | Mod: HCNC,CPTII,S$GLB, | Performed by: INTERNAL MEDICINE

## 2021-12-29 PROCEDURE — 1159F MED LIST DOCD IN RCRD: CPT | Mod: HCNC,CPTII,S$GLB, | Performed by: INTERNAL MEDICINE

## 2021-12-29 PROCEDURE — 80053 COMPREHEN METABOLIC PANEL: CPT | Mod: HCNC | Performed by: INTERNAL MEDICINE

## 2021-12-29 PROCEDURE — 3008F BODY MASS INDEX DOCD: CPT | Mod: HCNC,CPTII,S$GLB, | Performed by: INTERNAL MEDICINE

## 2021-12-29 PROCEDURE — 99999 PR PBB SHADOW E&M-EST. PATIENT-LVL V: CPT | Mod: PBBFAC,HCNC,, | Performed by: INTERNAL MEDICINE

## 2021-12-29 PROCEDURE — 99215 OFFICE O/P EST HI 40 MIN: CPT | Mod: PBBFAC,HCNC | Performed by: INTERNAL MEDICINE

## 2021-12-29 PROCEDURE — 84443 ASSAY THYROID STIM HORMONE: CPT | Mod: HCNC | Performed by: INTERNAL MEDICINE

## 2021-12-29 PROCEDURE — 83520 IMMUNOASSAY QUANT NOS NONAB: CPT | Mod: HCNC | Performed by: INTERNAL MEDICINE

## 2021-12-29 PROCEDURE — 99999 PR PBB SHADOW E&M-EST. PATIENT-LVL V: ICD-10-PCS | Mod: PBBFAC,HCNC,, | Performed by: INTERNAL MEDICINE

## 2021-12-29 PROCEDURE — 99204 PR OFFICE/OUTPT VISIT, NEW, LEVL IV, 45-59 MIN: ICD-10-PCS | Mod: HCNC,S$GLB,, | Performed by: INTERNAL MEDICINE

## 2021-12-29 PROCEDURE — 3079F PR MOST RECENT DIASTOLIC BLOOD PRESSURE 80-89 MM HG: ICD-10-PCS | Mod: HCNC,CPTII,S$GLB, | Performed by: INTERNAL MEDICINE

## 2021-12-29 PROCEDURE — 3079F DIAST BP 80-89 MM HG: CPT | Mod: HCNC,CPTII,S$GLB, | Performed by: INTERNAL MEDICINE

## 2021-12-29 PROCEDURE — 36415 COLL VENOUS BLD VENIPUNCTURE: CPT | Mod: HCNC | Performed by: INTERNAL MEDICINE

## 2021-12-29 PROCEDURE — 84480 ASSAY TRIIODOTHYRONINE (T3): CPT | Mod: HCNC | Performed by: INTERNAL MEDICINE

## 2021-12-29 PROCEDURE — 84439 ASSAY OF FREE THYROXINE: CPT | Mod: HCNC | Performed by: INTERNAL MEDICINE

## 2022-01-03 LAB — TSH RECEP AB SER-ACNC: 1.41 IU/L (ref 0–1.75)

## 2022-01-06 ENCOUNTER — TELEPHONE (OUTPATIENT)
Dept: HEPATOLOGY | Facility: CLINIC | Age: 52
End: 2022-01-06
Payer: MEDICARE

## 2022-01-06 ENCOUNTER — HOSPITAL ENCOUNTER (OUTPATIENT)
Dept: RADIOLOGY | Facility: HOSPITAL | Age: 52
Discharge: HOME | End: 2022-01-06
Attending: PHYSICIAN ASSISTANT
Payer: MEDICARE

## 2022-01-06 DIAGNOSIS — K76.9 LIVER DISEASE, UNSPECIFIED: ICD-10-CM

## 2022-01-06 PROCEDURE — 74183 MRI ABD W/O CNTR FLWD CNTR: CPT | Mod: TC

## 2022-01-06 PROCEDURE — 25500020 PHARM REV CODE 255: Performed by: PHYSICIAN ASSISTANT

## 2022-01-06 PROCEDURE — 74183 MRI ABD W/O CNTR FLWD CNTR: CPT | Mod: 26,,, | Performed by: RADIOLOGY

## 2022-01-06 PROCEDURE — 74183 MRI ABDOMEN W WO CONTRAST: ICD-10-PCS | Mod: 26,,, | Performed by: RADIOLOGY

## 2022-01-06 PROCEDURE — A9585 GADOBUTROL INJECTION: HCPCS | Performed by: PHYSICIAN ASSISTANT

## 2022-01-06 RX ORDER — GADOBUTROL 604.72 MG/ML
10 INJECTION INTRAVENOUS
Status: COMPLETED | OUTPATIENT
Start: 2022-01-06 | End: 2022-01-06

## 2022-01-06 RX ADMIN — GADOBUTROL 10 ML: 604.72 INJECTION INTRAVENOUS at 07:01

## 2022-01-06 NOTE — TELEPHONE ENCOUNTER
Patient: Mary Sanches       MRN: [id number]      : 1970     Age: 51 y.o.  [address]  [place] [address]    Providers    Advanced Practice providers: KIAN Tran    Priority of review: Other    Patient Transplant Status: Not a candidate    Reason for presentation: Indeterminate lesion    Clinical Summary: 52 y/o f with prior history of HCV treated and cured ; fatty liver, F3/F4 fibrosis via FibroSure, well compensated overall w/ normal AFP & elevated liver enzymes, hepatocellular.      Recent  Ultrasound suggesting lymphadenopathy, I obtained CT scan suggestive of HCC (?) Reviewed in IR conference 2021 with recommendations for MRI    MRI 2022 suggesting perfusional variant.   Agree? Any new lymphadenopathy?  Follow-up recs?    Imaging to be reviewed: CT  & MRI     HCC Treatment History: none    Platelets:   Lab Results   Component Value Date/Time     2021 02:15 PM     Creatinine:   Lab Results   Component Value Date/Time    CREATININE 0.7 2021 04:10 PM     Bilirubin:   Lab Results   Component Value Date/Time    BILITOT 0.4 2021 04:10 PM     AFP Last 3 each if available:   Lab Results   Component Value Date/Time    AFP 6.2 2021 09:32 AM    AFP 6.8 2020 07:34 AM    AFP 8.7 (H) 2020 09:09 AM       MELD: MELD-Na score: 6 at 2021  3:51 PM  MELD score: 6 at 2021  3:51 PM  Calculated from:  Serum Creatinine: 0.8 mg/dL (Using min of 1 mg/dL) at 2021  3:51 PM  Serum Sodium: 138 mmol/L (Using max of 137 mmol/L) at 2021  3:51 PM  Total Bilirubin: 0.3 mg/dL (Using min of 1 mg/dL) at 2021  3:51 PM  INR(ratio): 1.0 at 2021  7:27 AM  Age: 51 years    Plan:     Follow-up Provider:

## 2022-01-11 ENCOUNTER — TELEPHONE (OUTPATIENT)
Dept: HEPATOLOGY | Facility: CLINIC | Age: 52
End: 2022-01-11
Payer: MEDICARE

## 2022-01-12 ENCOUNTER — OFFICE VISIT (OUTPATIENT)
Dept: HEPATOLOGY | Facility: CLINIC | Age: 52
End: 2022-01-12
Payer: MEDICARE

## 2022-01-12 ENCOUNTER — TELEPHONE (OUTPATIENT)
Dept: HEPATOLOGY | Facility: CLINIC | Age: 52
End: 2022-01-12
Payer: MEDICARE

## 2022-01-12 ENCOUNTER — HOSPITAL ENCOUNTER (OUTPATIENT)
Dept: RADIOLOGY | Facility: HOSPITAL | Age: 52
Discharge: HOME | End: 2022-01-12
Attending: INTERNAL MEDICINE
Payer: MEDICARE

## 2022-01-12 ENCOUNTER — OFFICE VISIT (OUTPATIENT)
Dept: DERMATOLOGY | Facility: CLINIC | Age: 52
End: 2022-01-12
Payer: MEDICARE

## 2022-01-12 VITALS
BODY MASS INDEX: 34.5 KG/M2 | SYSTOLIC BLOOD PRESSURE: 149 MMHG | WEIGHT: 194.69 LBS | HEART RATE: 71 BPM | HEIGHT: 63 IN | RESPIRATION RATE: 18 BRPM | OXYGEN SATURATION: 93 % | DIASTOLIC BLOOD PRESSURE: 72 MMHG | TEMPERATURE: 98 F

## 2022-01-12 VITALS — BODY MASS INDEX: 34.37 KG/M2 | WEIGHT: 194 LBS

## 2022-01-12 DIAGNOSIS — K76.0 FATTY LIVER: Primary | ICD-10-CM

## 2022-01-12 DIAGNOSIS — L28.2 PRURIGO: ICD-10-CM

## 2022-01-12 DIAGNOSIS — L30.9 DERMATITIS: Primary | ICD-10-CM

## 2022-01-12 DIAGNOSIS — K76.9 LIVER DISEASE, UNSPECIFIED: ICD-10-CM

## 2022-01-12 DIAGNOSIS — K74.00 LIVER FIBROSIS: ICD-10-CM

## 2022-01-12 DIAGNOSIS — E05.90 HYPERTHYROIDISM: ICD-10-CM

## 2022-01-12 DIAGNOSIS — L98.9 SKIN SORE: ICD-10-CM

## 2022-01-12 DIAGNOSIS — K76.9 LIVER LESION: ICD-10-CM

## 2022-01-12 PROCEDURE — 76536 US SOFT TISSUE HEAD NECK THYROID: ICD-10-PCS | Mod: 26,,, | Performed by: RADIOLOGY

## 2022-01-12 PROCEDURE — 99214 PR OFFICE/OUTPT VISIT, EST, LEVL IV, 30-39 MIN: ICD-10-PCS | Mod: 25,HCNC,S$GLB, | Performed by: DERMATOLOGY

## 2022-01-12 PROCEDURE — 88305 TISSUE EXAM BY PATHOLOGIST: ICD-10-PCS | Mod: 26,HCNC,, | Performed by: DERMATOLOGY

## 2022-01-12 PROCEDURE — 1159F PR MEDICATION LIST DOCUMENTED IN MEDICAL RECORD: ICD-10-PCS | Mod: HCNC,CPTII,S$GLB, | Performed by: DERMATOLOGY

## 2022-01-12 PROCEDURE — 87077 CULTURE AEROBIC IDENTIFY: CPT | Mod: HCNC | Performed by: DERMATOLOGY

## 2022-01-12 PROCEDURE — 99215 OFFICE O/P EST HI 40 MIN: CPT | Mod: HCNC,S$GLB,, | Performed by: PHYSICIAN ASSISTANT

## 2022-01-12 PROCEDURE — 99999 PR PBB SHADOW E&M-EST. PATIENT-LVL IV: ICD-10-PCS | Mod: PBBFAC,HCNC,, | Performed by: PHYSICIAN ASSISTANT

## 2022-01-12 PROCEDURE — 99999 PR PBB SHADOW E&M-EST. PATIENT-LVL III: ICD-10-PCS | Mod: PBBFAC,HCNC,, | Performed by: DERMATOLOGY

## 2022-01-12 PROCEDURE — 3077F PR MOST RECENT SYSTOLIC BLOOD PRESSURE >= 140 MM HG: ICD-10-PCS | Mod: HCNC,CPTII,S$GLB, | Performed by: PHYSICIAN ASSISTANT

## 2022-01-12 PROCEDURE — 87070 CULTURE OTHR SPECIMN AEROBIC: CPT | Mod: HCNC | Performed by: DERMATOLOGY

## 2022-01-12 PROCEDURE — 1160F PR REVIEW ALL MEDS BY PRESCRIBER/CLIN PHARMACIST DOCUMENTED: ICD-10-PCS | Mod: HCNC,CPTII,S$GLB, | Performed by: PHYSICIAN ASSISTANT

## 2022-01-12 PROCEDURE — 99215 PR OFFICE/OUTPT VISIT, EST, LEVL V, 40-54 MIN: ICD-10-PCS | Mod: HCNC,S$GLB,, | Performed by: PHYSICIAN ASSISTANT

## 2022-01-12 PROCEDURE — 11104 PUNCH BX SKIN SINGLE LESION: CPT | Mod: HCNC,S$GLB,, | Performed by: DERMATOLOGY

## 2022-01-12 PROCEDURE — 1160F RVW MEDS BY RX/DR IN RCRD: CPT | Mod: HCNC,CPTII,S$GLB, | Performed by: DERMATOLOGY

## 2022-01-12 PROCEDURE — 88305 TISSUE EXAM BY PATHOLOGIST: CPT | Mod: 26,HCNC,, | Performed by: DERMATOLOGY

## 2022-01-12 PROCEDURE — 99999 PR PBB SHADOW E&M-EST. PATIENT-LVL III: CPT | Mod: PBBFAC,HCNC,, | Performed by: DERMATOLOGY

## 2022-01-12 PROCEDURE — 3077F SYST BP >= 140 MM HG: CPT | Mod: HCNC,CPTII,S$GLB, | Performed by: PHYSICIAN ASSISTANT

## 2022-01-12 PROCEDURE — 1159F MED LIST DOCD IN RCRD: CPT | Mod: HCNC,CPTII,S$GLB, | Performed by: DERMATOLOGY

## 2022-01-12 PROCEDURE — 99214 OFFICE O/P EST MOD 30 MIN: CPT | Mod: PBBFAC | Performed by: PHYSICIAN ASSISTANT

## 2022-01-12 PROCEDURE — 3008F BODY MASS INDEX DOCD: CPT | Mod: HCNC,CPTII,S$GLB, | Performed by: PHYSICIAN ASSISTANT

## 2022-01-12 PROCEDURE — 3078F DIAST BP <80 MM HG: CPT | Mod: HCNC,CPTII,S$GLB, | Performed by: PHYSICIAN ASSISTANT

## 2022-01-12 PROCEDURE — 99214 OFFICE O/P EST MOD 30 MIN: CPT | Mod: 25,HCNC,S$GLB, | Performed by: DERMATOLOGY

## 2022-01-12 PROCEDURE — 1160F PR REVIEW ALL MEDS BY PRESCRIBER/CLIN PHARMACIST DOCUMENTED: ICD-10-PCS | Mod: HCNC,CPTII,S$GLB, | Performed by: DERMATOLOGY

## 2022-01-12 PROCEDURE — 76536 US EXAM OF HEAD AND NECK: CPT | Mod: TC

## 2022-01-12 PROCEDURE — 87186 SC STD MICRODIL/AGAR DIL: CPT | Mod: HCNC | Performed by: DERMATOLOGY

## 2022-01-12 PROCEDURE — 88312 PR  SPECIAL STAINS,GROUP I: ICD-10-PCS | Mod: 26,HCNC,, | Performed by: DERMATOLOGY

## 2022-01-12 PROCEDURE — 3008F PR BODY MASS INDEX (BMI) DOCUMENTED: ICD-10-PCS | Mod: HCNC,CPTII,S$GLB, | Performed by: DERMATOLOGY

## 2022-01-12 PROCEDURE — 88312 SPECIAL STAINS GROUP 1: CPT | Mod: 26,HCNC,, | Performed by: DERMATOLOGY

## 2022-01-12 PROCEDURE — 11104 PR PUNCH BIOPSY, SKIN, SINGLE LESION: ICD-10-PCS | Mod: HCNC,S$GLB,, | Performed by: DERMATOLOGY

## 2022-01-12 PROCEDURE — 3078F PR MOST RECENT DIASTOLIC BLOOD PRESSURE < 80 MM HG: ICD-10-PCS | Mod: HCNC,CPTII,S$GLB, | Performed by: PHYSICIAN ASSISTANT

## 2022-01-12 PROCEDURE — 1159F PR MEDICATION LIST DOCUMENTED IN MEDICAL RECORD: ICD-10-PCS | Mod: HCNC,CPTII,S$GLB, | Performed by: PHYSICIAN ASSISTANT

## 2022-01-12 PROCEDURE — 88312 SPECIAL STAINS GROUP 1: CPT | Mod: HCNC | Performed by: DERMATOLOGY

## 2022-01-12 PROCEDURE — 3008F BODY MASS INDEX DOCD: CPT | Mod: HCNC,CPTII,S$GLB, | Performed by: DERMATOLOGY

## 2022-01-12 PROCEDURE — 3008F PR BODY MASS INDEX (BMI) DOCUMENTED: ICD-10-PCS | Mod: HCNC,CPTII,S$GLB, | Performed by: PHYSICIAN ASSISTANT

## 2022-01-12 PROCEDURE — 1160F RVW MEDS BY RX/DR IN RCRD: CPT | Mod: HCNC,CPTII,S$GLB, | Performed by: PHYSICIAN ASSISTANT

## 2022-01-12 PROCEDURE — 88305 TISSUE EXAM BY PATHOLOGIST: CPT | Mod: HCNC | Performed by: DERMATOLOGY

## 2022-01-12 PROCEDURE — 76536 US EXAM OF HEAD AND NECK: CPT | Mod: 26,,, | Performed by: RADIOLOGY

## 2022-01-12 PROCEDURE — 99999 PR PBB SHADOW E&M-EST. PATIENT-LVL IV: CPT | Mod: PBBFAC,HCNC,, | Performed by: PHYSICIAN ASSISTANT

## 2022-01-12 PROCEDURE — 1159F MED LIST DOCD IN RCRD: CPT | Mod: HCNC,CPTII,S$GLB, | Performed by: PHYSICIAN ASSISTANT

## 2022-01-12 NOTE — PROGRESS NOTES
Hepatology Consultation: [facility]    ESTABLISHED PATIENT   PCP: DO Diana Long      Ms. Sanches is a 51 y.o. female with PMH as listed below who presents to clinic for returning for continued elevation of liver enzymes in the context of possible advanced liver fibrosis with cured HCV (s/p tx and cure 2020). Prior Fibrosure suggesting advance fibrosis F3/F4, well compensated. The patient did not receive Fibroscan due to COVID-19 pandemic at the time of HCV treatment, opted for Fibrosure instead. At her last visit 12/3/2020 we performed post-treatment Fibroscan that indicated F2, S2. She did gain about 20 lbs over the course of 2020. Otherwise, her IgG was mildly elevated. She has no synthetic dysfunction. Her ultrasound 11/11/2020 showed hepatomegaly to 18.6cm and normal size spleen without focal hepatic lesions. At the time her AFP decreased to 6.8.     The patient was previously followed by Jen Scheuermann, PA-C who treated and cured her Hep C.     I obtained a CT of her liver 12/17/2021 for further investigation of her ultrasound 9/9/2021 that suggested enlarged lymph node. CT 12/2021 revealed concerning liver lesion, MRI 1/6/2022 obtained that did not confirm HCC.     She was lost to follow-up since 3/8/2021 and did not show to several appointments.     Conference review: 12/21/21  indeterminate lesion with enhancement and possible washout. Rec MRI now. MRI now.  Conference review: 1/11/2021 MRI does not meet criteria for HCC. 3 arterially enhancing foci, without washout or pseudocapsule. Technically indeterminate. may reflect regenerative nodule near dome or vascular anomalies. 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 stable LN between IVC and aorta. recommend 3 month follow up MRI.       Interval history 01/12/2022:  Mary Sanches arrives today with  Chang.  Not watching her diet, drinks soda every day. Drinks coffee with  3 tbsp sugar per Chang.  She is doing well. (+) anxiety, new rash, seeing derm.   Denies symptoms of hepatic decompensation including jaundice, ascites, cognitive problems to suggest hepatic encephalopathy, or GI bleeding.     Fibrosis staging:              HCV FibroSURE 2/2020:A2, F3-4              Fibroscan 12/3/2020: S2,F2    Risk factors for fatty liver include obesity.      PMH Mary has a past medical history of Anxiety, Chronic back pain, Chronic hepatitis C (6/19/2020), Depression, Hallucination, History of psychiatric hospitalization, psychiatric care, Hyperthyroidism (12/29/2021), Migraines, Neuropathy, Obesity, Psychiatric exam requested by authority, Psychiatric problem, Psychosis, Sacroiliitis, Schizoaffective disorder, Scoliosis, Sleep difficulties, Therapy, and Tobacco use.   PSXH Mary has a past surgical history that includes Hysterectomy and Hand surgery (Left).    Mary's family history includes Cancer in her maternal grandmother; Cirrhosis in her paternal grandmother; Diabetes in her sister; Heart disease in her paternal grandfather; Hypertension in her father; Kidney disease in her sister; No Known Problems in her mother; Thyroid disease in her sister.    Mary reports that she has been smoking. She has a 0.13 pack-year smoking history. She has never used smokeless tobacco. She reports that she does not drink alcohol and does not use drugs.   ALG Mary is allergic to cranberry, gabapentin, ibuprofen, meloxicam, toradol [ketorolac], amoxicillin, cyclobenzaprine, and duloxetine.   STACEY Hernandez has a current medication list which includes the following prescription(s): alprazolam, butalbital-acetaminophen-caffeine -40 mg, diphenhydramine, escitalopram oxalate, mupirocin, tramadol, triamcinolone acetonide 0.1%, vitamin d, cetirizine, diclofenac sodium, meloxicam, olanzapine, and pregabalin.           ROS:   GENERAL: Denies fatigue  HEENT: Denies yellowing of eyes   CARDIOVASCULAR:  "Denies edema  GI: Denies abdominal pain  NEURO: Denies confusion, memory loss, or mood changes  HEME/LYMPH: Denies easy bruising or bleeding      Objective     BP (!) 149/72 (BP Location: Right arm, Patient Position: Sitting, BP Method: Medium (Automatic))   Pulse 71   Temp 97.8 °F (36.6 °C) (Oral)   Resp 18   Ht 5' 3" (1.6 m)   Wt 88.3 kg (194 lb 10.7 oz)   SpO2 (!) 93%   BMI 34.48 kg/m²     PHYSICAL EXAM:   Friendly woman, in no acute distress; alert and oriented to person, place and time  EYES: Sclerae anicteric  GI: Soft, non-tender, non-distended. No ascites.  EXTREMITIES:  No edema.  SKIN: Warm and dry. No jaundice. No telangectasias noted. No palmar erythema.  NEURO:  Normal gait. No asterixis.  PSYCH:  Memory intact. Thought and speech pattern appropriate. Behavior normal. Anxious       DIAGNOSTICS    Lab Results   Component Value Date    ALT 24 12/29/2021    AST 32 12/29/2021    ALKPHOS 108 12/29/2021    BILITOT 0.4 12/29/2021    ALBUMIN 3.0 (L) 12/29/2021    INR 1.0 09/28/2021     11/30/2021     Lab Results   Component Value Date    AFP 6.2 12/08/2021       In relation to metabolic risk factors:   Lab Results   Component Value Date    HGBA1C 5.2 12/27/2019    CHOL 173 12/08/2021    TSH <0.010 (L) 12/29/2021     Body mass index is 34.48 kg/m².      Prior serologic workup:   Lab Results   Component Value Date    SMOOTHMUSCAB Negative 1:40 11/19/2020    AMAIFA Negative 1:40 11/19/2020    IGGSERUM 2653 (H) 12/08/2021    ANASCREEN Negative <1:80 11/19/2020    FERRITIN 89 11/19/2020    FESATURATED 20 11/19/2020    PETH Negative 11/19/2020    PGBDH1QOAMKH MM 11/19/2020    ECHQU3QNRZOQ 221 (H) 11/19/2020    CERULOPLSM 39.0 11/19/2020    HEPBSAG Negative 02/20/2020    HEPCAB Positive (A) 12/27/2019    HEPAIGM Negative 12/27/2019       Imaging:  MRI Abdomen W WO Contrast  Narrative: EXAMINATION:  MRI ABDOMEN W WO CONTRAST    CLINICAL HISTORY:  Liver lesion, > 1cm;liver lesion;  Liver disease, " unspecified    TECHNIQUE:  Multiplanar multisequence images of the abdomen before and after administration of 10 mL Gadavist intravenous contrast.    COMPARISON:  CT 12/17/2021    FINDINGS:  Inferior Thorax: Unremarkable.    Liver: Nodular contour suggestive for cirrhosis.  Normal size.  Two foci of serpiginous arterial phase hyperenhancement within the central liver adjacent to a hepatic veins (series 11, image 24 and 27).  The areas demonstrate blood pool intensity on venous and delayed phases and are favored to represent vasculature shunting.  Irregular focus of arterial phase hyperenhancement within the periphery of hepatic segment 6 (series 11, image 54) which becomes isointense on venous and delayed phases favored to represent variant perfusion.  No suspicious washout or pseudo capsule.  Hepatic and portal veins are patent.    Gallbladder: Unremarkable.    Bile Ducts: No dilatation.    Pancreas: No mass or ductal dilatation.    Spleen: Unremarkable.    Adrenals: Unremarkable.    Kidneys/Ureters: 1.1 cm T2 hyperintense right renal lesion with mild intrinsic T1 hyperintensity likely representing a hemorrhagic/proteinaceous cyst.  No solid enhancing renal mass.  No hydronephrosis    GI Tract/Mesentery: No evidence of bowel obstruction or inflammation.    Peritoneal Space: No ascites.    Retroperitoneum: Mildly enlarged right periaortic lymph node measures 1.3 cm (series 13, image 49), stable.    Abdominal wall: Unremarkable.    Vasculature: No aneurysm.    Bones: No suspicious marrow replacing lesion.  Impression: Indeterminate small focus of arterial phase enhancement in the right hepatic lobe (segment VIII), correlating with the 12/17/2021 exam.  No evidence of contrast washout or pseudo capsule to suggest HCC.  No new lesions.    Mildly enlarged right periaortic lymph node, stable.    Small right renal hemorrhagic cyst.    Electronically signed by resident: Nile  "Foto  Date:    01/06/2022  Time:    08:43    Electronically signed by: Jeff Vance MD  Date:    01/06/2022  Time:    10:03      Assessment/Plan     51 y.o. female with:    1. Fatty liver  2. Liver fibrosis, F2  - I suspect cirrhosis of the liver, but fibrosis staging has not been revealing.   - plt intact   - no signs or symptoms of hepatic decompensation or portal HTN, medically early for EGD & plt normal  - immunized against Hep A &B        - AFP Tumor Marker; Standing  - CBC Auto Differential; Standing  - Comprehensive Metabolic Panel; Standing  - Protime-INR; Standing      3. Liver disease, unspecified  - MRI Abdomen W WO Contrast; Future    4. Liver lesion  - ultrasound 2021, CT 2021  - MRI 2022:" Two foci of serpiginous arterial phase hyperenhancement within the central liver adjacent to a hepatic veins (series 11, image 24 and 27).  The areas demonstrate blood pool intensity on venous and delayed phases and are favored to represent vasculature shunting.  Irregular focus of arterial phase hyperenhancement within the periphery of hepatic segment 6 (series 11, image 54) which becomes isointense on venous and delayed phases favored to represent variant perfusion.  No suspicious washout or pseudo capsule."  - reviewed in IR conference 1/11/2021, does not meet criteria for liver cancer, recommend mri f/u in 3 months  - afp now     5. Obesity       6. Chronic hepatitis C without hepatic coma  - s/p tx with epclusa svr12 6/2020      7. Elevated LFTs  serologic workup showing IgG high otherwise unrevealing workup  - negative for viral hep B & A; negative for genetic causes of liver disease  - family history of cirrhosis in paternal grandmother of unknown etiology  - HCV treated and cured  - DILI meds: voltaren& lexapro  - zyprexa no longer taking so likely not DILI  - will monitor, enzymes not impressively high and could be possibly secondary to fatty liver        Orders Placed This Encounter   Procedures    MRI " Abdomen W WO Contrast    AFP Tumor Marker    CBC Auto Differential    Comprehensive Metabolic Panel    Protime-INR       MELD-Na score: 6 at 9/28/2021  3:51 PM  MELD score: 6 at 9/28/2021  3:51 PM  Calculated from:  Serum Creatinine: 0.8 mg/dL (Using min of 1 mg/dL) at 9/28/2021  3:51 PM  Serum Sodium: 138 mmol/L (Using max of 137 mmol/L) at 9/28/2021  3:51 PM  Total Bilirubin: 0.3 mg/dL (Using min of 1 mg/dL) at 9/28/2021  3:51 PM  INR(ratio): 1.0 at 9/28/2021  7:27 AM  Age: 51 years      · Reviewed MRI findings with patient, no signs of liver cancer at this time but close follow-up recommended, could be regenerative nodule vs vascular abnml. AFP today  · Cut out all excess sugar, Mediterranean diet, low-carb, recommend weight loss of 10 lbs or so.   · Discontinue smoking.   · MRI in 3 months.  · Follow up in about 3 months (around 4/12/2022).    I spent 45 minutes on the day of this encounter preparing for, evaluating, treating, and managing this patient.     Thank you for allowing me to participate in the care of Mary Felton PA-C  Hepatology Advanced Practice Provider  [facility] [address] [facility]

## 2022-01-12 NOTE — PROGRESS NOTES
Subjective:       Patient ID:  Mary Sanches is a 51 y.o. female who presents for   Chief Complaint   Patient presents with    Rash     Bilateral arms and legs, trunk     Record review  11/2021    51 y.o. F with history of treated hepatitis C and mood disorders presents for evaluation of itching and rash. States she noted the rash started about 2 weeks after receiving her second Moderna COVID vaccine which was on 4/22. Initially states it started as itchy bumps near where the vaccine was injected. Then seemed to spread to the rest of her arm, the opposite arm, abdomen, buttocks, and dorsal feet. Endorses significant pruritus. States she has been using the liquid band-aid spray on her arms but that it seems to make the rash worse. Endorses use of dial soap and Aveeno moisturizer. Denies taking hot showers. States her hepatitis C has been well controlled. Denies any liver abnormalities on her recent labs. Denies any new medications. Endorses significant bone pain around the vaccination site. Has been taking her fiorcet for this pain. Denies any illicit substance abuse  Prurigo nodularis\  - Counseled on etiology of condition  - Recommend avoiding picking and scratching as much as possible  - Reviewed CBC, CMP, and HIV from 09/28/2021 all WNL  - Will check CBC, CMP, and TSH today to help identify any underlying potential cause for pruritis and subsequent prurigo nodularis  - Start Dove sensitive skin soap  - Start using frequent moisturizer such as Cetaphil, CeraVe, Vanicream etc. Use all over at least twice daily  - Start triamcinolone cream 0.1% twice daily to itchy spots, use for 2 weeks then take a 1 week break before resuming  - Start phototherapy, order placed today     -     CBC Auto Differential; Future; Expected date: 11/30/2021  -     COMPREHENSIVE METABOLIC PANEL; Future; Expected date: 11/30/2021  -     NB-UVB Light Therapy; Future  -     triamcinolone acetonide 0.1% (KENALOG) 0.1 % cream; Apply  "topically 2 (two) times daily. Apply twice a day to itchy spots. Do not apply to underarms, groin, or face. Use for 2 weeks then take 1 week break before restarting if needed  Dispense: 454 g; Refill: 1     Pruritus  - Check CBC, CMP, and TSH  - Treat as described as above in the interim  -     TSH; Future; Expected date: 11/30/2021"    10/2021  50 yo  F presents for f/u of skin rash. Seen in clinic by colleague on 9/28 for same condition. Started on mupirocin. Pt reports it has helped her sx. However rash is not fully resolved. Also reports having shoulder pain. Has f/u with PMR   Staph infection  -     mupirocin (BACTROBAN) 2 % ointment; Apply topically 3 (three) times daily as needed."      Still with pruritus, above labs all normal.   Undergoing workup for :  Liver lesion, > 1cm;liver lesion;  Liver disease, unspecified  See notes hepatology    CXR las year normal      Review of Systems   Constitutional: Negative for fever, chills, weight loss, weight gain, fatigue, night sweats and malaise.   Skin: Negative for daily sunscreen use, activity-related sunscreen use and wears hat.   Hematologic/Lymphatic: Bruises/bleeds easily.        Objective:    Physical Exam   Constitutional: She appears well-developed and well-nourished. No distress.   Neurological: She is alert and oriented to person, place, and time.   Psychiatric: She has a normal mood and affect.   Skin:   Areas Examined (abnormalities noted in diagram):   Head / Face Inspection Performed  Neck Inspection Performed  Chest / Axilla Inspection Performed  Abdomen Inspection Performed  Genitals / Buttocks / Groin Inspection Performed  Back Inspection Performed  RUE Inspected  LUE Inspection Performed  RLE Inspected  LLE Inspection Performed  Nails and Digits Inspection Performed              Diagram Legend     Erythematous scaling macule/papule c/w actinic keratosis       Vascular papule c/w angioma      Pigmented verrucoid papule/plaque c/w seborrheic " keratosis      Yellow umbilicated papule c/w sebaceous hyperplasia      Irregularly shaped tan macule c/w lentigo     1-2 mm smooth white papules consistent with Milia      Movable subcutaneous cyst with punctum c/w epidermal inclusion cyst      Subcutaneous movable cyst c/w pilar cyst      Firm pink to brown papule c/w dermatofibroma      Pedunculated fleshy papule(s) c/w skin tag(s)      Evenly pigmented macule c/w junctional nevus     Mildly variegated pigmented, slightly irregular-bordered macule c/w mildly atypical nevus      Flesh colored to evenly pigmented papule c/w intradermal nevus       Pink pearly papule/plaque c/w basal cell carcinoma      Erythematous hyperkeratotic cursted plaque c/w SCC      Surgical scar with no sign of skin cancer recurrence      Open and closed comedones      Inflammatory papules and pustules      Verrucoid papule consistent consistent with wart     Erythematous eczematous patches and plaques     Dystrophic onycholytic nail with subungual debris c/w onychomycosis     Umbilicated papule    Erythematous-base heme-crusted tan verrucoid plaque consistent with inflamed seborrheic keratosis     Erythematous Silvery Scaling Plaque c/w Psoriasis     See annotation      Assessment / Plan:      Pathology Orders:     Normal Orders This Visit    Specimen to Pathology, Dermatology     Comments:    See photo today and previous note Dr William  Number of Specimens:->1  ------------------------->-------------------------  Spec 1 Procedure:->Biopsy  Spec 1 Clinical Impression:->prurigo likely atopic  Spec 1 Source:->right arm    Questions:    Procedure Type: Dermatology and skin neoplasms    Number of Specimens: 1    ------------------------: -------------------------    Spec 1 Procedure: Biopsy    Spec 1 Clinical Impression: prurigo likely atopic    Spec 1 Source: right arm    Release to patient:         Dermatitis/pruritus possible atopic dermatitis has hx of allergies  Pruritus may be due to  underlying hepatic disease  R/o secondary pyoderma  Prurigo  -     Specimen to Pathology, Dermatology  Punch bx performed after anesthesia 1% xylocaine with epi, patient tolerated procedure well.  Directions for post bx care given.        -     Aerobic culture    No hot water  otc cerave itch relief lotion    Skin sore  -     Ambulatory referral/consult to Dermatology                 Follow up if symptoms worsen or fail to improve, for biopsy result.

## 2022-01-12 NOTE — TELEPHONE ENCOUNTER
----- Message from Cierra Felton PA-C sent at 1/12/2022 12:32 PM CST -----  Call pt tell her labs look good

## 2022-01-13 ENCOUNTER — TELEPHONE (OUTPATIENT)
Dept: HEPATOLOGY | Facility: CLINIC | Age: 52
End: 2022-01-13
Payer: MEDICARE

## 2022-01-13 NOTE — TELEPHONE ENCOUNTER
----- Message from Triny Ty MA sent at 1/12/2022  4:43 PM CST -----    ----- Message -----  From: Cierra Felton PA-C  Sent: 1/12/2022  12:32 PM CST  To: Melissa Norton Staff    Call pt tell her labs look good

## 2022-01-14 ENCOUNTER — TELEPHONE (OUTPATIENT)
Dept: ENDOCRINOLOGY | Facility: CLINIC | Age: 52
End: 2022-01-14
Payer: MEDICARE

## 2022-01-14 DIAGNOSIS — E05.90 HYPERTHYROIDISM: Primary | ICD-10-CM

## 2022-01-14 NOTE — TELEPHONE ENCOUNTER
Labs with suppressed TSH,, normal FT4, mild elevation T3, negative TRAB  US small nodules but otherwise unremarkable  Ideally would get uptake and scan but had recent contrast. Will update labs in 4 weeks and pending trend consider need for uptake and scan/treatment

## 2022-01-15 LAB — BACTERIA SPEC AEROBE CULT: ABNORMAL

## 2022-01-18 ENCOUNTER — TELEPHONE (OUTPATIENT)
Dept: DERMATOLOGY | Facility: CLINIC | Age: 52
End: 2022-01-18
Payer: MEDICARE

## 2022-01-18 RX ORDER — DOXYCYCLINE HYCLATE 100 MG
TABLET ORAL
Qty: 14 TABLET | Refills: 0 | Status: SHIPPED | OUTPATIENT
Start: 2022-01-18 | End: 2022-04-27

## 2022-01-28 LAB
COMMENT: NORMAL
FINAL PATHOLOGIC DIAGNOSIS: NORMAL
GROSS: NORMAL
Lab: NORMAL
MICROSCOPIC EXAM: NORMAL

## 2022-02-08 ENCOUNTER — OFFICE VISIT (OUTPATIENT)
Dept: PHYSICAL MEDICINE AND REHAB | Facility: CLINIC | Age: 52
End: 2022-02-08
Payer: MEDICARE

## 2022-02-08 VITALS
DIASTOLIC BLOOD PRESSURE: 91 MMHG | WEIGHT: 192.13 LBS | HEART RATE: 79 BPM | HEIGHT: 63 IN | BODY MASS INDEX: 34.04 KG/M2 | SYSTOLIC BLOOD PRESSURE: 147 MMHG

## 2022-02-08 DIAGNOSIS — M54.41 CHRONIC MIDLINE LOW BACK PAIN WITH RIGHT-SIDED SCIATICA: ICD-10-CM

## 2022-02-08 DIAGNOSIS — G89.29 CHRONIC NECK PAIN: ICD-10-CM

## 2022-02-08 DIAGNOSIS — E66.9 OBESITY (BMI 30.0-34.9): ICD-10-CM

## 2022-02-08 DIAGNOSIS — G89.29 CHRONIC MIDLINE LOW BACK PAIN WITH RIGHT-SIDED SCIATICA: ICD-10-CM

## 2022-02-08 DIAGNOSIS — M25.511 CHRONIC RIGHT SHOULDER PAIN: ICD-10-CM

## 2022-02-08 DIAGNOSIS — M47.26 OSTEOARTHRITIS OF SPINE WITH RADICULOPATHY, LUMBAR REGION: ICD-10-CM

## 2022-02-08 DIAGNOSIS — M54.42 CHRONIC MIDLINE LOW BACK PAIN WITH LEFT-SIDED SCIATICA: ICD-10-CM

## 2022-02-08 DIAGNOSIS — R29.6 FREQUENT FALLS: ICD-10-CM

## 2022-02-08 DIAGNOSIS — R26.9 GAIT DISORDER: Primary | ICD-10-CM

## 2022-02-08 DIAGNOSIS — M54.2 CHRONIC NECK PAIN: ICD-10-CM

## 2022-02-08 DIAGNOSIS — M51.36 DDD (DEGENERATIVE DISC DISEASE), LUMBAR: ICD-10-CM

## 2022-02-08 DIAGNOSIS — G89.29 CHRONIC RIGHT SHOULDER PAIN: ICD-10-CM

## 2022-02-08 DIAGNOSIS — G89.29 CHRONIC MIDLINE LOW BACK PAIN WITH LEFT-SIDED SCIATICA: ICD-10-CM

## 2022-02-08 PROCEDURE — 3080F DIAST BP >= 90 MM HG: CPT | Mod: HCNC,CPTII,S$GLB, | Performed by: PHYSICAL MEDICINE & REHABILITATION

## 2022-02-08 PROCEDURE — 3077F SYST BP >= 140 MM HG: CPT | Mod: HCNC,CPTII,S$GLB, | Performed by: PHYSICAL MEDICINE & REHABILITATION

## 2022-02-08 PROCEDURE — 99215 PR OFFICE/OUTPT VISIT, EST, LEVL V, 40-54 MIN: ICD-10-PCS | Mod: HCNC,S$GLB,, | Performed by: PHYSICAL MEDICINE & REHABILITATION

## 2022-02-08 PROCEDURE — 99999 PR PBB SHADOW E&M-EST. PATIENT-LVL III: ICD-10-PCS | Mod: PBBFAC,,, | Performed by: PHYSICAL MEDICINE & REHABILITATION

## 2022-02-08 PROCEDURE — 3008F BODY MASS INDEX DOCD: CPT | Mod: HCNC,CPTII,S$GLB, | Performed by: PHYSICAL MEDICINE & REHABILITATION

## 2022-02-08 PROCEDURE — 3008F PR BODY MASS INDEX (BMI) DOCUMENTED: ICD-10-PCS | Mod: HCNC,CPTII,S$GLB, | Performed by: PHYSICAL MEDICINE & REHABILITATION

## 2022-02-08 PROCEDURE — 3080F PR MOST RECENT DIASTOLIC BLOOD PRESSURE >= 90 MM HG: ICD-10-PCS | Mod: HCNC,CPTII,S$GLB, | Performed by: PHYSICAL MEDICINE & REHABILITATION

## 2022-02-08 PROCEDURE — 99213 OFFICE O/P EST LOW 20 MIN: CPT | Mod: PBBFAC | Performed by: PHYSICAL MEDICINE & REHABILITATION

## 2022-02-08 PROCEDURE — 99215 OFFICE O/P EST HI 40 MIN: CPT | Mod: HCNC,S$GLB,, | Performed by: PHYSICAL MEDICINE & REHABILITATION

## 2022-02-08 PROCEDURE — 99999 PR PBB SHADOW E&M-EST. PATIENT-LVL III: CPT | Mod: PBBFAC,,, | Performed by: PHYSICAL MEDICINE & REHABILITATION

## 2022-02-08 PROCEDURE — 3077F PR MOST RECENT SYSTOLIC BLOOD PRESSURE >= 140 MM HG: ICD-10-PCS | Mod: HCNC,CPTII,S$GLB, | Performed by: PHYSICAL MEDICINE & REHABILITATION

## 2022-02-08 RX ORDER — TRAMADOL HYDROCHLORIDE 50 MG/1
50 TABLET ORAL EVERY 6 HOURS PRN
Qty: 120 TABLET | Refills: 1 | Status: CANCELLED | OUTPATIENT
Start: 2022-02-08

## 2022-02-08 RX ORDER — TRAMADOL HYDROCHLORIDE 50 MG/1
50 TABLET ORAL EVERY 6 HOURS PRN
Qty: 120 TABLET | Refills: 3 | Status: SHIPPED | OUTPATIENT
Start: 2022-02-08 | End: 2022-04-09

## 2022-02-08 RX ORDER — PREGABALIN 50 MG/1
50 CAPSULE ORAL 3 TIMES DAILY
Qty: 120 CAPSULE | Refills: 3 | Status: SHIPPED | OUTPATIENT
Start: 2022-02-08 | End: 2022-08-05 | Stop reason: SDUPTHER

## 2022-02-08 NOTE — PROGRESS NOTES
Subjective:       Patient ID: Mary Sanches is a 51 y.o. female.    Chief Complaint: No chief complaint on file.    HPI       HISTORY OF PRESENT ILLNESS:  Ms. Sanches is a 51-year-old white female with osteoarthritis who is followed up in the [facility] for chronic low back pain with lumbar radiculopathy and chronic neck pain.  She had also history of chronic left shoulder pain after left proximal radial neck and head nondisplaced fracture due to falling in 05/2021, status post conservative treatment.  Her last visit to the clinic was on 7/13/2021. She was started on pregabalin and maintained on  p.r.n. tramadol and diclofenac gel.  She was not interested in referral for Epidural Steroid Injections.    The patient is coming to the clinic for follow-up. Her low back pain has been stable. It is an intermittent aching and tightness pain in the lumbar spine and across her back.  She has occasional shooting pain, recently to the right calf with burning.  Her maximum pain is 7-8/10 and minimum 5-6/10.  Today, it is 5-6/10.  The patient has right lower extremity weakness.  She has occasional urinary urgency. She denies any bowel incontinence.      Her neck pain has been stable.  It is an intermittent aching pain in the cervical spine. She denies any radiation to her arms.  Her maximum pain is 7-8/10 and minimum 5-6/10; today it is 7-8/10. She has left upper extremity weakness.  She has chronic gait imbalance.  She uses a Rollator walker at times.     Her left shoulder pain has been under fair control.  She has been having more right shoulder pain recently.  It is a constant aching pain aggravated by movement.  Her maximum pain is 8/10.    The patient is taking:  - she was prescribed Lyrica last visit.  She is not taking it any longer.  She is not sure if she did not pick it up initially.  She does not recall taking it or having any side effects.  - tramadol 50 mg tablets, 1 tablets 4 but occasionally 5  or 6 times per day  - Voltaren gel as needed, usually 3-4 times per day.  However she stopped because it does not help.  She was previously on gabapentin but it was stopped due to swelling      The patient is also coming for a mobility assessment for a power mobility device.  She lives alone in a first-floor apartment.  She is independent with her activities of daily living but it takes her a long time.  She ambulates using a straight cane or a walker.  When her symptoms are active she can go about 20-30 feet.  She is restricted by lower extremity weakness, leg numbness, and low back pain with right sciatica.  She cannot propel a manual wheelchair due to upper extremity weakness and right shoulder pain.  She reports history of falling 2-3 times per week with occasional hand injuries.      Past Medical History:   Diagnosis Date    Anxiety     Chronic back pain     Chronic hepatitis C 6/19/2020    Depression     Hallucination     History of psychiatric hospitalization     Hx of psychiatric care     Hyperthyroidism 12/29/2021    Migraines     Neuropathy     Obesity     Psychiatric exam requested by authority     Psychiatric problem     Psychosis     Sacroiliitis     Schizoaffective disorder     Scoliosis     Sleep difficulties     Therapy     Tobacco use      Review of patient's allergies indicates:   Allergen Reactions    Cranberry      Kidney Pain    Gabapentin Swelling     Throat Swelling, Hard to breathe    Ibuprofen Swelling     Stomach Bloated, Swelling Throat    Meloxicam Swelling     Swelling of the Throat    Toradol [ketorolac] Swelling     Throat Swelling difficulty breathing    Amoxicillin Hives    Cyclobenzaprine     Duloxetine            Review of Systems   Constitutional: Positive for fatigue. Negative for chills and fever.   Eyes: Negative for visual disturbance.   Respiratory: Negative for shortness of breath.    Cardiovascular: Negative for chest pain.   Gastrointestinal:  Negative for constipation, nausea and vomiting.   Genitourinary: Negative for difficulty urinating.   Musculoskeletal: Positive for arthralgias, back pain, gait problem and neck pain. Negative for joint swelling.   Neurological: Positive for headaches. Negative for dizziness.   Psychiatric/Behavioral: Negative for behavioral problems and sleep disturbance.       Objective:      Physical Exam  Constitutional:       Appearance: She is well-developed.      Comments: Coming to the clinic in a manual wheelchair propelled by      HENT:      Head: Normocephalic and atraumatic.   Neck:      Comments: +ve moderate tenderness cervical spine.  Musculoskeletal:      Cervical back: Normal range of motion.      Comments:   BUE:  ROM:   RUE: decreased at shoulder.   LUE: full.  Strength:    RUE: 3/5 at shoulder abduction, 4 elbow flexion, 4 elbow extension, 4+ hand .   LUE: 4/5 at shoulder abduction, 5- elbow flexion, 5- elbow extension, 5- hand .  Sensation to pinprick:   RUE: decreased.   LUE: decreased.  DTR:    RUE: +1 biceps, +1 triceps.   LUE:  +1 biceps, +1 triceps.    BLE:  ROM:    RLE: full.     LLE:full.  Mild bilateral knee crepitus.   Strength:    RLE: 4/5 at hip flexion, 4 knee extension, 5- ankle DF, 5- PF.   LLE: 4/5 at hip flexion, 5- knee extension, 5- ankle DF, 5- PF.  Sensation to pinprick:     RLE: decreased.     LLE: decreased.  DTR:     RLE: +1 knee, +1 ankle.    LLE: +1 knee, +1 ankle.  Clonus:    Rt ankle: -ve.    Lt ankle: -ve.    SLR (sitting):      RLE: +ve.      LLE: -ve.     +ve moderate tenderness lumbar spine.     Skin:     General: Skin is warm.   Neurological:      Mental Status: She is alert and oriented to person, place, and time.                 Assessment:       1. Chronic midline low back pain with right-sided sciatica    2. Osteoarthritis of spine with radiculopathy, lumbar region    3. Chronic neck pain    4. DDD (degenerative disc disease), lumbar    5. Obesity (BMI  30.0-34.9)        Plan:     For her pain:  - Restart pregabalin (LYRICA) 50 MG capsule; Take 1 capsule (50 mg total) by mouth 3 (three) times daily. In 1 week, if no relief, may increase dose to 2 capsules twice per day.  - Continue traMADol (ULTRAM) 50 mg tablet; Take 1 tablet (50 mg total) by mouth 3 (three) times daily as needed.  - Ambulatory referral/consult to Sports Medicine; Future    For mobility:  - The patient was seen today for mobility evaluation for a power mobility device due to significant impairment at home.  - The patient has multifactorial gait impairment.  - The patient is not able to ambulate safely at home.  - The patient is unable to use a walker functional distances due to chronic low back pain, lower extremity weakness and leg numbness likely due to radiculopathy and neuropathy.  - The patient is unable to use an optimally-configured manual wheelchair at home due to chronic right shoulder pain and upper extremity weakness.  - The patient has history of falls, which could be detrimental to her health.  - The patient has intact cognition and should be able to use a power mobility device well at home.  - A prescription for an electric scooter was generated.  - The patient has enough upper & lower extremity strength to be able to transfer to and from the power mobility device.  - The patient has enough range of motion & strength in BUE to allow functional operation of the tiller.  - The patient has good trunk balance and should be able to maintain a safe posture while operating a power mobility device.  - This will allow the patient to go safely to the kitchen, dining room or living room for feeding & socialization.     - Follow up in about 4 months (around 6/8/2022).      This was a 60 minute visit, more than 50% of which was spent counseling the patient about the diagnosis and the treatment plan.    This note was partly generated with [employer] voice recognition software. I apologize for any  possible typographical errors.

## 2022-02-08 NOTE — TELEPHONE ENCOUNTER
----- Message from Gilmer Hooks sent at 2/8/2022  1:02 PM CST -----  Contact: @ [phone]  Patient calling to get a medication update, patient states she waiting at the pharmacy now, pls call or send to:     [facility] - [place] - [address]  [address]  [place] [address]  Phone: [phone] Fax: [phone]

## 2022-02-08 NOTE — TELEPHONE ENCOUNTER
----- Message from Bhavani Diamond sent at 2/8/2022 12:20 PM CST -----  Contact: Patient  Patient requesting call back in regards to RX: traMADoL (ULTRAM) 50 mg tablet. Patient stated that she been in [facility] for twenty minutes and RX hasn't been sent.        [phone] ([place])

## 2022-02-08 NOTE — TELEPHONE ENCOUNTER
----- Message from Jena Ortiz sent at 2/8/2022 12:43 PM CST -----  Regarding: pt called  Name of Who is Calling: KARIME BOOKER [[id number]]      What is the request in detail: pt states she is at the [facility]'s pharmacy waiting for Dr Andrews to call in her medications. Please advise       Can the clinic reply by [facility]: NO      What Number to Call Back if not in [facility]: [phone]

## 2022-02-08 NOTE — TELEPHONE ENCOUNTER
Last Rx refill-----11/29/21  Last office visit--02/08/22  Next office visit--So far there are no available appointments at this time.  The patient was added to the wait list.

## 2022-02-11 ENCOUNTER — LAB VISIT (OUTPATIENT)
Dept: LAB | Facility: HOSPITAL | Age: 52
End: 2022-02-11
Attending: INTERNAL MEDICINE
Payer: MEDICARE

## 2022-02-11 DIAGNOSIS — E05.90 HYPERTHYROIDISM: ICD-10-CM

## 2022-02-11 LAB
T3 SERPL-MCNC: 298 NG/DL (ref 60–180)
T4 FREE SERPL-MCNC: 1.24 NG/DL (ref 0.71–1.51)
TSH SERPL DL<=0.005 MIU/L-ACNC: <0.01 UIU/ML (ref 0.4–4)

## 2022-02-11 PROCEDURE — 36415 COLL VENOUS BLD VENIPUNCTURE: CPT | Mod: HCNC,PO | Performed by: INTERNAL MEDICINE

## 2022-02-11 PROCEDURE — 84443 ASSAY THYROID STIM HORMONE: CPT | Mod: HCNC | Performed by: INTERNAL MEDICINE

## 2022-02-11 PROCEDURE — 84480 ASSAY TRIIODOTHYRONINE (T3): CPT | Mod: HCNC | Performed by: INTERNAL MEDICINE

## 2022-02-11 PROCEDURE — 84439 ASSAY OF FREE THYROXINE: CPT | Mod: HCNC | Performed by: INTERNAL MEDICINE

## 2022-02-21 ENCOUNTER — TELEPHONE (OUTPATIENT)
Dept: ENDOCRINOLOGY | Facility: CLINIC | Age: 52
End: 2022-02-21
Payer: MEDICARE

## 2022-02-21 DIAGNOSIS — E05.90 HYPERTHYROIDISM: Primary | ICD-10-CM

## 2022-02-21 NOTE — TELEPHONE ENCOUNTER
Labs with continued hyperthyroidism  Has been more than 2 months since CT scan so will obtain uptake and scan with plans for treatment pending results

## 2022-02-24 ENCOUNTER — TELEPHONE (OUTPATIENT)
Dept: PHYSICAL MEDICINE AND REHAB | Facility: CLINIC | Age: 52
End: 2022-02-24
Payer: MEDICARE

## 2022-02-24 NOTE — TELEPHONE ENCOUNTER
----- Message from Aliza Conway sent at 2/24/2022 11:23 AM CST -----  Regarding: Medical Advice  Contact: [phone]  Medical Advice     Patient: Mary    Would the patient rather a call back or a response via [facility]? Call Back    Best Call Back Number: [phone]    Additional Information: The pt call to check on the paperwork for her motorized scooter.

## 2022-02-24 NOTE — TELEPHONE ENCOUNTER
Called Mrs Sanches with no answer.  Patient orders was faxed to [facility] on  02/08/22  Patient can contact the office back.

## 2022-03-02 ENCOUNTER — HOSPITAL ENCOUNTER (OUTPATIENT)
Dept: RADIOLOGY | Facility: HOSPITAL | Age: 52
Discharge: HOME | End: 2022-03-02
Attending: INTERNAL MEDICINE
Payer: MEDICARE

## 2022-03-02 DIAGNOSIS — E05.90 HYPERTHYROIDISM: ICD-10-CM

## 2022-03-08 ENCOUNTER — HOSPITAL ENCOUNTER (OUTPATIENT)
Dept: RADIOLOGY | Facility: HOSPITAL | Age: 52
Discharge: HOME | End: 2022-03-08
Attending: INTERNAL MEDICINE
Payer: MEDICARE

## 2022-03-08 PROCEDURE — 78014 THYROID IMAGING W/BLOOD FLOW: CPT | Mod: 26,,, | Performed by: RADIOLOGY

## 2022-03-08 PROCEDURE — 78014 NM THYROID UPTAKE AND SCAN: ICD-10-PCS | Mod: 26,,, | Performed by: RADIOLOGY

## 2022-03-08 PROCEDURE — A9516 IODINE I-123 SOD IODIDE MIC: HCPCS

## 2022-03-09 ENCOUNTER — HOSPITAL ENCOUNTER (OUTPATIENT)
Dept: RADIOLOGY | Facility: HOSPITAL | Age: 52
Discharge: HOME | End: 2022-03-09
Attending: INTERNAL MEDICINE
Payer: MEDICARE

## 2022-03-11 ENCOUNTER — TELEPHONE (OUTPATIENT)
Dept: PHYSICAL MEDICINE AND REHAB | Facility: CLINIC | Age: 52
End: 2022-03-11
Payer: MEDICARE

## 2022-03-11 NOTE — TELEPHONE ENCOUNTER
----- Message from Divya Braxton sent at 3/11/2022  4:03 PM CST -----  Regarding: Questions and Concerns  Contact: [phone]  Patient Mary is calling to speak with the office. Patient would like to know if her scooter was ordered and when would she receive it. Please call patient at [phone]      Thank You

## 2022-03-14 ENCOUNTER — TELEPHONE (OUTPATIENT)
Dept: ENDOCRINOLOGY | Facility: CLINIC | Age: 52
End: 2022-03-14
Payer: MEDICARE

## 2022-03-14 DIAGNOSIS — E05.90 HYPERTHYROIDISM: Primary | ICD-10-CM

## 2022-03-14 DIAGNOSIS — E05.00 GRAVES' DISEASE: ICD-10-CM

## 2022-03-14 RX ORDER — METHIMAZOLE 10 MG/1
10 TABLET ORAL DAILY
Qty: 30 TABLET | Refills: 11 | Status: SHIPPED | OUTPATIENT
Start: 2022-03-14 | End: 2022-04-28

## 2022-03-14 NOTE — TELEPHONE ENCOUNTER
Uptake and scan with Graves' disease. Will start methimazole 10 mg with TSH 4 weeks  Side effects including skin reaction, arthralgias, liver dysfunction and agranulocytosis reviewed.

## 2022-03-17 DIAGNOSIS — G43.009 MIGRAINE WITHOUT AURA AND WITHOUT STATUS MIGRAINOSUS, NOT INTRACTABLE: Primary | ICD-10-CM

## 2022-03-17 RX ORDER — BUTALBITAL, ACETAMINOPHEN AND CAFFEINE 50; 325; 40 MG/1; MG/1; MG/1
TABLET ORAL
Qty: 30 TABLET | Refills: 0 | Status: SHIPPED | OUTPATIENT
Start: 2022-03-17 | End: 2022-06-14 | Stop reason: SDUPTHER

## 2022-03-17 NOTE — TELEPHONE ENCOUNTER
----- Message from Inna Luciano sent at 3/17/2022 12:05 PM CDT -----  Regarding: Pt called to schedule a work in appt for headaches and to request a medication refill until the doctor is able to see her  Appointment Access Request:    Pt called to schedule a work in appt for headaches and to request a medication refill until the doctor is able to see her. Pt didn't have the name of the medicine.     Pt would like a call back today and can be reached at [phone]

## 2022-04-11 ENCOUNTER — TELEPHONE (OUTPATIENT)
Dept: HEPATOLOGY | Facility: CLINIC | Age: 52
End: 2022-04-11
Payer: MEDICARE

## 2022-04-11 NOTE — TELEPHONE ENCOUNTER
Call returned to the patient to inquire about thyroid medication ? Please return the call to the [facility] the Office of Cierra Felton [phone].

## 2022-04-11 NOTE — TELEPHONE ENCOUNTER
----- Message from Cierra Felton PA-C sent at 4/6/2022  8:36 AM CDT -----  Devorah please call Chester Cotto  ----- Message -----  From: Samira Roberson MA  Sent: 4/5/2022   1:20 PM CDT  To: Cierra Felton PA-C    Placed call to this pt to [facility]. 4/18 appt. Got her moved to 4/27.    While on the phone she was very concerned about a new thyroid medication she is on (couldn't remember he name). Stated that while she is happy its helping her sleep, she is worried that its 'knocking' her 'out' in such a manner. She said she is not gonna take anymore of it until she hears from you in regard to if it is harming her liver. She also stated she is not taking any liver medications    Please advise      Manny aCro

## 2022-04-19 ENCOUNTER — TELEPHONE (OUTPATIENT)
Dept: PHYSICAL MEDICINE AND REHAB | Facility: CLINIC | Age: 52
End: 2022-04-19
Payer: MEDICARE

## 2022-04-19 NOTE — TELEPHONE ENCOUNTER
----- Message from Mariaa Means sent at 4/19/2022  9:31 AM CDT -----  Regarding: Advice  Contact: [phone]  Patient is calling to speak with someone in office she has been itching really bad. Please contact pt

## 2022-04-23 ENCOUNTER — NURSE TRIAGE (OUTPATIENT)
Dept: ADMINISTRATIVE | Facility: CLINIC | Age: 52
End: 2022-04-23
Payer: MEDICARE

## 2022-04-23 NOTE — TELEPHONE ENCOUNTER
Patient c/o a widespread rash and generalized itching. Advised per protocol to be seen within the next 4 hours. Supportive measures were discussed. Advised the patient to call back with any further questions or if symptoms worsen.      Reason for Disposition   Large or small blisters on skin (i.e., fluid filled bubbles or sacs)    Additional Information   Negative: [1] Life-threatening reaction (anaphylaxis) in the past to similar substance (e.g., food, insect bite/sting, chemical, etc.) AND [2] < 2 hours since exposure   Negative: [1] Sudden onset of rash (within last 2 hours) AND [2] difficulty breathing or swallowing   Negative: Shock suspected (e.g., cold/pale/clammy skin, too weak to stand, low BP, rapid pulse)   Negative: Difficult to awaken or acting confused (e.g., disoriented, slurred speech)   Negative: [1] Purple or blood-colored spots or dots AND [2] fever   Negative: Sounds like a life-threatening emergency to the triager   Negative: [1] Widespread rash AND [2] bright red, sunburn-like AND [3] current tampon use or nasal packing   Negative: [1] Widespread rash AND [2] bright red, sunburn-like AND [3] wound infection or recent surgery   Negative: [1] Bright red skin AND [2] peels off in sheets   Negative: Stiff neck (can't touch chin to chest)   Negative: Fever   Negative: Joint pain or swelling   Negative: Patient sounds very sick or weak to the triager   Negative: [1] Purple or blood-colored rash (spots or dots) AND [2] no fever AND [3] sounds well to triager    Protocols used: RASH OR REDNESS - WIDESPREAD-A-

## 2022-04-25 DIAGNOSIS — L30.9 DERMATITIS: Primary | ICD-10-CM

## 2022-04-25 RX ORDER — CRISABOROLE 20 MG/G
1 OINTMENT TOPICAL 2 TIMES DAILY
Qty: 60 G | Refills: 3 | Status: SHIPPED | OUTPATIENT
Start: 2022-04-25 | End: 2022-04-27

## 2022-04-27 ENCOUNTER — PATIENT OUTREACH (OUTPATIENT)
Dept: ADMINISTRATIVE | Facility: OTHER | Age: 52
End: 2022-04-27
Payer: MEDICARE

## 2022-04-27 ENCOUNTER — TELEPHONE (OUTPATIENT)
Dept: HEPATOLOGY | Facility: CLINIC | Age: 52
End: 2022-04-27
Payer: MEDICARE

## 2022-04-27 ENCOUNTER — LAB VISIT (OUTPATIENT)
Dept: LAB | Facility: HOSPITAL | Age: 52
End: 2022-04-27
Payer: MEDICARE

## 2022-04-27 ENCOUNTER — OFFICE VISIT (OUTPATIENT)
Dept: HEPATOLOGY | Facility: CLINIC | Age: 52
End: 2022-04-27
Payer: MEDICARE

## 2022-04-27 VITALS
RESPIRATION RATE: 18 BRPM | BODY MASS INDEX: 33.63 KG/M2 | SYSTOLIC BLOOD PRESSURE: 132 MMHG | HEART RATE: 57 BPM | OXYGEN SATURATION: 95 % | HEIGHT: 63 IN | TEMPERATURE: 98 F | WEIGHT: 189.81 LBS | DIASTOLIC BLOOD PRESSURE: 71 MMHG

## 2022-04-27 DIAGNOSIS — K74.60 HEPATIC CIRRHOSIS, UNSPECIFIED HEPATIC CIRRHOSIS TYPE, UNSPECIFIED WHETHER ASCITES PRESENT: ICD-10-CM

## 2022-04-27 DIAGNOSIS — B18.2 CHRONIC HEPATITIS C WITHOUT HEPATIC COMA: Primary | ICD-10-CM

## 2022-04-27 DIAGNOSIS — E05.90 HYPERTHYROIDISM: Primary | ICD-10-CM

## 2022-04-27 DIAGNOSIS — B18.2 CHRONIC HEPATITIS C WITHOUT HEPATIC COMA: ICD-10-CM

## 2022-04-27 DIAGNOSIS — E05.90 HYPERTHYROIDISM: ICD-10-CM

## 2022-04-27 LAB
AFP SERPL-MCNC: 5.9 NG/ML (ref 0–8.4)
ALBUMIN SERPL BCP-MCNC: 3.1 G/DL (ref 3.5–5.2)
ALP SERPL-CCNC: 125 U/L (ref 55–135)
ALT SERPL W/O P-5'-P-CCNC: 20 U/L (ref 10–44)
ANION GAP SERPL CALC-SCNC: 9 MMOL/L (ref 8–16)
AST SERPL-CCNC: 37 U/L (ref 10–40)
BASOPHILS # BLD AUTO: 0.05 K/UL (ref 0–0.2)
BASOPHILS NFR BLD: 0.7 % (ref 0–1.9)
BILIRUB SERPL-MCNC: 0.7 MG/DL (ref 0.1–1)
BUN SERPL-MCNC: 8 MG/DL (ref 6–20)
CALCIUM SERPL-MCNC: 9.5 MG/DL (ref 8.7–10.5)
CHLORIDE SERPL-SCNC: 104 MMOL/L (ref 95–110)
CO2 SERPL-SCNC: 24 MMOL/L (ref 23–29)
CREAT SERPL-MCNC: 0.7 MG/DL (ref 0.5–1.4)
DIFFERENTIAL METHOD: ABNORMAL
EOSINOPHIL # BLD AUTO: 0.3 K/UL (ref 0–0.5)
EOSINOPHIL NFR BLD: 4.7 % (ref 0–8)
ERYTHROCYTE [DISTWIDTH] IN BLOOD BY AUTOMATED COUNT: 13.4 % (ref 11.5–14.5)
EST. GFR  (AFRICAN AMERICAN): >60 ML/MIN/1.73 M^2
EST. GFR  (NON AFRICAN AMERICAN): >60 ML/MIN/1.73 M^2
GLUCOSE SERPL-MCNC: 80 MG/DL (ref 70–110)
HCT VFR BLD AUTO: 41.6 % (ref 37–48.5)
HGB BLD-MCNC: 14.3 G/DL (ref 12–16)
IMM GRANULOCYTES # BLD AUTO: 0.02 K/UL (ref 0–0.04)
IMM GRANULOCYTES NFR BLD AUTO: 0.3 % (ref 0–0.5)
INR PPP: 1.1 (ref 0.8–1.2)
LYMPHOCYTES # BLD AUTO: 2.4 K/UL (ref 1–4.8)
LYMPHOCYTES NFR BLD: 36 % (ref 18–48)
MCH RBC QN AUTO: 31.2 PG (ref 27–31)
MCHC RBC AUTO-ENTMCNC: 34.4 G/DL (ref 32–36)
MCV RBC AUTO: 91 FL (ref 82–98)
MONOCYTES # BLD AUTO: 0.5 K/UL (ref 0.3–1)
MONOCYTES NFR BLD: 6.8 % (ref 4–15)
NEUTROPHILS # BLD AUTO: 3.5 K/UL (ref 1.8–7.7)
NEUTROPHILS NFR BLD: 51.5 % (ref 38–73)
NRBC BLD-RTO: 0 /100 WBC
PLATELET # BLD AUTO: 179 K/UL (ref 150–450)
PMV BLD AUTO: 11.3 FL (ref 9.2–12.9)
POTASSIUM SERPL-SCNC: 3.8 MMOL/L (ref 3.5–5.1)
PROT SERPL-MCNC: 8.5 G/DL (ref 6–8.4)
PROTHROMBIN TIME: 11.7 SEC (ref 9–12.5)
RBC # BLD AUTO: 4.58 M/UL (ref 4–5.4)
SODIUM SERPL-SCNC: 137 MMOL/L (ref 136–145)
T3 SERPL-MCNC: 162 NG/DL (ref 60–180)
T4 FREE SERPL-MCNC: 0.89 NG/DL (ref 0.71–1.51)
TSH SERPL DL<=0.005 MIU/L-ACNC: 0.02 UIU/ML (ref 0.4–4)
WBC # BLD AUTO: 6.75 K/UL (ref 3.9–12.7)

## 2022-04-27 PROCEDURE — 1160F RVW MEDS BY RX/DR IN RCRD: CPT | Mod: CPTII,S$GLB,, | Performed by: PHYSICIAN ASSISTANT

## 2022-04-27 PROCEDURE — 84439 ASSAY OF FREE THYROXINE: CPT | Performed by: PHYSICIAN ASSISTANT

## 2022-04-27 PROCEDURE — 36415 COLL VENOUS BLD VENIPUNCTURE: CPT | Performed by: PHYSICIAN ASSISTANT

## 2022-04-27 PROCEDURE — 99215 PR OFFICE/OUTPT VISIT, EST, LEVL V, 40-54 MIN: ICD-10-PCS | Mod: S$GLB,,, | Performed by: PHYSICIAN ASSISTANT

## 2022-04-27 PROCEDURE — 87522 HEPATITIS C REVRS TRNSCRPJ: CPT | Performed by: PHYSICIAN ASSISTANT

## 2022-04-27 PROCEDURE — 99499 RISK ADDL DX/OHS AUDIT: ICD-10-PCS | Mod: S$GLB,,, | Performed by: PHYSICIAN ASSISTANT

## 2022-04-27 PROCEDURE — 99999 PR PBB SHADOW E&M-EST. PATIENT-LVL IV: CPT | Mod: PBBFAC,,, | Performed by: PHYSICIAN ASSISTANT

## 2022-04-27 PROCEDURE — 3075F PR MOST RECENT SYSTOLIC BLOOD PRESS GE 130-139MM HG: ICD-10-PCS | Mod: CPTII,S$GLB,, | Performed by: PHYSICIAN ASSISTANT

## 2022-04-27 PROCEDURE — 82105 ALPHA-FETOPROTEIN SERUM: CPT | Performed by: PHYSICIAN ASSISTANT

## 2022-04-27 PROCEDURE — 3008F BODY MASS INDEX DOCD: CPT | Mod: CPTII,S$GLB,, | Performed by: PHYSICIAN ASSISTANT

## 2022-04-27 PROCEDURE — 3008F PR BODY MASS INDEX (BMI) DOCUMENTED: ICD-10-PCS | Mod: CPTII,S$GLB,, | Performed by: PHYSICIAN ASSISTANT

## 2022-04-27 PROCEDURE — 1160F PR REVIEW ALL MEDS BY PRESCRIBER/CLIN PHARMACIST DOCUMENTED: ICD-10-PCS | Mod: CPTII,S$GLB,, | Performed by: PHYSICIAN ASSISTANT

## 2022-04-27 PROCEDURE — 3078F PR MOST RECENT DIASTOLIC BLOOD PRESSURE < 80 MM HG: ICD-10-PCS | Mod: CPTII,S$GLB,, | Performed by: PHYSICIAN ASSISTANT

## 2022-04-27 PROCEDURE — 85025 COMPLETE CBC W/AUTO DIFF WBC: CPT | Performed by: PHYSICIAN ASSISTANT

## 2022-04-27 PROCEDURE — 99999 PR PBB SHADOW E&M-EST. PATIENT-LVL IV: ICD-10-PCS | Mod: PBBFAC,,, | Performed by: PHYSICIAN ASSISTANT

## 2022-04-27 PROCEDURE — 85610 PROTHROMBIN TIME: CPT | Performed by: PHYSICIAN ASSISTANT

## 2022-04-27 PROCEDURE — 99215 OFFICE O/P EST HI 40 MIN: CPT | Mod: S$GLB,,, | Performed by: PHYSICIAN ASSISTANT

## 2022-04-27 PROCEDURE — 3078F DIAST BP <80 MM HG: CPT | Mod: CPTII,S$GLB,, | Performed by: PHYSICIAN ASSISTANT

## 2022-04-27 PROCEDURE — 3075F SYST BP GE 130 - 139MM HG: CPT | Mod: CPTII,S$GLB,, | Performed by: PHYSICIAN ASSISTANT

## 2022-04-27 PROCEDURE — 84480 ASSAY TRIIODOTHYRONINE (T3): CPT | Performed by: PHYSICIAN ASSISTANT

## 2022-04-27 PROCEDURE — 1159F MED LIST DOCD IN RCRD: CPT | Mod: CPTII,S$GLB,, | Performed by: PHYSICIAN ASSISTANT

## 2022-04-27 PROCEDURE — 80053 COMPREHEN METABOLIC PANEL: CPT | Performed by: PHYSICIAN ASSISTANT

## 2022-04-27 PROCEDURE — 84443 ASSAY THYROID STIM HORMONE: CPT | Performed by: PHYSICIAN ASSISTANT

## 2022-04-27 PROCEDURE — 99499 UNLISTED E&M SERVICE: CPT | Mod: S$GLB,,, | Performed by: PHYSICIAN ASSISTANT

## 2022-04-27 PROCEDURE — 1159F PR MEDICATION LIST DOCUMENTED IN MEDICAL RECORD: ICD-10-PCS | Mod: CPTII,S$GLB,, | Performed by: PHYSICIAN ASSISTANT

## 2022-04-27 NOTE — PATIENT INSTRUCTIONS
MRI on Monday  LABS TODAY   FOLLOW-UP WITH ENDOCRINOLOGY  FOLLOW-UP WITH ME IN 3 MONTHS WITH MRI PRIOR  DERMATOLOGY RE-REFERRAL

## 2022-04-27 NOTE — ASSESSMENT & PLAN NOTE
Key History and Diagnostic Findings  - Duration of hyperthyroidism: Since late 2021  - Etiology determined to be: Graves' disease  - Current relevant medications: Methimazole 10 mg started March 2022  - Takes thyroid medication properly  - TSH remains undetectable with free T4 of 1.24 on 02/11/2022. TPO and TRAb undetectable in December 21.  - Thyroid ultrasound obtained 01/12/2022 demonstrates three TI-RADS 4 category nodules. Based on size and morphology, the left mid solid thyroid nodule is recommended for follow-up at 1, 2, 3, and 5 years.    Plan  - Decrease methimazole to 5mg (ok to take at night)  - Recheck thyroid blood work in 2 months  - Follow up with Dermatology (referral placed yesterday)  - Return to clinic in 3 months or as-needed before

## 2022-04-27 NOTE — PROGRESS NOTES
Hepatology Consultation: [facility]    ESTABLISHED PATIENT   PCP: DO Diana Long      Ms. Sanches is a 52 y.o. female with PMH as listed below who presents to clinic for returning for continued elevation of liver enzymes in the context of possible advanced liver fibrosis with cured HCV (s/p tx and cure 2020). Prior Fibrosure suggesting advance fibrosis F3/F4, well compensated. The patient did not receive Fibroscan due to COVID-19 pandemic at the time of HCV treatment, opted for Fibrosure instead. At her last visit 12/3/2020 we performed post-treatment Fibroscan that indicated F2, S2. She did gain about 20 lbs over the course of 2020. Otherwise, her IgG was mildly elevated. She has no synthetic dysfunction. Her ultrasound 11/11/2020 showed hepatomegaly to 18.6cm and normal size spleen without focal hepatic lesions. At the time her AFP decreased to 6.8.     The patient was previously followed by Jen Scheuermann, PA-C who treated and cured her Hep C.     I obtained a CT of her liver 12/17/2021 for further investigation of her ultrasound 9/9/2021 that suggested enlarged lymph node. CT 12/2021 revealed concerning liver lesion, MRI 1/6/2022 obtained that did not confirm HCC.     She was lost to follow-up since 3/8/2021 and did not show to several appointments.     Conference review: 12/21/21  indeterminate lesion with enhancement and possible washout. Rec MRI now. MRI now.  Conference review: 1/11/2021 MRI does not meet criteria for HCC. 3 arterially enhancing foci, without washout or pseudocapsule. Technically indeterminate. may reflect regenerative nodule near dome or vascular anomalies. 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 stable LN between IVC and aorta. recommend 3 month follow up MRI.       Interval history 01/12/2022:  Mary Sanches arrives today with  Chang.  Not watching her diet, drinks soda every day. Drinks coffee with  3 tbsp sugar per Chang.  She is doing well. (+) anxiety, new rash, seeing derm.   Denies symptoms of hepatic decompensation including jaundice, ascites, cognitive problems to suggest hepatic encephalopathy, or GI bleeding.     Fibrosis staging:              HCV FibroSURE 2/2020:A2, F3-4              Fibroscan 12/3/2020: S2,F2    Risk factors for fatty liver include obesity.      Interval history 04/27/2022:  Mary Sanches arrives today alone.     Since our last visit, she has had a diffuse rash and visited dermatology. She believes secondary to methimazole but not sure. She is miserable with itching and has an awful rash on her buttocks as well.     Denies symptoms of hepatic decompensation including jaundice, ascites, cognitive problems to suggest hepatic encephalopathy, or GI bleeding.     She did not keep appointment for MRI as scheduled previously, or lab work.        [facility] Mary has a past medical history of Anxiety, Chronic back pain, Chronic hepatitis C (6/19/2020), Depression, Hallucination, History of psychiatric hospitalization, psychiatric care, Hyperthyroidism (12/29/2021), Migraines, Neuropathy, Obesity, Psychiatric exam requested by authority, Psychiatric problem, Psychosis, Sacroiliitis, Schizoaffective disorder, Scoliosis, Sleep difficulties, Therapy, and Tobacco use.   PSXH Mary has a past surgical history that includes Hysterectomy and Hand surgery (Left).    Mary's family history includes Cancer in her maternal grandmother; Cirrhosis in her paternal grandmother; Diabetes in her sister; Heart disease in her paternal grandfather; Hypertension in her father; Kidney disease in her sister; No Known Problems in her mother; Thyroid disease in her sister.    Mary reports that she has been smoking. She has a 0.13 pack-year smoking history. She has never used smokeless tobacco. She reports that she does not drink alcohol and does not use drugs.   ALG Mary is allergic to cranberry,  "gabapentin, ibuprofen, meloxicam, toradol [ketorolac], amoxicillin, cyclobenzaprine, and duloxetine.   STACEY Hernandez has a current medication list which includes the following prescription(s): alprazolam, butalbital-acetaminophen-caffeine -40 mg, diphenhydramine, mupirocin, pregabalin, tramadol, vitamin d, eucrisa, diclofenac sodium, doxycycline, escitalopram oxalate, methimazole, olanzapine, and triamcinolone acetonide 0.1%.           ROS:   GENERAL: Denies fatigue  HEENT: Denies yellowing of eyes   CARDIOVASCULAR: Denies edema  GI: Denies abdominal pain  NEURO: Denies confusion, memory loss, or mood changes  HEME/LYMPH: Denies easy bruising or bleeding      Objective     /71 (BP Location: Right arm, Patient Position: Sitting, BP Method: Medium (Automatic))   Pulse (!) 57   Temp 97.5 °F (36.4 °C) (Oral)   Resp 18   Ht 5' 3" (1.6 m)   Wt 86.1 kg (189 lb 13.1 oz)   SpO2 95%   BMI 33.62 kg/m²     PHYSICAL EXAM:   Friendly woman, tearful, anxious, tangential; alert and oriented to person, place and time  EYES: Sclerae anicteric  GI: Soft, non-tender, non-distended. No ascites.  EXTREMITIES:  No edema.  SKIN: Warm and dry. No jaundice. No telangectasias noted. No palmar erythema. Rash, healed, on extremities as well as intertriginous folds of buttocks  NEURO:  Normal gait. No asterixis. Ambulating with wheelchair  PSYCH:  Memory intact.       DIAGNOSTICS    Lab Results   Component Value Date    ALT 29 01/12/2022    AST 41 (H) 01/12/2022    ALKPHOS 122 01/12/2022    BILITOT 0.4 01/12/2022    ALBUMIN 3.1 (L) 01/12/2022    INR 1.0 01/12/2022     01/12/2022     Lab Results   Component Value Date    AFP 4.3 01/12/2022       In relation to metabolic risk factors:   Lab Results   Component Value Date    HGBA1C 5.2 12/27/2019    CHOL 173 12/08/2021    TSH <0.010 (L) 02/11/2022     Body mass index is 33.62 kg/m².      Prior serologic workup:   Lab Results   Component Value Date    SMOOTHMUSCAB Negative " 1:40 11/19/2020    AMAIFA Negative 1:40 11/19/2020    IGGSERUM 2653 (H) 12/08/2021    ANASCREEN Negative <1:80 11/19/2020    FERRITIN 89 11/19/2020    FESATURATED 20 11/19/2020    PETH Negative 11/19/2020    STCBV6JHFDWE MM 11/19/2020    WBBKG6ISHBDK 221 (H) 11/19/2020    CERULOPLSM 39.0 11/19/2020    HEPBSAG Negative 02/20/2020    HEPCAB Positive (A) 12/27/2019    HEPAIGM Negative 12/27/2019       Imaging:  MRI Abdomen W WO Contrast  Narrative: EXAMINATION:  MRI ABDOMEN W WO CONTRAST    CLINICAL HISTORY:  Liver lesion, > 1cm;liver lesion;  Liver disease, unspecified    TECHNIQUE:  Multiplanar multisequence images of the abdomen before and after administration of 10 mL Gadavist intravenous contrast.    COMPARISON:  CT 12/17/2021    FINDINGS:  Inferior Thorax: Unremarkable.    Liver: Nodular contour suggestive for cirrhosis.  Normal size.  Two foci of serpiginous arterial phase hyperenhancement within the central liver adjacent to a hepatic veins (series 11, image 24 and 27).  The areas demonstrate blood pool intensity on venous and delayed phases and are favored to represent vasculature shunting.  Irregular focus of arterial phase hyperenhancement within the periphery of hepatic segment 6 (series 11, image 54) which becomes isointense on venous and delayed phases favored to represent variant perfusion.  No suspicious washout or pseudo capsule.  Hepatic and portal veins are patent.    Gallbladder: Unremarkable.    Bile Ducts: No dilatation.    Pancreas: No mass or ductal dilatation.    Spleen: Unremarkable.    Adrenals: Unremarkable.    Kidneys/Ureters: 1.1 cm T2 hyperintense right renal lesion with mild intrinsic T1 hyperintensity likely representing a hemorrhagic/proteinaceous cyst.  No solid enhancing renal mass.  No hydronephrosis    GI Tract/Mesentery: No evidence of bowel obstruction or inflammation.    Peritoneal Space: No ascites.    Retroperitoneum: Mildly enlarged right periaortic lymph node measures 1.3 cm  "(series 13, image 49), stable.    Abdominal wall: Unremarkable.    Vasculature: No aneurysm.    Bones: No suspicious marrow replacing lesion.  Impression: Indeterminate small focus of arterial phase enhancement in the right hepatic lobe (segment VIII), correlating with the 12/17/2021 exam.  No evidence of contrast washout or pseudo capsule to suggest HCC.  No new lesions.    Mildly enlarged right periaortic lymph node, stable.    Small right renal hemorrhagic cyst.    Electronically signed by resident: Nile Schmitz  Date:    01/06/2022  Time:    08:43    Electronically signed by: Jeff Vance MD  Date:    01/06/2022  Time:    10:03      Assessment/Plan     52 y.o. female with:    1. Fatty liver  2. Liver fibrosis, F2  - I suspect cirrhosis of the liver, but fibrosis staging has not been revealing.   - plt intact   - no signs or symptoms of hepatic decompensation or portal HTN, medically early for EGD & plt normal  - immunized against Hep A &B  - AFP Tumor Marker; Standing  - CBC Auto Differential; Standing  - Comprehensive Metabolic Panel; Standing  - Protime-INR; Standing      3. Liver disease, unspecified  - MRI Abdomen W WO Contrast; Future    4. Liver lesion  - ultrasound 2021, CT 2021  - MRI 2022:" Two foci of serpiginous arterial phase hyperenhancement within the central liver adjacent to a hepatic veins (series 11, image 24 and 27).  The areas demonstrate blood pool intensity on venous and delayed phases and are favored to represent vasculature shunting.  Irregular focus of arterial phase hyperenhancement within the periphery of hepatic segment 6 (series 11, image 54) which becomes isointense on venous and delayed phases favored to represent variant perfusion.  No suspicious washout or pseudo capsule."  - reviewed in IR conference 1/11/2021, does not meet criteria for liver cancer, recommend mri f/u in 3 months   - planned for 4/2022 but patient did not keep appointment    5. Obesity       6. Chronic " hepatitis C without hepatic coma  - s/p tx with epclusa svr12 6/2020      7. Elevated LFTs  - ?hyperthyroidism  - DILI meds: voltaren& lexapro  - zyprexa no longer taking so likely not DILI  - will monitor, enzymes not impressively high and could be possibly secondary to fatty liver        Orders Placed This Encounter   Procedures    Hepatitis C RNA, Quantitative, PCR    AFP Tumor Marker    CBC Auto Differential    Comprehensive Metabolic Panel    Protime-INR       MELD-Na score: 6 at 1/12/2022 10:40 AM  MELD score: 6 at 1/12/2022 10:40 AM  Calculated from:  Serum Creatinine: 0.7 mg/dL (Using min of 1 mg/dL) at 1/12/2022 10:40 AM  Serum Sodium: 137 mmol/L at 1/12/2022 10:40 AM  Total Bilirubin: 0.4 mg/dL (Using min of 1 mg/dL) at 1/12/2022 10:40 AM  INR(ratio): 1.0 at 1/12/2022 10:40 AM  Age: 51 years      · MRI Monday. Reminder given to patient.   · Labs today.   · Follow-up with dermatology and endocrinology. Regarding methimazole and liver tox, recommend CMP to look at inflammation. Control of her hyperthyroidism may actually help her transaminases.  · Follow-up with psychiatry for severe mood disorder.  · Patient with difficulty coordinating care and keeping appointments. Obtained significant other's phone number for aiding in scheduling.  Follow up in about 3 months (around 7/27/2022).          I spent 45 minutes on the day of this encounter preparing for, evaluating, treating, and managing this patient.       Thank you for allowing me to participate in the care of Mary Felton PA-C  Hepatology Advanced Practice Provider  [facility]

## 2022-04-27 NOTE — ASSESSMENT & PLAN NOTE
- History of psychiatric illness may affect medication adherence and may contribute to her present lethargy from treatment

## 2022-04-27 NOTE — PROGRESS NOTES
Subjective:      Patient ID: Mary Sanches is a 52 y.o. female.    Chief Complaint:  Graves hyperthyroidism    History of Present Illness   52-year-old  female with bipolar disorder and Graves disease presenting for follow-up regarding her hyperthyroidism    Regarding Graves Hyperthyroidism:    - Rash has been present for over one year since after her second Covid vaccine but states that it has gotten noticeably worse and more painful in the last 2-3 months.  No one else in household has any similar skin condition.  Patient states she has been prone to skin rashes current higher life.  There are pets in the household including dogs but are reportedly free of parasites.  - States she has been experiencing significant lethargy during the day as well  - Has seen a dermatologist in the past but reportedly was told it was an infection  - Duration of hyperthyroidism: Since late 2021  - Etiology determined to be: Graves' disease  - Current relevant medications: Methimazole 10 mg started March 2022  - Takes thyroid medication properly  - TSH remains undetectable with free T4 of 1.24 on 02/11/2022. TPO and TRAb undetectable in December 21.  Lab Results   Component Value Date    TSH 0.024 (L) 04/27/2022    TSH <0.010 (L) 02/11/2022    TSH <0.010 (L) 12/29/2021    FREET4 0.89 04/27/2022    FREET4 1.24 02/11/2022    FREET4 1.20 12/29/2021    THYROPEROXID <6.0 12/08/2021   - Thyroid ultrasound obtained 01/12/2022 demonstrates three TI-RADS 4 category nodules. Based on size and morphology, the left mid solid thyroid nodule is recommended for follow-up at 1, 2, 3, and 5 years.  - Thyroid uptake and scan obtained 03/09/2022 demonstrates Graves disease  - Most recent DEXA: None    - A complete 14 point review of systems was conducted and negative except for what is stated above    Social and family history reviewed  Current medications and allergies reviewed    Objective:   BP (!) 130/90 (BP Location: Left arm, Patient  "Position: Sitting, BP Method: Large (Manual))   Ht 5' 3" (1.6 m)   Wt 85.8 kg (189 lb 2.5 oz)   BMI 33.51 kg/m²   Physical Exam  Constitutional:       Appearance: She is obese.   Musculoskeletal:      Cervical back: Neck supple.   Skin:     Comments: Significant scabbing without purulence or significant surrounding erythema in gluteal area, lateral left thigh, and posterior neck   Neurological:      General: No focal deficit present.      Mental Status: She is alert and oriented to person, place, and time.   Psychiatric:         Mood and Affect: Mood normal.       BP Readings from Last 1 Encounters:   04/28/22 (!) 130/90      Wt Readings from Last 1 Encounters:   04/28/22 0958 85.8 kg (189 lb 2.5 oz)     Body mass index is 33.51 kg/m².    Lab Review:   Lab Results   Component Value Date    HGBA1C 5.2 12/27/2019     Lab Results   Component Value Date    CHOL 173 12/08/2021    HDL 34 (L) 12/08/2021    LDLCALC 123.0 12/08/2021    TRIG 80 12/08/2021    CHOLHDL 19.7 (L) 12/08/2021     Lab Results   Component Value Date     04/27/2022    K 3.8 04/27/2022     04/27/2022    CO2 24 04/27/2022    GLU 80 04/27/2022    BUN 8 04/27/2022    CREATININE 0.7 04/27/2022    CALCIUM 9.5 04/27/2022    PROT 8.5 (H) 04/27/2022    ALBUMIN 3.1 (L) 04/27/2022    BILITOT 0.7 04/27/2022    ALKPHOS 125 04/27/2022    AST 37 04/27/2022    ALT 20 04/27/2022    ANIONGAP 9 04/27/2022    ESTGFRAFRICA >60.0 04/27/2022    EGFRNONAA >60.0 04/27/2022    TSH 0.024 (L) 04/27/2022       All pertinent labs reviewed    Assessment and Plan     Hyperthyroidism  Key History and Diagnostic Findings  - Duration of hyperthyroidism: Since late 2021  - Etiology determined to be: Graves' disease  - Current relevant medications: Methimazole 10 mg started March 2022  - Takes thyroid medication properly  - TSH remains undetectable with free T4 of 1.24 on 02/11/2022. TPO and TRAb undetectable in December 21.  - Thyroid ultrasound obtained 01/12/2022 " demonstrates three TI-RADS 4 category nodules. Based on size and morphology, the left mid solid thyroid nodule is recommended for follow-up at 1, 2, 3, and 5 years.    Plan  - Decrease methimazole to 5mg (ok to take at night)  - Recheck thyroid blood work in 2 months  - Follow up with Dermatology (referral placed yesterday)  - Return to clinic in 3 months or as-needed before    Obesity (BMI 30-39.9)  - Diet and exercise lifestyle modifications    Mood disorder  - History of psychiatric illness may affect medication adherence and may contribute to her present lethargy from treatment      Pradip Currie DO  [facility] [address]  [address] [place], [place], [address]    Office: [phone]  Fax: [phone]    Disclaimer: This note has been generated using voice-recognition software. There may be typographical errors that have been missed during proof-reading.    The above history labs imaging impression and plan were discussed with attending physician who is in agreement and also took part in this patient's care.  I personally reviewed all of the patients available medications, labs, imaging, vitals, allergies, medical history.

## 2022-04-27 NOTE — PROGRESS NOTES
Health Maintenance Due   Topic Date Due    TETANUS VACCINE  Never done    Shingles Vaccine (1 of 2) Never done    Influenza Vaccine (1) 09/01/2021    COVID-19 Vaccine (3 - Booster for Moderna series) 09/22/2021     Updates were requested from care everywhere.  Chart was reviewed for overdue Proactive [facility] ([facility]) topics (CRS, Breast Cancer Screening, Eye exam)  Health Maintenance has been updated.  LINKS immunization registry triggered.  Immunizations were reconciled.

## 2022-04-28 ENCOUNTER — OFFICE VISIT (OUTPATIENT)
Dept: ENDOCRINOLOGY | Facility: CLINIC | Age: 52
End: 2022-04-28
Payer: MEDICARE

## 2022-04-28 VITALS
HEIGHT: 63 IN | WEIGHT: 189.13 LBS | SYSTOLIC BLOOD PRESSURE: 130 MMHG | DIASTOLIC BLOOD PRESSURE: 90 MMHG | BODY MASS INDEX: 33.51 KG/M2

## 2022-04-28 DIAGNOSIS — E05.90 HYPERTHYROIDISM: ICD-10-CM

## 2022-04-28 DIAGNOSIS — F39 MOOD DISORDER: ICD-10-CM

## 2022-04-28 DIAGNOSIS — E66.9 OBESITY (BMI 30-39.9): ICD-10-CM

## 2022-04-28 DIAGNOSIS — E05.00 GRAVES' DISEASE: ICD-10-CM

## 2022-04-28 PROCEDURE — 99499 UNLISTED E&M SERVICE: CPT | Mod: S$GLB,,, | Performed by: GENERAL ACUTE CARE HOSPITAL

## 2022-04-28 PROCEDURE — 99999 PR PBB SHADOW E&M-EST. PATIENT-LVL III: CPT | Mod: PBBFAC,GC,, | Performed by: STUDENT IN AN ORGANIZED HEALTH CARE EDUCATION/TRAINING PROGRAM

## 2022-04-28 PROCEDURE — 99499 RISK ADDL DX/OHS AUDIT: ICD-10-PCS | Mod: S$GLB,,, | Performed by: GENERAL ACUTE CARE HOSPITAL

## 2022-04-28 PROCEDURE — 99214 PR OFFICE/OUTPT VISIT, EST, LEVL IV, 30-39 MIN: ICD-10-PCS | Mod: GC,S$GLB,, | Performed by: STUDENT IN AN ORGANIZED HEALTH CARE EDUCATION/TRAINING PROGRAM

## 2022-04-28 PROCEDURE — 99999 PR PBB SHADOW E&M-EST. PATIENT-LVL III: ICD-10-PCS | Mod: PBBFAC,GC,, | Performed by: STUDENT IN AN ORGANIZED HEALTH CARE EDUCATION/TRAINING PROGRAM

## 2022-04-28 PROCEDURE — 99214 OFFICE O/P EST MOD 30 MIN: CPT | Mod: GC,S$GLB,, | Performed by: STUDENT IN AN ORGANIZED HEALTH CARE EDUCATION/TRAINING PROGRAM

## 2022-04-28 RX ORDER — METHIMAZOLE 5 MG/1
5 TABLET ORAL DAILY
Qty: 90 TABLET | Refills: 3 | Status: SHIPPED | OUTPATIENT
Start: 2022-04-28 | End: 2022-06-09 | Stop reason: DRUGHIGH

## 2022-04-28 NOTE — PROGRESS NOTES
I have seen the patient, reviewed the Fellow's history and physical, assessment, and plan. I have personally interviewed and examined the patient at bedside and agree with the findings.       Pt w/ Grave's disease clinically euthyroid on Methimazole 10mg daily.     TSH improving and now is detectable but still suppressed which may take several months to be in the normal range.     FT4 now WNL     Pt has a macopapular rash in buttocks and upper back, pruritic.  Her rash has been chronic > 6 months prior to initiation of Methimazole but she thinks the methimazole may be causing worsening of the rash.  No concerning features for TEN, Levon Vic's or vasculitis.   Will decrease Methimazole to 5mg daily to see if there is any improvement of rash although it seems to be unrelated.  Will give dermatology referral for further evaluation for skin rash.     Rest of plan and details per fellows note.     MD Miguel Angel Bauer - [facility] 6th Fl

## 2022-04-28 NOTE — PATIENT INSTRUCTIONS
- Decrease methimazole to 5mg (ok to take at night)  - Recheck thyroid blood work in 2 months  - Follow up with Dermatology (referral placed yesterday)  - Return to clinic in 3 months or as-needed before

## 2022-04-29 ENCOUNTER — TELEPHONE (OUTPATIENT)
Dept: HEPATOLOGY | Facility: CLINIC | Age: 52
End: 2022-04-29
Payer: MEDICARE

## 2022-04-29 ENCOUNTER — TELEPHONE (OUTPATIENT)
Dept: PHYSICAL MEDICINE AND REHAB | Facility: CLINIC | Age: 52
End: 2022-04-29
Payer: MEDICARE

## 2022-04-29 LAB — HCV RNA SERPL NAA+PROBE-ACNC: NORMAL IU/ML

## 2022-04-29 RX ORDER — DIPHENHYDRAMINE HCL 50 MG
50 CAPSULE ORAL EVERY 6 HOURS PRN
Qty: 60 CAPSULE | Refills: 1 | Status: ON HOLD | OUTPATIENT
Start: 2022-04-29 | End: 2023-10-03 | Stop reason: HOSPADM

## 2022-04-29 NOTE — TELEPHONE ENCOUNTER
----- Message from Cierra Felton PA-C sent at 4/28/2022  3:14 PM CDT -----  Call pt tell her liver labs looking fine  Her thyroid level is still quite low needs f/u with endocrinology give phone #

## 2022-04-29 NOTE — TELEPHONE ENCOUNTER
----- Message from Faith Abel sent at 4/29/2022  8:24 AM CDT -----  Contact: KARIME BOOKER [[id number]] [phone]  GENERAL CALL BACK    Patient requests a phone call with an update on her scooter order.     Contact Preference: Phone call [phone]

## 2022-04-29 NOTE — TELEPHONE ENCOUNTER
----- Message from Cierra Felton PA-C sent at 4/28/2022  3:14 PM CDT -----  Call pt tell her liver labs looking fine  Her thyroid level is still quite low needs f/u with endocrinology give phone #     Call returned to the patient and message relayed from Cierra LANGSTON. Pt has labs scheduled for Endocrin for Tues 5/3/22 and Follow Up Visit in Aug.  She will reach out to them. Number on appt letter.

## 2022-05-02 ENCOUNTER — HOSPITAL ENCOUNTER (OUTPATIENT)
Dept: RADIOLOGY | Facility: HOSPITAL | Age: 52
Discharge: HOME | End: 2022-05-02
Attending: PHYSICIAN ASSISTANT
Payer: MEDICARE

## 2022-05-02 ENCOUNTER — OFFICE VISIT (OUTPATIENT)
Dept: DERMATOLOGY | Facility: CLINIC | Age: 52
End: 2022-05-02
Payer: MEDICARE

## 2022-05-02 DIAGNOSIS — L29.9 PRURITUS: Primary | ICD-10-CM

## 2022-05-02 DIAGNOSIS — L28.1 PRURIGO NODULARIS: ICD-10-CM

## 2022-05-02 DIAGNOSIS — K76.9 LIVER DISEASE, UNSPECIFIED: ICD-10-CM

## 2022-05-02 PROCEDURE — 74183 MRI ABDOMEN W WO CONTRAST: ICD-10-PCS | Mod: 26,,, | Performed by: STUDENT IN AN ORGANIZED HEALTH CARE EDUCATION/TRAINING PROGRAM

## 2022-05-02 PROCEDURE — 74183 MRI ABD W/O CNTR FLWD CNTR: CPT | Mod: TC

## 2022-05-02 PROCEDURE — 74183 MRI ABD W/O CNTR FLWD CNTR: CPT | Mod: 26,,, | Performed by: STUDENT IN AN ORGANIZED HEALTH CARE EDUCATION/TRAINING PROGRAM

## 2022-05-02 PROCEDURE — 99214 PR OFFICE/OUTPT VISIT, EST, LEVL IV, 30-39 MIN: ICD-10-PCS | Mod: S$GLB,,, | Performed by: DERMATOLOGY

## 2022-05-02 PROCEDURE — A9585 GADOBUTROL INJECTION: HCPCS | Performed by: PHYSICIAN ASSISTANT

## 2022-05-02 PROCEDURE — 99214 OFFICE O/P EST MOD 30 MIN: CPT | Mod: S$GLB,,, | Performed by: DERMATOLOGY

## 2022-05-02 PROCEDURE — 25500020 PHARM REV CODE 255: Performed by: PHYSICIAN ASSISTANT

## 2022-05-02 RX ORDER — GADOBUTROL 604.72 MG/ML
10 INJECTION INTRAVENOUS
Status: COMPLETED | OUTPATIENT
Start: 2022-05-02 | End: 2022-05-02

## 2022-05-02 RX ORDER — DOXYCYCLINE HYCLATE 100 MG
100 TABLET ORAL EVERY 12 HOURS
Qty: 28 TABLET | Refills: 0 | Status: ON HOLD | OUTPATIENT
Start: 2022-05-02 | End: 2023-10-03 | Stop reason: HOSPADM

## 2022-05-02 RX ADMIN — GADOBUTROL 10 ML: 604.72 INJECTION INTRAVENOUS at 07:05

## 2022-05-02 NOTE — PROGRESS NOTES
Subjective:       Patient ID:  Mary Sanches is a 52 y.o. female who presents for a rash    HPI  51 yo F patient with a history of hepatitis C and Graves Disease presents for evaluation of a rash being treated as prurigo. Reports she is very itchy and has had this rash for over a year. Had a biopsy performed by Dr. Jauregui with results as below. Has tried numerous creams including TAC 0.1%  without improvement. Reports the course of doxycycline she was prescribed helped the most and would like to restart.       Final Pathologic Diagnosis   Date Value Ref Range Status   01/12/2022   Final    Skin, right arm, punch biopsy:  - SUPERFICIAL PERIVASCULAR TO MID DERMAL LYMPHOCYTIC INFILTRATE WITH  EOSINOPHILS, see note  Note: The lesions are consistent with a dermal hypersensitivity reaction. It  may be idiopathic or follow well-documented events such as drug ingestion;  vaccination; an arthropod or snake bite; infection, particularly of viral  type.  Secondary excoriation is overlying the lesion.  Microscopic examination:   Sections show skin with superficial to mid dermal perivascular lymphocytic  infiltrate with few eosinophilis, accompanied by vascular dilatation and mild  dermal edema.  The overlying epidermis shows focal excoriation, neutrophil  crusts, mild hyperkeratosis, and parakeratosis.  No microorganisms are  appreciated on PAS fungal stain with adequate positive control.  Several  deeper levels were examined due to processing.       Comment:     Interp By Bronwyn Mora M.D., Signed on 01/28/2022 at 17:08           Review of Systems   Constitutional: Positive for weight loss. Negative for fever and chills.   Gastrointestinal: Negative for nausea, vomiting and diarrhea.   Skin: Positive for itching and rash.   Psychiatric/Behavioral: Positive for anxiety.   Endocrine: Positive for cold intolerance and heat intolerance.        Objective:    Physical Exam   Constitutional: She appears well-developed and  well-nourished. No distress.   Neurological: She is alert and oriented to person, place, and time. She is not disoriented.   Psychiatric: She has a normal mood and affect.   Skin:   Areas Examined (abnormalities noted in diagram):   Scalp / Hair Palpated and Inspected  Head / Face Inspection Performed  Neck Inspection Performed  Chest / Axilla Inspection Performed  Abdomen Inspection Performed  Genitals / Buttocks / Groin Inspection Performed  Back Inspection Performed  RUE Inspected  LUE Inspection Performed  RLE Inspected  LLE Inspection Performed  Nails and Digits Inspection Performed            Numerous scattered excoriated papules on trunk, arms, and legs      Labs reviewed    Assessment / Plan:        Pruritus  Prurigo nodularis    -     doxycycline (VIBRA-TABS) 100 MG tablet; Take 1 tablet (100 mg total) by mouth every 12 (twelve) hours.  Dispense: 28 tablet; Refill: 0  -     NB-UVB Light Therapy; Standing    Patient with numerous excoriations without any obvious rash present, concerning for internal process. CBC and CMP wnl but TSH very low at 0.024 which may be contributing to etiology. Liver disease may be playing a role as well.    -Recommend continuing to improve thyroid status with endocrinology    -Start doxycycline 100 mg BID x 2 weeks as this has helped patient in the past - antiinflammatory - also helps with rashes ie PLEVA    Discussed benefits and risks of doxycyline therapy including but not limited to GI discomfort, esophageal irritation/ulceration, and increased sun sensitivity. Patient was counseled to take medicine with meals and at least 1 hour before lying down.     -Start light therapy 2-3x per week, will wait until after doxycycline course complete due to photosensitizing effects    -Continue mupirocin to any lesions concerning for infection          -close follow up    Follow up in about 3 months (around 8/2/2022).

## 2022-05-02 NOTE — Clinical Note
Gris, new pt for NBUVB for itching.  Lives in [place]. Will be taking doxycycline for 2 weeks prior to starting so please schedule accordingly. Thanks so much! Dr Cuevas

## 2022-05-02 NOTE — Clinical Note
Pt seen in acute care derm clinic last week. Will need f/u with Dr Jauregui in 3 months, please contact to schedule. Thanks [facility]

## 2022-05-03 ENCOUNTER — TELEPHONE (OUTPATIENT)
Dept: DERMATOLOGY | Facility: CLINIC | Age: 52
End: 2022-05-03
Payer: MEDICARE

## 2022-05-03 NOTE — TELEPHONE ENCOUNTER
----- Message from Teresa Cuevas MD sent at 5/2/2022  5:03 PM CDT -----  rashawn Landry pt for NBUVB for itching.  Lives in [place]. Will be taking doxycycline for 2 weeks prior to starting so please schedule accordingly. Thanks so much! Dr Cuevas

## 2022-05-04 ENCOUNTER — TELEPHONE (OUTPATIENT)
Dept: HEPATOLOGY | Facility: CLINIC | Age: 52
End: 2022-05-04
Payer: MEDICARE

## 2022-05-04 NOTE — TELEPHONE ENCOUNTER
Patient: Mary Sanches       MRN: [id number]      : 1970     Age: 52 y.o.  [address]  [place] [address]    Providers  Advanced Practice providers: KIAN Tran    Priority of review: Other    Patient Transplant Status: Other not currently decompensated    Reason for presentation: Indeterminate lesion    Clinical Summary: 51 y/o HCV advanced fibrosis with indeterminate lesions, 3 month f/u.    Anything suspicious for cancer?  F/u imaging?      Imaging to be reviewed: MRIs      HCC Treatment History: none      Platelets:   Lab Results   Component Value Date/Time     2022 03:05 PM     Creatinine:   Lab Results   Component Value Date/Time    CREATININE 0.7 2022 03:05 PM     Bilirubin:   Lab Results   Component Value Date/Time    BILITOT 0.7 2022 03:05 PM     AFP Last 3 each if available:   Lab Results   Component Value Date/Time    AFP 5.9 2022 03:05 PM    AFP 4.3 2022 10:40 AM    AFP 6.2 2021 09:32 AM       MELD: MELD-Na score: 7 at 2022  3:05 PM  MELD score: 7 at 2022  3:05 PM  Calculated from:  Serum Creatinine: 0.7 mg/dL (Using min of 1 mg/dL) at 2022  3:05 PM  Serum Sodium: 137 mmol/L at 2022  3:05 PM  Total Bilirubin: 0.7 mg/dL (Using min of 1 mg/dL) at 2022  3:05 PM  INR(ratio): 1.1 at 2022  3:05 PM  Age: 52 years    Plan:     Follow-up Provider:

## 2022-05-04 NOTE — PROGRESS NOTES
Lakeshia call Mary and let her know MRI did not show any new concerns, will be review in conference next Tue and will get back to her about the next time we need imaging. Thanks

## 2022-05-05 ENCOUNTER — TELEPHONE (OUTPATIENT)
Dept: DERMATOLOGY | Facility: CLINIC | Age: 52
End: 2022-05-05
Payer: MEDICARE

## 2022-05-05 ENCOUNTER — TELEPHONE (OUTPATIENT)
Dept: PHYSICAL MEDICINE AND REHAB | Facility: CLINIC | Age: 52
End: 2022-05-05
Payer: MEDICARE

## 2022-05-05 NOTE — TELEPHONE ENCOUNTER
----- Message from Ava Perez sent at 5/5/2022 11:46 AM CDT -----  Contact: Pt  Power Scooter... Unable to approver request at the nurse level.. Require review by medical director.. peep to peer at     Case  [phone]

## 2022-05-06 LAB — HEMOCCULT STL QL IA: NEGATIVE

## 2022-05-10 ENCOUNTER — TELEPHONE (OUTPATIENT)
Dept: ORTHOPEDICS | Facility: CLINIC | Age: 52
End: 2022-05-10
Payer: MEDICARE

## 2022-05-10 DIAGNOSIS — K76.9 LIVER DISEASE, UNSPECIFIED: ICD-10-CM

## 2022-05-10 NOTE — TELEPHONE ENCOUNTER
turn call back to [phone] to contact patient in regards to a referral for shoulder pain. Left message for patient to re970. Thanks.

## 2022-05-10 NOTE — TELEPHONE ENCOUNTER
attempt to contact patient in regards to referral for shoulder pain. left message for patient to return call back to [phone]. thanks

## 2022-05-11 ENCOUNTER — TELEPHONE (OUTPATIENT)
Dept: PHYSICAL MEDICINE AND REHAB | Facility: CLINIC | Age: 52
End: 2022-05-11
Payer: MEDICARE

## 2022-05-11 ENCOUNTER — TELEPHONE (OUTPATIENT)
Dept: HEPATOLOGY | Facility: CLINIC | Age: 52
End: 2022-05-11
Payer: MEDICARE

## 2022-05-11 NOTE — TELEPHONE ENCOUNTER
----- Message from Kayley Harper sent at 5/11/2022 12:47 PM CDT -----  Type:  Patient Returning Call    Who Called:Mary  Who Left Message for Patient:self  Does the patient know what this is regarding?:wheelchair  Would the patient rather a call back or a response via [employer]? Call back  Best Call Back Number:[phone]  Additional Information: Patient would like to have a wheelchair. Patient would like to talk about falling.

## 2022-05-11 NOTE — TELEPHONE ENCOUNTER
Per RAMIREZ Felton, I called patient and let her know she needs to repeat an MRI again in 3 months since there is not significant changes to last image.   I scheduled MRI and lab on 8/2/22.  F/U with [facility] on 8/15/22.    Patient agreed and verbalized understanding.  Appointments mailed.

## 2022-05-11 NOTE — TELEPHONE ENCOUNTER
----- Message from Christin Gil sent at 5/11/2022 12:26 PM CDT -----  Regarding: Patient advice  Contact: Pt  Pt called in regards to questions/concerns about getting an scooter       Please advise ,Pt can be reached at [phone]

## 2022-05-12 ENCOUNTER — CLINICAL SUPPORT (OUTPATIENT)
Dept: DERMATOLOGY | Facility: CLINIC | Age: 52
End: 2022-05-12
Payer: MEDICARE

## 2022-05-12 DIAGNOSIS — L29.9 PRURITUS: ICD-10-CM

## 2022-05-12 PROCEDURE — 99499 NO LOS: ICD-10-PCS | Mod: S$GLB,,, | Performed by: DERMATOLOGY

## 2022-05-12 PROCEDURE — 99499 UNLISTED E&M SERVICE: CPT | Mod: S$GLB,,, | Performed by: DERMATOLOGY

## 2022-05-13 ENCOUNTER — TELEPHONE (OUTPATIENT)
Dept: PHYSICAL MEDICINE AND REHAB | Facility: CLINIC | Age: 52
End: 2022-05-13
Payer: MEDICARE

## 2022-05-13 NOTE — TELEPHONE ENCOUNTER
Called patient again with no answer.  Patient to contact the office back.   The patient is a 19y Female complaining of

## 2022-05-13 NOTE — TELEPHONE ENCOUNTER
----- Message from [facility] sent at 5/13/2022 11:59 AM CDT -----  Type:  Needs Medical Advice    Who Called: PT   Would the patient rather a call back or a response via [employer]? Callback   Best Call Back Number: [phone]  Additional Information: Pt requesting callback from office to discuss medical equipment issues. Pt states she needs the phone number for the location the office sent the order to.

## 2022-05-17 ENCOUNTER — TELEPHONE (OUTPATIENT)
Dept: PHYSICAL MEDICINE AND REHAB | Facility: CLINIC | Age: 52
End: 2022-05-17
Payer: MEDICARE

## 2022-05-17 NOTE — TELEPHONE ENCOUNTER
Called and   Spoke with  Mrs Sanches   I was able to give her [facility] office number  [phone].  Patient will contact them in regards to  Questions she has  about her scooter.

## 2022-05-17 NOTE — TELEPHONE ENCOUNTER
----- Message from Kayley Harper sent at 5/17/2022  1:19 PM CDT -----  Regarding: scooter  Type:  Patient Returning Call    Who Called:Mary   Who Left Message for Patient:self  Does the patient know what this is regarding?:scooter  Would the patient rather a call back or a response via [facility]? Call back  Best Call Back Number:[phone]  Additional Information: Patient would like info where to  her scooter the doctor called in for her.

## 2022-05-31 ENCOUNTER — TELEPHONE (OUTPATIENT)
Dept: INTERNAL MEDICINE | Facility: CLINIC | Age: 52
End: 2022-05-31
Payer: MEDICARE

## 2022-05-31 NOTE — TELEPHONE ENCOUNTER
spoke with pt re: her blood type is not in the computer. This is not a routine blood draw.   Pt will have to donate blood to find out her blood type.

## 2022-05-31 NOTE — TELEPHONE ENCOUNTER
----- Message from Dora Jean sent at 5/31/2022  8:49 AM CDT -----  Contact: pt [phone]  Patient would like to know her blood type. Please call and advise.    Thank you and have a great day.

## 2022-06-03 ENCOUNTER — PATIENT OUTREACH (OUTPATIENT)
Dept: ADMINISTRATIVE | Facility: HOSPITAL | Age: 52
End: 2022-06-03
Payer: MEDICARE

## 2022-06-09 ENCOUNTER — OFFICE VISIT (OUTPATIENT)
Dept: NEUROLOGY | Facility: CLINIC | Age: 52
End: 2022-06-09
Payer: MEDICARE

## 2022-06-09 VITALS
SYSTOLIC BLOOD PRESSURE: 111 MMHG | WEIGHT: 196.19 LBS | HEIGHT: 63 IN | DIASTOLIC BLOOD PRESSURE: 73 MMHG | HEART RATE: 60 BPM | BODY MASS INDEX: 34.76 KG/M2

## 2022-06-09 DIAGNOSIS — F25.9 SCHIZOAFFECTIVE DISORDER, UNSPECIFIED TYPE: Primary | ICD-10-CM

## 2022-06-09 DIAGNOSIS — G43.019 INTRACTABLE MIGRAINE WITHOUT AURA AND WITHOUT STATUS MIGRAINOSUS: ICD-10-CM

## 2022-06-09 DIAGNOSIS — F41.1 GENERALIZED ANXIETY DISORDER: ICD-10-CM

## 2022-06-09 PROCEDURE — 3074F PR MOST RECENT SYSTOLIC BLOOD PRESSURE < 130 MM HG: ICD-10-PCS | Mod: CPTII,S$GLB,, | Performed by: NEUROMUSCULOSKELETAL MEDICINE & OMM

## 2022-06-09 PROCEDURE — 3074F SYST BP LT 130 MM HG: CPT | Mod: CPTII,S$GLB,, | Performed by: NEUROMUSCULOSKELETAL MEDICINE & OMM

## 2022-06-09 PROCEDURE — 99999 PR PBB SHADOW E&M-EST. PATIENT-LVL III: CPT | Mod: PBBFAC,,, | Performed by: NEUROMUSCULOSKELETAL MEDICINE & OMM

## 2022-06-09 PROCEDURE — 99215 OFFICE O/P EST HI 40 MIN: CPT | Mod: S$GLB,,, | Performed by: NEUROMUSCULOSKELETAL MEDICINE & OMM

## 2022-06-09 PROCEDURE — 1159F MED LIST DOCD IN RCRD: CPT | Mod: CPTII,S$GLB,, | Performed by: NEUROMUSCULOSKELETAL MEDICINE & OMM

## 2022-06-09 PROCEDURE — 99215 PR OFFICE/OUTPT VISIT, EST, LEVL V, 40-54 MIN: ICD-10-PCS | Mod: S$GLB,,, | Performed by: NEUROMUSCULOSKELETAL MEDICINE & OMM

## 2022-06-09 PROCEDURE — 99999 PR PBB SHADOW E&M-EST. PATIENT-LVL III: ICD-10-PCS | Mod: PBBFAC,,, | Performed by: NEUROMUSCULOSKELETAL MEDICINE & OMM

## 2022-06-09 PROCEDURE — 3078F PR MOST RECENT DIASTOLIC BLOOD PRESSURE < 80 MM HG: ICD-10-PCS | Mod: CPTII,S$GLB,, | Performed by: NEUROMUSCULOSKELETAL MEDICINE & OMM

## 2022-06-09 PROCEDURE — 3008F PR BODY MASS INDEX (BMI) DOCUMENTED: ICD-10-PCS | Mod: CPTII,S$GLB,, | Performed by: NEUROMUSCULOSKELETAL MEDICINE & OMM

## 2022-06-09 PROCEDURE — 3008F BODY MASS INDEX DOCD: CPT | Mod: CPTII,S$GLB,, | Performed by: NEUROMUSCULOSKELETAL MEDICINE & OMM

## 2022-06-09 PROCEDURE — 1159F PR MEDICATION LIST DOCUMENTED IN MEDICAL RECORD: ICD-10-PCS | Mod: CPTII,S$GLB,, | Performed by: NEUROMUSCULOSKELETAL MEDICINE & OMM

## 2022-06-09 PROCEDURE — 3078F DIAST BP <80 MM HG: CPT | Mod: CPTII,S$GLB,, | Performed by: NEUROMUSCULOSKELETAL MEDICINE & OMM

## 2022-06-09 RX ORDER — HYDROXYZINE HYDROCHLORIDE 10 MG/1
25 TABLET, FILM COATED ORAL 3 TIMES DAILY PRN
Status: ON HOLD | COMMUNITY
End: 2023-10-03 | Stop reason: HOSPADM

## 2022-06-09 RX ORDER — METHIMAZOLE 10 MG/1
TABLET ORAL
COMMUNITY
Start: 2022-05-09 | End: 2022-07-21

## 2022-06-09 NOTE — PROGRESS NOTES
This history is dictated on [employer]  word recognition program. There are word recognition mistakes that are occasionally missed on review.  History:  Patient presents for follow-up for migraine headaches and multiple medical issues as described.  she describes 2-3 headaches per day which probably are not all migraines.  She has some other issues where she had liver toxicity from Tylenol.  She is still taking Fioricet 30 pills per month which she needs to have approved by her primary care doctor    Previous note:  7-21-20   patient presents for follow-up for her headaches.  She has been taking Fioricet 2-3 per day, however states that she can make it on the 30 pills per month.  She is having 1-2 headaches per week.  She says the tramadol does not help and she stopping that.  She continues to take Celexa and Zyprexa.  She states that she took some Norco from a relative 1 time not help the headaches considerably, however of told her I cannot prescribe narcotic she would have to go to pain management.  She has agreed that the Fioricet will help relieve the headache and she does not need take more than 30 per month since the headaches are variable in terms of frequency.       Gen. Appearance: Well-developed with no obvious deformities  Carotid arteries symmetrical pulses  Peripheral vascular shows symmetrical pulses with no obvious edema or tenderness  Neurological Exam:  Mental status-alert and oriented to person, place, and time; attention span and concentration is good.  Fund of knowledge-patient is aware of current events and able to give detailed history of the current problem.recent and remote memory seems intact.  Language function is normal with no evidence of aphasia     Cranial nerves:Visual acuity to hand chart -normal; visual fields to confrontation normal;pupils were equal and reactive to light ; funduscopic examination was normal with sharp disc margins. external ocular movements were full  with no nystagmus. Facial sensation to pinprick and corneal reflexes intact; Facial muscles were symmetrical. Hearing is unimpaired symmetrical finger rub; Tongue and palate movements were normal with swallowing unimpaired. Shoulder shrug was intact with good strength Speech was normal     Motor examination: Upper and Lower extremities - Normal and symmetrical;muscle tone was normal ;  right-handed  Sensory examination:Upper and lower extremities - Pinprick and soft touch were normal and symmetrical.  Vibration sense was normal at the toes for age 15-20 seconds  Deep tendon reflexes were 1-2+ symmetrical.  Both plantar responses were flexor  Cerebellar examination upper: Normal finger to nose and rapid alternating movements  Gait: Steady with no ataxia; heel and toe walk normal  Romberg test: negative  Tandem gait: Normal   Involuntary movements: Negative  Cervical examination: Full range of motion with no pain   Cervical tenderness:negative  Lumbar examination: Low back tenderness-negative   Sciatic notch tenderness-negative  Straight leg raising test-negative     Impression:Migraine without aura;Anxiety disorder; low back pain by history  Recommendations/plan:  Continue Fioricet tablets one q.8 p.r.n.#30 tablets if approved by primary care doctor ; Migraine; followup 6 months

## 2022-06-14 ENCOUNTER — NURSE TRIAGE (OUTPATIENT)
Dept: ADMINISTRATIVE | Facility: CLINIC | Age: 52
End: 2022-06-14
Payer: MEDICARE

## 2022-06-14 DIAGNOSIS — G43.009 MIGRAINE WITHOUT AURA AND WITHOUT STATUS MIGRAINOSUS, NOT INTRACTABLE: ICD-10-CM

## 2022-06-14 NOTE — TELEPHONE ENCOUNTER
Returned call to patient.  I left a voicemail.  I informed her that according to Dr. Wu's visit note, plan was to continue Fioricet if agreed to by PCP, but I would request refill of Fioricet from Dr. Wu.

## 2022-06-14 NOTE — TELEPHONE ENCOUNTER
----- Message from Obdulia Sin sent at 6/14/2022  9:48 AM CDT -----  Contact: [phone]  Pt states she was seen and the dr was suppose to send in for migraine medication and she is stating the pharmacy does not have them she is asking the office to fax the ok for this please advise and give return call

## 2022-06-15 RX ORDER — BUTALBITAL, ACETAMINOPHEN AND CAFFEINE 50; 325; 40 MG/1; MG/1; MG/1
TABLET ORAL
Qty: 30 TABLET | Refills: 1 | Status: SHIPPED | OUTPATIENT
Start: 2022-06-15 | End: 2022-08-24

## 2022-06-15 NOTE — TELEPHONE ENCOUNTER
Attempted to contact pt x2, a VM message left x1 with OOC # if assistance is needed.    Reason for Disposition   Message left on unidentified voice mail.  Phone number verified.   Second attempt to contact caller AND no contact made. Phone number verified.    Protocols used: NO CONTACT OR DUPLICATE CONTACT CALL-A-

## 2022-07-07 DIAGNOSIS — B95.8 STAPH INFECTION: ICD-10-CM

## 2022-07-08 DIAGNOSIS — L30.9 DERMATITIS: Primary | ICD-10-CM

## 2022-07-08 RX ORDER — MUPIROCIN 20 MG/G
OINTMENT TOPICAL 3 TIMES DAILY PRN
Qty: 30 G | Refills: 11 | Status: ON HOLD | OUTPATIENT
Start: 2022-07-08 | End: 2023-10-03 | Stop reason: HOSPADM

## 2022-07-08 RX ORDER — CRISABOROLE 20 MG/G
1 OINTMENT TOPICAL 2 TIMES DAILY
Qty: 60 G | Refills: 3 | Status: ON HOLD | OUTPATIENT
Start: 2022-07-08 | End: 2023-10-03 | Stop reason: HOSPADM

## 2022-07-20 ENCOUNTER — LAB VISIT (OUTPATIENT)
Dept: LAB | Facility: HOSPITAL | Age: 52
End: 2022-07-20
Attending: STUDENT IN AN ORGANIZED HEALTH CARE EDUCATION/TRAINING PROGRAM
Payer: MEDICARE

## 2022-07-20 DIAGNOSIS — E05.90 HYPERTHYROIDISM: ICD-10-CM

## 2022-07-20 LAB
T4 FREE SERPL-MCNC: 0.63 NG/DL (ref 0.71–1.51)
TSH SERPL DL<=0.005 MIU/L-ACNC: 10.88 UIU/ML (ref 0.4–4)

## 2022-07-20 PROCEDURE — 84443 ASSAY THYROID STIM HORMONE: CPT | Performed by: STUDENT IN AN ORGANIZED HEALTH CARE EDUCATION/TRAINING PROGRAM

## 2022-07-20 PROCEDURE — 84439 ASSAY OF FREE THYROXINE: CPT | Performed by: STUDENT IN AN ORGANIZED HEALTH CARE EDUCATION/TRAINING PROGRAM

## 2022-07-20 PROCEDURE — 36415 COLL VENOUS BLD VENIPUNCTURE: CPT | Mod: PO | Performed by: STUDENT IN AN ORGANIZED HEALTH CARE EDUCATION/TRAINING PROGRAM

## 2022-07-21 ENCOUNTER — TELEPHONE (OUTPATIENT)
Dept: ENDOCRINOLOGY | Facility: CLINIC | Age: 52
End: 2022-07-21
Payer: MEDICARE

## 2022-07-21 RX ORDER — METHIMAZOLE 5 MG/1
5 TABLET ORAL DAILY
Qty: 90 TABLET | Refills: 3 | Status: SHIPPED | OUTPATIENT
Start: 2022-07-21 | End: 2022-12-22

## 2022-07-21 NOTE — TELEPHONE ENCOUNTER
Spoke with patient on the telephone regarding recent thyroid labs demonstrating too strong a dose of methimazole.  She is currently taking methimazole 10 mg every evening, she was advised to split the 10 mg pill in half and take only 5 mg every evening and we will recheck her thyroid function in a few weeks when she has repeat appointment on 08/10/2022

## 2022-07-29 ENCOUNTER — NURSE TRIAGE (OUTPATIENT)
Dept: ADMINISTRATIVE | Facility: CLINIC | Age: 52
End: 2022-07-29
Payer: MEDICARE

## 2022-07-29 ENCOUNTER — HOSPITAL ENCOUNTER (EMERGENCY)
Facility: HOSPITAL | Age: 52
Discharge: HOME | End: 2022-07-30
Attending: EMERGENCY MEDICINE
Payer: MEDICARE

## 2022-07-29 DIAGNOSIS — R10.11 RIGHT UPPER QUADRANT PAIN: Primary | ICD-10-CM

## 2022-07-29 LAB
ALBUMIN SERPL BCP-MCNC: 3.3 G/DL (ref 3.5–5.2)
ALP SERPL-CCNC: 142 U/L (ref 55–135)
ALT SERPL W/O P-5'-P-CCNC: 34 U/L (ref 10–44)
ANION GAP SERPL CALC-SCNC: 10 MMOL/L (ref 8–16)
AST SERPL-CCNC: 53 U/L (ref 10–40)
BASOPHILS # BLD AUTO: 0.03 K/UL (ref 0–0.2)
BASOPHILS NFR BLD: 0.4 % (ref 0–1.9)
BILIRUB SERPL-MCNC: 0.3 MG/DL (ref 0.1–1)
BUN SERPL-MCNC: 12 MG/DL (ref 6–20)
CALCIUM SERPL-MCNC: 9.4 MG/DL (ref 8.7–10.5)
CHLORIDE SERPL-SCNC: 104 MMOL/L (ref 95–110)
CO2 SERPL-SCNC: 24 MMOL/L (ref 23–29)
CREAT SERPL-MCNC: 0.8 MG/DL (ref 0.5–1.4)
DIFFERENTIAL METHOD: ABNORMAL
EOSINOPHIL # BLD AUTO: 0.2 K/UL (ref 0–0.5)
EOSINOPHIL NFR BLD: 2 % (ref 0–8)
ERYTHROCYTE [DISTWIDTH] IN BLOOD BY AUTOMATED COUNT: 14.1 % (ref 11.5–14.5)
EST. GFR  (AFRICAN AMERICAN): >60 ML/MIN/1.73 M^2
EST. GFR  (NON AFRICAN AMERICAN): >60 ML/MIN/1.73 M^2
GLUCOSE SERPL-MCNC: 121 MG/DL (ref 70–110)
HCT VFR BLD AUTO: 38.5 % (ref 37–48.5)
HGB BLD-MCNC: 13.3 G/DL (ref 12–16)
IMM GRANULOCYTES # BLD AUTO: 0.02 K/UL (ref 0–0.04)
IMM GRANULOCYTES NFR BLD AUTO: 0.3 % (ref 0–0.5)
LIPASE SERPL-CCNC: 36 U/L (ref 4–60)
LYMPHOCYTES # BLD AUTO: 2.4 K/UL (ref 1–4.8)
LYMPHOCYTES NFR BLD: 31.7 % (ref 18–48)
MCH RBC QN AUTO: 32.4 PG (ref 27–31)
MCHC RBC AUTO-ENTMCNC: 34.5 G/DL (ref 32–36)
MCV RBC AUTO: 94 FL (ref 82–98)
MONOCYTES # BLD AUTO: 0.5 K/UL (ref 0.3–1)
MONOCYTES NFR BLD: 7 % (ref 4–15)
NEUTROPHILS # BLD AUTO: 4.4 K/UL (ref 1.8–7.7)
NEUTROPHILS NFR BLD: 58.6 % (ref 38–73)
NRBC BLD-RTO: 0 /100 WBC
PLATELET # BLD AUTO: 199 K/UL (ref 150–450)
PMV BLD AUTO: 11 FL (ref 9.2–12.9)
POTASSIUM SERPL-SCNC: 3.9 MMOL/L (ref 3.5–5.1)
PROT SERPL-MCNC: 8.8 G/DL (ref 6–8.4)
RBC # BLD AUTO: 4.11 M/UL (ref 4–5.4)
SODIUM SERPL-SCNC: 138 MMOL/L (ref 136–145)
WBC # BLD AUTO: 7.56 K/UL (ref 3.9–12.7)

## 2022-07-29 PROCEDURE — 83690 ASSAY OF LIPASE: CPT | Performed by: EMERGENCY MEDICINE

## 2022-07-29 PROCEDURE — 99284 PR EMERGENCY DEPT VISIT,LEVEL IV: ICD-10-PCS | Mod: ,,, | Performed by: EMERGENCY MEDICINE

## 2022-07-29 PROCEDURE — 99284 EMERGENCY DEPT VISIT MOD MDM: CPT | Mod: 25

## 2022-07-29 PROCEDURE — 87389 HIV-1 AG W/HIV-1&-2 AB AG IA: CPT | Performed by: PHYSICIAN ASSISTANT

## 2022-07-29 PROCEDURE — 85025 COMPLETE CBC W/AUTO DIFF WBC: CPT | Performed by: EMERGENCY MEDICINE

## 2022-07-29 PROCEDURE — 99284 EMERGENCY DEPT VISIT MOD MDM: CPT | Mod: ,,, | Performed by: EMERGENCY MEDICINE

## 2022-07-29 PROCEDURE — 80053 COMPREHEN METABOLIC PANEL: CPT | Performed by: EMERGENCY MEDICINE

## 2022-07-29 PROCEDURE — 80047 BASIC METABLC PNL IONIZED CA: CPT

## 2022-07-29 PROCEDURE — 82330 ASSAY OF CALCIUM: CPT

## 2022-07-29 NOTE — TELEPHONE ENCOUNTER
Pt calling in, states she ate some chicken yesterday and is having some abdominal pain above the belly button on upper right side below rib cage and around the side. Pt states she is having trouble breathing and is too weak to stand. Advised pt to hang up and call 911. Pt verbalizes understanding and states she will call. Advised to call back with further concerns.    Reason for Disposition   Shock suspected (e.g., cold/pale/clammy skin, too weak to stand, low BP, rapid pulse)    Additional Information   Negative: SEVERE difficulty breathing (e.g., struggling for each breath, speaks in single words)    Protocols used: ABDOMINAL PAIN - UPPER-A-AH

## 2022-07-30 VITALS
OXYGEN SATURATION: 98 % | HEART RATE: 53 BPM | DIASTOLIC BLOOD PRESSURE: 84 MMHG | TEMPERATURE: 99 F | RESPIRATION RATE: 16 BRPM | SYSTOLIC BLOOD PRESSURE: 176 MMHG

## 2022-07-30 PROBLEM — R10.9 ABDOMINAL PAIN: Status: ACTIVE | Noted: 2022-07-30

## 2022-07-30 LAB
BACTERIA #/AREA URNS AUTO: 0 /HPF
BILIRUB UR QL STRIP: NEGATIVE
CLARITY UR REFRACT.AUTO: CLEAR
COLOR UR AUTO: YELLOW
GLUCOSE UR QL STRIP: NEGATIVE
HGB UR QL STRIP: NEGATIVE
HYALINE CASTS UR QL AUTO: 0 /LPF
KETONES UR QL STRIP: NEGATIVE
LEUKOCYTE ESTERASE UR QL STRIP: NEGATIVE
MICROSCOPIC COMMENT: NORMAL
NITRITE UR QL STRIP: NEGATIVE
PH UR STRIP: 6 [PH] (ref 5–8)
PROT UR QL STRIP: ABNORMAL
RBC #/AREA URNS AUTO: 1 /HPF (ref 0–4)
SP GR UR STRIP: 1.02 (ref 1–1.03)
SQUAMOUS #/AREA URNS AUTO: 0 /HPF
URN SPEC COLLECT METH UR: ABNORMAL
WBC #/AREA URNS AUTO: 0 /HPF (ref 0–5)

## 2022-07-30 PROCEDURE — 25000003 PHARM REV CODE 250: Performed by: EMERGENCY MEDICINE

## 2022-07-30 PROCEDURE — 81001 URINALYSIS AUTO W/SCOPE: CPT | Performed by: EMERGENCY MEDICINE

## 2022-07-30 RX ORDER — MAG HYDROX/ALUMINUM HYD/SIMETH 200-200-20
30 SUSPENSION, ORAL (FINAL DOSE FORM) ORAL ONCE
Status: COMPLETED | OUTPATIENT
Start: 2022-07-30 | End: 2022-07-30

## 2022-07-30 RX ORDER — DICYCLOMINE HYDROCHLORIDE 10 MG/1
20 CAPSULE ORAL
Status: COMPLETED | OUTPATIENT
Start: 2022-07-30 | End: 2022-07-30

## 2022-07-30 RX ORDER — OXYCODONE HYDROCHLORIDE 5 MG/1
5 TABLET ORAL
Status: COMPLETED | OUTPATIENT
Start: 2022-07-30 | End: 2022-07-30

## 2022-07-30 RX ORDER — LIDOCAINE HYDROCHLORIDE 20 MG/ML
15 SOLUTION OROPHARYNGEAL ONCE
Status: COMPLETED | OUTPATIENT
Start: 2022-07-30 | End: 2022-07-30

## 2022-07-30 RX ADMIN — OXYCODONE 5 MG: 5 TABLET ORAL at 02:07

## 2022-07-30 RX ADMIN — LIDOCAINE HYDROCHLORIDE 15 ML: 20 SOLUTION ORAL; TOPICAL at 01:07

## 2022-07-30 RX ADMIN — ALUMINUM HYDROXIDE, MAGNESIUM HYDROXIDE, AND SIMETHICONE 30 ML: 200; 200; 20 SUSPENSION ORAL at 01:07

## 2022-07-30 RX ADMIN — DICYCLOMINE HYDROCHLORIDE 20 MG: 10 CAPSULE ORAL at 01:07

## 2022-07-30 NOTE — ASSESSMENT & PLAN NOTE
51 yo F with history most significant for Hep C (s/p treatment) with some residual degree of cirrhosis, hyperthyroidism who presents with one day history of right upper quadrant/midepigastric abdominal pain with associated constipation and bloating. Work up performed in ED without evidence of acute cholecystitis, choledocholithiasis, or cholangitis. Unlikely that patient's symptoms are due to her gallbladder given current findings and work up.     - No acute surgical intervention indicated at this time  - Recommend initiation of bowel regimen for constipation and bloating  - Could consider GI cocktail for possible gastritis   - Non-specific findings of gallbladder contraction on ultrasound, can follow up in general surgery clinic to discuss role for elective cholecystectomy as an outpatient.

## 2022-07-30 NOTE — ED NOTES
"Pt insisting IV to be placed in the hand due to "rolling veins" elsewhere. Iv inserted to left hand x 1 attempt without difficulty. Drawns blood easily flushes easily. PT stating it hurts to be in the hand after requests for hand placement.   "

## 2022-07-30 NOTE — ED PROVIDER NOTES
"Encounter Date: 7/29/2022    SCRIBE #1 NOTE: I, Sharri Paez, am scribing for, and in the presence of,  Madelyn Saunders MD. I have scribed the entire note.       History     Chief Complaint   Patient presents with    Abdominal Pain     RUQ, began after eating dinner. States "I think I ate too much and I'm not able to use the bathroom, It hurts so bad that I can't even lay down". Has appt on Tuesday with liver doctor     Time patient was seen by the provider: 9:58 PM      The patient is a 52 y.o. female with co-morbidities including: Hepatitis C, hyperthyroidism who presents to the ED with a complaint of RUQ abdominal pain with onset yesterday. Her  reports that she had some pain before eating yesterday evening and she reports that she ate some chicken and macaroni and cheese yesterday and started to have the pain. She made herself vomit to improve her pain but it did not improve her pain. She was not nauseated before. The pain is constant and does not radiate anywhere. The pain is worse today after eating 15-20 pizza rolls. She feels bloated but is passing gas. She took Pepto Bismol with no improvement. She had a small bowel movement today but states she was unable to fully empty her bowels due to RUQ pain. Her last normal BM was yesterday. She denies any diarrhea, fevers, urinary changes, or history of gallbladder surgeries. She is seeing her liver doctor on 8/2/22 for a checkup. She had Hepatitis C 2 years ago which she reports was cured after 3 mo of medication.     The history is provided by the patient, medical records and the spouse. No  was used.     Review of patient's allergies indicates:   Allergen Reactions    Cranberry      Kidney Pain    Gabapentin Swelling     Throat Swelling, Hard to breathe    Ibuprofen Swelling     Stomach Bloated, Swelling Throat    Meloxicam Swelling     Swelling of the Throat    Toradol [ketorolac] Swelling     Throat Swelling difficulty " breathing    Amoxicillin Hives    Cyclobenzaprine     Duloxetine      Past Medical History:   Diagnosis Date    Anxiety     Chronic back pain     Chronic hepatitis C 6/19/2020    Depression     Hallucination     History of psychiatric hospitalization     Hx of psychiatric care     Hyperthyroidism 12/29/2021    Migraines     Neuropathy     Obesity     Psychiatric exam requested by authority     Psychiatric problem     Psychosis     Sacroiliitis     Schizoaffective disorder     Scoliosis     Sleep difficulties     Therapy     Tobacco use      Past Surgical History:   Procedure Laterality Date    HAND SURGERY Left     HYSTERECTOMY       Family History   Problem Relation Age of Onset    No Known Problems Mother     Hypertension Father     Diabetes Sister         Type I    Cancer Maternal Grandmother     Heart disease Paternal Grandfather     Kidney disease Sister     Thyroid disease Sister     Cirrhosis Paternal Grandmother     Stroke Neg Hx      Social History     Tobacco Use    Smoking status: Current Every Day Smoker     Packs/day: 0.25     Years: 0.50     Pack years: 0.12    Smokeless tobacco: Never Used    Tobacco comment: ev 3 days has 2 cigarettes.    Substance Use Topics    Alcohol use: No    Drug use: No     Review of Systems   Constitutional: Negative for chills and fever.   Cardiovascular: Negative for chest pain.   Gastrointestinal: Positive for abdominal distention, abdominal pain (RUQ) and constipation. Negative for diarrhea, nausea and vomiting.   Genitourinary: Negative for decreased urine volume, dysuria, flank pain, frequency and urgency.        Neg changes in urination   Musculoskeletal: Negative for back pain.   Allergic/Immunologic: Negative for immunocompromised state.   Hematological: Does not bruise/bleed easily.       Physical Exam     Initial Vitals [07/29/22 2046]   BP Pulse Resp Temp SpO2   128/78 61 18 98.8 °F (37.1 °C) 96 %      MAP       --          Physical Exam    Nursing note and vitals reviewed.  Constitutional: She appears well-developed and well-nourished. She is not diaphoretic. No distress.   HENT:   Head: Normocephalic and atraumatic.   Nose: Nose normal.   Eyes: EOM are normal. Pupils are equal, round, and reactive to light. No scleral icterus.   Neck: Neck supple.   Normal range of motion.  Cardiovascular: Normal rate and regular rhythm.   Pulmonary/Chest: Breath sounds normal. No respiratory distress. She has no wheezes. She has no rhonchi. She has no rales. She exhibits no tenderness.   Abdominal: Abdomen is soft. Bowel sounds are normal. She exhibits no distension. There is abdominal tenderness (RUQ). There is no rebound and no guarding.   Musculoskeletal:         General: Normal range of motion.      Cervical back: Normal range of motion and neck supple.     Neurological: She is alert and oriented to person, place, and time. She has normal strength.   Skin: Skin is warm and dry. Capillary refill takes less than 2 seconds.   Psychiatric: She has a normal mood and affect. Her behavior is normal. Judgment and thought content normal.         ED Course   Procedures  Labs Reviewed   CBC W/ AUTO DIFFERENTIAL - Abnormal; Notable for the following components:       Result Value    MCH 32.4 (*)     All other components within normal limits   COMPREHENSIVE METABOLIC PANEL - Abnormal; Notable for the following components:    Glucose 121 (*)     Total Protein 8.8 (*)     Albumin 3.3 (*)     Alkaline Phosphatase 142 (*)     AST 53 (*)     All other components within normal limits   URINALYSIS, REFLEX TO URINE CULTURE - Abnormal; Notable for the following components:    Protein, UA 1+ (*)     All other components within normal limits    Narrative:     Specimen Source->Urine   LIPASE   URINALYSIS MICROSCOPIC    Narrative:     Specimen Source->Urine   HIV 1 / 2 ANTIBODY          Imaging Results          US Abdomen Limited (Final result)  Result time 07/30/22  00:07:19    Final result by Lele Park MD (07/30/22 00:07:19)                 Impression:      Contracted gallbladder, limiting assessment, with no visible gallstones.  The gallbladder wall is mildly thickened measuring 0.5 cm, favored to reflect gallbladder contraction.  There is no sonographic Valentine sign or pericholecystic fluid, making acute cholecystitis less likely, however, recommend clinical correlation.    Electronically signed by resident: Alyssa Vu  Date:    07/29/2022  Time:    23:46    Electronically signed by: Lele Park MD  Date:    07/30/2022  Time:    00:07             Narrative:    EXAMINATION:  US ABDOMEN LIMITED    CLINICAL HISTORY:  Abdominal Pain - Gallbladder;    TECHNIQUE:  Limited abdominal ultrasound (including pancreas gallbladder, common bile duct) was performed.    COMPARISON:  MRI abdomen with without contrast 05/02/2022 abdominal ultrasound 09/09/2021    FINDINGS:  Liver: Partially visualized liver is unremarkable.    Gallbladder: Contracted gallbladder.  The gallbladder wall is mildly thickened measuring 0.5 cm, likely related to gallbladder contraction.  There are no visible gallstones.  No sonographic Valentine sign or pericholecystic fluid.    Biliary system: The common duct is not dilated, measuring 2.5 mm.  No intrahepatic ductal dilatation.    Right kidney: Visualized portions of the right kidney are unremarkable.                                 Medications   aluminum-magnesium hydroxide-simethicone 200-200-20 mg/5 mL suspension 30 mL (30 mLs Oral Given 7/30/22 0117)     And   LIDOcaine HCl 2% oral solution 15 mL (15 mLs Oral Given 7/30/22 0117)   dicyclomine capsule 20 mg (20 mg Oral Given 7/30/22 0117)   oxyCODONE immediate release tablet 5 mg (5 mg Oral Given 7/30/22 0218)     Medical Decision Making:   History:   Old Medical Records: I decided to obtain old medical records.  Initial Assessment:   Well-appearing, no distress, vital signs stable but tenderness to  palpation in RUQ and pain exacerbated by food concerning for hepatobiliary etiology.  Differential Diagnosis:   Cholelithiasis, Cholecystitis, Choledocholithiasis, Hepatitis, Pancreatitis, Gastritis.  Clinical Tests:   Lab Tests: Ordered and Reviewed  Radiological Study: Ordered and Reviewed  ED Management:  Alk phos mildly elevated to 142, no other significant LFT elevations, ultrasound with gallbladder wall thickening to 5 mm but no pericholecystic fluid or cholelithiasis.  Thickening possibly secondary to gallbladder contraction, but will have general surgery evaluate given history of pain provoked by food.    S/o oncoming attg pending Surgery recs, anticipate discharge home.   Other:   I have discussed this case with another health care provider.       <> Summary of the Discussion: Discussed with General surgery resident, they state patient is not candidate for surgery given her history of liver disease, however they will evaluate patient and see if she needs outpt follow up in their clinic.           Scribe Attestation:   Scribe #1: I performed the above scribed service and the documentation accurately describes the services I performed. I attest to the accuracy of the note.        ED Course as of 07/30/22 2106 Fri Jul 29, 2022   2310 AST(!): 53 [JR]   2310 Alkaline Phosphatase(!): 142 [JR]   2310 ALT: 34 [JR]   2310 Sodium: 138 [JR]   2310 WBC: 7.56 [JR]   2310 Hemoglobin: 13.3 [JR]   2311 Lipase: 36 [JR]   Sat Jul 30, 2022   0124 Urinalysis, Reflex to Urine Culture Urine, Clean Catch(!) [JR]      ED Course User Index  [JR] Madelyn Saunders MD             Clinical Impression:   Final diagnoses:  [R10.11] Right upper quadrant pain (Primary)          ED Disposition Condition    Discharge Stable        ED Prescriptions     None        Follow-up Information     Follow up With Specialties Details Why Contact Info Additional Information    Miguel Angel jennifer [facility] Spec Surg 2nd Fl Surgery Schedule an appointment as soon  as possible for a visit   [address]  [facility] [phone] [facility] Clinic - Main Building, 2nd Floor Please park in [facility] and use escalator or Clinic elevator    Ángel Lozano, DO Internal Medicine Schedule an appointment as soon as possible for a visit   [address] [facility] [address]  [phone]              Madelyn Saunders MD  07/30/22 7577

## 2022-07-30 NOTE — CONSULTS
[facility] - Emergency Dept  General Surgery  Consult Note    Patient Name: Mary Sanches  MRN: [id number]  Code Status: Prior  Admission Date: 7/29/2022  Hospital Length of Stay: 0 days  Attending Physician: Madelyn Saunders MD  Primary Care Provider: Ángel Lozano DO    Patient information was obtained from patient, past medical records and ER records.     Inpatient consult to General surgery  Consult performed by: Aleah Carpenter MD  Consult ordered by: Madelyn Saunders MD  Reason for consult: abdominal pain with contracted gallbladder        Subjective:     Principal Problem: Abdominal pain    History of Present Illness: 53 yo F with past medical history of Hep C (s/p treatment with reported cure) with liver dysfunction, hyperthyroidism, mood disorder, and osteoarthritis who presents to ED with complaints of one day history of right sided abdominal pain. Patient reports that she began having pain yesterday evening, which worsened when she attempted to eat dinner that night. Patient is insistent that her pain is secondary to some bad chicken she ate, her  is not sure as he also ate the chicken. She was feeling very bloated and attempted to make herself vomit to try and relieve the pain, however this did not work, and she has not been nauseous. She does complain of some constipation, feels the sensation to go but is unable to fully empty her bowels. Denies any fevers or chills. Tolerating a diet but with continued discomfort. Imaging obtained in the ED showed a very mildly thickened gallbladder wall (5 mm), no cholelithiasis, or surrounding pericholecystic fluid. Labs were unremarkable - no leukocytosis, elevated alk phos but otherwise normal LFTs - no evidence of biliary obstruction. General Surgery was consulted to evaluate for surgical intervention.       No current facility-administered medications on file prior to encounter.     Current Outpatient Medications on File Prior to Encounter    Medication Sig    ALPRAZolam (XANAX) 1 MG tablet     butalbital-acetaminophen-caffeine -40 mg (FIORICET, ESGIC) -40 mg per tablet TAKE ONE TO TWO TABLETS BY MOUTH EVERY 6 HOURS AS NEEDED FOR HEADACHE    crisaborole (EUCRISA) 2 % Oint Apply 1 application topically 2 (two) times daily.    diphenhydrAMINE (BENADRYL) 50 MG capsule Take 1 capsule (50 mg total) by mouth every 6 (six) hours as needed for Itching.    doxycycline (VIBRA-TABS) 100 MG tablet Take 1 tablet (100 mg total) by mouth every 12 (twelve) hours.    escitalopram oxalate (LEXAPRO) 20 MG tablet Take 20 mg by mouth once daily.    hydrOXYzine HCL (ATARAX) 10 MG Tab Take 25 mg by mouth 3 (three) times daily as needed.    methIMAzole (TAPAZOLE) 5 MG Tab Take 1 tablet (5 mg total) by mouth once daily.    mupirocin (BACTROBAN) 2 % ointment Apply topically 3 (three) times daily as needed (sores).    OLANZapine (ZYPREXA) 10 MG tablet     pregabalin (LYRICA) 50 MG capsule Take 1 capsule (50 mg total) by mouth 3 (three) times daily. In 1 week, if no relief, may increase dose to 2 capsules twice per day.    traMADoL (ULTRAM) 50 mg tablet TAKE ONE TABLET BY MOUTH EVERY 6 HOURS AS NEEDED FOR PAIN    triamcinolone acetonide 0.1% (KENALOG) 0.1 % cream Apply topically 2 (two) times daily. Apply twice a day to itchy spots. Do not apply to underarms, groin, or face. Use for 2 weeks then take 1 week break before restarting if needed    vitamin D (VITAMIN D3) 1000 units Tab Take 1,000 Units by mouth once daily.       Review of patient's allergies indicates:   Allergen Reactions    Cranberry      Kidney Pain    Gabapentin Swelling     Throat Swelling, Hard to breathe    Ibuprofen Swelling     Stomach Bloated, Swelling Throat    Meloxicam Swelling     Swelling of the Throat    Toradol [ketorolac] Swelling     Throat Swelling difficulty breathing    Amoxicillin Hives    Cyclobenzaprine     Duloxetine        Past Medical History:   Diagnosis  Date    Anxiety     Chronic back pain     Chronic hepatitis C 6/19/2020    Depression     Hallucination     History of psychiatric hospitalization     Hx of psychiatric care     Hyperthyroidism 12/29/2021    Migraines     Neuropathy     Obesity     Psychiatric exam requested by authority     Psychiatric problem     Psychosis     Sacroiliitis     Schizoaffective disorder     Scoliosis     Sleep difficulties     Therapy     Tobacco use      Past Surgical History:   Procedure Laterality Date    HAND SURGERY Left     HYSTERECTOMY       Family History       Problem Relation (Age of Onset)    Cancer Maternal Grandmother    Cirrhosis Paternal Grandmother    Diabetes Sister    Heart disease Paternal Grandfather    Hypertension Father    Kidney disease Sister    No Known Problems Mother    Thyroid disease Sister          Tobacco Use    Smoking status: Current Every Day Smoker     Packs/day: 0.25     Years: 0.50     Pack years: 0.12    Smokeless tobacco: Never Used    Tobacco comment: ev 3 days has 2 cigarettes.    Substance and Sexual Activity    Alcohol use: No    Drug use: No    Sexual activity: Not Currently     Partners: Male     Review of Systems   Constitutional:  Negative for chills and fever.   HENT:  Negative for trouble swallowing and voice change.    Respiratory:  Negative for cough and shortness of breath.    Cardiovascular:  Negative for chest pain and palpitations.   Gastrointestinal:  Positive for abdominal distention (subjective bloating), abdominal pain and constipation. Negative for diarrhea, nausea and vomiting.   Genitourinary:  Negative for difficulty urinating and dysuria.   Musculoskeletal:  Positive for back pain (chronic).   Skin:  Negative for color change and rash.   Neurological:  Negative for dizziness and weakness.   All other systems reviewed and are negative.  Objective:     Vital Signs (Most Recent):  Temp: 98.8 °F (37.1 °C) (07/29/22 2046)  Pulse: (!) 54 (07/30/22  0022)  Resp: 16 (07/30/22 0022)  BP: (!) 144/72 (07/30/22 0022)  SpO2: 98 % (07/30/22 0022)   Vital Signs (24h Range):  Temp:  [98.8 °F (37.1 °C)] 98.8 °F (37.1 °C)  Pulse:  [54-61] 54  Resp:  [16-18] 16  SpO2:  [96 %-98 %] 98 %  BP: (128-144)/(72-78) 144/72        There is no height or weight on file to calculate BMI.    Physical Exam  Vitals reviewed.   Constitutional:       General: She is not in acute distress.     Appearance: She is not ill-appearing.   HENT:      Head: Normocephalic and atraumatic.   Eyes:      Extraocular Movements: Extraocular movements intact.      Conjunctiva/sclera: Conjunctivae normal.   Cardiovascular:      Rate and Rhythm: Normal rate.      Pulses: Normal pulses.   Pulmonary:      Effort: Pulmonary effort is normal. No respiratory distress.   Abdominal:      General: Abdomen is flat. There is no distension.      Palpations: Abdomen is soft.      Tenderness: There is abdominal tenderness (tender to palpation in the RUQ and midepigastric regions). There is no guarding or rebound.   Musculoskeletal:         General: No swelling. Normal range of motion.      Cervical back: Normal range of motion and neck supple.   Skin:     General: Skin is warm and dry.      Coloration: Skin is not jaundiced.   Neurological:      General: No focal deficit present.      Mental Status: She is alert and oriented to person, place, and time.       Significant Labs:  I have reviewed all pertinent lab results within the past 24 hours.  CBC:   Recent Labs   Lab 07/29/22  2228   WBC 7.56   RBC 4.11   HGB 13.3   HCT 38.5      MCV 94   MCH 32.4*   MCHC 34.5     BMP:   Recent Labs   Lab 07/29/22  2228   *      K 3.9      CO2 24   BUN 12   CREATININE 0.8   CALCIUM 9.4       Significant Diagnostics:  I have reviewed all pertinent imaging results/findings within the past 24 hours.  US Abdomen Limited  Narrative: EXAMINATION:  US ABDOMEN LIMITED    CLINICAL HISTORY:  Abdominal Pain -  Gallbladder;    TECHNIQUE:  Limited abdominal ultrasound (including pancreas gallbladder, common bile duct) was performed.    COMPARISON:  MRI abdomen with without contrast 05/02/2022 abdominal ultrasound 09/09/2021    FINDINGS:  Liver: Partially visualized liver is unremarkable.    Gallbladder: Contracted gallbladder.  The gallbladder wall is mildly thickened measuring 0.5 cm, likely related to gallbladder contraction.  There are no visible gallstones.  No sonographic Valentine sign or pericholecystic fluid.    Biliary system: The common duct is not dilated, measuring 2.5 mm.  No intrahepatic ductal dilatation.    Right kidney: Visualized portions of the right kidney are unremarkable.  Impression: Contracted gallbladder, limiting assessment, with no visible gallstones.  The gallbladder wall is mildly thickened measuring 0.5 cm, favored to reflect gallbladder contraction.  There is no sonographic Valentine sign or pericholecystic fluid, making acute cholecystitis less likely, however, recommend clinical correlation.    Electronically signed by resident: Alyssa Vu  Date:    07/29/2022  Time:    23:46    Electronically signed by: Lele Park MD  Date:    07/30/2022  Time:    00:07        Assessment/Plan:     * Abdominal pain  51 yo F with history most significant for Hep C (s/p treatment) with some residual degree of cirrhosis, hyperthyroidism who presents with one day history of right upper quadrant/midepigastric abdominal pain with associated constipation and bloating. Work up performed in ED without evidence of acute cholecystitis, choledocholithiasis, or cholangitis. Unlikely that patient's symptoms are due to her gallbladder given current findings and work up.     - No acute surgical intervention indicated at this time  - Recommend initiation of bowel regimen for constipation and bloating  - Could consider GI cocktail for possible gastritis   - Non-specific findings of gallbladder contraction on ultrasound, can  follow up in general surgery clinic to discuss role for elective cholecystectomy as an outpatient.      VTE Risk Mitigation (From admission, onward)    None          Thank you for your consult. I will sign off. Please contact us if you have any additional questions.    Aleah Carpenter MD  General Surgery  [facility] - Emergency Dept

## 2022-07-30 NOTE — SUBJECTIVE & OBJECTIVE
No current facility-administered medications on file prior to encounter.     Current Outpatient Medications on File Prior to Encounter   Medication Sig    ALPRAZolam (XANAX) 1 MG tablet     butalbital-acetaminophen-caffeine -40 mg (FIORICET, ESGIC) -40 mg per tablet TAKE ONE TO TWO TABLETS BY MOUTH EVERY 6 HOURS AS NEEDED FOR HEADACHE    crisaborole (EUCRISA) 2 % Oint Apply 1 application topically 2 (two) times daily.    diphenhydrAMINE (BENADRYL) 50 MG capsule Take 1 capsule (50 mg total) by mouth every 6 (six) hours as needed for Itching.    doxycycline (VIBRA-TABS) 100 MG tablet Take 1 tablet (100 mg total) by mouth every 12 (twelve) hours.    escitalopram oxalate (LEXAPRO) 20 MG tablet Take 20 mg by mouth once daily.    hydrOXYzine HCL (ATARAX) 10 MG Tab Take 25 mg by mouth 3 (three) times daily as needed.    methIMAzole (TAPAZOLE) 5 MG Tab Take 1 tablet (5 mg total) by mouth once daily.    mupirocin (BACTROBAN) 2 % ointment Apply topically 3 (three) times daily as needed (sores).    OLANZapine (ZYPREXA) 10 MG tablet     pregabalin (LYRICA) 50 MG capsule Take 1 capsule (50 mg total) by mouth 3 (three) times daily. In 1 week, if no relief, may increase dose to 2 capsules twice per day.    traMADoL (ULTRAM) 50 mg tablet TAKE ONE TABLET BY MOUTH EVERY 6 HOURS AS NEEDED FOR PAIN    triamcinolone acetonide 0.1% (KENALOG) 0.1 % cream Apply topically 2 (two) times daily. Apply twice a day to itchy spots. Do not apply to underarms, groin, or face. Use for 2 weeks then take 1 week break before restarting if needed    vitamin D (VITAMIN D3) 1000 units Tab Take 1,000 Units by mouth once daily.       Review of patient's allergies indicates:   Allergen Reactions    Cranberry      Kidney Pain    Gabapentin Swelling     Throat Swelling, Hard to breathe    Ibuprofen Swelling     Stomach Bloated, Swelling Throat    Meloxicam Swelling     Swelling of the Throat    Toradol [ketorolac] Swelling     Throat Swelling  difficulty breathing    Amoxicillin Hives    Cyclobenzaprine     Duloxetine        Past Medical History:   Diagnosis Date    Anxiety     Chronic back pain     Chronic hepatitis C 6/19/2020    Depression     Hallucination     History of psychiatric hospitalization     Hx of psychiatric care     Hyperthyroidism 12/29/2021    Migraines     Neuropathy     Obesity     Psychiatric exam requested by authority     Psychiatric problem     Psychosis     Sacroiliitis     Schizoaffective disorder     Scoliosis     Sleep difficulties     Therapy     Tobacco use      Past Surgical History:   Procedure Laterality Date    HAND SURGERY Left     HYSTERECTOMY       Family History       Problem Relation (Age of Onset)    Cancer Maternal Grandmother    Cirrhosis Paternal Grandmother    Diabetes Sister    Heart disease Paternal Grandfather    Hypertension Father    Kidney disease Sister    No Known Problems Mother    Thyroid disease Sister          Tobacco Use    Smoking status: Current Every Day Smoker     Packs/day: 0.25     Years: 0.50     Pack years: 0.12    Smokeless tobacco: Never Used    Tobacco comment: ev 3 days has 2 cigarettes.    Substance and Sexual Activity    Alcohol use: No    Drug use: No    Sexual activity: Not Currently     Partners: Male     Review of Systems   Constitutional:  Negative for chills and fever.   HENT:  Negative for trouble swallowing and voice change.    Respiratory:  Negative for cough and shortness of breath.    Cardiovascular:  Negative for chest pain and palpitations.   Gastrointestinal:  Positive for abdominal distention (subjective bloating), abdominal pain and constipation. Negative for diarrhea, nausea and vomiting.   Genitourinary:  Negative for difficulty urinating and dysuria.   Musculoskeletal:  Positive for back pain (chronic).   Skin:  Negative for color change and rash.   Neurological:  Negative for dizziness and weakness.   All other systems reviewed and are negative.  Objective:      Vital Signs (Most Recent):  Temp: 98.8 °F (37.1 °C) (07/29/22 2046)  Pulse: (!) 54 (07/30/22 0022)  Resp: 16 (07/30/22 0022)  BP: (!) 144/72 (07/30/22 0022)  SpO2: 98 % (07/30/22 0022)   Vital Signs (24h Range):  Temp:  [98.8 °F (37.1 °C)] 98.8 °F (37.1 °C)  Pulse:  [54-61] 54  Resp:  [16-18] 16  SpO2:  [96 %-98 %] 98 %  BP: (128-144)/(72-78) 144/72        There is no height or weight on file to calculate BMI.    Physical Exam  Vitals reviewed.   Constitutional:       General: She is not in acute distress.     Appearance: She is not ill-appearing.   HENT:      Head: Normocephalic and atraumatic.   Eyes:      Extraocular Movements: Extraocular movements intact.      Conjunctiva/sclera: Conjunctivae normal.   Cardiovascular:      Rate and Rhythm: Normal rate.      Pulses: Normal pulses.   Pulmonary:      Effort: Pulmonary effort is normal. No respiratory distress.   Abdominal:      General: Abdomen is flat. There is no distension.      Palpations: Abdomen is soft.      Tenderness: There is abdominal tenderness (tender to palpation in the RUQ and midepigastric regions). There is no guarding or rebound.   Musculoskeletal:         General: No swelling. Normal range of motion.      Cervical back: Normal range of motion and neck supple.   Skin:     General: Skin is warm and dry.      Coloration: Skin is not jaundiced.   Neurological:      General: No focal deficit present.      Mental Status: She is alert and oriented to person, place, and time.       Significant Labs:  I have reviewed all pertinent lab results within the past 24 hours.  CBC:   Recent Labs   Lab 07/29/22 2228   WBC 7.56   RBC 4.11   HGB 13.3   HCT 38.5      MCV 94   MCH 32.4*   MCHC 34.5     BMP:   Recent Labs   Lab 07/29/22 2228   *      K 3.9      CO2 24   BUN 12   CREATININE 0.8   CALCIUM 9.4       Significant Diagnostics:  I have reviewed all pertinent imaging results/findings within the past 24 hours.  US Abdomen  Limited  Narrative: EXAMINATION:  US ABDOMEN LIMITED    CLINICAL HISTORY:  Abdominal Pain - Gallbladder;    TECHNIQUE:  Limited abdominal ultrasound (including pancreas gallbladder, common bile duct) was performed.    COMPARISON:  MRI abdomen with without contrast 05/02/2022 abdominal ultrasound 09/09/2021    FINDINGS:  Liver: Partially visualized liver is unremarkable.    Gallbladder: Contracted gallbladder.  The gallbladder wall is mildly thickened measuring 0.5 cm, likely related to gallbladder contraction.  There are no visible gallstones.  No sonographic Valentine sign or pericholecystic fluid.    Biliary system: The common duct is not dilated, measuring 2.5 mm.  No intrahepatic ductal dilatation.    Right kidney: Visualized portions of the right kidney are unremarkable.  Impression: Contracted gallbladder, limiting assessment, with no visible gallstones.  The gallbladder wall is mildly thickened measuring 0.5 cm, favored to reflect gallbladder contraction.  There is no sonographic Valentine sign or pericholecystic fluid, making acute cholecystitis less likely, however, recommend clinical correlation.    Electronically signed by resident: Alyssa Vu  Date:    07/29/2022  Time:    23:46    Electronically signed by: Lele Park MD  Date:    07/30/2022  Time:    00:07

## 2022-07-30 NOTE — ED NOTES
"Mary Sanches, a 52 y.o. female presents to the ED w/ complaint of right upper quad pain and constipation. Also stating has been belching a lot.    Triage note:  Chief Complaint   Patient presents with    Abdominal Pain     RUQ, began after eating dinner. States "I think I ate too much and I'm not able to use the bathroom, It hurts so bad that I can't even lay down". Has appt on Tuesday with liver doctor     Review of patient's allergies indicates:   Allergen Reactions    Cranberry      Kidney Pain    Gabapentin Swelling     Throat Swelling, Hard to breathe    Ibuprofen Swelling     Stomach Bloated, Swelling Throat    Meloxicam Swelling     Swelling of the Throat    Toradol [ketorolac] Swelling     Throat Swelling difficulty breathing    Amoxicillin Hives    Cyclobenzaprine     Duloxetine      Past Medical History:   Diagnosis Date    Anxiety     Chronic back pain     Chronic hepatitis C 6/19/2020    Depression     Hallucination     History of psychiatric hospitalization     Hx of psychiatric care     Hyperthyroidism 12/29/2021    Migraines     Neuropathy     Obesity     Psychiatric exam requested by authority     Psychiatric problem     Psychosis     Sacroiliitis     Schizoaffective disorder     Scoliosis     Sleep difficulties     Therapy     Tobacco use      "

## 2022-07-30 NOTE — ED NOTES
I-STAT Chem-8+ Results:   Value Reference Range   Sodium 139 136-145 mmol/L   Potassium  3.8 3.5-5.1 mmol/L   Chloride 101  mmol/L   Ionized Calcium 1.19 1.06-1.42 mmol/L   CO2 (measured) 26 23-29 mmol/L   Glucose 124  mg/dL   BUN 13 6-30 mg/dL   Creatinine 0.7 0.5-1.4 mg/dL   Hematocrit 41 36-54%

## 2022-07-30 NOTE — HPI
53 yo F with past medical history of Hep C (s/p treatment with reported cure) with liver dysfunction, hyperthyroidism, mood disorder, and osteoarthritis who presents to ED with complaints of one day history of right sided abdominal pain. Patient reports that she began having pain yesterday evening, which worsened when she attempted to eat dinner that night. She was feeling very bloated and attempted to make herself vomit to try and relieve the pain, however this did not work, and she has not been nauseous. She does complain of some constipation, feels the sensation to go but is unable to fully empty her bowels. Denies any fevers or chills. Tolerating a diet but with continued discomfort. Imaging obtained in the ED showed a very mildly thickened gallbladder wall (5 mm), no cholelithiasis, or surrounding pericholecystic fluid. Labs were unremarkable - no leukocytosis, elevated alk phos but otherwise normal LFTs - no evidence of biliary obstruction. General Surgery was consulted to evaluate for surgical intervention.

## 2022-08-01 LAB
BUN SERPL-MCNC: 13 MG/DL (ref 6–30)
CHLORIDE SERPL-SCNC: 101 MMOL/L (ref 95–110)
CREAT SERPL-MCNC: 0.7 MG/DL (ref 0.5–1.4)
GLUCOSE SERPL-MCNC: 124 MG/DL (ref 70–110)
HCT VFR BLD CALC: 41 %PCV (ref 36–54)
POC IONIZED CALCIUM: 1.19 MMOL/L (ref 1.06–1.42)
POC TCO2 (MEASURED): 26 MMOL/L (ref 23–29)
POTASSIUM BLD-SCNC: 3.8 MMOL/L (ref 3.5–5.1)
SAMPLE: ABNORMAL
SODIUM BLD-SCNC: 139 MMOL/L (ref 136–145)

## 2022-08-02 ENCOUNTER — HOSPITAL ENCOUNTER (OUTPATIENT)
Dept: RADIOLOGY | Facility: HOSPITAL | Age: 52
Discharge: HOME | End: 2022-08-02
Attending: PHYSICIAN ASSISTANT
Payer: MEDICARE

## 2022-08-02 DIAGNOSIS — K76.9 LIVER DISEASE, UNSPECIFIED: ICD-10-CM

## 2022-08-02 LAB — HIV 1+2 AB+HIV1 P24 AG SERPL QL IA: NEGATIVE

## 2022-08-02 PROCEDURE — 74183 MRI ABD W/O CNTR FLWD CNTR: CPT | Mod: 26,,, | Performed by: RADIOLOGY

## 2022-08-02 PROCEDURE — A9585 GADOBUTROL INJECTION: HCPCS | Performed by: PHYSICIAN ASSISTANT

## 2022-08-02 PROCEDURE — 25500020 PHARM REV CODE 255: Performed by: PHYSICIAN ASSISTANT

## 2022-08-02 PROCEDURE — 74183 MRI ABDOMEN W WO CONTRAST: ICD-10-PCS | Mod: 26,,, | Performed by: RADIOLOGY

## 2022-08-02 PROCEDURE — 74183 MRI ABD W/O CNTR FLWD CNTR: CPT | Mod: TC

## 2022-08-02 RX ORDER — GADOBUTROL 604.72 MG/ML
10 INJECTION INTRAVENOUS
Status: COMPLETED | OUTPATIENT
Start: 2022-08-02 | End: 2022-08-02

## 2022-08-02 RX ADMIN — GADOBUTROL 10 ML: 604.72 INJECTION INTRAVENOUS at 06:08

## 2022-08-03 ENCOUNTER — TELEPHONE (OUTPATIENT)
Dept: HEPATOLOGY | Facility: CLINIC | Age: 52
End: 2022-08-03
Payer: MEDICARE

## 2022-08-03 NOTE — TELEPHONE ENCOUNTER
Patient: Mary Sanches       MRN: [id number]      : 1970     Age: 52 y.o.  [address]  [place] [address]    Providers  Advanced Practice providers: KIAN Tran    Priority of review: Other    Patient Transplant Status: Other indeterminate lesion    Reason for presentation: Indeterminate lesion    Clinical Summary: 53 y/o F with prior history of HCV treated and cured ; fatty liver, F3/F4 fibrosis via FibroSure, well compensated overall w/ normal AFP & elevated liver enzymes, hepatocellular.      Recent  Ultrasound suggesting lymphadenopathy, I obtained CT scan suggestive of HCC (?) Reviewed in IR conference 2021 with recommendations for MRI    Subsequent MRIs have been indeterminate/no HCC.    AFP WNL but mildly inching up.    Reviewed in IR conference 2022: 3 arterially enhancing foci, without washout or pseudocapsule. Technically indeterminate. may reflect regenerative nodule near dome or vascular anomalies. 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 stable LN between IVC and aorta. recommend 3 month follow up MRI.     Imaging: Please review MRI 2022 and 2022      Platelets:   Lab Results   Component Value Date/Time     2022 07:29 AM     Creatinine:   Lab Results   Component Value Date/Time    CREATININE 0.8 2022 07:29 AM     Bilirubin:   Lab Results   Component Value Date/Time    BILITOT 0.5 2022 07:29 AM     AFP Last 3 each if available:   Lab Results   Component Value Date/Time    AFP 6.4 2022 07:29 AM    AFP 5.9 2022 03:05 PM    AFP 4.3 2022 10:40 AM       MELD: MELD-Na score: 7 at 2022  7:29 AM  MELD score: 7 at 2022  7:29 AM  Calculated from:  Serum Creatinine: 0.8 mg/dL (Using min of 1 mg/dL) at 2022  7:29 AM  Serum Sodium: 138 mmol/L (Using max of 137 mmol/L) at 2022  7:29 AM  Total Bilirubin: 0.5 mg/dL (Using min of 1 mg/dL) at 2022  7:29 AM  INR(ratio): 1.1 at 2022  7:29 AM  Age: 52  years    Plan:     Follow-up Provider:

## 2022-08-10 ENCOUNTER — OFFICE VISIT (OUTPATIENT)
Dept: ENDOCRINOLOGY | Facility: CLINIC | Age: 52
End: 2022-08-10
Payer: MEDICARE

## 2022-08-10 VITALS
SYSTOLIC BLOOD PRESSURE: 130 MMHG | HEIGHT: 63 IN | BODY MASS INDEX: 34.32 KG/M2 | DIASTOLIC BLOOD PRESSURE: 78 MMHG | WEIGHT: 193.69 LBS

## 2022-08-10 DIAGNOSIS — E66.9 OBESITY (BMI 30-39.9): ICD-10-CM

## 2022-08-10 DIAGNOSIS — E04.2 MULTINODULAR GOITER (NONTOXIC): ICD-10-CM

## 2022-08-10 DIAGNOSIS — E05.00 GRAVES' DISEASE: Primary | ICD-10-CM

## 2022-08-10 PROCEDURE — 99214 PR OFFICE/OUTPT VISIT, EST, LEVL IV, 30-39 MIN: ICD-10-PCS | Mod: S$GLB,,, | Performed by: GENERAL ACUTE CARE HOSPITAL

## 2022-08-10 PROCEDURE — 99999 PR PBB SHADOW E&M-EST. PATIENT-LVL III: CPT | Mod: PBBFAC,,, | Performed by: GENERAL ACUTE CARE HOSPITAL

## 2022-08-10 PROCEDURE — 99999 PR PBB SHADOW E&M-EST. PATIENT-LVL III: ICD-10-PCS | Mod: PBBFAC,,, | Performed by: GENERAL ACUTE CARE HOSPITAL

## 2022-08-10 PROCEDURE — 3008F PR BODY MASS INDEX (BMI) DOCUMENTED: ICD-10-PCS | Mod: CPTII,S$GLB,, | Performed by: GENERAL ACUTE CARE HOSPITAL

## 2022-08-10 PROCEDURE — 3008F BODY MASS INDEX DOCD: CPT | Mod: CPTII,S$GLB,, | Performed by: GENERAL ACUTE CARE HOSPITAL

## 2022-08-10 PROCEDURE — 1160F PR REVIEW ALL MEDS BY PRESCRIBER/CLIN PHARMACIST DOCUMENTED: ICD-10-PCS | Mod: CPTII,S$GLB,, | Performed by: GENERAL ACUTE CARE HOSPITAL

## 2022-08-10 PROCEDURE — 1159F PR MEDICATION LIST DOCUMENTED IN MEDICAL RECORD: ICD-10-PCS | Mod: CPTII,S$GLB,, | Performed by: GENERAL ACUTE CARE HOSPITAL

## 2022-08-10 PROCEDURE — 1160F RVW MEDS BY RX/DR IN RCRD: CPT | Mod: CPTII,S$GLB,, | Performed by: GENERAL ACUTE CARE HOSPITAL

## 2022-08-10 PROCEDURE — 3078F PR MOST RECENT DIASTOLIC BLOOD PRESSURE < 80 MM HG: ICD-10-PCS | Mod: CPTII,S$GLB,, | Performed by: GENERAL ACUTE CARE HOSPITAL

## 2022-08-10 PROCEDURE — 3075F PR MOST RECENT SYSTOLIC BLOOD PRESS GE 130-139MM HG: ICD-10-PCS | Mod: CPTII,S$GLB,, | Performed by: GENERAL ACUTE CARE HOSPITAL

## 2022-08-10 PROCEDURE — 1159F MED LIST DOCD IN RCRD: CPT | Mod: CPTII,S$GLB,, | Performed by: GENERAL ACUTE CARE HOSPITAL

## 2022-08-10 PROCEDURE — 99214 OFFICE O/P EST MOD 30 MIN: CPT | Mod: S$GLB,,, | Performed by: GENERAL ACUTE CARE HOSPITAL

## 2022-08-10 PROCEDURE — 3078F DIAST BP <80 MM HG: CPT | Mod: CPTII,S$GLB,, | Performed by: GENERAL ACUTE CARE HOSPITAL

## 2022-08-10 PROCEDURE — 3075F SYST BP GE 130 - 139MM HG: CPT | Mod: CPTII,S$GLB,, | Performed by: GENERAL ACUTE CARE HOSPITAL

## 2022-08-10 NOTE — PROGRESS NOTES
Subjective:      Patient ID: Mary Sanches is a 52 y.o. female.    Chief Complaint:  Graves hyperthyroidism    History of Present Illness   52-year-old  female with bipolar disorder and Graves disease presenting for follow-up regarding her hyperthyroidism.  Pt today reports feels well and denies any complaints.      Pt was last seen by Dr. Currie and plan was to decrease methimazole to 5mg due to TSH of 10.      Regarding Graves Hyperthyroidism:      - Duration of hyperthyroidism: Since late 2021  - Etiology determined to be: Graves' disease  - Current relevant medications: Methimazole 10 mg started March 2022    -Currently on Methimazole 5mg daily since 7/20/2022     - Takes thyroid medication properly    -Clinically Euthyroid       Lab Results   Component Value Date    TSH 10.875 (H) 07/20/2022    TSH 0.024 (L) 05/02/2022    TSH 0.024 (L) 04/27/2022    FREET4 0.63 (L) 07/20/2022    FREET4 0.81 05/02/2022    FREET4 0.89 04/27/2022    THYROPEROXID <6.0 12/08/2021     - Thyroid ultrasound obtained 01/12/2022 demonstrates three TI-RADS 4 category nodules. Based on size and morphology, the left mid solid thyroid nodule is recommended for follow-up at 1, 2, 3, and 5 years.    - Thyroid uptake and scan obtained 03/09/2022 demonstrates Graves disease    - Most recent DXA: None    -Fractures?  DENIES     - A complete 14 point review of systems was conducted and negative except for what is stated above    Social and family history reviewed  Current medications and allergies reviewed    Objective:   There were no vitals taken for this visit.  Physical Exam  Constitutional:       Appearance: She is obese.   Musculoskeletal:      Cervical back: Neck supple.   Skin:     Comments: Significant scabbing without purulence or significant surrounding erythema in gluteal area, lateral left thigh, and posterior neck   Neurological:      General: No focal deficit present.      Mental Status: She is alert and oriented to  person, place, and time.   Psychiatric:         Mood and Affect: Mood normal.       BP Readings from Last 1 Encounters:   07/30/22 (!) 176/84      Wt Readings from Last 1 Encounters:   06/09/22 1057 89 kg (196 lb 3.4 oz)     There is no height or weight on file to calculate BMI.    Lab Review:   Lab Results   Component Value Date    HGBA1C 5.2 12/27/2019     Lab Results   Component Value Date    CHOL 173 12/08/2021    HDL 34 (L) 12/08/2021    LDLCALC 123.0 12/08/2021    TRIG 80 12/08/2021    CHOLHDL 19.7 (L) 12/08/2021     Lab Results   Component Value Date     08/02/2022    K 4.5 08/02/2022     08/02/2022    CO2 25 08/02/2022     08/02/2022    BUN 11 08/02/2022    CREATININE 0.8 08/02/2022    CALCIUM 9.4 08/02/2022    PROT 8.7 (H) 08/02/2022    ALBUMIN 3.5 08/02/2022    BILITOT 0.5 08/02/2022    ALKPHOS 145 (H) 08/02/2022    AST 36 08/02/2022    ALT 39 08/02/2022    ANIONGAP 7 (L) 08/02/2022    ESTGFRAFRICA >60.0 07/29/2022    EGFRNONAA >60.0 07/29/2022    TSH 10.875 (H) 07/20/2022       All pertinent labs reviewed    Assessment and Plan       Graves' disease  TSH above goal     C/w Methimazole 5mg daily for  Now     TSH today     Pending on results will adjust methimazole dose     Multinodular goiter (nontoxic)    Repeat Thyroid US on 1/2023     Indications for FNA discussed       Obesity (BMI 30-39.9)    Lifestyle modifications discussed     Will monitor

## 2022-08-10 NOTE — PATIENT INSTRUCTIONS
We will check thyroid levels next week.     For now continue taking Methimazole 5mg once a day.     Once I have results I will call you with next steps to follow and possible methimazole dose adjustment.     Follow up in 4 months but we will do blood work for monitoring in between visits.     If any questions feel free to contact me.     Have a nice day.     Sincerely,       Matthew Hurst MD   Endocrinology   8/10/2022 10:52 AM

## 2022-08-15 ENCOUNTER — LAB VISIT (OUTPATIENT)
Dept: LAB | Facility: HOSPITAL | Age: 52
End: 2022-08-15
Attending: GENERAL ACUTE CARE HOSPITAL
Payer: MEDICARE

## 2022-08-15 DIAGNOSIS — E05.00 GRAVES' DISEASE: ICD-10-CM

## 2022-08-15 LAB — TSH SERPL DL<=0.005 MIU/L-ACNC: 1.92 UIU/ML (ref 0.4–4)

## 2022-08-15 PROCEDURE — 84443 ASSAY THYROID STIM HORMONE: CPT | Performed by: GENERAL ACUTE CARE HOSPITAL

## 2022-08-15 PROCEDURE — 36415 COLL VENOUS BLD VENIPUNCTURE: CPT | Performed by: GENERAL ACUTE CARE HOSPITAL

## 2022-08-16 ENCOUNTER — TELEPHONE (OUTPATIENT)
Dept: ENDOCRINOLOGY | Facility: HOSPITAL | Age: 52
End: 2022-08-16
Payer: MEDICARE

## 2022-08-16 DIAGNOSIS — E05.00 GRAVES' DISEASE: Primary | ICD-10-CM

## 2022-08-17 ENCOUNTER — OFFICE VISIT (OUTPATIENT)
Dept: HEPATOLOGY | Facility: CLINIC | Age: 52
End: 2022-08-17
Payer: MEDICARE

## 2022-08-17 VITALS
BODY MASS INDEX: 34.53 KG/M2 | HEIGHT: 63 IN | WEIGHT: 194.88 LBS | TEMPERATURE: 97 F | OXYGEN SATURATION: 96 % | RESPIRATION RATE: 18 BRPM | SYSTOLIC BLOOD PRESSURE: 127 MMHG | HEART RATE: 65 BPM | DIASTOLIC BLOOD PRESSURE: 70 MMHG

## 2022-08-17 DIAGNOSIS — K76.9 LIVER DISEASE, UNSPECIFIED: ICD-10-CM

## 2022-08-17 PROCEDURE — 3008F PR BODY MASS INDEX (BMI) DOCUMENTED: ICD-10-PCS | Mod: CPTII,S$GLB,, | Performed by: PHYSICIAN ASSISTANT

## 2022-08-17 PROCEDURE — 1159F MED LIST DOCD IN RCRD: CPT | Mod: CPTII,S$GLB,, | Performed by: PHYSICIAN ASSISTANT

## 2022-08-17 PROCEDURE — 3074F SYST BP LT 130 MM HG: CPT | Mod: CPTII,S$GLB,, | Performed by: PHYSICIAN ASSISTANT

## 2022-08-17 PROCEDURE — 99999 PR PBB SHADOW E&M-EST. PATIENT-LVL V: CPT | Mod: PBBFAC,,, | Performed by: PHYSICIAN ASSISTANT

## 2022-08-17 PROCEDURE — 3074F PR MOST RECENT SYSTOLIC BLOOD PRESSURE < 130 MM HG: ICD-10-PCS | Mod: CPTII,S$GLB,, | Performed by: PHYSICIAN ASSISTANT

## 2022-08-17 PROCEDURE — 3078F DIAST BP <80 MM HG: CPT | Mod: CPTII,S$GLB,, | Performed by: PHYSICIAN ASSISTANT

## 2022-08-17 PROCEDURE — 3078F PR MOST RECENT DIASTOLIC BLOOD PRESSURE < 80 MM HG: ICD-10-PCS | Mod: CPTII,S$GLB,, | Performed by: PHYSICIAN ASSISTANT

## 2022-08-17 PROCEDURE — 99214 OFFICE O/P EST MOD 30 MIN: CPT | Mod: S$GLB,,, | Performed by: PHYSICIAN ASSISTANT

## 2022-08-17 PROCEDURE — 1160F PR REVIEW ALL MEDS BY PRESCRIBER/CLIN PHARMACIST DOCUMENTED: ICD-10-PCS | Mod: CPTII,S$GLB,, | Performed by: PHYSICIAN ASSISTANT

## 2022-08-17 PROCEDURE — 3008F BODY MASS INDEX DOCD: CPT | Mod: CPTII,S$GLB,, | Performed by: PHYSICIAN ASSISTANT

## 2022-08-17 PROCEDURE — 1160F RVW MEDS BY RX/DR IN RCRD: CPT | Mod: CPTII,S$GLB,, | Performed by: PHYSICIAN ASSISTANT

## 2022-08-17 PROCEDURE — 1159F PR MEDICATION LIST DOCUMENTED IN MEDICAL RECORD: ICD-10-PCS | Mod: CPTII,S$GLB,, | Performed by: PHYSICIAN ASSISTANT

## 2022-08-17 PROCEDURE — 99214 PR OFFICE/OUTPT VISIT, EST, LEVL IV, 30-39 MIN: ICD-10-PCS | Mod: S$GLB,,, | Performed by: PHYSICIAN ASSISTANT

## 2022-08-17 PROCEDURE — 99999 PR PBB SHADOW E&M-EST. PATIENT-LVL V: ICD-10-PCS | Mod: PBBFAC,,, | Performed by: PHYSICIAN ASSISTANT

## 2022-08-17 NOTE — PROGRESS NOTES
Hepatology Consultation: [facility]    ESTABLISHED PATIENT   PCP: DO Diana Long      Ms. Sanches is a 52 y.o. female with PMH as listed below who presents to clinic for returning for continued elevation of liver enzymes in the context of possible advanced liver fibrosis with cured HCV (s/p tx and cure 2020). Prior Fibrosure suggesting advance fibrosis F3/F4, well compensated. The patient did not receive Fibroscan due to COVID-19 pandemic at the time of HCV treatment, opted for Fibrosure instead. At her last visit 12/3/2020 we performed post-treatment Fibroscan that indicated F2, S2. She did gain about 20 lbs over the course of 2020. Otherwise, her IgG was mildly elevated. She has no synthetic dysfunction. Her ultrasound 11/11/2020 showed hepatomegaly to 18.6cm and normal size spleen without focal hepatic lesions. At the time her AFP decreased to 6.8.     The patient was previously followed by Jen Scheuermann, PA-C who treated and cured her Hep C.     I obtained a CT of her liver 12/17/2021 for further investigation of her ultrasound 9/9/2021 that suggested enlarged lymph node. CT 12/2021 revealed concerning liver lesion, MRI 1/6/2022 obtained that did not confirm HCC.     She was lost to follow-up since 3/8/2021 and did not show to several appointments.     Conference review: 12/21/21  indeterminate lesion with enhancement and possible washout. Rec MRI now. MRI now.  Conference review: 1/11/2021 MRI does not meet criteria for HCC. 3 arterially enhancing foci, without washout or pseudocapsule. Technically indeterminate. may reflect regenerative nodule near dome or vascular anomalies. 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 stable LN between IVC and aorta. recommend 3 month follow up MRI.       Interval history 01/12/2022:  Mary Sanches arrives today with  Chang.  Not watching her diet, drinks soda every day. Drinks coffee with  3 tbsp sugar per Chang.  She is doing well. (+) anxiety, new rash, seeing derm.   Denies symptoms of hepatic decompensation including jaundice, ascites, cognitive problems to suggest hepatic encephalopathy, or GI bleeding.     Fibrosis staging:              HCV FibroSURE 2/2020:A2, F3-4              Fibroscan 12/3/2020: S2,F2    Risk factors for fatty liver include obesity.      Interval history 04/27/2022:  Mary Sanches arrives today alone.     Since our last visit, she has had a diffuse rash and visited dermatology. She believes secondary to methimazole but not sure. She is miserable with itching and has an awful rash on her buttocks as well.     Denies symptoms of hepatic decompensation including jaundice, ascites, cognitive problems to suggest hepatic encephalopathy, or GI bleeding.     She did not keep appointment for MRI as scheduled previously, or lab work.    Interval history 08/17/2022:  Mary Sanches arrives today with  Chang.   Since our last visit, she is feeling much better, has changed her diet, and is ambulating independently. Her thyroid and skin issues are under control. She subjectively feels and looks better.  Denies symptoms of hepatic decompensation including jaundice, abdominal distention or lower extremity swelling, hematemesis, melena, or periods of confusion suggestive of hepatic encephalopathy.          PMH Mary has a past medical history of Anxiety, Chronic back pain, Chronic hepatitis C (6/19/2020), Depression, Hallucination, History of psychiatric hospitalization, psychiatric care, Hyperthyroidism (12/29/2021), Migraines, Neuropathy, Obesity, Psychiatric exam requested by authority, Psychiatric problem, Psychosis, Sacroiliitis, Schizoaffective disorder, Scoliosis, Sleep difficulties, Therapy, and Tobacco use.   PSXH Mary has a past surgical history that includes Hysterectomy and Hand surgery (Left).   FH Mary's family history includes Cancer in her maternal  "grandmother; Cirrhosis in her paternal grandmother; Diabetes in her sister; Heart disease in her paternal grandfather; Hypertension in her father; Kidney disease in her sister; No Known Problems in her mother; Thyroid disease in her sister.   FRANCISCO Hernandez reports that she has been smoking. She has a 0.13 pack-year smoking history. She has never used smokeless tobacco. She reports that she does not drink alcohol and does not use drugs.   DANILO Hernandez is allergic to cranberry, gabapentin, ibuprofen, meloxicam, toradol [ketorolac], amoxicillin, cyclobenzaprine, and duloxetine.   STACEY Hernandez has a current medication list which includes the following prescription(s): alprazolam, butalbital-acetaminophen-caffeine -40 mg, eucrisa, diphenhydramine, doxycycline, escitalopram oxalate, hydroxyzine hcl, methimazole, mupirocin, olanzapine, pregabalin, tramadol, triamcinolone acetonide 0.1%, and vitamin d.           ROS:   GENERAL: Denies fatigue  HEENT: Denies yellowing of eyes   CARDIOVASCULAR: Denies edema  GI: Denies abdominal pain  NEURO: Denies confusion, memory loss, or mood changes  HEME/LYMPH: Denies easy bruising or bleeding      Objective     /70 (BP Location: Right arm, Patient Position: Sitting, BP Method: Medium (Automatic))   Pulse 65   Temp 97 °F (36.1 °C) (Oral)   Resp 18   Ht 5' 3" (1.6 m)   Wt 88.4 kg (194 lb 14.2 oz)   SpO2 96%   BMI 34.52 kg/m²     PHYSICAL EXAM:   Friendly woman, alert and oriented to person, place and time  EYES: Sclerae anicteric  SKIN: Warm and dry. No jaundice. No telangectasias noted. No palmar erythema.   NEURO:  Normal gait. No asterixis. Ambulating normally  PSYCH:  Memory intact.       DIAGNOSTICS    Lab Results   Component Value Date    ALT 39 08/02/2022    AST 36 08/02/2022    ALKPHOS 145 (H) 08/02/2022    BILITOT 0.5 08/02/2022    ALBUMIN 3.5 08/02/2022    INR 1.1 08/02/2022     08/02/2022     Lab Results   Component Value Date    AFP 6.4 08/02/2022       In " relation to metabolic risk factors:   Lab Results   Component Value Date    HGBA1C 5.2 12/27/2019    CHOL 173 12/08/2021    TSH 1.921 08/15/2022     Body mass index is 34.52 kg/m².      Prior serologic workup:   Lab Results   Component Value Date    SMOOTHMUSCAB Negative 1:40 11/19/2020    AMAIFA Negative 1:40 11/19/2020    IGGSERUM 2653 (H) 12/08/2021    ANASCREEN Negative <1:80 11/19/2020    FERRITIN 89 11/19/2020    FESATURATED 20 11/19/2020    PETH Negative 11/19/2020    WCWDO1HJQHHB MM 11/19/2020    KPABP3MJKTNX 221 (H) 11/19/2020    CERULOPLSM 39.0 11/19/2020    HEPBSAG Negative 02/20/2020    HEPCAB Positive (A) 12/27/2019    HEPAIGM Negative 12/27/2019       Imaging:  MRI Abdomen W WO Contrast  Narrative: EXAMINATION:  MRI ABDOMEN W WO CONTRAST    CLINICAL HISTORY:  Liver lesion, > 1cm;  Liver disease, unspecified    TECHNIQUE:  Multisequence, multiplanar MRI of the abdomen performed per liver protocol before and after administration of 10 mL Gadavist intravenous contrast.    COMPARISON:  MRI abdomen 05/02/2022, 01/06/2022.  Abdominal ultrasound 07/29/2022.    FINDINGS:  Liver: Normal in size, measuring 16.9 cm craniocaudal dimension.  Nodular parenchymal contour, suggesting cirrhosis.  Previously described foci of arterial phase enhancement are not visualized on this study.  Newly seen wedge-shaped focus of arterial phase enhancement within the subcapsular hepatic segment VIII, persists on venous and delayed phases without washout or pseudocapsule (series 11, 12, 13:36).  No associated signal abnormality or restricted diffusion.  Hepatic and portal veins are patent.    Biliary: Gallbladder is unremarkable.  No intrahepatic or extrahepatic biliary dilatation.    Pancreas: Unremarkable.  No pancreatic ductal dilatation.    Spleen: Normal size.  No focal lesions.    Adrenal glands: Unremarkable.    Kidneys: Stable T2 hyperintense lesion of the right kidney, demonstrates trace dependent T1 hyperintensity, likely  "hemorrhagic or proteinaceous cyst.  No enhancing renal masses.  No hydronephrosis.    Miscellaneous: Visualized bowel loops are unremarkable.  Stable 1.2 cm short axis aortocaval node (series 11:58).  No new adenopathy.  Stable T2 hyperintense lesion of the T12 vertebral body, likely hemangioma.  Impression: 1. Newly seen focus of arterial phase enhancement in subcapsular hepatic segment VIII, favored to reflect transient hepatic intensity difference.  Previously described foci of arterial phase enhancement are not visualized on this study.  Continued surveillance imaging as clinically indicated.  2. Nodular hepatic parenchymal contour, suggesting cirrhosis.  3. Stable right renal proteinaceous/hemorrhagic cyst.  4. Stable 1.2 cm aortocaval node.  No new adenopathy.    Electronically signed by resident: Ciro Whipple  Date:    08/02/2022  Time:    11:30    Electronically signed by: Mart Queen MD  Date:    08/02/2022  Time:    14:17        Assessment/Plan     52 y.o. female with:    1. Fatty liver  2. Liver fibrosis, F2  - I suspect cirrhosis of the liver, but fibrosis staging has not been revealing.   - plt intact   - no signs or symptoms of hepatic decompensation or portal HTN, medically early for EGD & plt normal  - immunized against Hep A &B  - will follow conservatively     - AFP Tumor Marker; Standing  - CBC Auto Differential; Standing  - Comprehensive Metabolic Panel; Standing  - Protime-INR; Standing      3. Liver lesion  - ultrasound 2021, CT 2021  - MRI 2022:" Two foci of serpiginous arterial phase hyperenhancement within the central liver adjacent to a hepatic veins (series 11, image 24 and 27).  The areas demonstrate blood pool intensity on venous and delayed phases and are favored to represent vasculature shunting.  Irregular focus of arterial phase hyperenhancement within the periphery of hepatic segment 6 (series 11, image 54) which becomes isointense on venous and delayed phases favored to " "represent variant perfusion.  No suspicious washout or pseudo capsule."  - reviewed in IR conference 1/11/2021, does not meet criteria for liver cancer, recommend mri f/u in 3 months   -  IR CONF 5/22 new seg V lesion recommend 3 month follow-up but does not meet criteria for HCC.   - IR CONF 8/9/22 new seg VIII wedge shaped focus, transient hepatic intensity difference; MRI reviewed in IR conference without concerning lesions, recommendation for 6 month f/u suspect perfusion defects, recommend MRI in 6 months    5. Obesity   - 8/2022 update patient has achieved weight loss and changed her diet     6. Chronic hepatitis C without hepatic coma  - s/p tx with epclusa svr12 6/2020      7. Elevated LFTs  - ?hyperthyroidism  - DILI meds: voltaren& lexapro  - zyprexa no longer taking so likely not DILI  - will monitor, enzymes not impressively high and could be possibly secondary to fatty liver  - 8/2022: resolved AST/ALT. ALP mildly elevated persistently       Orders Placed This Encounter   Procedures    MRI Abdomen W WO Contrast       MELD-Na score: 7 at 8/2/2022  7:29 AM  MELD score: 7 at 8/2/2022  7:29 AM  Calculated from:  Serum Creatinine: 0.8 mg/dL (Using min of 1 mg/dL) at 8/2/2022  7:29 AM  Serum Sodium: 138 mmol/L (Using max of 137 mmol/L) at 8/2/2022  7:29 AM  Total Bilirubin: 0.5 mg/dL (Using min of 1 mg/dL) at 8/2/2022  7:29 AM  INR(ratio): 1.1 at 8/2/2022  7:29 AM  Age: 52 years      · Patient appears much improved since last visit subjectively.   · Liver labs stable and AST/ALT normalized. Patient has achieved weight loss.   · MRI reviewed in IR conference without concerning lesions, recommendation for 6 month follow-up.  Follow up in about 6 months (around 2/17/2023).      I spent 30 minutes on the day of this encounter preparing for, evaluating, treating, and managing this patient.       Thank you for allowing me to participate in the care of Mary Felton, PA-C  Hepatology " Advanced Practice Provider  [facility] [address] [facility]

## 2022-08-17 NOTE — PATIENT INSTRUCTIONS
MRI and labs in 6 months.   Follow-up 1.5 weeks later.   Cut out sodas.     Because you have cirrhosis, it is important to attend clinic visits every 6 months with an Ultrasound and blood tests every 6 months to screen for liver cancer (you are at risk of developing liver cancer due to scar tissue in the liver)    Signs and symptoms of worsening liver disease include jaundice, fluid in the belly (ascites), and confusion/disorientation/slowed thought processes due to hepatic encephalopathy (toxins building up because of liver problems).   You should seek medical attention if any of these things occur.    Also, possible bleeding from esophageal varices (blood vessels in the stomach and foodpipe can burst and cause fatal bleeding).  Therefore, if you have symptoms of vomiting blood, blood in your stool, dark or black stools or vomiting coffee ground vomit, YOU SHOULD GO TO THE EMERGENCY ROOM IMMEDIATELY.     Cirrhosis can increase the risk of liver cancer, liver failure, and death. However, we will watch your liver function score (MELD score) closely with each clinic visit. A normal MELD score is 6, highest is 40.  We will check this with every clinic visit. A MELD 15 or higher is when we start to consider transplant because MELD 15 or higher indicates that the liver is not functioning as well       Cirrhosis Counseling  - strict abstinence of alcohol use (includes beer, wine, and/or liquor)  - avoid non-steroidal anti-inflammatory drugs (NSAIDs) such as ibuprofen, Motrin, naprosyn, Aleve due to the risk of kidney damage  - can take acetaminophen (Tylenol), no more than 2000 mg per day  - low sodium (salt) 2 gram per day diet  - high protein diet: 1.5g/kg to prevent muscle mass loss. Recommended at least 1 protein shake daily using Premier Protein shakes    - resistance exercises for muscle strength  - avoid raw seafoods due to the risk of fatal Vibrio vulnificus infection  - ultrasound of the liver every 6 months for  liver cancer screening  - Upper endoscopy every 1-2 years to screen for varices in the stomach and esophagus

## 2022-08-18 ENCOUNTER — TELEPHONE (OUTPATIENT)
Dept: NEUROLOGY | Facility: CLINIC | Age: 52
End: 2022-08-18

## 2022-08-18 DIAGNOSIS — G43.009 MIGRAINE WITHOUT AURA AND WITHOUT STATUS MIGRAINOSUS, NOT INTRACTABLE: ICD-10-CM

## 2022-08-18 RX ORDER — BUTALBITAL, ACETAMINOPHEN AND CAFFEINE 50; 325; 40 MG/1; MG/1; MG/1
TABLET ORAL
Qty: 30 TABLET | Refills: 2 | Status: CANCELLED | OUTPATIENT
Start: 2022-08-18

## 2022-09-01 ENCOUNTER — PATIENT OUTREACH (OUTPATIENT)
Dept: ADMINISTRATIVE | Facility: HOSPITAL | Age: 52
End: 2022-09-01
Payer: MEDICARE

## 2022-10-04 ENCOUNTER — TELEPHONE (OUTPATIENT)
Dept: NEUROLOGY | Facility: CLINIC | Age: 52
End: 2022-10-04
Payer: MEDICARE

## 2022-10-04 DIAGNOSIS — G89.29 CHRONIC NECK PAIN: ICD-10-CM

## 2022-10-04 DIAGNOSIS — G89.29 CHRONIC MIDLINE LOW BACK PAIN WITH RIGHT-SIDED SCIATICA: ICD-10-CM

## 2022-10-04 DIAGNOSIS — M54.2 CHRONIC NECK PAIN: ICD-10-CM

## 2022-10-04 DIAGNOSIS — M54.41 CHRONIC MIDLINE LOW BACK PAIN WITH RIGHT-SIDED SCIATICA: ICD-10-CM

## 2022-10-04 RX ORDER — PREGABALIN 50 MG/1
50 CAPSULE ORAL 3 TIMES DAILY
Qty: 120 CAPSULE | Refills: 3 | Status: SHIPPED | OUTPATIENT
Start: 2022-10-04 | End: 2023-04-12

## 2022-10-04 RX ORDER — TRAMADOL HYDROCHLORIDE 50 MG/1
50 TABLET ORAL EVERY 6 HOURS PRN
Qty: 120 TABLET | Refills: 1 | Status: SHIPPED | OUTPATIENT
Start: 2022-10-07 | End: 2022-11-29 | Stop reason: SDUPTHER

## 2022-10-04 NOTE — TELEPHONE ENCOUNTER
----- Message from Nayeli Faust sent at 10/4/2022  9:06 AM CDT -----  Regarding: Refill  Contact: pt @ [phone]  Rx Refill/Request    Is this a Refill or New Rx:  refill    Rx Name and Strength:  butalbital-acetaminophen-caffeine -40 mg (FIORICET, ESGIC) -40 mg per tablet    Preferred Pharmacy with phone number:  [facility] - [place] - [address] [place] [address] [address]  [address] [place] [address] [address]  [place] [address]  Phone: [phone] Fax: [phone]        Communication Preference:pt @ [phone]      Additional Information

## 2022-10-04 NOTE — TELEPHONE ENCOUNTER
----- Message from Nayeli Faust sent at 10/4/2022  9:11 AM CDT -----  Regarding: Refill  Contact: pt @ [phone]  Rx Refill/Request    Is this a Refill or New Rx:  Refill    Rx Name and Strength:  traMADoL (ULTRAM) 50 mg tablet    pregabalin (LYRICA) 50 MG capsule      Preferred Pharmacy with phone number:  [facility] [place] - [facility] [address]  [facility] [facility] [address]  [place] [address]  Phone: [phone] Fax: [phone]      Communication Preference:pt @ [phone]    Additional Information

## 2022-11-29 ENCOUNTER — TELEPHONE (OUTPATIENT)
Dept: NEUROLOGY | Facility: CLINIC | Age: 52
End: 2022-11-29
Payer: MEDICARE

## 2022-11-29 ENCOUNTER — PES CALL (OUTPATIENT)
Dept: ADMINISTRATIVE | Facility: CLINIC | Age: 52
End: 2022-11-29
Payer: MEDICARE

## 2022-11-29 RX ORDER — TRAMADOL HYDROCHLORIDE 50 MG/1
50 TABLET ORAL EVERY 6 HOURS PRN
Qty: 120 TABLET | Refills: 1 | Status: SHIPPED | OUTPATIENT
Start: 2022-12-09 | End: 2023-02-15 | Stop reason: SDUPTHER

## 2022-11-29 NOTE — TELEPHONE ENCOUNTER
----- Message from Chinyere Enriquez sent at 11/29/2022  2:14 PM CST -----  Contact: pt  Rx refill           traMADoL (ULTRAM) 50 mg tablet    pregabalin (LYRICA) 50 MG capsule            [facility] [place] - [address] [facility] [address]  [address] [facility] [address]  [place] [address]  Phone: [phone] Fax: [phone]

## 2022-11-30 NOTE — TELEPHONE ENCOUNTER
----- Message from Chinyere Enriquez sent at 11/29/2022  2:21 PM CST -----  Contact: pt  Pt requesting callback to get appt scheduled             Confirmed contact below:  Contact Name:Mary Myron  Phone Number: [phone]

## 2022-12-19 PROBLEM — E05.00 GRAVES DISEASE: Status: ACTIVE | Noted: 2021-12-29

## 2022-12-19 NOTE — ASSESSMENT & PLAN NOTE
- Continue methimazole 5mg  - Recheck TSH now    - Will continue on methimazole treatment until late 2023 at which time we will reassess for remission of Graves; if no remission will then discuss further definitive therapy with thyroidectomy versus GUTIERREZ  - Return to clinic in 6 months or as-needed before

## 2022-12-19 NOTE — PROGRESS NOTES
Subjective:      Patient ID: Mary Sanches is a 52 y.o. female    Chief Complaint:  Graves hyperthyroidism    History of Present Illness   52-year-old  female with bipolar disorder and Graves disease presenting for follow-up regarding her hyperthyroidism    - Last seen by Dr. Mcgee on 08/10/2022 at which time methimazole 5 mg daily was continued and plan to repeat thyroid ultrasound in January 2023 was reaffirmed to assess multinodular goiter    - Hit her head on a vertical pole 3-4 days ago with residual headache (history of migraine), no neurologic deficits    Regarding Graves Hyperthyroidism:    - Duration of hyperthyroidism: Late 2021  - Etiology determined to be: Graves' disease  - Current relevant medications: Methimazole 5 mg (on methimazole since March 2022)  - Takes thyroid medication properly  - Currently has sore throat but only when coughing, not with palpation  Lab Results   Component Value Date    TSH 1.921 08/15/2022    TSH 10.875 (H) 07/20/2022    TSH 0.024 (L) 05/02/2022    FREET4 0.63 (L) 07/20/2022    FREET4 0.81 05/02/2022    FREET4 0.89 04/27/2022    THYROPEROXID <6.0 12/08/2021     - Thyroid ultrasound obtained 01/12/2022 demonstrates three TI-RADS 4 category nodules. Based on size and morphology, the left mid solid thyroid nodule is recommended for follow-up at 1, 2, 3, and 5 years.    - Thyroid uptake and scan obtained 03/09/2022 demonstrates Graves disease  - Most recent DEXA: None    - A complete 14 point review of systems was conducted and negative except for what is stated above    Social and family history reviewed  Current medications and allergies reviewed    Objective:   /78   Pulse (!) 58   Resp 16   Wt 95.2 kg (209 lb 14.1 oz)   SpO2 99%   BMI 37.18 kg/m²   Physical Exam  Constitutional:       Appearance: She is obese.   Musculoskeletal:      Cervical back: Neck supple.   Skin:     Comments: Significant scabbing without purulence or significant surrounding  erythema in gluteal area, lateral left thigh, and posterior neck   Neurological:      General: No focal deficit present.      Mental Status: She is alert and oriented to person, place, and time.   Psychiatric:         Mood and Affect: Mood normal.     BP Readings from Last 1 Encounters:   12/20/22 130/78      Wt Readings from Last 1 Encounters:   12/20/22 0916 95.2 kg (209 lb 14.1 oz)     Body mass index is 37.18 kg/m².    Lab Review:   Lab Results   Component Value Date    HGBA1C 5.2 12/27/2019     Lab Results   Component Value Date    CHOL 173 12/08/2021    HDL 34 (L) 12/08/2021    LDLCALC 123.0 12/08/2021    TRIG 80 12/08/2021    CHOLHDL 19.7 (L) 12/08/2021     Lab Results   Component Value Date     08/02/2022    K 4.5 08/02/2022     08/02/2022    CO2 25 08/02/2022     08/02/2022    BUN 11 08/02/2022    CREATININE 0.8 08/02/2022    CALCIUM 9.4 08/02/2022    PROT 8.7 (H) 08/02/2022    ALBUMIN 3.5 08/02/2022    BILITOT 0.5 08/02/2022    ALKPHOS 145 (H) 08/02/2022    AST 36 08/02/2022    ALT 39 08/02/2022    ANIONGAP 7 (L) 08/02/2022    ESTGFRAFRICA >60.0 07/29/2022    EGFRNONAA >60.0 07/29/2022    TSH 1.921 08/15/2022     All pertinent labs reviewed    Assessment and Plan     Graves disease  - Continue methimazole 5mg  - Recheck TSH now    - Will continue on methimazole treatment until late 2023 at which time we will reassess for remission of Graves; if no remission will then discuss further definitive therapy with thyroidectomy versus GUTIERREZ  - Return to clinic in 6 months or as-needed before    Obesity (BMI 30-39.9)  - Diet and exercise lifestyle modifications  - Update A1c    Mood disorder  - History of psychiatric illness may affect medication adherence    Thyroid nodule  - Schedule thyroid ultrasound to assess nodules      Pradip Currie DO  [facility], 6th Floor  [address] [place], [address]    Office: [phone]  Fax: [phone]    Disclaimer: This note has been generated using voice-recognition software. There may be typographical errors that have been missed during proof-reading.    The above history labs imaging impression and plan were discussed with attending physician who is in agreement and also took part in this patient's care.  I personally reviewed all of the patients available medications, labs, imaging, vitals, allergies, medical history.

## 2022-12-20 ENCOUNTER — OFFICE VISIT (OUTPATIENT)
Dept: ENDOCRINOLOGY | Facility: CLINIC | Age: 52
End: 2022-12-20
Payer: MEDICARE

## 2022-12-20 ENCOUNTER — LAB VISIT (OUTPATIENT)
Dept: LAB | Facility: HOSPITAL | Age: 52
End: 2022-12-20
Payer: MEDICARE

## 2022-12-20 VITALS
SYSTOLIC BLOOD PRESSURE: 130 MMHG | OXYGEN SATURATION: 99 % | RESPIRATION RATE: 16 BRPM | WEIGHT: 209.88 LBS | BODY MASS INDEX: 37.18 KG/M2 | HEART RATE: 58 BPM | DIASTOLIC BLOOD PRESSURE: 78 MMHG

## 2022-12-20 DIAGNOSIS — E04.2 MULTINODULAR GOITER (NONTOXIC): ICD-10-CM

## 2022-12-20 DIAGNOSIS — E04.1 THYROID NODULE: ICD-10-CM

## 2022-12-20 DIAGNOSIS — F39 MOOD DISORDER: ICD-10-CM

## 2022-12-20 DIAGNOSIS — E05.00 GRAVES' DISEASE: ICD-10-CM

## 2022-12-20 DIAGNOSIS — E66.9 OBESITY (BMI 30-39.9): ICD-10-CM

## 2022-12-20 DIAGNOSIS — E05.00 GRAVES' DISEASE: Primary | ICD-10-CM

## 2022-12-20 DIAGNOSIS — E05.00 GRAVES DISEASE: ICD-10-CM

## 2022-12-20 LAB — TSH SERPL DL<=0.005 MIU/L-ACNC: 3.96 UIU/ML (ref 0.4–4)

## 2022-12-20 PROCEDURE — 1160F RVW MEDS BY RX/DR IN RCRD: CPT | Mod: HCNC,CPTII,GC,S$GLB | Performed by: STUDENT IN AN ORGANIZED HEALTH CARE EDUCATION/TRAINING PROGRAM

## 2022-12-20 PROCEDURE — 99214 OFFICE O/P EST MOD 30 MIN: CPT | Mod: HCNC,GC,S$GLB, | Performed by: STUDENT IN AN ORGANIZED HEALTH CARE EDUCATION/TRAINING PROGRAM

## 2022-12-20 PROCEDURE — 3008F PR BODY MASS INDEX (BMI) DOCUMENTED: ICD-10-PCS | Mod: HCNC,CPTII,GC,S$GLB | Performed by: STUDENT IN AN ORGANIZED HEALTH CARE EDUCATION/TRAINING PROGRAM

## 2022-12-20 PROCEDURE — 99214 PR OFFICE/OUTPT VISIT, EST, LEVL IV, 30-39 MIN: ICD-10-PCS | Mod: HCNC,GC,S$GLB, | Performed by: STUDENT IN AN ORGANIZED HEALTH CARE EDUCATION/TRAINING PROGRAM

## 2022-12-20 PROCEDURE — 3078F PR MOST RECENT DIASTOLIC BLOOD PRESSURE < 80 MM HG: ICD-10-PCS | Mod: HCNC,CPTII,GC,S$GLB | Performed by: STUDENT IN AN ORGANIZED HEALTH CARE EDUCATION/TRAINING PROGRAM

## 2022-12-20 PROCEDURE — 1160F PR REVIEW ALL MEDS BY PRESCRIBER/CLIN PHARMACIST DOCUMENTED: ICD-10-PCS | Mod: HCNC,CPTII,GC,S$GLB | Performed by: STUDENT IN AN ORGANIZED HEALTH CARE EDUCATION/TRAINING PROGRAM

## 2022-12-20 PROCEDURE — 36415 COLL VENOUS BLD VENIPUNCTURE: CPT | Mod: HCNC | Performed by: STUDENT IN AN ORGANIZED HEALTH CARE EDUCATION/TRAINING PROGRAM

## 2022-12-20 PROCEDURE — 1159F MED LIST DOCD IN RCRD: CPT | Mod: HCNC,CPTII,GC,S$GLB | Performed by: STUDENT IN AN ORGANIZED HEALTH CARE EDUCATION/TRAINING PROGRAM

## 2022-12-20 PROCEDURE — 99999 PR PBB SHADOW E&M-EST. PATIENT-LVL IV: ICD-10-PCS | Mod: PBBFAC,HCNC,GC, | Performed by: STUDENT IN AN ORGANIZED HEALTH CARE EDUCATION/TRAINING PROGRAM

## 2022-12-20 PROCEDURE — 1159F PR MEDICATION LIST DOCUMENTED IN MEDICAL RECORD: ICD-10-PCS | Mod: HCNC,CPTII,GC,S$GLB | Performed by: STUDENT IN AN ORGANIZED HEALTH CARE EDUCATION/TRAINING PROGRAM

## 2022-12-20 PROCEDURE — 3075F PR MOST RECENT SYSTOLIC BLOOD PRESS GE 130-139MM HG: ICD-10-PCS | Mod: HCNC,CPTII,GC,S$GLB | Performed by: STUDENT IN AN ORGANIZED HEALTH CARE EDUCATION/TRAINING PROGRAM

## 2022-12-20 PROCEDURE — 99999 PR PBB SHADOW E&M-EST. PATIENT-LVL IV: CPT | Mod: PBBFAC,HCNC,GC, | Performed by: STUDENT IN AN ORGANIZED HEALTH CARE EDUCATION/TRAINING PROGRAM

## 2022-12-20 PROCEDURE — 84443 ASSAY THYROID STIM HORMONE: CPT | Mod: HCNC | Performed by: STUDENT IN AN ORGANIZED HEALTH CARE EDUCATION/TRAINING PROGRAM

## 2022-12-20 PROCEDURE — 3078F DIAST BP <80 MM HG: CPT | Mod: HCNC,CPTII,GC,S$GLB | Performed by: STUDENT IN AN ORGANIZED HEALTH CARE EDUCATION/TRAINING PROGRAM

## 2022-12-20 PROCEDURE — 3075F SYST BP GE 130 - 139MM HG: CPT | Mod: HCNC,CPTII,GC,S$GLB | Performed by: STUDENT IN AN ORGANIZED HEALTH CARE EDUCATION/TRAINING PROGRAM

## 2022-12-20 PROCEDURE — 3008F BODY MASS INDEX DOCD: CPT | Mod: HCNC,CPTII,GC,S$GLB | Performed by: STUDENT IN AN ORGANIZED HEALTH CARE EDUCATION/TRAINING PROGRAM

## 2022-12-20 NOTE — PATIENT INSTRUCTIONS
- TSH bloodwork today  - Schedule thyroid ultrasound to assess nodules  - Return to endocrine clinic in 6 months

## 2022-12-22 ENCOUNTER — TELEPHONE (OUTPATIENT)
Dept: ENDOCRINOLOGY | Facility: CLINIC | Age: 52
End: 2022-12-22
Payer: MEDICARE

## 2022-12-22 DIAGNOSIS — E05.00 GRAVES' DISEASE: Primary | ICD-10-CM

## 2022-12-22 RX ORDER — METHIMAZOLE 5 MG/1
TABLET ORAL
Qty: 90 TABLET | Refills: 3 | Status: SHIPPED | OUTPATIENT
Start: 2022-12-22 | End: 2023-03-04

## 2022-12-22 NOTE — TELEPHONE ENCOUNTER
Called patient regarding recent TSH results on high end of normal.  Discussed plan to decrease methimazole dose from 5 mg daily down to 2.5 mg daily and rechecking TSH in 3 months.  Patient was agreeable and expressed understanding.

## 2023-01-09 NOTE — TELEPHONE ENCOUNTER
----- Message from Bassam Ho sent at 1/9/2023 12:23 PM CST -----  Contact: [phone]  Type:  RX Refill Request    Who Called: pt     Refill or New Rx:    RX Name and Strength:   methIMAzole (TAPAZOLE) 5 MG Tab      Preferred Pharmacy with phone number:   [facility] - [place] - [address]  [address] [address]  [place] [address]  Phone: [phone] Fax: [phone]          Local or Mail Order: local    Would the patient rather a call back or a response via [facility]?     Best Call Back Number [phone]      Additional Information: PT IS ALSO REQUESTING TO SPEAK WITH THE NURSE IN REF TO A APPT

## 2023-01-10 ENCOUNTER — HOSPITAL ENCOUNTER (OUTPATIENT)
Dept: RADIOLOGY | Facility: HOSPITAL | Age: 53
Discharge: HOME | End: 2023-01-10
Attending: PHYSICIAN ASSISTANT
Payer: MEDICARE

## 2023-01-10 DIAGNOSIS — K76.9 LIVER DISEASE, UNSPECIFIED: ICD-10-CM

## 2023-01-10 PROCEDURE — 74183 MRI ABDOMEN W WO CONTRAST: ICD-10-PCS | Mod: 26,HCNC,, | Performed by: STUDENT IN AN ORGANIZED HEALTH CARE EDUCATION/TRAINING PROGRAM

## 2023-01-10 PROCEDURE — A9585 GADOBUTROL INJECTION: HCPCS | Mod: HCNC | Performed by: PHYSICIAN ASSISTANT

## 2023-01-10 PROCEDURE — 25500020 PHARM REV CODE 255: Mod: HCNC | Performed by: PHYSICIAN ASSISTANT

## 2023-01-10 PROCEDURE — 74183 MRI ABD W/O CNTR FLWD CNTR: CPT | Mod: TC,HCNC

## 2023-01-10 PROCEDURE — 74183 MRI ABD W/O CNTR FLWD CNTR: CPT | Mod: 26,HCNC,, | Performed by: STUDENT IN AN ORGANIZED HEALTH CARE EDUCATION/TRAINING PROGRAM

## 2023-01-10 RX ORDER — GADOBUTROL 604.72 MG/ML
10 INJECTION INTRAVENOUS
Status: COMPLETED | OUTPATIENT
Start: 2023-01-10 | End: 2023-01-10

## 2023-01-10 RX ORDER — METHIMAZOLE 5 MG/1
TABLET ORAL
Qty: 90 TABLET | Refills: 3 | OUTPATIENT
Start: 2023-01-10

## 2023-01-10 RX ADMIN — GADOBUTROL 10 ML: 604.72 INJECTION INTRAVENOUS at 08:01

## 2023-01-11 ENCOUNTER — TELEPHONE (OUTPATIENT)
Dept: HEPATOLOGY | Facility: CLINIC | Age: 53
End: 2023-01-11
Payer: MEDICARE

## 2023-01-11 NOTE — TELEPHONE ENCOUNTER
Patient: Mary Sanches       MRN: [id number]      : 1970     Age: 52 y.o.  [address]  [place] [address]    Providers  Advanced Practice providers: KIAN Tran    Priority of review: Other    Patient Transplant Status: Not a candidate    Reason for presentation: Indeterminate lesion    Clinical Summary: 53 y/o F with prior history of HCV treated and cured ; fatty liver, F3/F4 fibrosis via FibroSure, well compensated overall w/ normal AFP & elevated liver enzymes, hepatocellular.      Recent  Ultrasound suggesting lymphadenopathy, I obtained CT scan suggestive of HCC (?) Reviewed in IR conference 2021 with recommendations for MRI     Subsequent MRIs have been indeterminate/no HCC.    AFP WNL      Reviewed in IR conference 2022: 3 arterially enhancing foci, without washout or pseudocapsule. Technically indeterminate. may reflect regenerative nodule near dome or vascular anomalies. 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 stable LN between IVC and aorta. recommend 3 month follow up MRI.     AFP pending. Please review MRI 2023 and make recommendations for follow-up     Imaging: Please review MRI 2022 and 2022       Platelets:   Lab Results   Component Value Date/Time     2022 07:29 AM     Creatinine:   Lab Results   Component Value Date/Time    CREATININE 0.8 2022 07:29 AM     Bilirubin:   Lab Results   Component Value Date/Time    BILITOT 0.5 2022 07:29 AM     AFP Last 3 each if available:   Lab Results   Component Value Date/Time    AFP 6.4 2022 07:29 AM    AFP 5.9 2022 03:05 PM    AFP 4.3 2022 10:40 AM       MELD: MELD-Na score: 7 at 2022  7:29 AM  MELD score: 7 at 2022  7:29 AM  Calculated from:  Serum Creatinine: 0.8 mg/dL (Using min of 1 mg/dL) at 2022  7:29 AM  Serum Sodium: 138 mmol/L (Using max of 137 mmol/L) at 2022  7:29 AM  Total Bilirubin: 0.5 mg/dL (Using min of 1 mg/dL) at 2022   7:29 AM  INR(ratio): 1.1 at 8/2/2022  7:29 AM  Age: 52 years    Plan:     Follow-up Provider:

## 2023-01-11 NOTE — PROGRESS NOTES
Submitted for IR conference review next Tuesday.   Will discuss results in greater detail at upcoming hepatology appointment.

## 2023-01-20 ENCOUNTER — TELEPHONE (OUTPATIENT)
Dept: HEPATOLOGY | Facility: CLINIC | Age: 53
End: 2023-01-20
Payer: MEDICARE

## 2023-01-20 DIAGNOSIS — K76.9 LIVER LESION: ICD-10-CM

## 2023-01-20 DIAGNOSIS — K74.00 LIVER FIBROSIS: ICD-10-CM

## 2023-01-20 DIAGNOSIS — K76.9 LIVER DISEASE, UNSPECIFIED: Primary | ICD-10-CM

## 2023-01-25 ENCOUNTER — TELEPHONE (OUTPATIENT)
Dept: PHYSICAL MEDICINE AND REHAB | Facility: CLINIC | Age: 53
End: 2023-01-25
Payer: MEDICARE

## 2023-01-25 NOTE — TELEPHONE ENCOUNTER
----- Message from Tremontana Chevalier sent at 1/25/2023  3:07 PM CST -----  Regarding: appt/refill   #pt calling to spk with Dr. Andrews's office to see when she should rasheed her next appt. Pls call pt @ [phone]. Pt also wants to discuss refill of her RXs.

## 2023-02-15 RX ORDER — TRAMADOL HYDROCHLORIDE 50 MG/1
50 TABLET ORAL EVERY 6 HOURS PRN
Qty: 120 TABLET | Refills: 1 | Status: SHIPPED | OUTPATIENT
Start: 2023-02-15 | End: 2023-04-12

## 2023-02-15 NOTE — TELEPHONE ENCOUNTER
----- Message from Theresa Sepulveda sent at 2/15/2023  3:23 PM CST -----  Contact: Pt  Pt requesting a refill request. Pt is requesting a callback.         Medication:   traMADoL (ULTRAM) 50 mg tablet  pregabalin (LYRICA) 50 MG capsule      [facility] - [place] - [address] [place] [address] [address]  [address] [place] [address] [address]  [place] [address]  Phone: [phone] Fax: [phone]    Confirmed contact below:   Contact Name:Mary Marieler  Phone Number: [phone]

## 2023-02-15 NOTE — TELEPHONE ENCOUNTER
----- Message from Tremontana Chevalier sent at 2/15/2023  8:43 AM CST -----  Regarding: refill  Mary Sanches calling regarding Refills  (message) for #traMADoL (ULTRAM) 50 mg tablet - send to [facility] - [place]  [address]

## 2023-02-22 ENCOUNTER — OFFICE VISIT (OUTPATIENT)
Dept: HEPATOLOGY | Facility: CLINIC | Age: 53
End: 2023-02-22
Payer: MEDICARE

## 2023-02-22 VITALS — BODY MASS INDEX: 36.52 KG/M2 | HEIGHT: 63 IN | WEIGHT: 206.13 LBS

## 2023-02-22 DIAGNOSIS — K76.0 FATTY LIVER: Primary | ICD-10-CM

## 2023-02-22 DIAGNOSIS — K74.60 HEPATIC CIRRHOSIS, UNSPECIFIED HEPATIC CIRRHOSIS TYPE, UNSPECIFIED WHETHER ASCITES PRESENT: ICD-10-CM

## 2023-02-22 DIAGNOSIS — K76.9 LIVER LESION: ICD-10-CM

## 2023-02-22 DIAGNOSIS — K74.00 LIVER FIBROSIS: ICD-10-CM

## 2023-02-22 PROCEDURE — 3008F BODY MASS INDEX DOCD: CPT | Mod: HCNC,CPTII,S$GLB, | Performed by: PHYSICIAN ASSISTANT

## 2023-02-22 PROCEDURE — 1159F PR MEDICATION LIST DOCUMENTED IN MEDICAL RECORD: ICD-10-PCS | Mod: HCNC,CPTII,S$GLB, | Performed by: PHYSICIAN ASSISTANT

## 2023-02-22 PROCEDURE — 1159F MED LIST DOCD IN RCRD: CPT | Mod: HCNC,CPTII,S$GLB, | Performed by: PHYSICIAN ASSISTANT

## 2023-02-22 PROCEDURE — 1160F PR REVIEW ALL MEDS BY PRESCRIBER/CLIN PHARMACIST DOCUMENTED: ICD-10-PCS | Mod: HCNC,CPTII,S$GLB, | Performed by: PHYSICIAN ASSISTANT

## 2023-02-22 PROCEDURE — 1160F RVW MEDS BY RX/DR IN RCRD: CPT | Mod: HCNC,CPTII,S$GLB, | Performed by: PHYSICIAN ASSISTANT

## 2023-02-22 PROCEDURE — 99214 OFFICE O/P EST MOD 30 MIN: CPT | Mod: HCNC,S$GLB,, | Performed by: PHYSICIAN ASSISTANT

## 2023-02-22 PROCEDURE — 99999 PR PBB SHADOW E&M-EST. PATIENT-LVL III: CPT | Mod: PBBFAC,HCNC,, | Performed by: PHYSICIAN ASSISTANT

## 2023-02-22 PROCEDURE — 99999 PR PBB SHADOW E&M-EST. PATIENT-LVL III: ICD-10-PCS | Mod: PBBFAC,HCNC,, | Performed by: PHYSICIAN ASSISTANT

## 2023-02-22 PROCEDURE — 99214 PR OFFICE/OUTPT VISIT, EST, LEVL IV, 30-39 MIN: ICD-10-PCS | Mod: HCNC,S$GLB,, | Performed by: PHYSICIAN ASSISTANT

## 2023-02-22 PROCEDURE — 3008F PR BODY MASS INDEX (BMI) DOCUMENTED: ICD-10-PCS | Mod: HCNC,CPTII,S$GLB, | Performed by: PHYSICIAN ASSISTANT

## 2023-02-22 RX ORDER — TRAZODONE HYDROCHLORIDE 100 MG/1
100 TABLET ORAL NIGHTLY
Status: ON HOLD | COMMUNITY
Start: 2023-01-09 | End: 2023-10-03 | Stop reason: HOSPADM

## 2023-02-22 NOTE — PROGRESS NOTES
Hepatology Consultation: [facility]    ESTABLISHED PATIENT   PCP: DO Diana Long      Ms. Sanches is a 52 y.o. female with PMH as listed below who presents to clinic for returning for continued elevation of liver enzymes in the context of possible advanced liver fibrosis with cured HCV (s/p tx and cure 2020). Prior Fibrosure suggesting advance fibrosis F3/F4, well compensated. The patient did not receive Fibroscan due to COVID-19 pandemic at the time of HCV treatment, opted for Fibrosure instead. At her last visit 12/3/2020 we performed post-treatment Fibroscan that indicated F2, S2. She did gain about 20 lbs over the course of 2020. Otherwise, her IgG was mildly elevated. She has no synthetic dysfunction. Her ultrasound 11/11/2020 showed hepatomegaly to 18.6cm and normal size spleen without focal hepatic lesions. At the time her AFP decreased to 6.8.     The patient was previously followed by Jen Scheuermann, PA-C who treated and cured her Hep C.     I obtained a CT of her liver 12/17/2021 for further investigation of her ultrasound 9/9/2021 that suggested enlarged lymph node. CT 12/2021 revealed concerning liver lesion, MRI 1/6/2022 obtained that did not confirm HCC.     She was lost to follow-up since 3/8/2021 and did not show to several appointments.     Conference review: 12/21/21  indeterminate lesion with enhancement and possible washout. Rec MRI now. MRI now.  Conference review: 1/11/2021 MRI does not meet criteria for HCC. 3 arterially enhancing foci, without washout or pseudocapsule. Technically indeterminate. may reflect regenerative nodule near dome or vascular anomalies. 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 stable LN between IVC and aorta. recommend 3 month follow up MRI.       Interval history 01/12/2022:  Mary Sanches arrives today with  Chang.  Not watching her diet, drinks soda every day. Drinks coffee with  3 tbsp sugar per Chang.  She is doing well. (+) anxiety, new rash, seeing derm.   Denies symptoms of hepatic decompensation including jaundice, ascites, cognitive problems to suggest hepatic encephalopathy, or GI bleeding.     Fibrosis staging:              HCV FibroSURE 2/2020:A2, F3-4              Fibroscan 12/3/2020: S2,F2    Risk factors for fatty liver include obesity.      Interval history 04/27/2022:  Mary Sanches arrives today alone.     Since our last visit, she has had a diffuse rash and visited dermatology. She believes secondary to methimazole but not sure. She is miserable with itching and has an awful rash on her buttocks as well.     Denies symptoms of hepatic decompensation including jaundice, ascites, cognitive problems to suggest hepatic encephalopathy, or GI bleeding.     She did not keep appointment for MRI as scheduled previously, or lab work.    Interval history 08/17/2022:  Mary Sanches arrives today with  Chang.   Since our last visit, she is feeling much better, has changed her diet, and is ambulating independently. Her thyroid and skin issues are under control. She subjectively feels and looks better.  Denies symptoms of hepatic decompensation including jaundice, abdominal distention or lower extremity swelling, hematemesis, melena, or periods of confusion suggestive of hepatic encephalopathy.      Interval history 02/22/2023:  Mary Sanches arrives today with  Chang.   Since our last visit, she has been doing okay. She is eating fruit and eating much less sugars and sodas. She is feeling a lot better including resolve of her skin rashes, unknown prior etiology.   She denies abdominal pain. Denies symptoms of hepatic decompensation including jaundice, ascites, cognitive problems to suggest hepatic encephalopathy, or GI bleeding.         [facility] has a past medical history of Anxiety, Chronic back pain, Chronic hepatitis C (6/19/2020), Depression,  "Hallucination, History of psychiatric hospitalization, psychiatric care, Hyperthyroidism (12/29/2021), Migraines, Neuropathy, Obesity, Psychiatric exam requested by authority, Psychiatric problem, Psychosis, Sacroiliitis, Schizoaffective disorder, Scoliosis, Sleep difficulties, Therapy, and Tobacco use.   PSXH Mary has a past surgical history that includes Hysterectomy and Hand surgery (Left).   FH Mary's family history includes Cancer in her maternal grandmother; Cirrhosis in her paternal grandmother; Diabetes in her sister; Heart disease in her paternal grandfather; Hypertension in her father; Kidney disease in her sister; No Known Problems in her mother; Thyroid disease in her sister.   SH Mary reports that she has been smoking. She has a 0.13 pack-year smoking history. She has never used smokeless tobacco. She reports that she does not drink alcohol and does not use drugs.   ALG Mary is allergic to cranberry, gabapentin, ibuprofen, meloxicam, toradol [ketorolac], amoxicillin, cyclobenzaprine, and duloxetine.   STACEY Hernandez has a current medication list which includes the following prescription(s): alprazolam, butalbital-acetaminophen-caffeine -40 mg, eucrisa, diphenhydramine, doxycycline, escitalopram oxalate, hydroxyzine hcl, methimazole, mupirocin, olanzapine, tramadol, trazodone, triamcinolone acetonide 0.1%, vitamin d, and pregabalin.           ROS:   GENERAL: Denies fatigue  HEENT: Denies yellowing of eyes   CARDIOVASCULAR: Denies edema  GI: Denies abdominal pain  NEURO: Denies confusion, memory loss, or mood changes  HEME/LYMPH: Denies easy bruising or bleeding      Objective     Ht 5' 3" (1.6 m)   Wt 93.5 kg (206 lb 2.1 oz)   BMI 36.51 kg/m²     PHYSICAL EXAM:   Friendly woman, alert and oriented to person, place and time  EYES: Sclerae anicteric  GI: Non-tender, soft. Protuberant abdomen with hepatomegaly.  SKIN: Warm and dry. No jaundice. No telangectasias noted. No palmar erythema. "   NEURO:  Normal gait. No asterixis.   PSYCH:  Memory intact.     DIAGNOSTICS  Lab Results   Component Value Date    ALT 39 08/02/2022    AST 36 08/02/2022    ALKPHOS 145 (H) 08/02/2022    BILITOT 0.5 08/02/2022    ALBUMIN 3.5 08/02/2022    INR 1.1 08/02/2022     08/02/2022     Lab Results   Component Value Date    AFP 6.4 08/02/2022       In relation to metabolic risk factors:   Lab Results   Component Value Date    HGBA1C 5.2 12/27/2019    CHOL 173 12/08/2021    TSH 3.964 12/20/2022     Body mass index is 36.51 kg/m².      Prior serologic workup:   Lab Results   Component Value Date    SMOOTHMUSCAB Negative 1:40 11/19/2020    AMAIFA Negative 1:40 11/19/2020    IGGSERUM 2653 (H) 12/08/2021    ANASCREEN Negative <1:80 11/19/2020    FERRITIN 89 11/19/2020    FESATURATED 20 11/19/2020    PETH Negative 11/19/2020    UPZBN1CNVMCI MM 11/19/2020    TQTWI9ZQJYRE 221 (H) 11/19/2020    CERULOPLSM 39.0 11/19/2020    HEPBSAG Negative 02/20/2020    HEPCAB Positive (A) 12/27/2019    HEPAIGM Negative 12/27/2019       Imaging:  MRI Abdomen W WO Contrast  Narrative: EXAMINATION:  MRI ABDOMEN W WO CONTRAST    CLINICAL HISTORY:  Liver lesion, > 1cm;  Liver disease, unspecified    TECHNIQUE:  Multiplanar multisequence images of the abdomen before and after administration of 10 mL Gadavist intravenous contrast.    COMPARISON:  MRI 08/02/2022    FINDINGS:  Inferior Thorax: Unremarkable.    Liver: Upper limit of normal size.  Cirrhotic morphology.  Previously described wedge-shaped focus of arterial enhancement within the subcapsular right hepatic lobe segment 8 is less conspicuous on today's exam.  New focus of arterial enhancement within the right hepatic lobe segment 6 measuring 1.1 cm (series 11, image 54) which appears to somewhat persist on venous and delayed phase (series 15, image 59).  Two additional punctate subcentimeter foci of arterial enhancement within the right hepatic lobe (series 11, image 37).  No washout or  "pseudo capsule.  Hepatic and portal veins are patent.    Gallbladder: Unremarkable.    Bile Ducts: No dilatation.    Pancreas: No mass or ductal dilatation.    Spleen: Unremarkable.    Adrenals: Unremarkable.    Kidneys/Ureters: Stable small T2 hyperintense right renal lesion with dependent intrinsic T1 hyperintensity likely representing a hemorrhagic/proteinaceous renal cyst.  No solid enhancing renal mass.  No hydronephrosis.    GI Tract/Mesentery: No evidence of bowel obstruction or inflammation.    Peritoneal Space: No ascites.    Retroperitoneum: Stable slightly prominent aortocaval lymph node measuring 1.2 cm (series 11, image 52).  No new adenopathy.    Abdominal wall: Unremarkable.    Vasculature: No aneurysm.    Bones: Stable T2/T1 hyperintense lesion within the T12 vertebral body likely representing a hemangioma.  No suspicious marrow replacing lesion.  Impression: 1. Few new small foci of indeterminate arterial enhancement within the right hepatic lobe.  Previously described focus of arterial enhancement within the subcapsular hepatic segment 8 is less conspicuous on today's exam.  No washout or pseudo capsule.  Findings could represent perfusion abnormalities.  Attention on follow-up.  2. Hepatic cirrhosis.  3. Stable mildly prominent aortocaval lymph node.  4. Additional findings as above.    Electronically signed by: Nile Schmitz  Date:    01/10/2023  Time:    09:51      Assessment/Plan     52 y.o. female with:    1. Fatty liver  2. Liver fibrosis, F2  - I suspect cirrhosis of the liver, but fibrosis staging has not been revealing.   - plt intact   - no signs or symptoms of hepatic decompensation or portal HTN, medically early for EGD & plt normal  - immunized against Hep A &B  - will follow conservatively     - AFP Tumor Marker; Standing  - CBC Auto Differential; Standing  - Comprehensive Metabolic Panel; Standing  - Protime-INR; Standing      3. Liver lesion  - ultrasound 2021, CT 2021  - MRI 2022:" " "Two foci of serpiginous arterial phase hyperenhancement within the central liver adjacent to a hepatic veins (series 11, image 24 and 27).  The areas demonstrate blood pool intensity on venous and delayed phases and are favored to represent vasculature shunting.  Irregular focus of arterial phase hyperenhancement within the periphery of hepatic segment 6 (series 11, image 54) which becomes isointense on venous and delayed phases favored to represent variant perfusion.  No suspicious washout or pseudo capsule."  - reviewed in IR conference 1/11/2021, does not meet criteria for liver cancer, recommend mri f/u in 3 months   -  IR CONF 5/22 new seg V lesion recommend 3 month follow-up but does not meet criteria for HCC.   - IR CONF 8/9/22 new seg VIII wedge shaped focus, transient hepatic intensity difference; MRI reviewed in IR conference without concerning lesions, recommendation for 6 month f/u suspect perfusion defects, recommend MRI in 6 months    5. Obesity   - 8/2022 update patient has achieved weight loss and changed her diet     6. Chronic hepatitis C without hepatic coma  - s/p tx with epclusa svr12 6/2020      7. Elevated LFTs  - ?hyperthyroidism  - DILI meds: voltaren& lexapro  - zyprexa no longer taking so likely not DILI  - will monitor, enzymes not impressively high and could be possibly secondary to fatty liver  - 8/2022: resolved AST/ALT. ALP mildly elevated persistently       Orders Placed This Encounter   Procedures    Protime-INR    Comprehensive Metabolic Panel    CBC Auto Differential    AFP Tumor Marker       MELD-Na score: 7 at 8/2/2022  7:29 AM  MELD score: 7 at 8/2/2022  7:29 AM  Calculated from:  Serum Creatinine: 0.8 mg/dL (Using min of 1 mg/dL) at 8/2/2022  7:29 AM  Serum Sodium: 138 mmol/L (Using max of 137 mmol/L) at 8/2/2022  7:29 AM  Total Bilirubin: 0.5 mg/dL (Using min of 1 mg/dL) at 8/2/2022  7:29 AM  INR(ratio): 1.1 at 8/2/2022  7:29 AM  Age: 52 years      MRI reviewed in conference " January 2023 with no liver lesions meeting criteria for HCC. I recommend 3 month follow-up because of discussion of new 1.1cm lesion. Scheduled for labs and MRI in March/April.  Overdue for MELD labs & AFP, not completed.  Follow up in about 3 months (around 5/22/2023).      I spent 30 minutes on the day of this encounter preparing for, evaluating, treating, and managing this patient.       Thank you for allowing me to participate in the care of Mary Felton PA-C  Hepatology Advanced Practice Provider  [facility]

## 2023-03-02 ENCOUNTER — HOSPITAL ENCOUNTER (OUTPATIENT)
Dept: ENDOCRINOLOGY | Facility: CLINIC | Age: 53
Discharge: HOME | End: 2023-03-02
Attending: STUDENT IN AN ORGANIZED HEALTH CARE EDUCATION/TRAINING PROGRAM
Payer: MEDICARE

## 2023-03-02 ENCOUNTER — LAB VISIT (OUTPATIENT)
Dept: LAB | Facility: HOSPITAL | Age: 53
End: 2023-03-02
Payer: MEDICARE

## 2023-03-02 DIAGNOSIS — K76.0 FATTY LIVER: ICD-10-CM

## 2023-03-02 DIAGNOSIS — E04.2 MULTINODULAR GOITER (NONTOXIC): ICD-10-CM

## 2023-03-02 DIAGNOSIS — K74.00 LIVER FIBROSIS: ICD-10-CM

## 2023-03-02 DIAGNOSIS — E05.00 GRAVES' DISEASE: ICD-10-CM

## 2023-03-02 LAB
AFP SERPL-MCNC: 10 NG/ML (ref 0–8.4)
ALBUMIN SERPL BCP-MCNC: 3.2 G/DL (ref 3.5–5.2)
ALP SERPL-CCNC: 107 U/L (ref 55–135)
ALT SERPL W/O P-5'-P-CCNC: 28 U/L (ref 10–44)
ANION GAP SERPL CALC-SCNC: 8 MMOL/L (ref 8–16)
AST SERPL-CCNC: 46 U/L (ref 10–40)
BASOPHILS # BLD AUTO: 0.05 K/UL (ref 0–0.2)
BASOPHILS NFR BLD: 0.8 % (ref 0–1.9)
BILIRUB SERPL-MCNC: 0.8 MG/DL (ref 0.1–1)
BUN SERPL-MCNC: 6 MG/DL (ref 6–20)
CALCIUM SERPL-MCNC: 9.2 MG/DL (ref 8.7–10.5)
CHLORIDE SERPL-SCNC: 103 MMOL/L (ref 95–110)
CO2 SERPL-SCNC: 26 MMOL/L (ref 23–29)
CREAT SERPL-MCNC: 0.8 MG/DL (ref 0.5–1.4)
DIFFERENTIAL METHOD: ABNORMAL
EOSINOPHIL # BLD AUTO: 0.3 K/UL (ref 0–0.5)
EOSINOPHIL NFR BLD: 4.3 % (ref 0–8)
ERYTHROCYTE [DISTWIDTH] IN BLOOD BY AUTOMATED COUNT: 14 % (ref 11.5–14.5)
EST. GFR  (NO RACE VARIABLE): >60 ML/MIN/1.73 M^2
GLUCOSE SERPL-MCNC: 124 MG/DL (ref 70–110)
HCT VFR BLD AUTO: 43.2 % (ref 37–48.5)
HGB BLD-MCNC: 14.6 G/DL (ref 12–16)
IMM GRANULOCYTES # BLD AUTO: 0 K/UL (ref 0–0.04)
IMM GRANULOCYTES NFR BLD AUTO: 0 % (ref 0–0.5)
INR PPP: 1.2 (ref 0.8–1.2)
LYMPHOCYTES # BLD AUTO: 2.5 K/UL (ref 1–4.8)
LYMPHOCYTES NFR BLD: 39 % (ref 18–48)
MCH RBC QN AUTO: 32 PG (ref 27–31)
MCHC RBC AUTO-ENTMCNC: 33.8 G/DL (ref 32–36)
MCV RBC AUTO: 95 FL (ref 82–98)
MONOCYTES # BLD AUTO: 0.4 K/UL (ref 0.3–1)
MONOCYTES NFR BLD: 5.6 % (ref 4–15)
NEUTROPHILS # BLD AUTO: 3.2 K/UL (ref 1.8–7.7)
NEUTROPHILS NFR BLD: 50.3 % (ref 38–73)
NRBC BLD-RTO: 0 /100 WBC
PLATELET # BLD AUTO: 175 K/UL (ref 150–450)
PMV BLD AUTO: 11.3 FL (ref 9.2–12.9)
POTASSIUM SERPL-SCNC: 3.8 MMOL/L (ref 3.5–5.1)
PROT SERPL-MCNC: 8.5 G/DL (ref 6–8.4)
PROTHROMBIN TIME: 11.9 SEC (ref 9–12.5)
RBC # BLD AUTO: 4.56 M/UL (ref 4–5.4)
SODIUM SERPL-SCNC: 137 MMOL/L (ref 136–145)
T4 FREE SERPL-MCNC: 0.58 NG/DL (ref 0.71–1.51)
TSH SERPL DL<=0.005 MIU/L-ACNC: 18.08 UIU/ML (ref 0.4–4)
WBC # BLD AUTO: 6.28 K/UL (ref 3.9–12.7)

## 2023-03-02 PROCEDURE — 84439 ASSAY OF FREE THYROXINE: CPT | Mod: HCNC | Performed by: STUDENT IN AN ORGANIZED HEALTH CARE EDUCATION/TRAINING PROGRAM

## 2023-03-02 PROCEDURE — 76536 US EXAM OF HEAD AND NECK: CPT | Mod: HCNC,S$GLB,, | Performed by: INTERNAL MEDICINE

## 2023-03-02 PROCEDURE — 85025 COMPLETE CBC W/AUTO DIFF WBC: CPT | Mod: HCNC | Performed by: PHYSICIAN ASSISTANT

## 2023-03-02 PROCEDURE — 82105 ALPHA-FETOPROTEIN SERUM: CPT | Mod: HCNC | Performed by: PHYSICIAN ASSISTANT

## 2023-03-02 PROCEDURE — 84443 ASSAY THYROID STIM HORMONE: CPT | Mod: HCNC | Performed by: STUDENT IN AN ORGANIZED HEALTH CARE EDUCATION/TRAINING PROGRAM

## 2023-03-02 PROCEDURE — 36415 COLL VENOUS BLD VENIPUNCTURE: CPT | Mod: HCNC | Performed by: PHYSICIAN ASSISTANT

## 2023-03-02 PROCEDURE — 76536 US SOFT TISSUE HEAD NECK THYROID: ICD-10-PCS | Mod: HCNC,S$GLB,, | Performed by: INTERNAL MEDICINE

## 2023-03-02 PROCEDURE — 85610 PROTHROMBIN TIME: CPT | Mod: HCNC | Performed by: PHYSICIAN ASSISTANT

## 2023-03-02 PROCEDURE — 80053 COMPREHEN METABOLIC PANEL: CPT | Mod: HCNC | Performed by: PHYSICIAN ASSISTANT

## 2023-03-03 NOTE — PROGRESS NOTES
Pt with Graves on methimazole 2.5 mg daily; low free T4 at 0.58 and TSH high at 18 indicating over treatment. Recent US shows nodule that needs FNA.    Planning to confirm adherence to 2.5 mg daily and if taking it appropriately (and not more) ask her to stop methimazole and recheck TSH and free T4 in 8 weeks. Will schedule FNA as well after discussing with her.    Please let me know if you agree with plan

## 2023-03-04 ENCOUNTER — TELEPHONE (OUTPATIENT)
Dept: ENDOCRINOLOGY | Facility: HOSPITAL | Age: 53
End: 2023-03-04
Payer: MEDICARE

## 2023-03-04 DIAGNOSIS — E05.00 GRAVES' DISEASE: ICD-10-CM

## 2023-03-04 DIAGNOSIS — E04.1 THYROID NODULE: Primary | ICD-10-CM

## 2023-03-04 NOTE — TELEPHONE ENCOUNTER
Spoke with patient on the telephone regarding recent thyroid blood test and ultrasound and need to stop methimazole and recheck TSH in 6-8 weeks and schedule FNA.  Patient voiced understanding and was in agreement.

## 2023-03-10 ENCOUNTER — TELEPHONE (OUTPATIENT)
Dept: INTERNAL MEDICINE | Facility: CLINIC | Age: 53
End: 2023-03-10
Payer: MEDICARE

## 2023-03-10 NOTE — TELEPHONE ENCOUNTER
----- Message from Judie Boss sent at 3/10/2023 11:28 AM CST -----  Contact: nurse care manager/ketan/louis/[phone]/call pt  Requesting an RX refill or new RX.  Is this a refill or new RX:   RX name and strength (copy/paste from chart):  methIMAzole (TAPAZOLE) 5 MG Tab (Discontinued) 90 tablet 3 12/22/2022 3/4/2023 No  Sig: Take 0.5 tablet (2.5 mg total) by mouth once daily.  Is this a 30 day or 90 day RX:   Pharmacy name and phone # (copy/paste from chart):    [facility] [place] - [address]  [address]  [place] [address]  Phone: [phone] Fax: [phone]  The doctors have asked that we provide their patients with the following 2 reminders -- prescription refills can take up to 72 hours, and a friendly reminder that in the future you can use your [facility] account to request refills: call back      Pt would like to get a Rx for a cough.     Please advise

## 2023-04-12 ENCOUNTER — PES CALL (OUTPATIENT)
Dept: ADMINISTRATIVE | Facility: CLINIC | Age: 53
End: 2023-04-12
Payer: MEDICARE

## 2023-04-20 ENCOUNTER — TELEPHONE (OUTPATIENT)
Dept: HEPATOLOGY | Facility: CLINIC | Age: 53
End: 2023-04-20
Payer: MEDICARE

## 2023-04-20 ENCOUNTER — HOSPITAL ENCOUNTER (OUTPATIENT)
Dept: RADIOLOGY | Facility: HOSPITAL | Age: 53
Discharge: HOME | End: 2023-04-20
Attending: PHYSICIAN ASSISTANT
Payer: MEDICARE

## 2023-04-20 ENCOUNTER — TELEPHONE (OUTPATIENT)
Dept: ENDOCRINOLOGY | Facility: CLINIC | Age: 53
End: 2023-04-20
Payer: MEDICARE

## 2023-04-20 DIAGNOSIS — K74.00 LIVER FIBROSIS: ICD-10-CM

## 2023-04-20 DIAGNOSIS — K76.9 LIVER DISEASE, UNSPECIFIED: ICD-10-CM

## 2023-04-20 DIAGNOSIS — K76.9 LIVER LESION: ICD-10-CM

## 2023-04-20 DIAGNOSIS — E05.00 GRAVES' DISEASE: Primary | ICD-10-CM

## 2023-04-20 PROCEDURE — 25500020 PHARM REV CODE 255: Mod: HCNC | Performed by: PHYSICIAN ASSISTANT

## 2023-04-20 PROCEDURE — 74183 MRI ABDOMEN W WO CONTRAST: ICD-10-PCS | Mod: 26,HCNC,, | Performed by: STUDENT IN AN ORGANIZED HEALTH CARE EDUCATION/TRAINING PROGRAM

## 2023-04-20 PROCEDURE — 74183 MRI ABD W/O CNTR FLWD CNTR: CPT | Mod: TC,HCNC

## 2023-04-20 PROCEDURE — A9585 GADOBUTROL INJECTION: HCPCS | Mod: HCNC | Performed by: PHYSICIAN ASSISTANT

## 2023-04-20 PROCEDURE — 74183 MRI ABD W/O CNTR FLWD CNTR: CPT | Mod: 26,HCNC,, | Performed by: STUDENT IN AN ORGANIZED HEALTH CARE EDUCATION/TRAINING PROGRAM

## 2023-04-20 RX ORDER — GADOBUTROL 604.72 MG/ML
10 INJECTION INTRAVENOUS
Status: COMPLETED | OUTPATIENT
Start: 2023-04-20 | End: 2023-04-20

## 2023-04-20 RX ADMIN — GADOBUTROL 10 ML: 604.72 INJECTION INTRAVENOUS at 07:04

## 2023-04-20 NOTE — TELEPHONE ENCOUNTER
I spoke with her regarding the recent lab work showing worsening hypothyroidism. She has a history of Graves' disease with fluctuating TFTs.. According to the last phone note from 3/3, she was supposed to stop taking methimazole altogether. She states she is still taking a half of the 5 mg tablet, but some days she forgets to break it and takes the full tablet. She was also taking 1 and a half tablets of an older methimazole prescription that she had as well. She finished it two days ago and discarded the bottle. She's not sure if this was 5 or 10 mg. She couldn't tell me why she was taking that, except that she had it left over from a previous prescription.    She reports she has been feeling really tired and lethargic lately and has been sleeping a lot.    I had her look through all of her medicine bottles and put away the methimazole so that she doesn't accidentally take it. We are going to recheck her levels in 4 weeks. I had her read back the instructions to make sure she understands.

## 2023-04-20 NOTE — TELEPHONE ENCOUNTER
Patient: Mary Sanches       MRN: [id number]      : 1970     Age: 53 y.o.  [address]  [place] [address]    Providers  Advanced Practice providers: Cierra Felton PA-C    Priority of review: Other    Patient Transplant Status: Not a candidate    Reason for presentation: Indeterminate lesion    Clinical Summary: 51 y/o F with prior history of HCV treated and cured ; fatty liver, F3/F4 fibrosis via FibroSure, well compensated overall w/ normal AFP & elevated liver enzymes, hepatocellular.      Recent  Ultrasound suggesting lymphadenopathy, I obtained CT scan suggestive of HCC (?) Reviewed in IR conference 2021 with recommendations for MRI     Subsequent MRIs have been indeterminate/no HCC.    AFP WNL      Reviewed in IR conference 2022: 3 arterially enhancing foci, without washout or pseudocapsule. Technically indeterminate. may reflect regenerative nodule near dome or vascular anomalies. 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 stable LN between IVC and aorta. recommend 3 month follow up MRI.     Reviewed in IR conference 2023: new 1.1cm lesions in 6 mos with arterial enhancing and pseudocapsule.     Given new lesion, repeated MRI in 3 months. Afp remains mildly elevated. Please advise. Any role for biopsy?     Imaging: Please review MRI 2022 and 2022       Platelets:   Lab Results   Component Value Date/Time     2023 08:03 AM     Creatinine:   Lab Results   Component Value Date/Time    CREATININE 0.8 2023 08:03 AM     Bilirubin:   Lab Results   Component Value Date/Time    BILITOT 0.6 2023 08:03 AM     AFP Last 3 each if available:   Lab Results   Component Value Date/Time    AFP 9.6 (H) 2023 08:03 AM    AFP 10 (H) 2023 07:58 AM    AFP 6.4 2022 07:29 AM       MELD: MELD-Na score: 7 at 2023  8:03 AM  MELD score: 7 at 2023  8:03 AM  Calculated from:  Serum Creatinine: 0.8 mg/dL (Using min of 1 mg/dL) at  4/20/2023  8:03 AM  Serum Sodium: 138 mmol/L (Using max of 137 mmol/L) at 4/20/2023  8:03 AM  Total Bilirubin: 0.6 mg/dL (Using min of 1 mg/dL) at 4/20/2023  8:03 AM  INR(ratio): 1.1 at 4/20/2023  8:03 AM  Age: 53 years    Plan:     Follow-up Provider:

## 2023-04-24 ENCOUNTER — OFFICE VISIT (OUTPATIENT)
Dept: HEPATOLOGY | Facility: CLINIC | Age: 53
End: 2023-04-24
Payer: MEDICARE

## 2023-04-24 VITALS — WEIGHT: 207.44 LBS | BODY MASS INDEX: 36.75 KG/M2 | HEIGHT: 63 IN

## 2023-04-24 DIAGNOSIS — B18.2 CHRONIC HEPATITIS C WITHOUT HEPATIC COMA: ICD-10-CM

## 2023-04-24 DIAGNOSIS — K76.0 FATTY LIVER: ICD-10-CM

## 2023-04-24 DIAGNOSIS — K76.9 LIVER DISEASE, UNSPECIFIED: Primary | ICD-10-CM

## 2023-04-24 DIAGNOSIS — K76.9 LIVER LESION: ICD-10-CM

## 2023-04-24 DIAGNOSIS — K74.00 LIVER FIBROSIS: ICD-10-CM

## 2023-04-24 PROCEDURE — 1160F RVW MEDS BY RX/DR IN RCRD: CPT | Mod: HCNC,CPTII,S$GLB, | Performed by: PHYSICIAN ASSISTANT

## 2023-04-24 PROCEDURE — 99999 PR PBB SHADOW E&M-EST. PATIENT-LVL III: CPT | Mod: PBBFAC,HCNC,, | Performed by: PHYSICIAN ASSISTANT

## 2023-04-24 PROCEDURE — 99214 PR OFFICE/OUTPT VISIT, EST, LEVL IV, 30-39 MIN: ICD-10-PCS | Mod: HCNC,S$GLB,, | Performed by: PHYSICIAN ASSISTANT

## 2023-04-24 PROCEDURE — 3008F BODY MASS INDEX DOCD: CPT | Mod: HCNC,CPTII,S$GLB, | Performed by: PHYSICIAN ASSISTANT

## 2023-04-24 PROCEDURE — 3008F PR BODY MASS INDEX (BMI) DOCUMENTED: ICD-10-PCS | Mod: HCNC,CPTII,S$GLB, | Performed by: PHYSICIAN ASSISTANT

## 2023-04-24 PROCEDURE — 99999 PR PBB SHADOW E&M-EST. PATIENT-LVL III: ICD-10-PCS | Mod: PBBFAC,HCNC,, | Performed by: PHYSICIAN ASSISTANT

## 2023-04-24 PROCEDURE — 99214 OFFICE O/P EST MOD 30 MIN: CPT | Mod: HCNC,S$GLB,, | Performed by: PHYSICIAN ASSISTANT

## 2023-04-24 PROCEDURE — 1159F PR MEDICATION LIST DOCUMENTED IN MEDICAL RECORD: ICD-10-PCS | Mod: HCNC,CPTII,S$GLB, | Performed by: PHYSICIAN ASSISTANT

## 2023-04-24 PROCEDURE — 1159F MED LIST DOCD IN RCRD: CPT | Mod: HCNC,CPTII,S$GLB, | Performed by: PHYSICIAN ASSISTANT

## 2023-04-24 PROCEDURE — 1160F PR REVIEW ALL MEDS BY PRESCRIBER/CLIN PHARMACIST DOCUMENTED: ICD-10-PCS | Mod: HCNC,CPTII,S$GLB, | Performed by: PHYSICIAN ASSISTANT

## 2023-04-24 NOTE — PATIENT INSTRUCTIONS
Because you have cirrhosis, it is important to attend clinic visits every 6 months with an Ultrasound and blood tests every 6 months to screen for liver cancer (you are at risk of developing liver cancer due to scar tissue in the liver)    Signs and symptoms of worsening liver disease include jaundice, fluid in the belly (ascites), and confusion/disorientation/slowed thought processes due to hepatic encephalopathy (toxins building up because of liver problems).   You should seek medical attention if any of these things occur.    Also, possible bleeding from esophageal varices (blood vessels in the stomach and foodpipe can burst and cause fatal bleeding).  Therefore, if you have symptoms of vomiting blood, blood in your stool, dark or black stools or vomiting coffee ground vomit, YOU SHOULD GO TO THE EMERGENCY ROOM IMMEDIATELY.     Cirrhosis can increase the risk of liver cancer, liver failure, and death. However, we will watch your liver function score (MELD score) closely with each clinic visit. A normal MELD score is 6, highest is 40.  We will check this with every clinic visit. A MELD 15 or higher is when we start to consider transplant because MELD 15 or higher indicates that the liver is not functioning as well       Cirrhosis Counseling  - strict abstinence of alcohol use (includes beer, wine, and/or liquor)  - avoid non-steroidal anti-inflammatory drugs (NSAIDs) such as ibuprofen, Motrin, naprosyn, Aleve due to the risk of kidney damage  - can take acetaminophen (Tylenol), no more than 2000 mg per day  - low sodium (salt) 2 gram per day diet  - high protein diet: 1.5g/kg to prevent muscle mass loss. Recommended at least 1 protein shake daily using Premier Protein shakes    - resistance exercises for muscle strength  - avoid raw seafoods due to the risk of fatal Vibrio vulnificus infection  - ultrasound of the liver every 6 months for liver cancer screening  - Upper endoscopy every 1-2 years to screen for  varices in the stomach and esophagus

## 2023-04-25 ENCOUNTER — CONFERENCE (OUTPATIENT)
Dept: TRANSPLANT | Facility: CLINIC | Age: 53
End: 2023-04-25
Payer: MEDICARE

## 2023-05-01 NOTE — TELEPHONE ENCOUNTER
Patient: Mary Sanches       MRN: [id number]      : 1970     Age: 53 y.o.  [address]  [place] [address]    Presenting Radiologists: Jayjay Moy MD    Providers  Advanced Practice providers: Cierra Felton PA-C    Priority of review: Other    Patient Transplant Status: Not a candidate    Reason for presentation: Indeterminate lesion    Clinical Summary: 51 y/o F with prior history of HCV treated and cured 2020; fatty liver, F3/F4 fibrosis via FibroSure, well compensated overall w/ normal AFP & elevated liver enzymes, hepatocellular.      Recent  Ultrasound suggesting lymphadenopathy, I obtained CT scan suggestive of HCC (?) Reviewed in IR conference 2021 with recommendations for MRI     Subsequent MRIs have been indeterminate/no HCC.    AFP WNL      Reviewed in IR conference 2022: 3 arterially enhancing foci, without washout or pseudocapsule. Technically indeterminate. may reflect regenerative nodule near dome or vascular anomalies. 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 stable LN between IVC and aorta. recommend 3 month follow up MRI.     Reviewed in IR conference 2023: new 1.1cm lesions in 6 mos with arterial enhancing and pseudocapsule.     Given new lesion, repeated MRI in 3 months. Afp remains mildly elevated. Please advise. Any role for biopsy?     Imaging: Please review MRI 2022 and 2022       Platelets:   Lab Results   Component Value Date/Time     2023 08:03 AM     Creatinine:   Lab Results   Component Value Date/Time    CREATININE 0.8 2023 08:03 AM     Bilirubin:   Lab Results   Component Value Date/Time    BILITOT 0.6 2023 08:03 AM     AFP Last 3 each if available:   Lab Results   Component Value Date/Time    AFP 9.6 (H) 2023 08:03 AM    AFP 10 (H) 2023 07:58 AM    AFP 6.4 2022 07:29 AM       MELD: MELD-Na score: 7 at 2023  8:03 AM  MELD score: 7 at 2023  8:03 AM  Calculated from:  Serum  Creatinine: 0.8 mg/dL (Using min of 1 mg/dL) at 4/20/2023  8:03 AM  Serum Sodium: 138 mmol/L (Using max of 137 mmol/L) at 4/20/2023  8:03 AM  Total Bilirubin: 0.6 mg/dL (Using min of 1 mg/dL) at 4/20/2023  8:03 AM  INR(ratio): 1.1 at 4/20/2023  8:03 AM  Age: 53 years    Plan: No suspicious lesion seen.  Previously seen 1.1cm area of enhancement in seg 6 is no longer present.       Per provider, case does not need to be reviewed at IR conference. Will repeat mri in 6 months.    Note forwarded to provider to coordinate follow-up    Follow-up Provider: Cierra Felton MD

## 2023-05-30 ENCOUNTER — OFFICE VISIT (OUTPATIENT)
Dept: PHYSICAL MEDICINE AND REHAB | Facility: CLINIC | Age: 53
End: 2023-05-30
Payer: MEDICARE

## 2023-05-30 VITALS — SYSTOLIC BLOOD PRESSURE: 139 MMHG | HEART RATE: 61 BPM | DIASTOLIC BLOOD PRESSURE: 86 MMHG

## 2023-05-30 DIAGNOSIS — M47.26 OSTEOARTHRITIS OF SPINE WITH RADICULOPATHY, LUMBAR REGION: ICD-10-CM

## 2023-05-30 DIAGNOSIS — G89.29 CHRONIC NECK PAIN: ICD-10-CM

## 2023-05-30 DIAGNOSIS — E66.9 OBESITY (BMI 30.0-34.9): ICD-10-CM

## 2023-05-30 DIAGNOSIS — M54.2 CHRONIC NECK PAIN: ICD-10-CM

## 2023-05-30 DIAGNOSIS — M54.41 CHRONIC MIDLINE LOW BACK PAIN WITH RIGHT-SIDED SCIATICA: ICD-10-CM

## 2023-05-30 DIAGNOSIS — G89.29 CHRONIC MIDLINE LOW BACK PAIN WITH RIGHT-SIDED SCIATICA: Primary | ICD-10-CM

## 2023-05-30 DIAGNOSIS — M51.36 DDD (DEGENERATIVE DISC DISEASE), LUMBAR: ICD-10-CM

## 2023-05-30 DIAGNOSIS — G89.29 CHRONIC MIDLINE LOW BACK PAIN WITH RIGHT-SIDED SCIATICA: ICD-10-CM

## 2023-05-30 DIAGNOSIS — M54.41 CHRONIC MIDLINE LOW BACK PAIN WITH RIGHT-SIDED SCIATICA: Primary | ICD-10-CM

## 2023-05-30 PROCEDURE — 3079F PR MOST RECENT DIASTOLIC BLOOD PRESSURE 80-89 MM HG: ICD-10-PCS | Mod: CPTII,S$GLB,, | Performed by: PHYSICAL MEDICINE & REHABILITATION

## 2023-05-30 PROCEDURE — 3079F DIAST BP 80-89 MM HG: CPT | Mod: CPTII,S$GLB,, | Performed by: PHYSICAL MEDICINE & REHABILITATION

## 2023-05-30 PROCEDURE — 3075F PR MOST RECENT SYSTOLIC BLOOD PRESS GE 130-139MM HG: ICD-10-PCS | Mod: CPTII,S$GLB,, | Performed by: PHYSICAL MEDICINE & REHABILITATION

## 2023-05-30 PROCEDURE — 99214 PR OFFICE/OUTPT VISIT, EST, LEVL IV, 30-39 MIN: ICD-10-PCS | Mod: S$GLB,,, | Performed by: PHYSICAL MEDICINE & REHABILITATION

## 2023-05-30 PROCEDURE — 99999 PR PBB SHADOW E&M-EST. PATIENT-LVL II: ICD-10-PCS | Mod: PBBFAC,,, | Performed by: PHYSICAL MEDICINE & REHABILITATION

## 2023-05-30 PROCEDURE — 3075F SYST BP GE 130 - 139MM HG: CPT | Mod: CPTII,S$GLB,, | Performed by: PHYSICAL MEDICINE & REHABILITATION

## 2023-05-30 PROCEDURE — 99214 OFFICE O/P EST MOD 30 MIN: CPT | Mod: S$GLB,,, | Performed by: PHYSICAL MEDICINE & REHABILITATION

## 2023-05-30 PROCEDURE — 99999 PR PBB SHADOW E&M-EST. PATIENT-LVL II: CPT | Mod: PBBFAC,,, | Performed by: PHYSICAL MEDICINE & REHABILITATION

## 2023-05-30 RX ORDER — PREGABALIN 50 MG/1
100 CAPSULE ORAL 2 TIMES DAILY
Start: 2023-05-30 | End: 2023-05-31

## 2023-05-31 RX ORDER — PREGABALIN 50 MG/1
CAPSULE ORAL
Qty: 90 CAPSULE | Refills: 1 | Status: SHIPPED | OUTPATIENT
Start: 2023-05-31 | End: 2023-08-17 | Stop reason: SDUPTHER

## 2023-07-05 NOTE — TELEPHONE ENCOUNTER
----- Message from Stephie Caal sent at 7/5/2023  6:29 AM CDT -----  Regarding: Medication Refill  Contact: Patient  Type:  RX Refill Request    Who Called: Patient   Refill or New Rx: Refill   RX Name and Strength: traMADoL (ULTRAM) 50 mg tablet  How is the patient currently taking it? (ex. 1XDay):Take 1 tablet (50 mg total) by mouth every 6 (six) hours as needed for Pain. - Oral  Is this a 30 day or 90 day RX: 30 day   Preferred Pharmacy with phone number:[facility] - [place] [address] Phone:  [phone]  Local or Mail Order: Local   Ordering Provider: Darryl   Would the patient rather a call back or a response via [facility]? Call back   Best Call Back Number: [phone]  Additional Information: Pt is requesting a refill on medication. Pt is also requesting a call back in regards to cream suggested to use by physician. Pt would like to know if she can get a refill or if cream is OTC she stated it starts with a V

## 2023-07-06 RX ORDER — TRAMADOL HYDROCHLORIDE 50 MG/1
50 TABLET ORAL EVERY 6 HOURS PRN
Qty: 120 TABLET | Refills: 1 | Status: SHIPPED | OUTPATIENT
Start: 2023-07-06 | End: 2023-09-05 | Stop reason: SDUPTHER

## 2023-08-17 ENCOUNTER — TELEPHONE (OUTPATIENT)
Dept: NEUROLOGY | Facility: CLINIC | Age: 53
End: 2023-08-17
Payer: MEDICARE

## 2023-08-17 DIAGNOSIS — G89.29 CHRONIC MIDLINE LOW BACK PAIN WITH RIGHT-SIDED SCIATICA: ICD-10-CM

## 2023-08-17 DIAGNOSIS — M54.41 CHRONIC MIDLINE LOW BACK PAIN WITH RIGHT-SIDED SCIATICA: ICD-10-CM

## 2023-08-17 DIAGNOSIS — M54.2 CHRONIC NECK PAIN: ICD-10-CM

## 2023-08-17 DIAGNOSIS — G89.29 CHRONIC NECK PAIN: ICD-10-CM

## 2023-08-17 RX ORDER — PREGABALIN 100 MG/1
100 CAPSULE ORAL 2 TIMES DAILY
Qty: 90 CAPSULE | Refills: 2 | Status: ON HOLD | OUTPATIENT
Start: 2023-08-17 | End: 2023-10-03 | Stop reason: HOSPADM

## 2023-08-17 RX ORDER — ACETAMINOPHEN 500 MG
500-1000 TABLET ORAL 3 TIMES DAILY PRN
Refills: 0 | COMMUNITY
Start: 2023-08-17

## 2023-08-17 NOTE — TELEPHONE ENCOUNTER
----- Message from Silvia Hernandez MA sent at 8/17/2023  3:16 PM CDT -----  Regarding: FW: Consult/Advice  Contact: [phone]    ----- Message -----  From: Melissa Alexander  Sent: 8/17/2023   2:50 PM CDT  To: Darryl KRAUS Staff  Subject: Consult/Advice                                   CONSULT/ADVISORY    Name of Caller: Mary Sanches    Contact Preference: [phone]    Nature of Call: Pt is stating the Tramadol medication is causing diarrhea and bad headaches.  Pt is calling to see if this medication can be changed back to what she used to take.  Please call.

## 2023-08-17 NOTE — TELEPHONE ENCOUNTER
I called the patient.    She said tramadol is not helping and it is causing some headaches and some itching.  I told her she can stop it.  She can take Tylenol as needed for pain.  She said Lyrica has been helping her.  She is on 50 mg 3 times per day.  I increase it to 100 mg twice per day with instructions to go up to 3 times per day if no relief.

## 2023-08-28 ENCOUNTER — TELEPHONE (OUTPATIENT)
Dept: HEPATOLOGY | Facility: CLINIC | Age: 53
End: 2023-08-28
Payer: MEDICARE

## 2023-08-28 NOTE — TELEPHONE ENCOUNTER
Spoke with patient's boyrfiend Chang let him know I am leaving [facility] for patient, unable to get in touch with Ms Mary  Care will be transferred back to [facility]     Appointment reminders requested will have staff mail

## 2023-09-05 RX ORDER — TRAMADOL HYDROCHLORIDE 50 MG/1
50 TABLET ORAL EVERY 6 HOURS PRN
Qty: 120 TABLET | Refills: 1 | Status: ON HOLD | OUTPATIENT
Start: 2023-09-16 | End: 2023-10-03 | Stop reason: HOSPADM

## 2023-09-05 NOTE — TELEPHONE ENCOUNTER
----- Message from Tesfaye Pitts sent at 9/5/2023  7:51 AM CDT -----  Type:  Patient Returning Call    Who Called:pt   Who Left Message for Patient:  Does the patient know what this is regarding?:pt advice   Would the patient rather a call back or a response via [facility]? Call  Best Call Back Number:[phone]  Additional Information: pt states that the traMADoL (ULTRAM) 50 mg tablet prescribed does help with the pain but it does give her headaches. And would like to know if she ca be put on something else    [facility] - [place]   Phone:  [phone]

## 2023-09-14 ENCOUNTER — TELEPHONE (OUTPATIENT)
Dept: OBSTETRICS AND GYNECOLOGY | Facility: CLINIC | Age: 53
End: 2023-09-14
Payer: MEDICARE

## 2023-09-14 NOTE — TELEPHONE ENCOUNTER
----- Message from Zeny Pitts sent at 9/11/2023 12:09 PM CDT -----  Type:  Needs Medical Advice    Who Called: pt  Symptoms (please be specific): pt is wanting to know if Dr Usman Morales is taking patients still please call to schedule if not she is having pain on lower part where she had staples  Would the patient rather a call back or a response via [facility]? call  Best Call Back Number: [phone]  Additional Information: If not please schedule her with another

## 2023-09-14 NOTE — TELEPHONE ENCOUNTER
Spoke to pt to inform her that we needed to push appt to another provider due to insurance reason and pt stated she would like appt after 10/31/23. Before I could inform her that Dr johnson will see her on 11/3/23 line dropped. I try calling 3 x after but kept getting a voicemail in which I informed her of changes.

## 2023-09-14 NOTE — TELEPHONE ENCOUNTER
Returned pt call and she informs us that she believes the area where Dr Morales did her surgery on her for Hysterectomy from about 15 years ago may be opening up. Offered her an appt as soon as tomorrow but pt states she can only come during a certain time frame next month due to other appts. Please schedule with Dr. Apodaca for 10/16/2023 at 8 a.m ty leinn

## 2023-09-22 ENCOUNTER — HOSPITAL ENCOUNTER (EMERGENCY)
Facility: HOSPITAL | Age: 53
Discharge: PSYCHIATRIC OR SUBSTANCE USE FACILITY | End: 2023-09-23
Attending: EMERGENCY MEDICINE
Payer: MEDICARE

## 2023-09-22 DIAGNOSIS — F23 ACUTE PSYCHOSIS: Primary | ICD-10-CM

## 2023-09-22 DIAGNOSIS — Z00.8 MEDICAL CLEARANCE FOR PSYCHIATRIC ADMISSION: ICD-10-CM

## 2023-09-22 LAB
ALBUMIN SERPL BCP-MCNC: 3.4 G/DL (ref 3.5–5.2)
ALP SERPL-CCNC: 116 U/L (ref 55–135)
ALT SERPL W/O P-5'-P-CCNC: 57 U/L (ref 10–44)
ANION GAP SERPL CALC-SCNC: 11 MMOL/L (ref 8–16)
APAP SERPL-MCNC: <3 UG/ML (ref 10–20)
AST SERPL-CCNC: 148 U/L (ref 10–40)
BASOPHILS # BLD AUTO: 0.07 K/UL (ref 0–0.2)
BASOPHILS NFR BLD: 0.6 % (ref 0–1.9)
BILIRUB SERPL-MCNC: 1.3 MG/DL (ref 0.1–1)
BUN SERPL-MCNC: 18 MG/DL (ref 6–20)
CALCIUM SERPL-MCNC: 9.7 MG/DL (ref 8.7–10.5)
CHLORIDE SERPL-SCNC: 109 MMOL/L (ref 95–110)
CO2 SERPL-SCNC: 18 MMOL/L (ref 23–29)
CREAT SERPL-MCNC: 0.9 MG/DL (ref 0.5–1.4)
DIFFERENTIAL METHOD: ABNORMAL
EOSINOPHIL # BLD AUTO: 0.1 K/UL (ref 0–0.5)
EOSINOPHIL NFR BLD: 0.9 % (ref 0–8)
ERYTHROCYTE [DISTWIDTH] IN BLOOD BY AUTOMATED COUNT: 14.6 % (ref 11.5–14.5)
EST. GFR  (NO RACE VARIABLE): >60 ML/MIN/1.73 M^2
ETHANOL SERPL-MCNC: <10 MG/DL
GLUCOSE SERPL-MCNC: 104 MG/DL (ref 70–110)
HCT VFR BLD AUTO: 39.3 % (ref 37–48.5)
HGB BLD-MCNC: 13 G/DL (ref 12–16)
IMM GRANULOCYTES # BLD AUTO: 0.04 K/UL (ref 0–0.04)
IMM GRANULOCYTES NFR BLD AUTO: 0.4 % (ref 0–0.5)
LYMPHOCYTES # BLD AUTO: 4.3 K/UL (ref 1–4.8)
LYMPHOCYTES NFR BLD: 39.2 % (ref 18–48)
MCH RBC QN AUTO: 32.1 PG (ref 27–31)
MCHC RBC AUTO-ENTMCNC: 33.1 G/DL (ref 32–36)
MCV RBC AUTO: 97 FL (ref 82–98)
MONOCYTES # BLD AUTO: 1.1 K/UL (ref 0.3–1)
MONOCYTES NFR BLD: 9.9 % (ref 4–15)
NEUTROPHILS # BLD AUTO: 5.4 K/UL (ref 1.8–7.7)
NEUTROPHILS NFR BLD: 49 % (ref 38–73)
NRBC BLD-RTO: 0 /100 WBC
PLATELET # BLD AUTO: 213 K/UL (ref 150–450)
PMV BLD AUTO: 11.6 FL (ref 9.2–12.9)
POTASSIUM SERPL-SCNC: 4.3 MMOL/L (ref 3.5–5.1)
PROT SERPL-MCNC: 9.4 G/DL (ref 6–8.4)
RBC # BLD AUTO: 4.05 M/UL (ref 4–5.4)
SODIUM SERPL-SCNC: 138 MMOL/L (ref 136–145)
WBC # BLD AUTO: 11.02 K/UL (ref 3.9–12.7)

## 2023-09-22 PROCEDURE — 99285 EMERGENCY DEPT VISIT HI MDM: CPT | Mod: HCNC

## 2023-09-22 PROCEDURE — 82077 ASSAY SPEC XCP UR&BREATH IA: CPT | Mod: HCNC | Performed by: EMERGENCY MEDICINE

## 2023-09-22 PROCEDURE — 81001 URINALYSIS AUTO W/SCOPE: CPT | Mod: HCNC | Performed by: EMERGENCY MEDICINE

## 2023-09-22 PROCEDURE — 80143 DRUG ASSAY ACETAMINOPHEN: CPT | Mod: HCNC | Performed by: EMERGENCY MEDICINE

## 2023-09-22 PROCEDURE — 80053 COMPREHEN METABOLIC PANEL: CPT | Mod: HCNC | Performed by: EMERGENCY MEDICINE

## 2023-09-22 PROCEDURE — 84443 ASSAY THYROID STIM HORMONE: CPT | Mod: HCNC | Performed by: EMERGENCY MEDICINE

## 2023-09-22 PROCEDURE — 80307 DRUG TEST PRSMV CHEM ANLYZR: CPT | Mod: HCNC | Performed by: EMERGENCY MEDICINE

## 2023-09-22 PROCEDURE — 85025 COMPLETE CBC W/AUTO DIFF WBC: CPT | Mod: HCNC | Performed by: EMERGENCY MEDICINE

## 2023-09-23 ENCOUNTER — HOSPITAL ENCOUNTER (INPATIENT)
Facility: HOSPITAL | Age: 53
LOS: 10 days | Discharge: HOME | DRG: 885 | End: 2023-10-03
Attending: PSYCHIATRY & NEUROLOGY | Admitting: PSYCHIATRY & NEUROLOGY
Payer: MEDICARE

## 2023-09-23 VITALS
DIASTOLIC BLOOD PRESSURE: 81 MMHG | HEART RATE: 70 BPM | OXYGEN SATURATION: 97 % | SYSTOLIC BLOOD PRESSURE: 192 MMHG | RESPIRATION RATE: 18 BRPM | TEMPERATURE: 98 F

## 2023-09-23 DIAGNOSIS — F29 PSYCHOSIS: ICD-10-CM

## 2023-09-23 DIAGNOSIS — F31.13 BIPOLAR I DISORDER, CURRENT OR MOST RECENT EPISODE MANIC, SEVERE: ICD-10-CM

## 2023-09-23 DIAGNOSIS — F29 PSYCHOSIS, UNSPECIFIED PSYCHOSIS TYPE: Primary | ICD-10-CM

## 2023-09-23 LAB
AMPHET+METHAMPHET UR QL: ABNORMAL
BACTERIA #/AREA URNS AUTO: NORMAL /HPF
BARBITURATES UR QL SCN>200 NG/ML: ABNORMAL
BENZODIAZ UR QL SCN>200 NG/ML: ABNORMAL
BILIRUB UR QL STRIP: NEGATIVE
BZE UR QL SCN: NEGATIVE
CANNABINOIDS UR QL SCN: ABNORMAL
CHOLEST SERPL-MCNC: 151 MG/DL (ref 120–199)
CHOLEST/HDLC SERPL: 5.4 {RATIO} (ref 2–5)
CLARITY UR REFRACT.AUTO: CLEAR
COLOR UR AUTO: YELLOW
CREAT UR-MCNC: 223 MG/DL (ref 15–325)
ESTIMATED AVG GLUCOSE: 103 MG/DL (ref 68–131)
GLUCOSE UR QL STRIP: NEGATIVE
HBA1C MFR BLD: 5.2 % (ref 4–5.6)
HDLC SERPL-MCNC: 28 MG/DL (ref 40–75)
HDLC SERPL: 18.5 % (ref 20–50)
HGB UR QL STRIP: ABNORMAL
HYALINE CASTS UR QL AUTO: 0 /LPF
KETONES UR QL STRIP: ABNORMAL
LDLC SERPL CALC-MCNC: 101 MG/DL (ref 63–159)
LEUKOCYTE ESTERASE UR QL STRIP: NEGATIVE
METHADONE UR QL SCN>300 NG/ML: NEGATIVE
MICROSCOPIC COMMENT: NORMAL
NITRITE UR QL STRIP: NEGATIVE
NONHDLC SERPL-MCNC: 123 MG/DL
OPIATES UR QL SCN: NEGATIVE
PCP UR QL SCN>25 NG/ML: NEGATIVE
PH UR STRIP: 6 [PH] (ref 5–8)
PROT UR QL STRIP: ABNORMAL
RBC #/AREA URNS AUTO: 1 /HPF (ref 0–4)
SP GR UR STRIP: >1.03 (ref 1–1.03)
SQUAMOUS #/AREA URNS AUTO: 4 /HPF
TOXICOLOGY INFORMATION: ABNORMAL
TRIGL SERPL-MCNC: 110 MG/DL (ref 30–150)
TSH SERPL DL<=0.005 MIU/L-ACNC: 1.61 UIU/ML (ref 0.4–4)
UNSPECIFIED CRY UR QL COMP ASSIST: 1
URN SPEC COLLECT METH UR: ABNORMAL
WBC #/AREA URNS AUTO: 2 /HPF (ref 0–5)

## 2023-09-23 PROCEDURE — 99223 PR INITIAL HOSPITAL CARE,LEVL III: ICD-10-PCS | Mod: AI,HCNC,, | Performed by: PSYCHIATRY & NEUROLOGY

## 2023-09-23 PROCEDURE — 11400000 HC PSYCH PRIVATE ROOM

## 2023-09-23 PROCEDURE — 90833 PR PSYCHOTHERAPY W/PATIENT W/E&M, 30 MIN (ADD ON): ICD-10-PCS | Mod: HCNC,,, | Performed by: PSYCHIATRY & NEUROLOGY

## 2023-09-23 PROCEDURE — 99223 1ST HOSP IP/OBS HIGH 75: CPT | Mod: AI,HCNC,, | Performed by: PSYCHIATRY & NEUROLOGY

## 2023-09-23 PROCEDURE — 25000003 PHARM REV CODE 250: Mod: HCNC | Performed by: EMERGENCY MEDICINE

## 2023-09-23 PROCEDURE — 36415 COLL VENOUS BLD VENIPUNCTURE: CPT | Performed by: PSYCHIATRY & NEUROLOGY

## 2023-09-23 PROCEDURE — 80061 LIPID PANEL: CPT | Performed by: PSYCHIATRY & NEUROLOGY

## 2023-09-23 PROCEDURE — 83036 HEMOGLOBIN GLYCOSYLATED A1C: CPT | Performed by: PSYCHIATRY & NEUROLOGY

## 2023-09-23 PROCEDURE — 90833 PSYTX W PT W E/M 30 MIN: CPT | Mod: HCNC,,, | Performed by: PSYCHIATRY & NEUROLOGY

## 2023-09-23 PROCEDURE — 25000003 PHARM REV CODE 250: Performed by: PSYCHIATRY & NEUROLOGY

## 2023-09-23 RX ORDER — BENZTROPINE MESYLATE 1 MG/ML
2 INJECTION, SOLUTION INTRAMUSCULAR; INTRAVENOUS EVERY 8 HOURS PRN
Status: DISCONTINUED | OUTPATIENT
Start: 2023-09-23 | End: 2023-10-03 | Stop reason: HOSPADM

## 2023-09-23 RX ORDER — DIAZEPAM 5 MG/1
10 TABLET ORAL 3 TIMES DAILY
Status: DISCONTINUED | OUTPATIENT
Start: 2023-09-23 | End: 2023-09-25

## 2023-09-23 RX ORDER — PROMETHAZINE HYDROCHLORIDE 25 MG/1
25 TABLET ORAL EVERY 6 HOURS PRN
Status: DISCONTINUED | OUTPATIENT
Start: 2023-09-23 | End: 2023-10-03 | Stop reason: HOSPADM

## 2023-09-23 RX ORDER — ACETAMINOPHEN 325 MG/1
650 TABLET ORAL EVERY 6 HOURS PRN
Status: DISCONTINUED | OUTPATIENT
Start: 2023-09-23 | End: 2023-10-03 | Stop reason: HOSPADM

## 2023-09-23 RX ORDER — HYDROXYZINE PAMOATE 50 MG/1
50 CAPSULE ORAL EVERY 6 HOURS PRN
Status: DISCONTINUED | OUTPATIENT
Start: 2023-09-23 | End: 2023-10-03 | Stop reason: HOSPADM

## 2023-09-23 RX ORDER — OLANZAPINE 10 MG/2ML
10 INJECTION, POWDER, FOR SOLUTION INTRAMUSCULAR EVERY 8 HOURS PRN
Status: DISCONTINUED | OUTPATIENT
Start: 2023-09-23 | End: 2023-10-03 | Stop reason: HOSPADM

## 2023-09-23 RX ORDER — QUETIAPINE FUMARATE 50 MG/1
50 TABLET, FILM COATED ORAL NIGHTLY
Status: DISCONTINUED | OUTPATIENT
Start: 2023-09-23 | End: 2023-09-24

## 2023-09-23 RX ORDER — ONDANSETRON 4 MG/1
4 TABLET, ORALLY DISINTEGRATING ORAL EVERY 8 HOURS PRN
Status: DISCONTINUED | OUTPATIENT
Start: 2023-09-23 | End: 2023-10-03 | Stop reason: HOSPADM

## 2023-09-23 RX ORDER — LOPERAMIDE HYDROCHLORIDE 2 MG/1
2 CAPSULE ORAL
Status: DISCONTINUED | OUTPATIENT
Start: 2023-09-23 | End: 2023-10-03 | Stop reason: HOSPADM

## 2023-09-23 RX ORDER — LORAZEPAM 1 MG/1
1 TABLET ORAL
Status: COMPLETED | OUTPATIENT
Start: 2023-09-23 | End: 2023-09-23

## 2023-09-23 RX ORDER — MAG HYDROX/ALUMINUM HYD/SIMETH 200-200-20
30 SUSPENSION, ORAL (FINAL DOSE FORM) ORAL EVERY 6 HOURS PRN
Status: DISCONTINUED | OUTPATIENT
Start: 2023-09-23 | End: 2023-10-03 | Stop reason: HOSPADM

## 2023-09-23 RX ORDER — OLANZAPINE 10 MG/1
10 TABLET ORAL EVERY 8 HOURS PRN
Status: DISCONTINUED | OUTPATIENT
Start: 2023-09-23 | End: 2023-10-03 | Stop reason: HOSPADM

## 2023-09-23 RX ORDER — TRAZODONE HYDROCHLORIDE 50 MG/1
100 TABLET ORAL NIGHTLY
Status: DISCONTINUED | OUTPATIENT
Start: 2023-09-23 | End: 2023-09-23 | Stop reason: HOSPADM

## 2023-09-23 RX ORDER — IBUPROFEN 200 MG
1 TABLET ORAL DAILY PRN
Status: DISCONTINUED | OUTPATIENT
Start: 2023-09-23 | End: 2023-10-03 | Stop reason: HOSPADM

## 2023-09-23 RX ORDER — BENZONATATE 100 MG/1
100 CAPSULE ORAL 3 TIMES DAILY PRN
Status: DISCONTINUED | OUTPATIENT
Start: 2023-09-23 | End: 2023-10-03 | Stop reason: HOSPADM

## 2023-09-23 RX ADMIN — LORAZEPAM 1 MG: 1 TABLET ORAL at 05:09

## 2023-09-23 RX ADMIN — TRAZODONE HYDROCHLORIDE 100 MG: 50 TABLET ORAL at 01:09

## 2023-09-23 RX ADMIN — DIAZEPAM 10 MG: 5 TABLET ORAL at 02:09

## 2023-09-23 RX ADMIN — OLANZAPINE 10 MG: 10 TABLET, FILM COATED ORAL at 02:09

## 2023-09-23 NOTE — ED NOTES
Pt remains in paper scrubs, resting in stretcher comfortably - with side rails up, locked, and in lowest position. Chest rise and fall noted; breathing equal, even, and unlabored. Sitter remains at bedside in direct visual contact, charting per protocol every 15 minutes. No equipment or belongings are in the patients room to prevent self harm or injury. Pt aware of plan of care. No acute distress noted and no needs expressed at this time. Endorses visual and auditory Hallucinations.

## 2023-09-23 NOTE — ED NOTES
Assumed care of patient. Patient is alert and resting comfortably in bed in NAD. Patient remains in paper scrubs per hospital policy. All belongings and harmful objects remain outside of room. Pt remains calm at this time but continues to talk to self. Patient updated on plan of care, pt denies any needs or complaints at this time. Bed locked in lowest position, side rails up x2.  remains at bedside with direct visual contact. VSS. Will continue to monitor.

## 2023-09-23 NOTE — ED NOTES
Pt remains in paper scrubs, resting in stretcher comfortably - with side rails up, locked, and in lowest position. Chest rise and fall noted; breathing equal, even, and unlabored. Sitter remains at bedside in direct visual contact, charting per protocol every 15 minutes. No equipment or belongings are in the patients room to prevent self harm or injury. Pt aware of plan of care. No acute distress noted and no needs expressed at this time. Endorses Auditory and Visual Hallucinations.

## 2023-09-23 NOTE — NURSING
"Alert 53 y.o. wf  who presents to the ED at [facility] for Psychiatric Evaluation (Per EMS pt called them stating she has implants in her body that she wants removed. Per EMS pt was yelling at people that are not there and having obvious visual hallucinations. Denies SI/HI).  Described as 53-year-old female with history of schizoaffective disorder presenting to the emergency department for a psychiatric evaluation.  She was allegedly telling EMS that she has implants in her body that she wants removed.  She told me that she has white dots popping up on her skin after she ingested what she thought was green tea but then she thought it may have cocaine in it but she believes it was something else.  Upon arrival to unit pt checked per metal detector with negative findings.  Pt unable to really give a history.  Pt with disorganized thought process. Pt denies si, hi.  Gravely disabled.   Pt stated "I didn't want to hit that old lady.  I don't believe in that.  She was old.  Oh no please save her.  She had a heart attack."  This writer reoriented pt to where she was and pt stated "oh you're right.  I thought I was still in [place]."   Pt;s uds positive for benzos, barbiturates, thc, and amphetamines.  Pt in to see Dr Ellis for her psych eval.  New orders noted. Pt oriented to unit and unit routine.  Verbalized understanding but needs frequent reorientation.  Pt instructed to call for any needs or concerns at all.  Will cont to monitor for safety.   "

## 2023-09-23 NOTE — ED NOTES
Care assumed from ANDREW Fauts. Pt remains in paper scrubs, resting in stretcher comfortably - with side rails up, locked, and in lowest position. Chest rise and fall noted; breathing equal, even, and unlabored. Sitter remains at bedside in direct visual contact, charting per protocol every 15 minutes. No equipment or belongings are in the patients room to prevent self harm or injury. Pt aware of plan of care. No acute distress noted and no needs expressed at this time. Will continue to assess periodically.

## 2023-09-23 NOTE — ED PROVIDER NOTES
History:  Mary Sanches is a 53 y.o. female who presents to the ED with Psychiatric Evaluation (Per EMS pt called them stating she has implants in her body that she wants removed. Per EMS pt was yelling at people that are not there and having obvious visual hallucinations. Denies SI/HI)    Described as 53-year-old female with history of schizoaffective disorder presenting to the emergency department for a psychiatric evaluation.  She was allegedly telling EMS that she has implants in her body that she wants removed.  She told me that she has white dots popping up on her skin after she ingested what she thought was green tea but then her frontal or it may have cocaine in it but she believes it was something else.    Review of Systems: All systems reviewed and are negative except as per history of present illness.    Medications:   Previous Medications    ACETAMINOPHEN (TYLENOL) 500 MG TABLET    Take 1-2 tablets (500-1,000 mg total) by mouth 3 (three) times daily as needed for Pain.    ALPRAZOLAM (XANAX) 1 MG TABLET        BUTALBITAL-ACETAMINOPHEN-CAFFEINE -40 MG (FIORICET, ESGIC) -40 MG PER TABLET    TAKE ONE TO TWO TABLETS BY MOUTH EVERY 6 HOURS AS NEEDED FOR HEADACHE    CRISABOROLE (EUCRISA) 2 % OINT    Apply 1 application topically 2 (two) times daily.    DIPHENHYDRAMINE (BENADRYL) 50 MG CAPSULE    Take 1 capsule (50 mg total) by mouth every 6 (six) hours as needed for Itching.    DOXYCYCLINE (VIBRA-TABS) 100 MG TABLET    Take 1 tablet (100 mg total) by mouth every 12 (twelve) hours.    ESCITALOPRAM OXALATE (LEXAPRO) 20 MG TABLET    Take 20 mg by mouth once daily.    HYDROXYZINE HCL (ATARAX) 10 MG TAB    Take 25 mg by mouth 3 (three) times daily as needed.    MUPIROCIN (BACTROBAN) 2 % OINTMENT    Apply topically 3 (three) times daily as needed (sores).    OLANZAPINE (ZYPREXA) 10 MG TABLET        PREGABALIN (LYRICA) 100 MG CAPSULE    Take 1 capsule (100 mg total) by mouth 2 (two) times daily. In  1-2 weeks, if no relief, may increase dose to 3 times per day.    TRAMADOL (ULTRAM) 50 MG TABLET    Take 1 tablet (50 mg total) by mouth every 6 (six) hours as needed for Pain.    TRAZODONE (DESYREL) 100 MG TABLET    Take 100 mg by mouth every evening.    TRIAMCINOLONE ACETONIDE 0.1% (KENALOG) 0.1 % CREAM    Apply topically 2 (two) times daily. Apply twice a day to itchy spots. Do not apply to underarms, groin, or face. Use for 2 weeks then take 1 week break before restarting if needed    VITAMIN D (VITAMIN D3) 1000 UNITS TAB    Take 1,000 Units by mouth once daily.       PMH:   Past Medical History:   Diagnosis Date    Anxiety     Chronic back pain     Chronic hepatitis C 6/19/2020    Depression     Hallucination     History of psychiatric hospitalization     Hx of psychiatric care     Hyperthyroidism 12/29/2021    Migraines     Neuropathy     Obesity     Psychiatric exam requested by authority     Psychiatric problem     Psychosis     Sacroiliitis     Schizoaffective disorder     Scoliosis     Sleep difficulties     Therapy     Tobacco use      PSH:   Past Surgical History:   Procedure Laterality Date    HAND SURGERY Left     HYSTERECTOMY       Allergies: She is allergic to cranberry, gabapentin, ibuprofen, meloxicam, toradol [ketorolac], amoxicillin, cyclobenzaprine, and duloxetine.  Social History: Marital Status: . She  reports that she has been smoking cigarettes. She has a 0.1 pack-year smoking history. She has never used smokeless tobacco.. She  reports no history of alcohol use..       Exam:  VITAL SIGNS:   Vitals:    09/22/23 2045   BP: (!) 138/102   Pulse: 90   Resp: 18   Temp: 98.4 °F (36.9 °C)   SpO2: 99%     Const: Awake and alert, NAD   Head: Atraumatic  Eyes: Normal Conjunctiva  ENT: Normal External Ears, Nose and Mouth.  Neck: Full range of motion. No meningismus.  Resp: Normal respiratory effort, No distress  Cardio: Equal and intact distal pulses  Abd: Soft, non tender, non distended.    Skin: No petechiae or rashes  Ext: No cyanosis, or edema  Neur: Awake and alert  Psych:  Disorganized thought, visual hallucinations, responding to internal stimuli, grandiose ideas    Data:  Results for orders placed or performed during the hospital encounter of 09/22/23   CBC auto differential   Result Value Ref Range    WBC 11.02 3.90 - 12.70 K/uL    RBC 4.05 4.00 - 5.40 M/uL    Hemoglobin 13.0 12.0 - 16.0 g/dL    Hematocrit 39.3 37.0 - 48.5 %    MCV 97 82 - 98 fL    MCH 32.1 (H) 27.0 - 31.0 pg    MCHC 33.1 32.0 - 36.0 g/dL    RDW 14.6 (H) 11.5 - 14.5 %    Platelets 213 150 - 450 K/uL    MPV 11.6 9.2 - 12.9 fL    Immature Granulocytes 0.4 0.0 - 0.5 %    Gran # (ANC) 5.4 1.8 - 7.7 K/uL    Immature Grans (Abs) 0.04 0.00 - 0.04 K/uL    Lymph # 4.3 1.0 - 4.8 K/uL    Mono # 1.1 (H) 0.3 - 1.0 K/uL    Eos # 0.1 0.0 - 0.5 K/uL    Baso # 0.07 0.00 - 0.20 K/uL    nRBC 0 0 /100 WBC    Gran % 49.0 38.0 - 73.0 %    Lymph % 39.2 18.0 - 48.0 %    Mono % 9.9 4.0 - 15.0 %    Eosinophil % 0.9 0.0 - 8.0 %    Basophil % 0.6 0.0 - 1.9 %    Differential Method Automated    Comprehensive metabolic panel   Result Value Ref Range    Sodium 138 136 - 145 mmol/L    Potassium 4.3 3.5 - 5.1 mmol/L    Chloride 109 95 - 110 mmol/L    CO2 18 (L) 23 - 29 mmol/L    Glucose 104 70 - 110 mg/dL    BUN 18 6 - 20 mg/dL    Creatinine 0.9 0.5 - 1.4 mg/dL    Calcium 9.7 8.7 - 10.5 mg/dL    Total Protein 9.4 (H) 6.0 - 8.4 g/dL    Albumin 3.4 (L) 3.5 - 5.2 g/dL    Total Bilirubin 1.3 (H) 0.1 - 1.0 mg/dL    Alkaline Phosphatase 116 55 - 135 U/L     (H) 10 - 40 U/L    ALT 57 (H) 10 - 44 U/L    eGFR >60.0 >60 mL/min/1.73 m^2    Anion Gap 11 8 - 16 mmol/L   TSH   Result Value Ref Range    TSH 1.611 0.400 - 4.000 uIU/mL   Drug screen panel, emergency   Result Value Ref Range    Benzodiazepines Presumptive Positive (A) Negative    Methadone metabolites Negative Negative    Cocaine (Metab.) Negative Negative    Opiate Scrn, Ur Negative Negative     Barbiturate Screen, Ur Presumptive Positive (A) Negative    Amphetamine Screen, Ur Presumptive Positive (A) Negative    THC Presumptive Positive (A) Negative    Phencyclidine Negative Negative    Creatinine, Urine 223.0 15.0 - 325.0 mg/dL    Toxicology Information SEE COMMENT    Ethanol   Result Value Ref Range    Alcohol, Serum <10 <10 mg/dL   Acetaminophen level   Result Value Ref Range    Acetaminophen (Tylenol), Serum <3.0 (L) 10.0 - 20.0 ug/mL         Labs & Imaging studies were reviewed independently by me.     Medical Decision Makin-year-old female presenting to the emergency department for psychiatric evaluation.  She is acutely psychotic on my evaluation.  Laboratory studies are generally unremarkable, though UDS is positive for benzos, barbiturates, amphetamines, and marijuana.  Patient is medically appropriate for further psychiatric care.    Clinical Impression:  1. Acute psychosis    2. Medical clearance for psychiatric admission             Medication List        ASK your doctor about these medications      acetaminophen 500 MG tablet  Commonly known as: TYLENOL  Take 1-2 tablets (500-1,000 mg total) by mouth 3 (three) times daily as needed for Pain.     ALPRAZolam 1 MG tablet  Commonly known as: XANAX     butalbital-acetaminophen-caffeine -40 mg -40 mg per tablet  Commonly known as: FIORICET, ESGIC  TAKE ONE TO TWO TABLETS BY MOUTH EVERY 6 HOURS AS NEEDED FOR HEADACHE     diphenhydrAMINE 50 MG capsule  Commonly known as: BENADRYL  Take 1 capsule (50 mg total) by mouth every 6 (six) hours as needed for Itching.     doxycycline 100 MG tablet  Commonly known as: VIBRA-TABS  Take 1 tablet (100 mg total) by mouth every 12 (twelve) hours.     EScitalopram oxalate 20 MG tablet  Commonly known as: LEXAPRO     EUCRISA 2 % Oint  Generic drug: crisaborole  Apply 1 application topically 2 (two) times daily.     hydrOXYzine HCL 10 MG Tab  Commonly known as: ATARAX     mupirocin 2 %  ointment  Commonly known as: BACTROBAN  Apply topically 3 (three) times daily as needed (sores).     OLANZapine 10 MG tablet  Commonly known as: ZyPREXA     pregabalin 100 MG capsule  Commonly known as: LYRICA  Take 1 capsule (100 mg total) by mouth 2 (two) times daily. In 1-2 weeks, if no relief, may increase dose to 3 times per day.     traMADoL 50 mg tablet  Commonly known as: ULTRAM  Take 1 tablet (50 mg total) by mouth every 6 (six) hours as needed for Pain.     traZODone 100 MG tablet  Commonly known as: DESYREL     triamcinolone acetonide 0.1% 0.1 % cream  Commonly known as: KENALOG  Apply topically 2 (two) times daily. Apply twice a day to itchy spots. Do not apply to underarms, groin, or face. Use for 2 weeks then take 1 week break before restarting if needed     vitamin D 1000 units Tab  Commonly known as: VITAMIN D3                   Giuliana Kearney MD  09/23/23 0126

## 2023-09-23 NOTE — PROVIDER PROGRESS NOTES - EMERGENCY DEPT.
Encounter Date: 9/22/2023    ED Physician Progress Notes        Physician Note:   Case signed out to me at 6:00 a.m. with plan to transfer to psychiatric hospital.  I evaluated the patient 6:30 a.m..  She is awake and alert.  She is worried about being transferred to a psychiatric hospital.  Reviewed the note seems that she is been placed under a pec.  Will continue the pec as we await placement.

## 2023-09-23 NOTE — ED NOTES
Mary Sanches is in room #29, was placed in paper scrubs and all cords and wires have been removed. The patient has signed their mental health rights and they have been placed in the chart. All the patient's belongings have been removed, labeled and locked in the belonging's closet. The PEC has been completed, signed, dated and placed in the patient's chart. There is a sitter, Gwen, at the bedside with direct visual contact doing 15 minute observations and they have no belongings in the room. If family/vistors are present, they have also been made aware of the no belongings policy and have stated their understanding. The SADD tool was done on intake. The transfer sheet should be signed upon the actual transfer. The Psych hold orders have been signed, dated and placed in the chart. Vital signs are being done per orders.The psych resident has been notified. The next of kin to be notified at phone number [phone]. The patient has not yet been medically cleared.

## 2023-09-23 NOTE — H&P
"PSYCHIATRY INPATIENT ADMISSION NOTE - H & P      9/23/2023 10:44 AM   Mary Sanches   1970   [id number]         DATE OF ADMISSION: 9/23/2023 10:08 AM    SITE: [facility]    CURRENT LEGAL STATUS: PEC and/or CEC      HISTORY    CHIEF COMPLAINT   Mary Sanches is a 53 y.o. female with a past psychiatric history of schizoaffective disorder, anxiety and substance use currently admitted to the inpatient unit with the following chief complaint: psychosis, "I really like this lady Sindy.. but I never put my hands anyone."    HPI   The patient was seen and examined. The chart was reviewed.    The patient presented to the ER on 9/23/2023 . Per staff notes:  -Mary Sanches is a 53 y.o. female who presents to the ED with Psychiatric Evaluation (Per EMS pt called them stating she has implants in her body that she wants removed. Per EMS pt was yelling at people that are not there and having obvious visual hallucinations. Denies SI/HI)   Described as 53-year-old female with history of schizoaffective disorder presenting to the emergency department for a psychiatric evaluation.  She was allegedly telling EMS that she has implants in her body that she wants removed.  She told me that she has white dots popping up on her skin after she ingested what she thought was green tea but then her frontal or it may have cocaine in it but she believes it was something else  -Per EMS pt called them stating she has implants in her body that she wants removed. Per EMS pt was yelling at people that are not there and having obvious visual hallucinations. Denies SI/HI      Chart review: pt had a previous admission in 12/2019 for psychosis. She was started on Seroquel and required a valium taper for benzo dependence.     The patient was medically cleared and admitted to the U.    The patient presents psychotic with a disorganized thought process. Delusional themes notes with likely RIS.    She was an unreliable historian. " Symptoms of psychosis and tiago noted.    UDS was positive for nemzos, barbituates, THC and amphetamines.       Symptoms of Depression: diminished mood - No, loss of interest/anhedonia - No;  recurrent - No, >14 days - No, diminished energy - No, change in sleep - No, change in appetite - No, diminished concentration or cognition or indecisiveness - No, PMA/R -  No, excessive guilt or hopelessness or worthlessness - No, suicidal ideations - No    Changes in Sleep: trouble with initiation- Yes, maintenance, - No early morning awakening with inability to return to sleep - No, hypersomnolence - No    Suicidal- active/passive ideations - No, organized plans, future intentions - No    Homicidal ideations: active/passive ideations - No, organized plans, future intentions - No    Symptoms of psychosis: hallucinations - Yes, delusions - Yes, disorganized speech - Yes, disorganized behavior or abnormal motor behavior - Yes, or negative symptoms (diminshed emotional expression, avolition, anhedonia, alogia, asociality) - Yes, active phase symptoms >1 month - No, continuous signs of illness > 6 months - No, since onset of illness decreased level of functioning present - Yes    Symptoms of tiago or hypomania: elevated, expansive, or irritable mood with increased energy or activity - Yes; > 4 days -  unknown ,  >7 days -  unknown ; with inflated self-esteem or grandiosity - No, decreased need for sleep - Yes, increased rate of speech - Yes, FOI or racing thoughts - Yes, distractibility - Yes, increased goal directed activity or PMA - Yes, risky/disinhibited behavior - Yes    Symptoms of OSKAR: excessive anxiety/worry/fear, more days than not, about numerous issues - Yes, ongoing for >6 months -  unknown , difficult to control - Yes, with restlessness - Yes, fatigue - No, poor concentration - Yes, irritability - Yes, muscle tension - Yes, sleep disturbance - Yes; causes functionally impairing distress - No    Symptoms of Panic  "Disorder: recurrent panic attacks (palpitations/heart racing, sweating, shakiness, dyspnea, choking, chest pain/discomfort, Gi symptoms, dizzy/lightheadedness, hot/col flashes, paresthesias, derealization, fear of losing control or fear of dying or fear of "going crazy") - No, precipitated - No, un-precipitated - No, source of worry and/or behavioral changes secondary for 1 month or longer- No, agoraphobia - No    Symptoms of PTSD: h/o trauma exposure - No; re-experiencing/intrusive symptoms - No, avoidant behavior - No, 2 or more negative alterations in cognition or mood - No, 2 or more hyperarousal symptoms - No; with dissociative symptoms - No, ongoing for 1 or more  months - No    Symptoms of OCD: obsessions (recurrent thoughts/urges/images; intrusive and/or unwanted; uses other thoughts/actions to suppress) - No; compulsions (repetitive behaviors used to lower distress/anxiety/obsessions) - No, time-consuming (over 1 hour per day) or cause significant distress/impairment - - No    Symptoms of Anorexia: restriction of caloric intake leading to significantly low body weight - No, intense fear of gaining weight or persistent behavior that interferes with weight gain even thought at a significantly low weight - No, disturbance in the way in which one's body weight or shape is experienced, undue influence of body weight or shape on self evaluation, or persistent lack of recognition of the seriousness of the current low body weight - No    Symptoms of Bulimia: recurrent episodes of binge eating (definitely larger amount  than what others would eat and lack of a sense of control over eating during episode) - No, recurrent inappropriate compensatory behaviors in order to prevent weight gain (fasting, medications, exercise, vomiting) - No, binges and compensatory behaviors both occur on average at least once a week for 3 months - No, self evaluations is unduly influenced by body shape/weight- - No    Symptoms of Binge " eating: recurrent episodes of binge eating (definitely larger amount than what others would eat and lack of a sense of control over eating during episode) - No, 3 or more of following (eating much more rapidly, eating until uncomfortably full, large amounts when not hungry, eating alone because of embarrassed by how much,  feeling disgusted with oneself, depressed or very guilty afterward) - No, distress regarding binges - No, binges occur on average at least once a week for 3 months - No      Substance/s:  Taken in larger amounts or over longer periods than intended: No,  Persistent desire or unsuccessful attempts to cut down or stop: No,  Great deal of time spent seeking, using or recovering from: No,  Craving or strong desire to use: No,  Recurrent use despite failure to meet major role obligation: No,  Continued use despite persistent or recurrent social/interparsonal issues due to use: No,  Important social/work/recreational activities given up due to use: No,  Recurrent use in physically hazardous situations: No,  Continued use despite knowledge of persistent physical or psychological problem: No,  Tolerance (either increased need or diminished effect): No,  -pt was an unrelaible historian- _UDS as above; +uds 3 years ago (benzos, barbutuates and THC)    Psychotherapy:  Target symptoms: anxiety , substance abuse, mood disorder, psychosis  Why chosen therapy is appropriate versus another modality: relevant to diagnosis, patient responds to this modality, evidence based practice  Outcome monitoring methods: self-report, observation  Therapeutic intervention type: insight oriented psychotherapy, behavior modifying psychotherapy, supportive psychotherapy, interactive psychotherapy  Topics discussed/themes: building skills sets for symptom management, symptom recognition  The patient's response to the intervention is accepting. The patient's progress toward treatment goals is limited.   Duration of intervention: 16  minutes.      PAST PSYCHIATRIC HISTORY  Previous Psychiatric Hospitalizations: Yes, last documented in 2019 and one prior  Previous SI/HI: No,  Previous Suicide Attempts: No,   Previous Medication Trials: Yes,  Psychiatric Care (current & past): Yes,  History of Psychotherapy: Yes,  History of Violence: No,  History of sexual/physical abuse: No,    PAST MEDICAL & SURGICAL HISTORY   Past Medical History:   Diagnosis Date    Anxiety     Chronic back pain     Chronic hepatitis C 6/19/2020    Depression     Hallucination     History of psychiatric hospitalization     Hx of psychiatric care     Hyperthyroidism 12/29/2021    Migraines     Neuropathy     Obesity     Psychiatric exam requested by authority     Psychiatric problem     Psychosis     Sacroiliitis     Schizoaffective disorder     Scoliosis     Sleep difficulties     Therapy     Tobacco use      Past Surgical History:   Procedure Laterality Date    HAND SURGERY Left     HYSTERECTOMY           CURRENT PSYCH MEDICATION REGIMEN   Pt unable to say aside form xanax  Current Medication side effects:  unknown  Current Medication compliance:  unknown    Previous psych meds trials  Yes- pt could not say    Home Meds:   Prior to Admission medications    Medication Sig Start Date End Date Taking? Authorizing Provider   acetaminophen (TYLENOL) 500 MG tablet Take 1-2 tablets (500-1,000 mg total) by mouth 3 (three) times daily as needed for Pain. 8/17/23   Lilia Andrews MD   ALPRAZolam (XANAX) 1 MG tablet  7/8/20   Provider, Historical   butalbital-acetaminophen-caffeine -40 mg (FIORICET, ESGIC) -40 mg per tablet TAKE ONE TO TWO TABLETS BY MOUTH EVERY 6 HOURS AS NEEDED FOR HEADACHE 8/24/22   Usman Wu MD   crisaborole (EUCRISA) 2 % Oint Apply 1 application topically 2 (two) times daily. 7/8/22   Kayleigh Jauregui MD   diphenhydrAMINE (BENADRYL) 50 MG capsule Take 1 capsule (50 mg total) by mouth every 6 (six) hours as needed for Itching. 4/29/22    Kayleigh Jauregui MD   doxycycline (VIBRA-TABS) 100 MG tablet Take 1 tablet (100 mg total) by mouth every 12 (twelve) hours. 5/2/22   Nile Irby MD   escitalopram oxalate (LEXAPRO) 20 MG tablet Take 20 mg by mouth once daily.    Provider, Historical   hydrOXYzine HCL (ATARAX) 10 MG Tab Take 25 mg by mouth 3 (three) times daily as needed.    Provider, Historical   mupirocin (BACTROBAN) 2 % ointment Apply topically 3 (three) times daily as needed (sores). 7/8/22   Teresa Cuevas MD   OLANZapine (ZYPREXA) 10 MG tablet  3/11/21   Provider, Historical   pregabalin (LYRICA) 100 MG capsule Take 1 capsule (100 mg total) by mouth 2 (two) times daily. In 1-2 weeks, if no relief, may increase dose to 3 times per day. 8/17/23   Lilia Andrews MD   traMADoL (ULTRAM) 50 mg tablet Take 1 tablet (50 mg total) by mouth every 6 (six) hours as needed for Pain. 9/16/23   Lilia Andrews MD   traZODone (DESYREL) 100 MG tablet Take 100 mg by mouth every evening. 1/9/23   Provider, Historical   triamcinolone acetonide 0.1% (KENALOG) 0.1 % cream Apply topically 2 (two) times daily. Apply twice a day to itchy spots. Do not apply to underarms, groin, or face. Use for 2 weeks then take 1 week break before restarting if needed 11/30/21   Zeus Paniagua MD   vitamin D (VITAMIN D3) 1000 units Tab Take 1,000 Units by mouth once daily.    Provider, Historical         OTC Meds: none    Scheduled Meds:    PRN Meds: acetaminophen, aluminum-magnesium hydroxide-simethicone, benzonatate, benztropine mesylate, hydrOXYzine pamoate, loperamide, nicotine, OLANZapine **AND** OLANZapine, ondansetron, promethazine   Psychotherapeutics (From admission, onward)      Start     Stop Route Frequency Ordered    09/23/23 1105  OLANZapine tablet 10 mg  (Olanzapine PRN (</= 66 yo))        See Hyperspace for full Linked Orders Report.    -- Oral Every 8 hours PRN 09/23/23 1029    09/23/23 1105  OLANZapine injection 10 mg  (Olanzapine PRN  (</= 66 yo))        See Alok for full Linked Orders Report.    -- IM Every 8 hours PRN 09/23/23 1029            ALLERGIES   Review of patient's allergies indicates:   Allergen Reactions    Cranberry      Kidney Pain    Gabapentin Swelling     Throat Swelling, Hard to breathe    Ibuprofen Swelling     Stomach Bloated, Swelling Throat    Meloxicam Swelling     Swelling of the Throat    Toradol [ketorolac] Swelling     Throat Swelling difficulty breathing    Amoxicillin Hives    Cyclobenzaprine     Duloxetine        NEUROLOGIC HISTORY  Seizures: No  Head trauma: No    SOCIAL HISTORY:  Developmental/Childhood:Achieved all developmental milestones timely  Education: unknown  Employment Status/Finances:unknown   Relationship Status/Sexual Orientation:   Children: 0  Housing Status: Home    history:  NO   Access to Firearms: NO ;  Locked up? n/a  Anglican:Actively participates in organized Shinto  Recreational activities:Time with family    SUBSTANCE ABUSE HISTORY   Recreational Drugs: barbiturates, benzodiazepines, marijuana, and methamphetamines - pt denied  Use of Alcohol: denied  Rehab History:no   Tobacco Use:yes    LEGAL HISTORY:   Past charges/incarcerations: NO  Pending charges:NO    FAMILY PSYCHIATRIC HISTORY   Family History   Problem Relation Age of Onset    No Known Problems Mother     Hypertension Father     Diabetes Sister         Type I    Cancer Maternal Grandmother     Heart disease Paternal Grandfather     Kidney disease Sister     Thyroid disease Sister     Cirrhosis Paternal Grandmother     Stroke Neg Hx              ROS   General ROS: negative  Ophthalmic ROS: negative  ENT ROS: negative  Allergy and Immunology ROS: negative  Hematological and Lymphatic ROS: negative  Endocrine ROS: negative  Respiratory ROS: no cough, shortness of breath, or wheezing  Cardiovascular ROS: no chest pain or dyspnea on exertion  Gastrointestinal ROS: no abdominal pain, change in bowel habits, or  black or bloody stools  Genito-Urinary ROS: no dysuria, trouble voiding, or hematuria  Musculoskeletal ROS: negative  Neurological ROS: no TIA or stroke symptoms  Dermatological ROS: negative        EXAMINATION    PHYSICAL EXAM  Reviewed note/exam by Dr. Kearney from 9/23/23 at 1:19 AM; Med consulted for initial physical exam- pending    VITALS   Vitals:    09/23/23 1014   BP: (!) 172/67   Pulse: 69   Resp: 20   Temp: 97.4 °F (36.3 °C)        There is no height or weight on file to calculate BMI.        PAIN  0/10  Subjective report of pain matches objective signs and symptoms: Yes    LABORATORY DATA   Recent Results (from the past 72 hour(s))   CBC auto differential    Collection Time: 09/22/23 11:06 PM   Result Value Ref Range    WBC 11.02 3.90 - 12.70 K/uL    RBC 4.05 4.00 - 5.40 M/uL    Hemoglobin 13.0 12.0 - 16.0 g/dL    Hematocrit 39.3 37.0 - 48.5 %    MCV 97 82 - 98 fL    MCH 32.1 (H) 27.0 - 31.0 pg    MCHC 33.1 32.0 - 36.0 g/dL    RDW 14.6 (H) 11.5 - 14.5 %    Platelets 213 150 - 450 K/uL    MPV 11.6 9.2 - 12.9 fL    Immature Granulocytes 0.4 0.0 - 0.5 %    Gran # (ANC) 5.4 1.8 - 7.7 K/uL    Immature Grans (Abs) 0.04 0.00 - 0.04 K/uL    Lymph # 4.3 1.0 - 4.8 K/uL    Mono # 1.1 (H) 0.3 - 1.0 K/uL    Eos # 0.1 0.0 - 0.5 K/uL    Baso # 0.07 0.00 - 0.20 K/uL    nRBC 0 0 /100 WBC    Gran % 49.0 38.0 - 73.0 %    Lymph % 39.2 18.0 - 48.0 %    Mono % 9.9 4.0 - 15.0 %    Eosinophil % 0.9 0.0 - 8.0 %    Basophil % 0.6 0.0 - 1.9 %    Differential Method Automated    Comprehensive metabolic panel    Collection Time: 09/22/23 11:06 PM   Result Value Ref Range    Sodium 138 136 - 145 mmol/L    Potassium 4.3 3.5 - 5.1 mmol/L    Chloride 109 95 - 110 mmol/L    CO2 18 (L) 23 - 29 mmol/L    Glucose 104 70 - 110 mg/dL    BUN 18 6 - 20 mg/dL    Creatinine 0.9 0.5 - 1.4 mg/dL    Calcium 9.7 8.7 - 10.5 mg/dL    Total Protein 9.4 (H) 6.0 - 8.4 g/dL    Albumin 3.4 (L) 3.5 - 5.2 g/dL    Total Bilirubin 1.3 (H) 0.1 - 1.0 mg/dL     "Alkaline Phosphatase 116 55 - 135 U/L     (H) 10 - 40 U/L    ALT 57 (H) 10 - 44 U/L    eGFR >60.0 >60 mL/min/1.73 m^2    Anion Gap 11 8 - 16 mmol/L   TSH    Collection Time: 09/22/23 11:06 PM   Result Value Ref Range    TSH 1.611 0.400 - 4.000 uIU/mL   Ethanol    Collection Time: 09/22/23 11:06 PM   Result Value Ref Range    Alcohol, Serum <10 <10 mg/dL   Acetaminophen level    Collection Time: 09/22/23 11:06 PM   Result Value Ref Range    Acetaminophen (Tylenol), Serum <3.0 (L) 10.0 - 20.0 ug/mL   Urinalysis, Reflex to Urine Culture Urine, Clean Catch    Collection Time: 09/22/23 11:48 PM    Specimen: Urine   Result Value Ref Range    Specimen UA Urine, Clean Catch     Color, UA Yellow Yellow, Straw, Fatou    Appearance, UA Clear Clear    pH, UA 6.0 5.0 - 8.0    Specific Gravity, UA >1.030 (A) 1.005 - 1.030    Protein, UA 2+ (A) Negative    Glucose, UA Negative Negative    Ketones, UA 2+ (A) Negative    Bilirubin (UA) Negative Negative    Occult Blood UA 2+ (A) Negative    Nitrite, UA Negative Negative    Leukocytes, UA Negative Negative   Drug screen panel, emergency    Collection Time: 09/22/23 11:48 PM   Result Value Ref Range    Benzodiazepines Presumptive Positive (A) Negative    Methadone metabolites Negative Negative    Cocaine (Metab.) Negative Negative    Opiate Scrn, Ur Negative Negative    Barbiturate Screen, Ur Presumptive Positive (A) Negative    Amphetamine Screen, Ur Presumptive Positive (A) Negative    THC Presumptive Positive (A) Negative    Phencyclidine Negative Negative    Creatinine, Urine 223.0 15.0 - 325.0 mg/dL    Toxicology Information SEE COMMENT    Urinalysis Microscopic    Collection Time: 09/22/23 11:48 PM   Result Value Ref Range    RBC, UA 1 0 - 4 /hpf    WBC, UA 2 0 - 5 /hpf    Bacteria Rare None-Occ /hpf    Squam Epithel, UA 4 /hpf    Hyaline Casts, UA 0 0-1/lpf /lpf    Unclass Brynn UA 1 None-Moderate    Microscopic Comment SEE COMMENT       No results found for: "PHENYTOIN", " ""PHENOBARB", "VALPROATE", "CBMZ"        CONSTITUTIONAL  General Appearance: unremarkable, age appropriate, obese    MUSCULOSKELETAL  Muscle Strength and Tone:no dyskinesia, no tremor, no tic  Abnormal Involuntary Movements: No  Gait and Station: non-ataxic    PSYCHIATRIC   Level of Consciousness: awake and alert   Orientation: person, place, time, and situation  Grooming: Disheveled and Hospital garb  Psychomotor Behavior: normal, cooperative, friendly and cooperative, psychomotor agitation, restless and fidgety , eye contact normal  Speech: normal tone, normal rate, normal pitch, pressured, spontaneous  Language: grossly intact, able to name, able to repeat  Mood: anxious and labile  Affect: Anxious, Consistent with mood, Congruent with thought, and Labile  Thought Process: tangential and flight of ideas  Associations: intact   Thought Content: +delusions, denies SI, and denies HI  Perceptions: denies AH and denies  VH  Memory: Able to recall past events, Remote intact, and Recent intact  Attention:Decreased and Easily distracted  Fund of Knowledge: unable to assess  Estimate if Intelligence:  Unable to determine based on work/education history, vocabulary and mental status exam  Insight: limited awareness of illness, poor awareness of illness  Judgment: impaired due to tiago/psychosis (and drug use)      PSYCHOSOCIAL    PSYCHOSOCIAL STRESSORS   drug     FUNCTIONING RELATIONSHIPS   good support system    STRENGTHS AND LIABILITIES   Strength: Patient accepts guidance/feedback, Strength: Patient is expressive/articulate., Liability: Patient is unstable., Liability: Patient lacks coping skills.    Is the patient aware of the biomedical complications associated with substance abuse and mental illness? yes    Does the patient have an Advance Directive for Mental Health treatment? no  (If yes, inform patient to bring copy.)        MEDICAL DECISION MAKING        ASSESSMENT       Bipolar I Disorder mre manic, severe with " psychotic features  R/o SAD; r/o SIM/PD  Unspecified Anxiety Disorder    Polysubstance dependence  Nicotine Dependence    Psychosocial stressors    HTN  Chronic pain      PROBLEM LIST AND MANAGEMENT PLANS    Mood/psychosis: pt counseled  -start Serqouel 50 mg po q HS    Anxiety: pt counseled  -seroquel off-label as above  -Vistaril prn    Polysubstance dependence: pt counseled  -start Valium 10 mg po TID to prevent w/d- will taper as able  .  Nicotine Dependence: pt counseled  -start nicotine 14 patch dermal q day    Psychosocial stressors: pt counseled  -SW consulted to assist with resources    HTN: pt counseled  -MEd consulted     Chronic pain: pt counseled  -MEd consulted         PRESCRIPTION DRUG MANAGEMENT  Compliance: unknown  Side Effects: unknown  Regimen Adjustments: see above    Discussed diagnosis, risks and benefits of proposed treatment vs alternative treatments vs no treatment, potential side effects of these treatments and the inherent unpredictability of treatment. The patient expresses understanding of the above and displays the capacity to agree with this treatment given said understanding. Patient also agrees that, currently, the benefits outweigh the risks and would like to pursue/continue treatment at this time.    Any medications being used off-label were discussed with the patient inclusive of the evidence base for the use of the medications and consent was obtained for the off-label use of the medication.         DIAGNOSTIC TESTING  Labs reviewed with patient; follow up pending labs    Disposition:  -Will attempt to obtain outside psychiatric records if available  -SW to assist with aftercare planning and collateral  -Once stable discharge home with outpatient follow up care and/or rehab  -Continue inpatient treatment under a PEC and/or CEC for danger to self/ danger to others/grave disability as evident by significant psychotic thought disorder, aggressive neuroleptic titration, danger to  others, and gravely disabled        Usman Ellis MD  Psychiatry

## 2023-09-23 NOTE — ED NOTES
..Patient identifiers for Mary Sanches 53 y.o. female checked and correct.  Chief Complaint   Patient presents with    Psychiatric Evaluation     Per EMS pt called them stating she has implants in her body that she wants removed. Per EMS pt was yelling at people that are not there and having obvious visual hallucinations. Denies SI/HI     Past Medical History:   Diagnosis Date    Anxiety     Chronic back pain     Chronic hepatitis C 6/19/2020    Depression     Hallucination     History of psychiatric hospitalization     Hx of psychiatric care     Hyperthyroidism 12/29/2021    Migraines     Neuropathy     Obesity     Psychiatric exam requested by authority     Psychiatric problem     Psychosis     Sacroiliitis     Schizoaffective disorder     Scoliosis     Sleep difficulties     Therapy     Tobacco use      Allergies reported:   Review of patient's allergies indicates:   Allergen Reactions    Cranberry      Kidney Pain    Gabapentin Swelling     Throat Swelling, Hard to breathe    Ibuprofen Swelling     Stomach Bloated, Swelling Throat    Meloxicam Swelling     Swelling of the Throat    Toradol [ketorolac] Swelling     Throat Swelling difficulty breathing    Amoxicillin Hives    Cyclobenzaprine     Duloxetine          LOC: Patient is awake, alert, and aware of environment with an appropriate affect. Patient is oriented x 3 and speaking appropriately.  APPEARANCE: Patient resting comfortably and in no acute distress. Patient is clean and well groomed, patient's clothing is properly fastened.  HEENT: **constantly talking. Alert . Visual hallucinations --Seen by psych this morning  SKIN: The skin is warm and dry. Patient has normal skin turgor and moist mucus membranes. Skin is intact; no bruising or breakdown noted.  MUSKULOSKELETAL: Patient is moving all extremities well, no obvious deformities noted. Pulses intact.   RESPIRATORY: Airway is open and patent. Respirations are spontaneous and non-labored with  normal effort and rate, BBS=clear  CARDIAC: Patient has a normal rate and rhythm. r,No peripheral edema noted.   ABDOMEN: No distention noted. Bowel sounds active in all 4 quadrants. Soft and non-tender upon palpation.  NEUROLOGICAL:  Facial expression is symmetrical. Hand grasps are equal bilaterally. Normal sensation in all extremities when touched with finger.

## 2023-09-23 NOTE — ED NOTES
Pt remains in paper scrubs, resting in stretcher comfortably. No signs of distress noted. Sitter remains at bedside in direct visual contact, charting per protocol every 15 minutes. No equipment or belongings are in the patients room. Monitoring ongoing

## 2023-09-24 PROBLEM — F29 PSYCHOSIS: Status: ACTIVE | Noted: 2020-06-19

## 2023-09-24 PROCEDURE — 90833 PSYTX W PT W E/M 30 MIN: CPT | Mod: HCNC,,, | Performed by: PSYCHIATRY & NEUROLOGY

## 2023-09-24 PROCEDURE — 99233 SBSQ HOSP IP/OBS HIGH 50: CPT | Mod: HCNC,,, | Performed by: PSYCHIATRY & NEUROLOGY

## 2023-09-24 PROCEDURE — 99233 PR SUBSEQUENT HOSPITAL CARE,LEVL III: ICD-10-PCS | Mod: HCNC,,, | Performed by: PSYCHIATRY & NEUROLOGY

## 2023-09-24 PROCEDURE — 25000003 PHARM REV CODE 250: Performed by: PSYCHIATRY & NEUROLOGY

## 2023-09-24 PROCEDURE — 90833 PR PSYCHOTHERAPY W/PATIENT W/E&M, 30 MIN (ADD ON): ICD-10-PCS | Mod: HCNC,,, | Performed by: PSYCHIATRY & NEUROLOGY

## 2023-09-24 PROCEDURE — 11400000 HC PSYCH PRIVATE ROOM

## 2023-09-24 RX ORDER — QUETIAPINE FUMARATE 50 MG/1
50 TABLET, FILM COATED ORAL 2 TIMES DAILY
Status: DISCONTINUED | OUTPATIENT
Start: 2023-09-24 | End: 2023-09-25

## 2023-09-24 RX ADMIN — QUETIAPINE FUMARATE 50 MG: 50 TABLET ORAL at 08:09

## 2023-09-24 RX ADMIN — OLANZAPINE 10 MG: 10 TABLET, FILM COATED ORAL at 09:09

## 2023-09-24 RX ADMIN — DIAZEPAM 10 MG: 5 TABLET ORAL at 08:09

## 2023-09-24 RX ADMIN — DIAZEPAM 10 MG: 5 TABLET ORAL at 02:09

## 2023-09-24 RX ADMIN — QUETIAPINE FUMARATE 50 MG: 50 TABLET ORAL at 11:09

## 2023-09-24 NOTE — NURSING
"Pt in bedroom at this time.  Pt has spent most of the day there and has been out for meals and phone time.  Pt did talk with her father today. Pt's thoughts remain disorganized.  Pt unable to stay on topic.  Pt states she needs her meds out of her purse to take them because she needs a psychiatrist. Explained to pt once again that she is inpatient on a psych unit and she meds she gets here has to be from our pharmacy and not out of her purse.  Pt stated "I understand but maybe you can get me a couple out of there and let me take them."  Very hard to redirect pt.  Pt denies si, hi, a v hallucinations.  Gravely disabled.  Appetite adequate.  Pt taking meds without any side effects voiced.  Pt instructed to call for any needs or concerns at all.  Verbalized understanding.  Will cont to monitor for safety.   "

## 2023-09-24 NOTE — PLAN OF CARE
Problem: Adult Behavioral Health Plan of Care  Goal: Plan of Care Review  Outcome: Ongoing, Progressing     Problem: Adult Behavioral Health Plan of Care  Goal: Patient-Specific Goal (Individualization)  Outcome: Ongoing, Progressing     Problem: Mood Impairment (Psychotic Signs/Symptoms)  Goal: Improved Mood Symptoms (Psychotic Signs/Symptoms)  Outcome: Ongoing, Progressing     Problem: Behavior Regulation Impairment (Psychotic Signs/Symptoms)  Goal: Improved Behavioral Control (Psychotic Signs/Symptoms)  Outcome: Ongoing, Progressing

## 2023-09-24 NOTE — PROGRESS NOTES
"PSYCHIATRY DAILY INPATIENT PROGRESS NOTE  SUBSEQUENT HOSPITAL VISIT    ENCOUNTER DATE: 9/24/2023  SITE: [facility]    DATE OF ADMISSION: 9/23/2023 10:08 AM  LENGTH OF STAY: 1 days      CHIEF COMPLAINT   Mary Sanches is a 53 y.o. female, seen during daily booker rounds on the inpatient unit.  Mary Sanches presented with the chief complaint of  psychosis, "I really like this lady Sindy.. but I never put my hands anyone."      The patient was seen and examined. The chart was reviewed.     Reviewed notes from Rns and MD and labs from the last 24 hours.    The patient's case was discussed with the treatment team/care providers today including Rns    Staff reports severe (requiring PRN medications for non redirectable, agitated behavior in order to prevent harm to self or others) behavioral or management issues.     The patient has been compliant with treatment.      Subjective 09/24/2023       Today the patient reports that she is "not good." She quickly derails in her thoughts and remains a poor historian.   Paranoia and disorganization persists.    Psychosis/tiago continue to impair her ability to participate with assessments.       The patient denies any side effects to medications.            Psychiatric ROS (observed, reported, or endorsed/denied):  Depressed mood - No  Interest/pleasure/anhedonia: No  Guilt/hopelessness/worthlessness - No  Changes in Sleep - No  Changes in Appetite - No  Changes in Concentration - No  Changes in Energy - No  PMA/R- No  Suicidal- active/passive ideations - No  Homicidal ideations: active/passive ideations - No    Hallucinations - Continuing  Delusions - Continuing  Disorganized behavior - Continuing  Disorganized speech - Continuing  Negative symptoms - No    Elevated mood - No  Decreased need for sleep - No  Grandiosity - No  Racing thoughts - Continuing  Impulsivity - Continuing  Irritability- Continuing  Increased energy - No  Distractibility - " Continuing  Increase in goal-directed activity or PMA- Continuing    Symptoms of OSKAR - Continuing  Symptoms of Panic Disorder- No  Symptoms of PTSD - No        Overall progress: Patient is showing no improvement on the Unit to date      Psychotherapy:  Target symptoms: anxiety , substance abuse, mood disorder, psychosis  Why chosen therapy is appropriate versus another modality: relevant to diagnosis, patient responds to this modality, evidence based practice  Outcome monitoring methods: self-report, observation  Therapeutic intervention type: insight oriented psychotherapy, behavior modifying psychotherapy, supportive psychotherapy, interactive psychotherapy  Topics discussed/themes: building skills sets for symptom management, symptom recognition  The patient's response to the intervention is accepting. The patient's progress toward treatment goals is limited.   Duration of intervention: 16 minutes      Medical ROS  General ROS: negative  Ophthalmic ROS: negative  ENT ROS: negative  Allergy and Immunology ROS: negative  Hematological and Lymphatic ROS: negative  Endocrine ROS: negative  Respiratory ROS: no cough, shortness of breath, or wheezing  Cardiovascular ROS: no chest pain or dyspnea on exertion  Gastrointestinal ROS: no abdominal pain, change in bowel habits, or black or bloody stools  Genito-Urinary ROS: no dysuria, trouble voiding, or hematuria  Musculoskeletal ROS: negative  Neurological ROS: no TIA or stroke symptoms  Dermatological ROS: negative      PAST MEDICAL HISTORY   Past Medical History:   Diagnosis Date    Anxiety     Chronic back pain     Chronic hepatitis C 6/19/2020    Depression     Hallucination     History of psychiatric hospitalization     Hx of psychiatric care     Hyperthyroidism 12/29/2021    Migraines     Neuropathy     Obesity     Psychiatric exam requested by authority     Psychiatric problem     Psychosis     Sacroiliitis     Schizoaffective disorder     Scoliosis     Sleep  "difficulties     Therapy     Tobacco use            PSYCHOTROPIC MEDICATIONS   Scheduled Meds:   diazePAM  10 mg Oral TID    QUEtiapine  50 mg Oral QHS     Continuous Infusions:  PRN Meds:.acetaminophen, aluminum-magnesium hydroxide-simethicone, benzonatate, benztropine mesylate, hydrOXYzine pamoate, loperamide, nicotine, OLANZapine **AND** OLANZapine, ondansetron, promethazine        EXAMINATION    VITALS   Vitals:    09/23/23 1014 09/24/23 0747   BP: (!) 172/67 (!) 151/74   BP Location: Right arm Right arm   Patient Position: Sitting Sitting   Pulse: 69 70   Resp: 20 20   Temp: 97.4 °F (36.3 °C) 97.7 °F (36.5 °C)   TempSrc: Oral Oral   SpO2: (!) 93% 96%   Weight: 69.9 kg (154 lb)    Height: 5' 3" (1.6 m)        Body mass index is 27.28 kg/m².    CONSTITUTIONAL  General Appearance: unremarkable, age appropriate, obese     MUSCULOSKELETAL  Muscle Strength and Tone:no dyskinesia, no tremor, no tic  Abnormal Involuntary Movements: No  Gait and Station: non-ataxic     PSYCHIATRIC   Level of Consciousness: awake and alert   Orientation: person, place, time, and situation  Grooming: Disheveled and Hospital garb  Psychomotor Behavior: normal, cooperative, friendly and cooperative, psychomotor agitation, restless and fidgety , eye contact normal  Speech: normal tone, normal rate, normal pitch, pressured, spontaneous  Language: grossly intact, able to name, able to repeat  Mood: anxious and labile  Affect: Anxious, Consistent with mood, Congruent with thought, and Labile  Thought Process: tangential and flight of ideas  Associations: intact   Thought Content: +delusions, denies SI, and denies HI  Perceptions: denies AH and denies  VH  Memory: Able to recall past events, Remote intact, and Recent intact  Attention:Decreased and Easily distracted  Fund of Knowledge: unable to assess  Estimate if Intelligence:  Unable to determine based on work/education history, vocabulary and mental status exam  Insight: limited awareness of " illness, poor awareness of illness  Judgment: impaired due to tiago/psychosis (and drug use)    DIAGNOSTIC TESTING   Laboratory Results  Recent Results (from the past 24 hour(s))   Lipid Panel    Collection Time: 09/23/23 10:42 AM   Result Value Ref Range    Cholesterol 151 120 - 199 mg/dL    Triglycerides 110 30 - 150 mg/dL    HDL 28 (L) 40 - 75 mg/dL    LDL Cholesterol 101.0 63.0 - 159.0 mg/dL    HDL/Cholesterol Ratio 18.5 (L) 20.0 - 50.0 %    Total Cholesterol/HDL Ratio 5.4 (H) 2.0 - 5.0    Non-HDL Cholesterol 123 mg/dL   Hemoglobin A1c    Collection Time: 09/23/23 10:42 AM   Result Value Ref Range    Hemoglobin A1C 5.2 4.0 - 5.6 %    Estimated Avg Glucose 103 68 - 131 mg/dL             MEDICAL DECISION MAKING      ASSESSMENT:   Bipolar I Disorder mre manic, severe with psychotic features  R/o SAD; r/o SIM/PD  Unspecified Anxiety Disorder     Polysubstance dependence  Nicotine Dependence     Psychosocial stressors     HTN  Chronic pain        PROBLEM LIST AND MANAGEMENT PLANS     Mood/psychosis: pt counseled  -start Serqouel 50 mg po q HS- increase to 50 mg po BID today     Anxiety: pt counseled  -seroquel off-label as above  -Vistaril prn     Polysubstance dependence: pt counseled  -started/continue  Valium 10 mg po TID to prevent w/d- will taper as able  .  Nicotine Dependence: pt counseled  -start nicotine 14 patch dermal q day     Psychosocial stressors: pt counseled  -SW consulted to assist with resources     HTN: pt counseled  -MEd consulted      Chronic pain: pt counseled  -MEd consulted           Discussed diagnosis, risks and benefits of proposed treatment vs alternative treatments vs no treatment, potential side effects of these treatments and the inherent unpredictability of treatment. The patient expresses understanding of the above and displays the capacity to agree with this treatment given said understanding. Patient also agrees that, currently, the benefits outweigh the risks and would like to  pursue/continue treatment at this time.    Any medications being used off-label were discussed with the patient inclusive of the evidence base for the use of the medications and consent was obtained for the off-label use of the medication.       DISCHARGE PLANNING  Expected Disposition Plan: Home or Self Care      NEED FOR CONTINUED HOSPITALIZATION  Psychiatric illness continues to pose a potential threat to life or bodily function, of self or others, thereby requiring the need for continued inpatient psychiatric hospitalization: Yes, due to: significant psychotic thought disorder, aggressive neuroleptic titration, and gravely disabled, as evidenced by:  Ongoing concerns with grave disability with patient unable to perform basic feeding, hygiene and dressing activities without significant constant support. and Ongoing concerns with perceptual aberrancy and paranoid persecutory delusions leading to potential harm of self or others.    Protective inpatient pyschiatric hospitalization required while a safe disposition plan is enacted: Yes    Patient stabilized and ready for discharge from inpatient psychiatric unit: No        STAFF:   Usman Ellis MD  Psychiatry

## 2023-09-24 NOTE — PLAN OF CARE
Patient remained in bed and own room throughout the night. Q15 checks performed to check on patient. Patient refused vitals and meds. POC ongoing.

## 2023-09-25 PROBLEM — Z72.0 TOBACCO ABUSE: Status: ACTIVE | Noted: 2023-09-25

## 2023-09-25 PROCEDURE — 99232 PR SUBSEQUENT HOSPITAL CARE,LEVL II: ICD-10-PCS | Mod: ,,, | Performed by: PSYCHIATRY & NEUROLOGY

## 2023-09-25 PROCEDURE — 25000003 PHARM REV CODE 250: Performed by: PSYCHIATRY & NEUROLOGY

## 2023-09-25 PROCEDURE — 90833 PR PSYCHOTHERAPY W/PATIENT W/E&M, 30 MIN (ADD ON): ICD-10-PCS | Mod: ,,, | Performed by: PSYCHIATRY & NEUROLOGY

## 2023-09-25 PROCEDURE — 25000003 PHARM REV CODE 250: Performed by: INTERNAL MEDICINE

## 2023-09-25 PROCEDURE — 90833 PSYTX W PT W E/M 30 MIN: CPT | Mod: ,,, | Performed by: PSYCHIATRY & NEUROLOGY

## 2023-09-25 PROCEDURE — 99232 SBSQ HOSP IP/OBS MODERATE 35: CPT | Mod: ,,, | Performed by: PSYCHIATRY & NEUROLOGY

## 2023-09-25 PROCEDURE — 11400000 HC PSYCH PRIVATE ROOM

## 2023-09-25 RX ORDER — LORAZEPAM 1 MG/1
2 TABLET ORAL ONCE
Status: COMPLETED | OUTPATIENT
Start: 2023-09-25 | End: 2023-09-25

## 2023-09-25 RX ORDER — LORAZEPAM 1 MG/1
2 TABLET ORAL 3 TIMES DAILY
Status: DISCONTINUED | OUTPATIENT
Start: 2023-09-25 | End: 2023-09-25

## 2023-09-25 RX ORDER — LORAZEPAM 1 MG/1
1 TABLET ORAL 3 TIMES DAILY PRN
Status: DISCONTINUED | OUTPATIENT
Start: 2023-09-25 | End: 2023-10-03 | Stop reason: HOSPADM

## 2023-09-25 RX ORDER — QUETIAPINE FUMARATE 100 MG/1
100 TABLET, FILM COATED ORAL 2 TIMES DAILY
Status: DISCONTINUED | OUTPATIENT
Start: 2023-09-25 | End: 2023-09-27

## 2023-09-25 RX ORDER — DIAZEPAM 5 MG/1
10 TABLET ORAL 4 TIMES DAILY
Status: DISCONTINUED | OUTPATIENT
Start: 2023-09-25 | End: 2023-09-26

## 2023-09-25 RX ORDER — DIAZEPAM 5 MG/1
10 TABLET ORAL 3 TIMES DAILY
Status: DISCONTINUED | OUTPATIENT
Start: 2023-09-25 | End: 2023-09-25

## 2023-09-25 RX ADMIN — DIAZEPAM 10 MG: 5 TABLET ORAL at 08:09

## 2023-09-25 RX ADMIN — QUETIAPINE FUMARATE 100 MG: 100 TABLET ORAL at 08:09

## 2023-09-25 RX ADMIN — HYDROXYZINE PAMOATE 50 MG: 50 CAPSULE ORAL at 11:09

## 2023-09-25 RX ADMIN — DIAZEPAM 10 MG: 5 TABLET ORAL at 01:09

## 2023-09-25 RX ADMIN — QUETIAPINE FUMARATE 50 MG: 50 TABLET ORAL at 08:09

## 2023-09-25 RX ADMIN — DIAZEPAM 10 MG: 5 TABLET ORAL at 05:09

## 2023-09-25 RX ADMIN — LORAZEPAM 2 MG: 1 TABLET ORAL at 11:09

## 2023-09-25 NOTE — HPI
History:  Mary Sanches is a 53 y.o. female who presents to the ED with Psychiatric Evaluation (Per EMS pt called them stating she has implants in her body that she wants removed. Per EMS pt was yelling at people that are not there and having obvious visual hallucinations. Denies SI/HI)     Described as 53-year-old female with history of schizoaffective disorder presenting to the emergency department for a psychiatric evaluation.  She was allegedly telling EMS that she has implants in her body that she wants removed.  She told me that she has white dots popping up on her skin after she ingested what she thought was green tea but then her frontal or it may have cocaine in it but she believes it was something else.

## 2023-09-25 NOTE — SUBJECTIVE & OBJECTIVE
Past Medical History:   Diagnosis Date    Anxiety     Chronic back pain     Chronic hepatitis C 6/19/2020    Depression     Hallucination     History of psychiatric hospitalization     Hx of psychiatric care     Hyperthyroidism 12/29/2021    Migraines     Neuropathy     Obesity     Psychiatric exam requested by authority     Psychiatric problem     Psychosis     Sacroiliitis     Schizoaffective disorder     Scoliosis     Sleep difficulties     Therapy     Tobacco use        Past Surgical History:   Procedure Laterality Date    HAND SURGERY Left     HYSTERECTOMY         Review of patient's allergies indicates:   Allergen Reactions    Cranberry      Kidney Pain    Gabapentin Swelling     Throat Swelling, Hard to breathe    Ibuprofen Swelling     Stomach Bloated, Swelling Throat    Meloxicam Swelling     Swelling of the Throat    Toradol [ketorolac] Swelling     Throat Swelling difficulty breathing    Amoxicillin Hives    Cyclobenzaprine     Duloxetine        No current facility-administered medications on file prior to encounter.     Current Outpatient Medications on File Prior to Encounter   Medication Sig    acetaminophen (TYLENOL) 500 MG tablet Take 1-2 tablets (500-1,000 mg total) by mouth 3 (three) times daily as needed for Pain.    ALPRAZolam (XANAX) 1 MG tablet     butalbital-acetaminophen-caffeine -40 mg (FIORICET, ESGIC) -40 mg per tablet TAKE ONE TO TWO TABLETS BY MOUTH EVERY 6 HOURS AS NEEDED FOR HEADACHE    crisaborole (EUCRISA) 2 % Oint Apply 1 application topically 2 (two) times daily.    diphenhydrAMINE (BENADRYL) 50 MG capsule Take 1 capsule (50 mg total) by mouth every 6 (six) hours as needed for Itching.    doxycycline (VIBRA-TABS) 100 MG tablet Take 1 tablet (100 mg total) by mouth every 12 (twelve) hours.    escitalopram oxalate (LEXAPRO) 20 MG tablet Take 20 mg by mouth once daily.    hydrOXYzine HCL (ATARAX) 10 MG Tab Take 25 mg by mouth 3 (three) times daily as needed.    mupirocin  (BACTROBAN) 2 % ointment Apply topically 3 (three) times daily as needed (sores).    OLANZapine (ZYPREXA) 10 MG tablet     pregabalin (LYRICA) 100 MG capsule Take 1 capsule (100 mg total) by mouth 2 (two) times daily. In 1-2 weeks, if no relief, may increase dose to 3 times per day.    traMADoL (ULTRAM) 50 mg tablet Take 1 tablet (50 mg total) by mouth every 6 (six) hours as needed for Pain.    traZODone (DESYREL) 100 MG tablet Take 100 mg by mouth every evening.    triamcinolone acetonide 0.1% (KENALOG) 0.1 % cream Apply topically 2 (two) times daily. Apply twice a day to itchy spots. Do not apply to underarms, groin, or face. Use for 2 weeks then take 1 week break before restarting if needed    vitamin D (VITAMIN D3) 1000 units Tab Take 1,000 Units by mouth once daily.     Family History       Problem Relation (Age of Onset)    Cancer Maternal Grandmother    Cirrhosis Paternal Grandmother    Diabetes Sister    Heart disease Paternal Grandfather    Hypertension Father    Kidney disease Sister    No Known Problems Mother    Thyroid disease Sister          Tobacco Use    Smoking status: Every Day     Current packs/day: 0.25     Average packs/day: 0.3 packs/day for 0.5 years (0.1 ttl pk-yrs)     Types: Cigarettes    Smokeless tobacco: Never    Tobacco comments:     ev 3 days has 2 cigarettes.    Substance and Sexual Activity    Alcohol use: No    Drug use: No    Sexual activity: Not Currently     Partners: Male     Review of Systems   Constitutional:  Negative for fatigue and fever.   HENT:  Negative for congestion, ear pain and sore throat.    Eyes:  Negative for pain and discharge.   Respiratory:  Negative for cough, shortness of breath and wheezing.    Gastrointestinal:  Negative for abdominal pain, constipation, diarrhea, nausea and vomiting.   Endocrine: Negative for cold intolerance and heat intolerance.   Genitourinary:  Negative for difficulty urinating, dysuria and frequency.   Musculoskeletal:  Negative for  arthralgias.   Allergic/Immunologic: Negative for environmental allergies.   Neurological:  Negative for dizziness, tremors and seizures.   Psychiatric/Behavioral:  Positive for behavioral problems, hallucinations and sleep disturbance.    All other systems reviewed and are negative.    Objective:     Vital Signs (Most Recent):  Temp: 98.4 °F (36.9 °C) (09/25/23 0731)  Pulse: 74 (09/25/23 0731)  Resp: 20 (09/25/23 0731)  BP: (!) 181/97 (09/25/23 0731)  SpO2: 96 % (09/25/23 0731) Vital Signs (24h Range):  Temp:  [98.4 °F (36.9 °C)-99 °F (37.2 °C)] 98.4 °F (36.9 °C)  Pulse:  [73-74] 74  Resp:  [16-20] 20  SpO2:  [94 %-96 %] 96 %  BP: (161-181)/(79-97) 181/97     Weight: 69.9 kg (154 lb)  Body mass index is 27.28 kg/m².     Physical Exam  Vitals and nursing note reviewed.   Constitutional:       Appearance: Normal appearance.   HENT:      Head: Normocephalic and atraumatic.      Nose: Nose normal.      Mouth/Throat:      Mouth: Mucous membranes are moist.      Pharynx: Oropharynx is clear.   Eyes:      Extraocular Movements: Extraocular movements intact.      Conjunctiva/sclera: Conjunctivae normal.      Pupils: Pupils are equal, round, and reactive to light.   Cardiovascular:      Rate and Rhythm: Normal rate and regular rhythm.      Pulses: Normal pulses.      Heart sounds: Normal heart sounds.   Pulmonary:      Effort: Pulmonary effort is normal.      Breath sounds: Normal breath sounds.   Abdominal:      General: Abdomen is flat. Bowel sounds are normal.      Palpations: Abdomen is soft.   Musculoskeletal:         General: Normal range of motion.      Cervical back: Normal range of motion and neck supple.   Skin:     General: Skin is warm and dry.      Capillary Refill: Capillary refill takes less than 2 seconds.      Comments: No rashes on limited skin exam.   Neurological:      General: No focal deficit present.      Mental Status: She is alert and oriented to person, place, and time.      Cranial Nerves: No  cranial nerve deficit.      Comments: I Olfactory:  Sense of smell intact    II Optic:  Pupils equal round react to light.  Vision intact.    III, IV, VI, Ocular motor, Trochlear, Abducens:  Extraocular movements intact    V Trigeminal:  Facial sensation intact facial sensation intact,, muscles of mastication intact muscles of mastication intact, corneal reflex intact, corneal reflex intact    VII Facial:  Muscles of facial expression intact     VIII Vestibular cochlear: Hearing intact vestibular cochlear: Hearing intact    IX Glossopharyngeal:  Gag reflex intact.  Tasting intact.     X Vagus:  Gag reflex intact.    XI Spinal Accessory:  Shoulder shrug intact.  Head rotation intact.    XII Hypoglossal:  Tongue movements intact.     Psychiatric:      Comments: Patient appears depressed.  Hallucinating, delusional               CRANIAL NERVES     CN III, IV, VI   Pupils are equal, round, and reactive to light.       Significant Labs: All pertinent labs within the past 24 hours have been reviewed.    Significant Imaging: I have reviewed all pertinent imaging results/findings within the past 24 hours.

## 2023-09-25 NOTE — PROGRESS NOTES
"PSYCHIATRY DAILY INPATIENT PROGRESS NOTE  SUBSEQUENT HOSPITAL VISIT    ENCOUNTER DATE: 9/25/2023  SITE: [facility]    DATE OF ADMISSION: 9/23/2023 10:08 AM  LENGTH OF STAY: 2 days      CHIEF COMPLAINT   Mary Sanches is a 53 y.o. female, seen during daily booker rounds on the inpatient unit.  Mary Sanches presented with the chief complaint of  psychosis, "I really like this lady Sindy.. but I never put my hands anyone."      The patient was seen and examined. The chart was reviewed.     Reviewed notes from Rns and MD and labs from the last 24 hours.    The patient's case was discussed with the treatment team/care providers today including Rns    Staff reports severe (requiring PRN medications for non redirectable, agitated behavior in order to prevent harm to self or others) behavioral or management issues.     The patient has been compliant with treatment.      Subjective 09/25/2023    Patient continues to verbalize disorganized thoughts, including "I'm allergic to my tears." Speech remains pressured. Endorses anxiety which she states is largely unrelieved by diazepam, and ongoing feelings of depression without suicidal ideation. Patient has multiple somatic complaints including nausea, dizziness, "hot flashes". Patient is also hypertensive and to her knowledge is not currently prescribed medication for this.  There is some concern for benzodiazepine withdrawal as patient was taking 4 mg of alprazolam/day prior to hospitalization.     Will switch to lorazepam 2 mg TID with plan to taper as indicated.     CIWA-     RESULT SUMMARY:  12 points    INPUTS:  Nausea/vomiting --> 3 = (More severe symptoms)  Tremor --> 2 = (More severe symptoms)  Paroxysmal sweats --> 0 = No sweat visible  Anxiety --> 1 = Mildly anxious  Agitation --> 1 = Somewhat more activity than normal activty  Tactile disturbances --> 2 = Mild itching, pin and needles, burning, or numbness  Auditory disturbances --> 0 = Not " present  Visual disturbances --> 0 = Not present  Headache/fullness in head --> 3 = Moderate  Orientation/clouding of sensorium --> 0 = Oriented, can do serial additions          Psychiatric ROS (observed, reported, or endorsed/denied):  Depressed mood - fluctuating  Interest/pleasure/anhedonia: No  Guilt/hopelessness/worthlessness - No  Changes in Sleep - No  Changes in Appetite - No  Changes in Concentration - No  Changes in Energy - No  PMA/R- No  Suicidal- active/passive ideations - No  Homicidal ideations: active/passive ideations - No    Hallucinations - Continuing  Delusions - Continuing  Disorganized behavior - Continuing  Disorganized speech - Continuing  Negative symptoms - No    Elevated mood - No  Decreased need for sleep - No  Grandiosity - No  Racing thoughts - Continuing  Impulsivity - Continuing  Irritability- Continuing  Increased energy - No  Distractibility - Continuing  Increase in goal-directed activity or PMA- Continuing    Symptoms of OSKAR - Continuing  Symptoms of Panic Disorder- No  Symptoms of PTSD - No        Overall progress: Patient is showing no improvement on the Unit to date      Psychotherapy:  Target symptoms: anxiety , substance abuse, mood disorder, psychosis  Why chosen therapy is appropriate versus another modality: relevant to diagnosis, patient responds to this modality, evidence based practice  Outcome monitoring methods: self-report, observation  Therapeutic intervention type: insight oriented psychotherapy, behavior modifying psychotherapy, supportive psychotherapy, interactive psychotherapy  Topics discussed/themes: building skills sets for symptom management, symptom recognition  The patient's response to the intervention is accepting. The patient's progress toward treatment goals is limited.   Duration of intervention: 16 minutes      Medical ROS  General ROS: negative  Ophthalmic ROS: negative  ENT ROS: negative  Allergy and Immunology ROS: negative  Hematological and  "Lymphatic ROS: negative  Endocrine ROS: negative  Respiratory ROS: no cough, shortness of breath, or wheezing  Cardiovascular ROS: no chest pain or dyspnea on exertion  Gastrointestinal ROS: + Nausea  Genito-Urinary ROS: no dysuria, trouble voiding, or hematuria  Musculoskeletal ROS: + back pain  Neurological ROS: no TIA or stroke symptoms  Dermatological ROS: negative      PAST MEDICAL HISTORY   Past Medical History:   Diagnosis Date    Anxiety     Chronic back pain     Chronic hepatitis C 6/19/2020    Depression     Hallucination     History of psychiatric hospitalization     Hx of psychiatric care     Hyperthyroidism 12/29/2021    Migraines     Neuropathy     Obesity     Psychiatric exam requested by authority     Psychiatric problem     Psychosis     Sacroiliitis     Schizoaffective disorder     Scoliosis     Sleep difficulties     Therapy     Tobacco use            PSYCHOTROPIC MEDICATIONS   Scheduled Meds:   diazePAM  10 mg Oral TID    QUEtiapine  50 mg Oral BID     Continuous Infusions:  PRN Meds:.acetaminophen, aluminum-magnesium hydroxide-simethicone, benzonatate, benztropine mesylate, hydrOXYzine pamoate, loperamide, nicotine, OLANZapine **AND** OLANZapine, ondansetron, promethazine        EXAMINATION    VITALS   Vitals:    09/23/23 1014 09/24/23 0747 09/24/23 1917 09/25/23 0731   BP: (!) 172/67 (!) 151/74 (!) 161/79 (!) 181/97   BP Location: Right arm Right arm Left arm Left arm   Patient Position: Sitting Sitting Sitting Sitting   Pulse: 69 70 73 74   Resp: 20 20 16 20   Temp: 97.4 °F (36.3 °C) 97.7 °F (36.5 °C) 99 °F (37.2 °C) 98.4 °F (36.9 °C)   TempSrc: Oral Oral Oral Oral   SpO2: (!) 93% 96% (!) 94% 96%   Weight: 69.9 kg (154 lb)      Height: 5' 3" (1.6 m)          Body mass index is 27.28 kg/m².    CONSTITUTIONAL  General Appearance: unremarkable, age appropriate, obese     MUSCULOSKELETAL  Muscle Strength and Tone:no dyskinesia, no tremor, no tic  Abnormal Involuntary Movements: No  Gait and " Station: non-ataxic     PSYCHIATRIC   Level of Consciousness: awake and alert   Orientation: person, place, time, and situation  Grooming: Disheveled and Hospital garb  Psychomotor Behavior: normal, cooperative, friendly and cooperative, psychomotor agitation, restless and fidgety , eye contact normal  Speech: normal tone, normal rate, normal pitch, pressured, spontaneous  Language: grossly intact, able to name, able to repeat  Mood: anxious    Affect: Anxious, Consistent with mood, Congruent with thought, and Labile  Thought Process: tangential and flight of ideas  Associations: intact   Thought Content: +delusions, denies SI, and denies HI  Perceptions: denies AH and denies  VH  Memory: Able to recall past events, Remote intact, and Recent intact  Attention:Decreased and Easily distracted  Fund of Knowledge: unable to assess  Estimate if Intelligence:  Unable to determine based on work/education history, vocabulary and mental status exam  Insight: limited awareness of illness, poor awareness of illness  Judgment: impaired due to tiago/psychosis (and drug use)    DIAGNOSTIC TESTING   Laboratory Results  No results found for this or any previous visit (from the past 24 hour(s)).            MEDICAL DECISION MAKING      ASSESSMENT:   Bipolar I Disorder mre manic, severe with psychotic features  R/o SAD; r/o SIM/PD  Unspecified Anxiety Disorder     Polysubstance dependence  Nicotine Dependence     Psychosocial stressors     HTN  Chronic pain        PROBLEM LIST AND MANAGEMENT PLANS     Mood/psychosis: pt counseled  -start Serqouel 50 mg po q HS- increase to 50 mg po BID today-Increase to 100 mg BID     Anxiety: pt counseled  -seroquel off-label as above  -Vistaril prn     Polysubstance dependence: pt counseled  -started/continue  Valium 10 mg po TID to prevent w/d- will taper as able-Increase valium to 10 mg QID w/ plan to taper as able.   -Initiate ativan 1 mg TID PRN for breakthrough symptoms/ciwa >9  .  Nicotine  Dependence: pt counseled  -start nicotine 14 patch dermal q day     Psychosocial stressors: pt counseled  -SW consulted to assist with resources     HTN: pt counseled  -MEd consulted      Chronic pain: pt counseled  -MEd consulted           Discussed diagnosis, risks and benefits of proposed treatment vs alternative treatments vs no treatment, potential side effects of these treatments and the inherent unpredictability of treatment. The patient expresses understanding of the above and displays the capacity to agree with this treatment given said understanding. Patient also agrees that, currently, the benefits outweigh the risks and would like to pursue/continue treatment at this time.    Any medications being used off-label were discussed with the patient inclusive of the evidence base for the use of the medications and consent was obtained for the off-label use of the medication.       DISCHARGE PLANNING  Expected Disposition Plan: Home or Self Care      NEED FOR CONTINUED HOSPITALIZATION  Psychiatric illness continues to pose a potential threat to life or bodily function, of self or others, thereby requiring the need for continued inpatient psychiatric hospitalization: Yes, due to: significant psychotic thought disorder, aggressive neuroleptic titration, and gravely disabled, as evidenced by:  Ongoing concerns with grave disability with patient unable to perform basic feeding, hygiene and dressing activities without significant constant support. and Ongoing concerns with perceptual aberrancy and paranoid persecutory delusions leading to potential harm of self or others.    Protective inpatient pyschiatric hospitalization required while a safe disposition plan is enacted: Yes    Patient stabilized and ready for discharge from inpatient psychiatric unit: No        STAFF:   Denny Morton NP  Psychiatry

## 2023-09-25 NOTE — NURSING
POC reviewed this shift and is ongoing.  Pt calm and cooperative on unit and compliant with medications.  Pt denies SI/HI/AVH.  Pt endorses depression and anxiety.  Pt is slow to respond to questions, thoughts are disorganized.  Will continue to monitor for safety.

## 2023-09-25 NOTE — PLAN OF CARE
Patient had uneventful night. Patient was calm and cooperative. Patient did take pm medications.Patient did remain in own room throughout the night. POC ongoing.

## 2023-09-25 NOTE — H&P
[facility]) Hospital Medicine  History & Physical    Patient Name: Mary Sanches  MRN: [id number]  Patient Class: IP- Psych  Admission Date: 9/23/2023  Attending Physician: Usman Ellis MD   Primary Care Provider: Ángel Lozano DO         Patient information was obtained from ER records.     Subjective:     Principal Problem:Psychosis    Chief Complaint: No chief complaint on file.       HPI: History:  Mary Sanches is a 53 y.o. female who presents to the ED with Psychiatric Evaluation (Per EMS pt called them stating she has implants in her body that she wants removed. Per EMS pt was yelling at people that are not there and having obvious visual hallucinations. Denies SI/HI)     Described as 53-year-old female with history of schizoaffective disorder presenting to the emergency department for a psychiatric evaluation.  She was allegedly telling EMS that she has implants in her body that she wants removed.  She told me that she has white dots popping up on her skin after she ingested what she thought was green tea but then her frontal or it may have cocaine in it but she believes it was something else.         Past Medical History:   Diagnosis Date    Anxiety     Chronic back pain     Chronic hepatitis C 6/19/2020    Depression     Hallucination     History of psychiatric hospitalization     Hx of psychiatric care     Hyperthyroidism 12/29/2021    Migraines     Neuropathy     Obesity     Psychiatric exam requested by authority     Psychiatric problem     Psychosis     Sacroiliitis     Schizoaffective disorder     Scoliosis     Sleep difficulties     Therapy     Tobacco use        Past Surgical History:   Procedure Laterality Date    HAND SURGERY Left     HYSTERECTOMY         Review of patient's allergies indicates:   Allergen Reactions    Cranberry      Kidney Pain    Gabapentin Swelling     Throat Swelling, Hard to breathe    Ibuprofen  Swelling     Stomach Bloated, Swelling Throat    Meloxicam Swelling     Swelling of the Throat    Toradol [ketorolac] Swelling     Throat Swelling difficulty breathing    Amoxicillin Hives    Cyclobenzaprine     Duloxetine        No current facility-administered medications on file prior to encounter.     Current Outpatient Medications on File Prior to Encounter   Medication Sig    acetaminophen (TYLENOL) 500 MG tablet Take 1-2 tablets (500-1,000 mg total) by mouth 3 (three) times daily as needed for Pain.    ALPRAZolam (XANAX) 1 MG tablet     butalbital-acetaminophen-caffeine -40 mg (FIORICET, ESGIC) -40 mg per tablet TAKE ONE TO TWO TABLETS BY MOUTH EVERY 6 HOURS AS NEEDED FOR HEADACHE    crisaborole (EUCRISA) 2 % Oint Apply 1 application topically 2 (two) times daily.    diphenhydrAMINE (BENADRYL) 50 MG capsule Take 1 capsule (50 mg total) by mouth every 6 (six) hours as needed for Itching.    doxycycline (VIBRA-TABS) 100 MG tablet Take 1 tablet (100 mg total) by mouth every 12 (twelve) hours.    escitalopram oxalate (LEXAPRO) 20 MG tablet Take 20 mg by mouth once daily.    hydrOXYzine HCL (ATARAX) 10 MG Tab Take 25 mg by mouth 3 (three) times daily as needed.    mupirocin (BACTROBAN) 2 % ointment Apply topically 3 (three) times daily as needed (sores).    OLANZapine (ZYPREXA) 10 MG tablet     pregabalin (LYRICA) 100 MG capsule Take 1 capsule (100 mg total) by mouth 2 (two) times daily. In 1-2 weeks, if no relief, may increase dose to 3 times per day.    traMADoL (ULTRAM) 50 mg tablet Take 1 tablet (50 mg total) by mouth every 6 (six) hours as needed for Pain.    traZODone (DESYREL) 100 MG tablet Take 100 mg by mouth every evening.    triamcinolone acetonide 0.1% (KENALOG) 0.1 % cream Apply topically 2 (two) times daily. Apply twice a day to itchy spots. Do not apply to underarms, groin, or face. Use for 2 weeks then take 1 week break before restarting if needed    vitamin D  (VITAMIN D3) 1000 units Tab Take 1,000 Units by mouth once daily.     Family History       Problem Relation (Age of Onset)    Cancer Maternal Grandmother    Cirrhosis Paternal Grandmother    Diabetes Sister    Heart disease Paternal Grandfather    Hypertension Father    Kidney disease Sister    No Known Problems Mother    Thyroid disease Sister          Tobacco Use    Smoking status: Every Day     Current packs/day: 0.25     Average packs/day: 0.3 packs/day for 0.5 years (0.1 ttl pk-yrs)     Types: Cigarettes    Smokeless tobacco: Never    Tobacco comments:     ev 3 days has 2 cigarettes.    Substance and Sexual Activity    Alcohol use: No    Drug use: No    Sexual activity: Not Currently     Partners: Male     Review of Systems   Constitutional:  Negative for fatigue and fever.   HENT:  Negative for congestion, ear pain and sore throat.    Eyes:  Negative for pain and discharge.   Respiratory:  Negative for cough, shortness of breath and wheezing.    Gastrointestinal:  Negative for abdominal pain, constipation, diarrhea, nausea and vomiting.   Endocrine: Negative for cold intolerance and heat intolerance.   Genitourinary:  Negative for difficulty urinating, dysuria and frequency.   Musculoskeletal:  Negative for arthralgias.   Allergic/Immunologic: Negative for environmental allergies.   Neurological:  Negative for dizziness, tremors and seizures.   Psychiatric/Behavioral:  Positive for behavioral problems, hallucinations and sleep disturbance.    All other systems reviewed and are negative.    Objective:     Vital Signs (Most Recent):  Temp: 98.4 °F (36.9 °C) (09/25/23 0731)  Pulse: 74 (09/25/23 0731)  Resp: 20 (09/25/23 0731)  BP: (!) 181/97 (09/25/23 0731)  SpO2: 96 % (09/25/23 0731) Vital Signs (24h Range):  Temp:  [98.4 °F (36.9 °C)-99 °F (37.2 °C)] 98.4 °F (36.9 °C)  Pulse:  [73-74] 74  Resp:  [16-20] 20  SpO2:  [94 %-96 %] 96 %  BP: (161-181)/(79-97) 181/97     Weight: 69.9 kg (154 lb)  Body mass index  is 27.28 kg/m².     Physical Exam  Vitals and nursing note reviewed.   Constitutional:       Appearance: Normal appearance.   HENT:      Head: Normocephalic and atraumatic.      Nose: Nose normal.      Mouth/Throat:      Mouth: Mucous membranes are moist.      Pharynx: Oropharynx is clear.   Eyes:      Extraocular Movements: Extraocular movements intact.      Conjunctiva/sclera: Conjunctivae normal.      Pupils: Pupils are equal, round, and reactive to light.   Cardiovascular:      Rate and Rhythm: Normal rate and regular rhythm.      Pulses: Normal pulses.      Heart sounds: Normal heart sounds.   Pulmonary:      Effort: Pulmonary effort is normal.      Breath sounds: Normal breath sounds.   Abdominal:      General: Abdomen is flat. Bowel sounds are normal.      Palpations: Abdomen is soft.   Musculoskeletal:         General: Normal range of motion.      Cervical back: Normal range of motion and neck supple.   Skin:     General: Skin is warm and dry.      Capillary Refill: Capillary refill takes less than 2 seconds.      Comments: No rashes on limited skin exam.   Neurological:      General: No focal deficit present.      Mental Status: She is alert and oriented to person, place, and time.      Cranial Nerves: No cranial nerve deficit.      Comments: I Olfactory:  Sense of smell intact    II Optic:  Pupils equal round react to light.  Vision intact.    III, IV, VI, Ocular motor, Trochlear, Abducens:  Extraocular movements intact    V Trigeminal:  Facial sensation intact facial sensation intact,, muscles of mastication intact muscles of mastication intact, corneal reflex intact, corneal reflex intact    VII Facial:  Muscles of facial expression intact     VIII Vestibular cochlear: Hearing intact vestibular cochlear: Hearing intact    IX Glossopharyngeal:  Gag reflex intact.  Tasting intact.     X Vagus:  Gag reflex intact.    XI Spinal Accessory:  Shoulder shrug intact.  Head rotation intact.    XII Hypoglossal:   Tongue movements intact.     Psychiatric:      Comments: Patient appears depressed.  Hallucinating, delusional               CRANIAL NERVES     CN III, IV, VI   Pupils are equal, round, and reactive to light.       Significant Labs: All pertinent labs within the past 24 hours have been reviewed.    Significant Imaging: I have reviewed all pertinent imaging results/findings within the past 24 hours.    Assessment/Plan:     * Psychosis  To be admitted to our inpatient psychiatric unit for further evaluation and management.        Tobacco abuse  Patient counseled on health consequences associated with tobacco abuse.  She endorses understanding.  Nicotine patch will be provided if necessary as to avoid cravings.          VTE Risk Mitigation (From admission, onward)    None                     Neal Murray Jr, MD  Department of Hospital Medicine  [facility] ([facility])

## 2023-09-25 NOTE — CONSULTS
RD consulted for new admit. Spoke with RN via phone and RN stated that the pt has no dietary issues at this time. RD to sign off. Please consult if any nutrition/dietary issues arise.

## 2023-09-25 NOTE — PLAN OF CARE
CSW attempted to complete pt's interview but was unsuccessful due to pt being clinically inappropriate. CSW will re attempt PSA interview at a later date.

## 2023-09-26 PROCEDURE — 90833 PR PSYCHOTHERAPY W/PATIENT W/E&M, 30 MIN (ADD ON): ICD-10-PCS | Mod: ,,, | Performed by: PSYCHIATRY & NEUROLOGY

## 2023-09-26 PROCEDURE — 25000003 PHARM REV CODE 250: Performed by: INTERNAL MEDICINE

## 2023-09-26 PROCEDURE — 25000003 PHARM REV CODE 250: Performed by: PSYCHIATRY & NEUROLOGY

## 2023-09-26 PROCEDURE — 90833 PSYTX W PT W E/M 30 MIN: CPT | Mod: ,,, | Performed by: PSYCHIATRY & NEUROLOGY

## 2023-09-26 PROCEDURE — 99232 SBSQ HOSP IP/OBS MODERATE 35: CPT | Mod: ,,, | Performed by: PSYCHIATRY & NEUROLOGY

## 2023-09-26 PROCEDURE — 99232 PR SUBSEQUENT HOSPITAL CARE,LEVL II: ICD-10-PCS | Mod: ,,, | Performed by: PSYCHIATRY & NEUROLOGY

## 2023-09-26 PROCEDURE — 11400000 HC PSYCH PRIVATE ROOM

## 2023-09-26 RX ORDER — AMLODIPINE BESYLATE 5 MG/1
5 TABLET ORAL DAILY
Status: DISCONTINUED | OUTPATIENT
Start: 2023-09-26 | End: 2023-10-03 | Stop reason: HOSPADM

## 2023-09-26 RX ORDER — LOSARTAN POTASSIUM 25 MG/1
25 TABLET ORAL DAILY
Status: DISCONTINUED | OUTPATIENT
Start: 2023-09-26 | End: 2023-10-03 | Stop reason: HOSPADM

## 2023-09-26 RX ORDER — DIAZEPAM 5 MG/1
10 TABLET ORAL 3 TIMES DAILY
Status: DISCONTINUED | OUTPATIENT
Start: 2023-09-27 | End: 2023-09-28

## 2023-09-26 RX ADMIN — AMLODIPINE BESYLATE 5 MG: 5 TABLET ORAL at 09:09

## 2023-09-26 RX ADMIN — QUETIAPINE FUMARATE 100 MG: 100 TABLET ORAL at 08:09

## 2023-09-26 RX ADMIN — DIAZEPAM 10 MG: 5 TABLET ORAL at 04:09

## 2023-09-26 RX ADMIN — QUETIAPINE FUMARATE 100 MG: 100 TABLET ORAL at 09:09

## 2023-09-26 RX ADMIN — HYDROXYZINE PAMOATE 50 MG: 50 CAPSULE ORAL at 08:09

## 2023-09-26 RX ADMIN — DIAZEPAM 10 MG: 5 TABLET ORAL at 12:09

## 2023-09-26 RX ADMIN — DIAZEPAM 10 MG: 5 TABLET ORAL at 08:09

## 2023-09-26 RX ADMIN — DIAZEPAM 10 MG: 5 TABLET ORAL at 09:09

## 2023-09-26 RX ADMIN — LOSARTAN POTASSIUM 25 MG: 25 TABLET, FILM COATED ORAL at 09:09

## 2023-09-26 NOTE — PROGRESS NOTES
"PSYCHIATRY DAILY INPATIENT PROGRESS NOTE  SUBSEQUENT HOSPITAL VISIT    ENCOUNTER DATE: 9/26/2023  SITE: [facility]    DATE OF ADMISSION: 9/23/2023 10:08 AM  LENGTH OF STAY: 3 days      CHIEF COMPLAINT   Mary Sanches is a 53 y.o. female, seen during daily booker rounds on the inpatient unit.  Mary Sanches presented with the chief complaint of  psychosis, "I really like this lady Sindy.. but I never put my hands anyone."      The patient was seen and examined. The chart was reviewed.     Reviewed notes from Rns and MD and labs from the last 24 hours.    The patient's case was discussed with the treatment team/care providers today including Rns    Staff reports severe (requiring PRN medications for non redirectable, agitated behavior in order to prevent harm to self or others) behavioral or management issues.     The patient has been compliant with treatment.      Subjective 09/26/2023    Patient remains tangential, somewhat disorganized thought process. Again states that she is "allergic to my own sweat and tears." Continues to verbalize physical complaints including "a lip sore" and continuing chronic back pain. Pt states that ulceration of upper lip was "caused by the valium," and subsequently stated she "needs to get back on my alprazolam."      Patient continues to endorse nausea, however states this has improved compared to yesterday.  No tremor is noted. Hypertension continues, Dr. Murray notified and will be initiating antihypertensive.     Patient rambling at times and requires frequent redirection throughout assessment. When asked if still concerned about "implants" as noted in ER documentation, patient states "I don't know."        Psychiatric ROS (observed, reported, or endorsed/denied):  Depressed mood - fluctuating  Interest/pleasure/anhedonia: No  Guilt/hopelessness/worthlessness - No  Changes in Sleep - No  Changes in Appetite - No  Changes in Concentration - No  Changes in Energy - " No  PMA/R- No  Suicidal- active/passive ideations - No  Homicidal ideations: active/passive ideations - No    Hallucinations - Continuing  Delusions - Continuing  Disorganized behavior - Continuing  Disorganized speech - Continuing  Negative symptoms - No    Elevated mood - No  Decreased need for sleep - No  Grandiosity - No  Racing thoughts - Continuing  Impulsivity - Continuing  Irritability- Continuing  Increased energy - No  Distractibility - Continuing  Increase in goal-directed activity or PMA- Continuing    Symptoms of OSKAR - Continuing  Symptoms of Panic Disorder- No  Symptoms of PTSD - No        Overall progress: Patient is showing mild improvement       Psychotherapy:  Target symptoms: anxiety , substance abuse, mood disorder, psychosis  Why chosen therapy is appropriate versus another modality: relevant to diagnosis, patient responds to this modality, evidence based practice  Outcome monitoring methods: self-report, observation  Therapeutic intervention type: insight oriented psychotherapy, behavior modifying psychotherapy, supportive psychotherapy, interactive psychotherapy  Topics discussed/themes: building skills sets for symptom management, symptom recognition  The patient's response to the intervention is accepting. The patient's progress toward treatment goals is limited.   Duration of intervention: 16 minutes      Medical ROS  General ROS: negative  Ophthalmic ROS: negative  ENT ROS: negative  Allergy and Immunology ROS: negative  Hematological and Lymphatic ROS: negative  Endocrine ROS: negative  Respiratory ROS: no cough, shortness of breath, or wheezing  Cardiovascular ROS: no chest pain or dyspnea on exertion  Gastrointestinal ROS: + Nausea- Improving  Genito-Urinary ROS: no dysuria, trouble voiding, or hematuria  Musculoskeletal ROS: + back pain, chronic  Neurological ROS: no TIA or stroke symptoms  Dermatological ROS: negative      PAST MEDICAL HISTORY   Past Medical History:   Diagnosis Date     Anxiety     Chronic back pain     Chronic hepatitis C 6/19/2020    Depression     Hallucination     History of psychiatric hospitalization     Hx of psychiatric care     Hyperthyroidism 12/29/2021    Migraines     Neuropathy     Obesity     Psychiatric exam requested by authority     Psychiatric problem     Psychosis     Sacroiliitis     Schizoaffective disorder     Scoliosis     Sleep difficulties     Therapy     Tobacco use            PSYCHOTROPIC MEDICATIONS   Scheduled Meds:   amLODIPine  5 mg Oral Daily    diazePAM  10 mg Oral QID    losartan  25 mg Oral Daily    QUEtiapine  100 mg Oral BID     Continuous Infusions:  PRN Meds:.acetaminophen, aluminum-magnesium hydroxide-simethicone, benzonatate, benztropine mesylate, hydrOXYzine pamoate, loperamide, LORazepam, nicotine, OLANZapine **AND** OLANZapine, ondansetron, promethazine        EXAMINATION    VITALS   Vitals:    09/24/23 1917 09/25/23 0731 09/25/23 1912 09/26/23 0739   BP: (!) 161/79 (!) 181/97 (!) 148/68 (!) 203/90   BP Location: Left arm Left arm Left arm Left arm   Patient Position: Sitting Sitting Sitting Sitting   Pulse: 73 74 77 75   Resp: 16 20 20 20   Temp: 99 °F (37.2 °C) 98.4 °F (36.9 °C) 98.5 °F (36.9 °C) 98 °F (36.7 °C)   TempSrc: Oral Oral Oral Oral   SpO2: (!) 94% 96% (!) 94% (!) 94%   Weight:       Height:           Body mass index is 27.28 kg/m².    CONSTITUTIONAL  General Appearance: unremarkable, age appropriate, obese     MUSCULOSKELETAL  Muscle Strength and Tone:no dyskinesia, no tremor, no tic  Abnormal Involuntary Movements: No  Gait and Station: non-ataxic     PSYCHIATRIC   Level of Consciousness: awake and alert   Orientation: person, place, time, and situation  Grooming: Disheveled and Hospital garb  Psychomotor Behavior: normal, cooperative, friendly and cooperative, psychomotor agitation, restless and fidgety , eye contact normal  Speech: normal tone, normal rate, normal pitch, pressured, spontaneous  Language: grossly intact,  "able to name, able to repeat  Mood: "Ok"    Affect: Anxious, Consistent with mood, Congruent with thought,    Thought Process: tangential and flight of ideas  Associations: intact   Thought Content: +delusions, denies SI, and denies HI  Perceptions: denies AH and denies  VH  Memory: Able to recall past events, Remote intact, and Recent intact  Attention:Decreased and Easily distracted  Fund of Knowledge: unable to assess  Estimate if Intelligence:  Unable to determine based on work/education history, vocabulary and mental status exam  Insight: limited awareness of illness, poor awareness of illness  Judgment: impaired due to tiago/psychosis (and drug use)    DIAGNOSTIC TESTING   Laboratory Results  No results found for this or any previous visit (from the past 24 hour(s)).            MEDICAL DECISION MAKING      ASSESSMENT:   Bipolar I Disorder mre manic, severe with psychotic features  R/o SAD; r/o SIM/PD  Unspecified Anxiety Disorder     Polysubstance dependence  Nicotine Dependence     Psychosocial stressors     HTN  Chronic pain        PROBLEM LIST AND MANAGEMENT PLANS     Mood/psychosis: pt counseled  -start Serqouel 50 mg po q HS- increase to 50 mg po BID today-Increase to 100 mg BID-Increase to 150 mg BID     Anxiety: pt counseled  -seroquel off-label as above  -Vistaril prn     Polysubstance dependence: pt counseled  -started/continue  Valium 10 mg po TID to prevent w/d- will taper as able-Increase valium to 10 mg QID w/ plan to taper as able.   -Initiate ativan 1 mg TID PRN for breakthrough symptoms/ciwa >9  .  Nicotine Dependence: pt counseled  -start nicotine 14 patch dermal q day     Psychosocial stressors: pt counseled  -SW consulted to assist with resources     HTN: pt counseled  -MEd consulted -Norvasc and losartan ordered     Chronic pain: pt counseled  -MEd consulted       Discussed diagnosis, risks and benefits of proposed treatment vs alternative treatments vs no treatment, potential side effects " of these treatments and the inherent unpredictability of treatment. The patient expresses understanding of the above and displays the capacity to agree with this treatment given said understanding. Patient also agrees that, currently, the benefits outweigh the risks and would like to pursue/continue treatment at this time.    Any medications being used off-label were discussed with the patient inclusive of the evidence base for the use of the medications and consent was obtained for the off-label use of the medication.       DISCHARGE PLANNING  Expected Disposition Plan: Home or Self Care      NEED FOR CONTINUED HOSPITALIZATION  Psychiatric illness continues to pose a potential threat to life or bodily function, of self or others, thereby requiring the need for continued inpatient psychiatric hospitalization: Yes, due to: significant psychotic thought disorder, aggressive neuroleptic titration, and gravely disabled, as evidenced by:  Ongoing concerns with grave disability with patient unable to perform basic feeding, hygiene and dressing activities without significant constant support. and Ongoing concerns with perceptual aberrancy and paranoid persecutory delusions leading to potential harm of self or others.    Protective inpatient pyschiatric hospitalization required while a safe disposition plan is enacted: Yes    Patient stabilized and ready for discharge from inpatient psychiatric unit: No        STAFF:   Denny Morton NP  Psychiatry

## 2023-09-26 NOTE — NURSING
MHT reported that pt complained of itching from the paper scrubs. Assessed pt upper body as she pointed out skin red as he has been scratching no raised areas noted. Admin vistaril 50 mg for itching and insomnia. No ss of distress. No c/o sob. No swelling in her throat. Will continue to monitor.
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
## 2023-09-26 NOTE — PLAN OF CARE
POC reviewed this shift and is on going. Patient is depressed, anxious, irritable, showing pressured speech, labile, and paranoid. Endorses Auditory Hallucinations, Visual Hallucinations, and Delusions. Denies Suicidal Ideation, Homicidal Ideation, Tactile Hallucinations, and Gustatory Hallucinations. Continues to believe that she is being poisoned with amphetamines. Pt continues to be medication compliant and staff will continue to monitor pt closely while on the unit.

## 2023-09-26 NOTE — PLAN OF CARE
POC reviewed this shift and is ongoing.  Pt cooperative with staff and interacting with peers while watching tv. Denies SI, HI, A/V hallucinations.  Pt c/o itching from paper scrubs. Meds for itching was admin. No ss of distress. Pt had positive results and is resting. Will continue to monitor for changes and safety

## 2023-09-27 PROCEDURE — 90833 PSYTX W PT W E/M 30 MIN: CPT | Mod: ,,, | Performed by: PSYCHIATRY & NEUROLOGY

## 2023-09-27 PROCEDURE — 25000003 PHARM REV CODE 250: Performed by: INTERNAL MEDICINE

## 2023-09-27 PROCEDURE — 25000003 PHARM REV CODE 250: Performed by: PSYCHIATRY & NEUROLOGY

## 2023-09-27 PROCEDURE — 11400000 HC PSYCH PRIVATE ROOM

## 2023-09-27 PROCEDURE — 90833 PR PSYCHOTHERAPY W/PATIENT W/E&M, 30 MIN (ADD ON): ICD-10-PCS | Mod: ,,, | Performed by: PSYCHIATRY & NEUROLOGY

## 2023-09-27 PROCEDURE — 99232 PR SUBSEQUENT HOSPITAL CARE,LEVL II: ICD-10-PCS | Mod: ,,, | Performed by: PSYCHIATRY & NEUROLOGY

## 2023-09-27 PROCEDURE — 99232 SBSQ HOSP IP/OBS MODERATE 35: CPT | Mod: ,,, | Performed by: PSYCHIATRY & NEUROLOGY

## 2023-09-27 RX ADMIN — AMLODIPINE BESYLATE 5 MG: 5 TABLET ORAL at 08:09

## 2023-09-27 RX ADMIN — DIAZEPAM 10 MG: 5 TABLET ORAL at 08:09

## 2023-09-27 RX ADMIN — DIAZEPAM 10 MG: 5 TABLET ORAL at 03:09

## 2023-09-27 RX ADMIN — QUETIAPINE FUMARATE 150 MG: 50 TABLET ORAL at 08:09

## 2023-09-27 RX ADMIN — LOSARTAN POTASSIUM 25 MG: 25 TABLET, FILM COATED ORAL at 08:09

## 2023-09-27 NOTE — PLAN OF CARE
Collateral: Ten Sanches, father, [phone]    Collateral Perception of Problem: I really don't know       Previous Psych History/Hospitalizations:  2 hospitalizations    Suicide Attempts (how/severity):  I don't know       History of violence:   None that I know of     Drug Use:  Not sure what kind     Alcohol Use:  None     Legal Issues:  None     Other Pertinent Info:   The lady she talking about, Olya, is a friend, both are drug users   Chang, her  is coming home today, he was in [place] visiting his mother, he works local but works a lot   She currently sees psychiatrist     Baseline:  Hallucinating and talking out her head   She sleeps a lot     Discharge Plan:  Home

## 2023-09-27 NOTE — PLAN OF CARE
Problem: Adult Behavioral Health Plan of Care  Goal: Plan of Care Review  Outcome: Ongoing, Progressing     Problem: Adult Behavioral Health Plan of Care  Goal: Patient-Specific Goal (Individualization)  Outcome: Ongoing, Progressing     Problem: Mood Impairment (Psychotic Signs/Symptoms)  Goal: Improved Mood Symptoms (Psychotic Signs/Symptoms)  Outcome: Ongoing, Progressing

## 2023-09-27 NOTE — PROGRESS NOTES
"PSYCHIATRY DAILY INPATIENT PROGRESS NOTE  SUBSEQUENT HOSPITAL VISIT    ENCOUNTER DATE: 9/27/2023  SITE: [facility]    DATE OF ADMISSION: 9/23/2023 10:08 AM  LENGTH OF STAY: 4 days      CHIEF COMPLAINT   Mary Sanches is a 53 y.o. female, seen during daily booker rounds on the inpatient unit.  Mary Sanches presented with the chief complaint of  psychosis, "I really like this lady Sindy.. but I never put my hands anyone."      The patient was seen and examined. The chart was reviewed.     Reviewed notes from Rns and MD and labs from the last 24 hours.    The patient's case was discussed with the treatment team/care providers today including Rns    Staff reports severe (requiring PRN medications for non redirectable, agitated behavior in order to prevent harm to self or others) behavioral or management issues.     The patient has been compliant with treatment.      Subjective 09/27/2023    Patient endorses continued anxiety, states "They were yelling at me and I don't like being yelled at." At times she states she is referring to staff, at other points she states she is referring to other patients on the unit. Patient remains delusional- Stating she is still concerned that things have been implanted into her body which are "making my skin turn green." Patient points out green bruising to right forearm. She becomes tearful when discussing this.     Pt remains hyperverbal and requires frequent redirection throughout assessment. She continues to verbalize somatic complaints, including bruising as noted above and reported rash to [facility] (unremarkable on assessment, no rash noted._ Patient complaints that she "woke up with blood on my sheets" which she states she did not report to nursing staff. When asked to show NP this blood, the patient states "the white lady came and changed out the sheets."            Psychiatric ROS (observed, reported, or endorsed/denied):  Depressed mood - " fluctuating  Interest/pleasure/anhedonia: No  Guilt/hopelessness/worthlessness - No  Changes in Sleep - No  Changes in Appetite - No  Changes in Concentration - No  Changes in Energy - No  PMA/R- No  Suicidal- active/passive ideations - No  Homicidal ideations: active/passive ideations - No    Hallucinations - less  Delusions - Continuing  Disorganized behavior - Continuing  Disorganized speech - Continuing  Negative symptoms - No    Elevated mood - No  Decreased need for sleep - No  Grandiosity - No  Racing thoughts - Continuing  Impulsivity - Continuing  Irritability- Continuing  Increased energy - No  Distractibility - Continuing  Increase in goal-directed activity or PMA- Continuing    Symptoms of OSKAR - Continuing  Symptoms of Panic Disorder- No  Symptoms of PTSD - No        Overall progress: Patient is showing mild improvement       Psychotherapy:  Target symptoms: anxiety , substance abuse, mood disorder, psychosis  Why chosen therapy is appropriate versus another modality: relevant to diagnosis, patient responds to this modality, evidence based practice  Outcome monitoring methods: self-report, observation  Therapeutic intervention type: insight oriented psychotherapy, behavior modifying psychotherapy, supportive psychotherapy, interactive psychotherapy  Topics discussed/themes: building skills sets for symptom management, symptom recognition  The patient's response to the intervention is accepting. The patient's progress toward treatment goals is limited.   Duration of intervention: 16 minutes      Medical ROS  General ROS: negative  Ophthalmic ROS: negative  ENT ROS: negative  Allergy and Immunology ROS: negative  Hematological and Lymphatic ROS: negative  Endocrine ROS: negative  Respiratory ROS: no cough, shortness of breath, or wheezing  Cardiovascular ROS: no chest pain or dyspnea on exertion  Gastrointestinal ROS: + Nausea- Improving  Genito-Urinary ROS: no dysuria, trouble voiding, or  hematuria  Musculoskeletal ROS: + back pain, chronic  Neurological ROS: no TIA or stroke symptoms  Dermatological ROS: negative      PAST MEDICAL HISTORY   Past Medical History:   Diagnosis Date    Anxiety     Chronic back pain     Chronic hepatitis C 6/19/2020    Depression     Hallucination     History of psychiatric hospitalization     Hx of psychiatric care     Hyperthyroidism 12/29/2021    Migraines     Neuropathy     Obesity     Psychiatric exam requested by authority     Psychiatric problem     Psychosis     Sacroiliitis     Schizoaffective disorder     Scoliosis     Sleep difficulties     Therapy     Tobacco use            PSYCHOTROPIC MEDICATIONS   Scheduled Meds:   amLODIPine  5 mg Oral Daily    diazePAM  10 mg Oral TID    losartan  25 mg Oral Daily    QUEtiapine  150 mg Oral BID     Continuous Infusions:  PRN Meds:.acetaminophen, aluminum-magnesium hydroxide-simethicone, benzonatate, benztropine mesylate, hydrOXYzine pamoate, loperamide, LORazepam, nicotine, OLANZapine **AND** OLANZapine, ondansetron, promethazine        EXAMINATION    VITALS   Vitals:    09/25/23 1912 09/26/23 0739 09/26/23 1922 09/27/23 0722   BP: (!) 148/68 (!) 203/90 (!) 149/87 (!) 155/72   BP Location: Left arm Left arm Left arm Left arm   Patient Position: Sitting Sitting Sitting Sitting   Pulse: 77 75 74 72   Resp: 20 20 18 20   Temp: 98.5 °F (36.9 °C) 98 °F (36.7 °C) 98.9 °F (37.2 °C) 98.5 °F (36.9 °C)   TempSrc: Oral Oral Oral Oral   SpO2: (!) 94% (!) 94% 97% 96%   Weight:       Height:           Body mass index is 27.28 kg/m².    CONSTITUTIONAL  General Appearance: unremarkable, age appropriate, obese     MUSCULOSKELETAL  Muscle Strength and Tone:no dyskinesia, no tremor, no tic  Abnormal Involuntary Movements: No  Gait and Station: non-ataxic     PSYCHIATRIC   Level of Consciousness: awake and alert   Orientation: person, place, time, and situation  Grooming: Disheveled and Hospital garb  Psychomotor Behavior: normal,  "cooperative, friendly and cooperative, psychomotor agitation, restless and fidgety , eye contact normal  Speech: normal tone, increased rate, normal pitch, pressured, spontaneous  Language: grossly intact, able to name, able to repeat  Mood: "Not too great"    Affect: Anxious, Consistent with mood, Congruent with thought,    Thought Process: tangential    Associations: tangential  Thought Content: +delusions, denies SI, and denies HI  Perceptions: denies AH and denies  VH  Memory: Able to recall past events, Remote intact, and Recent intact  Attention:Decreased and Easily distracted  Fund of Knowledge: unable to assess  Estimate if Intelligence:  Unable to determine based on work/education history, vocabulary and mental status exam  Insight: limited awareness of illness, poor awareness of illness  Judgment: impaired due to psychosis    DIAGNOSTIC TESTING   Laboratory Results  No results found for this or any previous visit (from the past 24 hour(s)).      MEDICAL DECISION MAKING      ASSESSMENT:   Bipolar I Disorder mre manic, severe with psychotic features  R/o SAD; r/o SIM/PD  Unspecified Anxiety Disorder     Polysubstance dependence  Nicotine Dependence     Psychosocial stressors     HTN  Chronic pain        PROBLEM LIST AND MANAGEMENT PLANS     Mood/psychosis: pt counseled  -start Serqouel 50 mg po q HS- increase to 50 mg po BID today-Increase to 100 mg BID-Increase to 150 mg BID today with plans to continue titration as tolerated/indicated     Anxiety: pt counseled  -seroquel off-label as above  -Vistaril prn     Polysubstance dependence: pt counseled  -started/continue  Valium 10 mg po TID to prevent w/d- will taper as able-Increase valium to 10 mg QID w/ plan to taper as able. -Taper to TID   -Initiate ativan 1 mg TID PRN for breakthrough symptoms/ciwa >9  .  Nicotine Dependence: pt counseled  -start nicotine 14 patch dermal q day     Psychosocial stressors: pt counseled  -SW consulted to assist with " resources     HTN: pt counseled  -MEd consulted -Norvasc and losartan ordered     Chronic pain: pt counseled  -MEd consulted       Discussed diagnosis, risks and benefits of proposed treatment vs alternative treatments vs no treatment, potential side effects of these treatments and the inherent unpredictability of treatment. The patient expresses understanding of the above and displays the capacity to agree with this treatment given said understanding. Patient also agrees that, currently, the benefits outweigh the risks and would like to pursue/continue treatment at this time.    Any medications being used off-label were discussed with the patient inclusive of the evidence base for the use of the medications and consent was obtained for the off-label use of the medication.       DISCHARGE PLANNING  Expected Disposition Plan: Home or Self Care      NEED FOR CONTINUED HOSPITALIZATION  Psychiatric illness continues to pose a potential threat to life or bodily function, of self or others, thereby requiring the need for continued inpatient psychiatric hospitalization: Yes, due to: significant psychotic thought disorder, aggressive neuroleptic titration, and gravely disabled, as evidenced by:  Ongoing concerns with grave disability with patient unable to perform basic feeding, hygiene and dressing activities without significant constant support. and Ongoing concerns with perceptual aberrancy and paranoid persecutory delusions leading to potential harm of self or others.    Protective inpatient pyschiatric hospitalization required while a safe disposition plan is enacted: Yes    Patient stabilized and ready for discharge from inpatient psychiatric unit: No        STAFF:   Denny Morton NP  Psychiatry

## 2023-09-27 NOTE — PLAN OF CARE
POC reviewed this shift and is ongoing.  Pt cooperative with staff and is med compliant. Interacts with peers. Denies SI, HI, A/V hallucinations. Will continue to monitor for changes and safety.

## 2023-09-27 NOTE — NURSING
Pt in bedroom at present.  Pt has been in her bedroom most of the shift. Pt remains anxious, paranoid at times.  Pt continues to think that she is implanted with things.  At times pt thinks she is being poisoned by amphetamines.  Pt has talked to her  on the phone several times today.  Pt taking meds without side effects noted.  Appetite adequate.  Pt instructed to call for any needs or concerns at all. Verbalized understanding.  Will cont to monitor for changes in status.

## 2023-09-28 PROCEDURE — 90833 PR PSYCHOTHERAPY W/PATIENT W/E&M, 30 MIN (ADD ON): ICD-10-PCS | Mod: ,,, | Performed by: PSYCHIATRY & NEUROLOGY

## 2023-09-28 PROCEDURE — 11400000 HC PSYCH PRIVATE ROOM

## 2023-09-28 PROCEDURE — 99232 SBSQ HOSP IP/OBS MODERATE 35: CPT | Mod: ,,, | Performed by: PSYCHIATRY & NEUROLOGY

## 2023-09-28 PROCEDURE — 25000003 PHARM REV CODE 250: Performed by: INTERNAL MEDICINE

## 2023-09-28 PROCEDURE — 90833 PSYTX W PT W E/M 30 MIN: CPT | Mod: ,,, | Performed by: PSYCHIATRY & NEUROLOGY

## 2023-09-28 PROCEDURE — 99232 PR SUBSEQUENT HOSPITAL CARE,LEVL II: ICD-10-PCS | Mod: ,,, | Performed by: PSYCHIATRY & NEUROLOGY

## 2023-09-28 PROCEDURE — 25000003 PHARM REV CODE 250: Performed by: PSYCHIATRY & NEUROLOGY

## 2023-09-28 RX ORDER — QUETIAPINE FUMARATE 100 MG/1
200 TABLET, FILM COATED ORAL DAILY
Status: DISCONTINUED | OUTPATIENT
Start: 2023-09-29 | End: 2023-09-28

## 2023-09-28 RX ORDER — QUETIAPINE FUMARATE 100 MG/1
300 TABLET, FILM COATED ORAL NIGHTLY
Status: DISCONTINUED | OUTPATIENT
Start: 2023-09-28 | End: 2023-09-29

## 2023-09-28 RX ORDER — QUETIAPINE FUMARATE 100 MG/1
200 TABLET, FILM COATED ORAL 2 TIMES DAILY
Status: DISCONTINUED | OUTPATIENT
Start: 2023-09-28 | End: 2023-09-28

## 2023-09-28 RX ORDER — DIAZEPAM 5 MG/1
5 TABLET ORAL
Status: DISCONTINUED | OUTPATIENT
Start: 2023-09-28 | End: 2023-09-28

## 2023-09-28 RX ORDER — DIAZEPAM 5 MG/1
10 TABLET ORAL 2 TIMES DAILY
Status: DISCONTINUED | OUTPATIENT
Start: 2023-09-28 | End: 2023-09-29

## 2023-09-28 RX ADMIN — AMLODIPINE BESYLATE 5 MG: 5 TABLET ORAL at 08:09

## 2023-09-28 RX ADMIN — QUETIAPINE FUMARATE 150 MG: 50 TABLET ORAL at 08:09

## 2023-09-28 RX ADMIN — DIAZEPAM 10 MG: 5 TABLET ORAL at 08:09

## 2023-09-28 RX ADMIN — LOSARTAN POTASSIUM 25 MG: 25 TABLET, FILM COATED ORAL at 08:09

## 2023-09-28 RX ADMIN — QUETIAPINE FUMARATE 300 MG: 100 TABLET ORAL at 08:09

## 2023-09-28 RX ADMIN — HYDROXYZINE PAMOATE 50 MG: 50 CAPSULE ORAL at 08:09

## 2023-09-28 NOTE — PROGRESS NOTES
"PSYCHIATRY DAILY INPATIENT PROGRESS NOTE  SUBSEQUENT HOSPITAL VISIT    ENCOUNTER DATE: 9/28/2023  SITE: [facility]    DATE OF ADMISSION: 9/23/2023 10:08 AM  LENGTH OF STAY: 5 days      CHIEF COMPLAINT   Mary Sanches is a 53 y.o. female, seen during daily booker rounds on the inpatient unit.  Mary Sanches presented with the chief complaint of  psychosis, "I really like this lady Sindy.. but I never put my hands anyone."      The patient was seen and examined. The chart was reviewed.     Reviewed notes from Rns and MD and labs from the last 24 hours.    The patient's case was discussed with the treatment team/care providers today including Rns    Staff reports severe (requiring PRN medications for non redirectable, agitated behavior in order to prevent harm to self or others) behavioral or management issues.     The patient has been compliant with treatment.      Subjective 09/28/2023    Patient reports anxiety is improved compared to yesterday. Patient may be demonstrating some improved insight, stating "I was thinking about what we talked about yesterday, I don't think my skin is turning green, I don't think there's anything inside me." There is some concern she is minimizing symptoms to ensure discharge.     Patient remains circumstantial during assessment, however this is improving somewhat and patient is generally easier to redirect compared to start of care.     Reports sleep was "better" last night, however continues to c/o difficulty with sleep initiation. She states that sleeping problems are a primary concern, as she "gets crazy and delirious when I don't sleep."    Continues to make intermittent bizarre statements during assessment. When asked about recent conversation with her , patient states "I think he sees straight through me" but is unable to provide additional detail or context.     Continues to deny S/S of benzo withdrawal.     Denies SE to current medication regimen. "     Psychiatric ROS (observed, reported, or endorsed/denied):  Depressed mood - fluctuating  Interest/pleasure/anhedonia: No  Guilt/hopelessness/worthlessness - No  Changes in Sleep - No  Changes in Appetite - No  Changes in Concentration - No  Changes in Energy - No  PMA/R- No  Suicidal- active/passive ideations - No  Homicidal ideations: active/passive ideations - No    Hallucinations - less  Delusions - less  Disorganized behavior - less  Disorganized speech - fluctuating  Negative symptoms - No    Elevated mood - No  Decreased need for sleep - No  Grandiosity - No  Racing thoughts - less  Impulsivity - less  Irritability- less  Increased energy - No  Distractibility - Continuing  Increase in goal-directed activity or PMA- Continuing    Symptoms of OSKAR - fluctuating  Symptoms of Panic Disorder- No  Symptoms of PTSD - No        Overall progress: Patient is showing mild improvement       Psychotherapy:  Target symptoms: anxiety , substance abuse, mood disorder, psychosis  Why chosen therapy is appropriate versus another modality: relevant to diagnosis, patient responds to this modality, evidence based practice  Outcome monitoring methods: self-report, observation  Therapeutic intervention type: insight oriented psychotherapy, behavior modifying psychotherapy, supportive psychotherapy, interactive psychotherapy  Topics discussed/themes: building skills sets for symptom management, symptom recognition  The patient's response to the intervention is accepting. The patient's progress toward treatment goals is limited.   Duration of intervention: 16 minutes      Medical ROS  General ROS: negative  Ophthalmic ROS: negative  ENT ROS: negative  Allergy and Immunology ROS: negative  Hematological and Lymphatic ROS: negative  Endocrine ROS: negative  Respiratory ROS: no cough, shortness of breath, or wheezing  Cardiovascular ROS: no chest pain or dyspnea on exertion  Gastrointestinal ROS: + Nausea- Improving  Genito-Urinary  ROS: no dysuria, trouble voiding, or hematuria  Musculoskeletal ROS: + back pain, chronic  Neurological ROS: no TIA or stroke symptoms  Dermatological ROS: negative      PAST MEDICAL HISTORY   Past Medical History:   Diagnosis Date    Anxiety     Chronic back pain     Chronic hepatitis C 6/19/2020    Depression     Hallucination     History of psychiatric hospitalization     Hx of psychiatric care     Hyperthyroidism 12/29/2021    Migraines     Neuropathy     Obesity     Psychiatric exam requested by authority     Psychiatric problem     Psychosis     Sacroiliitis     Schizoaffective disorder     Scoliosis     Sleep difficulties     Therapy     Tobacco use            PSYCHOTROPIC MEDICATIONS   Scheduled Meds:   amLODIPine  5 mg Oral Daily    diazePAM  10 mg Oral TID    losartan  25 mg Oral Daily    mineral oil-hydrophil petrolat   Topical (Top) BID    QUEtiapine  150 mg Oral BID     Continuous Infusions:  PRN Meds:.acetaminophen, aluminum-magnesium hydroxide-simethicone, benzonatate, benztropine mesylate, hydrOXYzine pamoate, loperamide, LORazepam, nicotine, OLANZapine **AND** OLANZapine, ondansetron, promethazine        EXAMINATION    VITALS   Vitals:    09/26/23 1922 09/27/23 0722 09/27/23 1919 09/28/23 0753   BP: (!) 149/87 (!) 155/72 (!) 161/98 (!) 147/74   BP Location: Left arm Left arm Left arm Left arm   Patient Position: Sitting Sitting Sitting Sitting   Pulse: 74 72 65 72   Resp: 18 20 16 18   Temp: 98.9 °F (37.2 °C) 98.5 °F (36.9 °C) 99 °F (37.2 °C) 97.9 °F (36.6 °C)   TempSrc: Oral Oral Oral Oral   SpO2: 97% 96% 95% 98%   Weight:       Height:           Body mass index is 27.28 kg/m².    CONSTITUTIONAL  General Appearance: unremarkable, age appropriate, obese     MUSCULOSKELETAL  Muscle Strength and Tone:no dyskinesia, no tremor, no tic  Abnormal Involuntary Movements: No  Gait and Station: non-ataxic     PSYCHIATRIC   Level of Consciousness: awake and alert   Orientation: person, place, time, and  "situation  Grooming: Disheveled and Hospital garb  Psychomotor Behavior: normal, cooperative, friendly and cooperative, psychomotor agitation, restless and fidgety , eye contact normal  Speech: normal tone, increased rate, normal pitch, pressured, spontaneous  Language: grossly intact, able to name, able to repeat  Mood: "Better"    Affect:  Consistent with mood, Congruent with thought,    Thought Process: Circumstantial    Associations: Less circumstantial  Thought Content: jaquelin delusions, denies SI, and denies HI  Perceptions: denies AH and denies  VH  Memory: Able to recall past events, Remote intact, and Recent intact  Attention:Decreased and Easily distracted  Fund of Knowledge: unable to assess  Estimate if Intelligence:  Unable to determine based on work/education history, vocabulary and mental status exam  Insight: limited awareness of illness, poor awareness of illness  Judgment: impaired due to psychosis    DIAGNOSTIC TESTING   Laboratory Results  No results found for this or any previous visit (from the past 24 hour(s)).      MEDICAL DECISION MAKING      ASSESSMENT:   Bipolar I Disorder mre manic, severe with psychotic features  R/o SAD; r/o SIM/PD  Unspecified Anxiety Disorder     Polysubstance dependence  Nicotine Dependence     Psychosocial stressors     HTN  Chronic pain        PROBLEM LIST AND MANAGEMENT PLANS     Mood/psychosis: pt counseled  -start Serqouel 50 mg po q HS- increase to 50 mg po BID today-Increase to 100 mg BID-Increase to 150 mg BID today with plans to continue titration as tolerated/indicated-Increase PM dose to 300 mg this evening, AM dose to 200 mg tomorrow     Anxiety: pt counseled  -seroquel off-label as above  -Vistaril prn     Polysubstance dependence: pt counseled  -started/continue  Valium 10 mg po TID to prevent w/d- will taper as able-Increase valium to 10 mg QID w/ plan to taper as able. -Taper to TID -Taper to 10 mg BID today  -Initiate ativan 1 mg TID PRN for " breakthrough symptoms/ciwa >9  .  Nicotine Dependence: pt counseled  -start nicotine 14 patch dermal q day     Psychosocial stressors: pt counseled  -SW consulted to assist with resources     HTN: pt counseled  -MEd consulted -Norvasc and losartan ordered     Chronic pain: pt counseled  -MEd consulted       Discussed diagnosis, risks and benefits of proposed treatment vs alternative treatments vs no treatment, potential side effects of these treatments and the inherent unpredictability of treatment. The patient expresses understanding of the above and displays the capacity to agree with this treatment given said understanding. Patient also agrees that, currently, the benefits outweigh the risks and would like to pursue/continue treatment at this time.    Any medications being used off-label were discussed with the patient inclusive of the evidence base for the use of the medications and consent was obtained for the off-label use of the medication.       DISCHARGE PLANNING  Expected Disposition Plan: Home or Self Care      NEED FOR CONTINUED HOSPITALIZATION  Psychiatric illness continues to pose a potential threat to life or bodily function, of self or others, thereby requiring the need for continued inpatient psychiatric hospitalization: Yes, due to: significant psychotic thought disorder, aggressive neuroleptic titration, and gravely disabled, as evidenced by:  Ongoing concerns with grave disability with patient unable to perform basic feeding, hygiene and dressing activities without significant constant support. and Ongoing concerns with perceptual aberrancy and paranoid persecutory delusions leading to potential harm of self or others.    Protective inpatient pyschiatric hospitalization required while a safe disposition plan is enacted: Yes    Patient stabilized and ready for discharge from inpatient psychiatric unit: No        STAFF:   Denny Morton NP  Psychiatry

## 2023-09-28 NOTE — PLAN OF CARE
Poc reviewed and ongoing,there have been no changes,mood labile,conts to seek medication without asking,anger and impulse control,on going and progressing,is denying all ideations at this time,will cont.to monitor as per protocol,care as per treatment plan.

## 2023-09-28 NOTE — PLAN OF CARE
Patient remained in room throughout the night. No complaints voiced. Patient calm and compliant. POC ongoing.

## 2023-09-29 PROCEDURE — 25000003 PHARM REV CODE 250: Performed by: PSYCHIATRY & NEUROLOGY

## 2023-09-29 PROCEDURE — 99232 PR SUBSEQUENT HOSPITAL CARE,LEVL II: ICD-10-PCS | Mod: ,,, | Performed by: PSYCHIATRY & NEUROLOGY

## 2023-09-29 PROCEDURE — 11400000 HC PSYCH PRIVATE ROOM

## 2023-09-29 PROCEDURE — 99232 SBSQ HOSP IP/OBS MODERATE 35: CPT | Mod: ,,, | Performed by: PSYCHIATRY & NEUROLOGY

## 2023-09-29 PROCEDURE — 90833 PR PSYCHOTHERAPY W/PATIENT W/E&M, 30 MIN (ADD ON): ICD-10-PCS | Mod: ,,, | Performed by: PSYCHIATRY & NEUROLOGY

## 2023-09-29 PROCEDURE — 90833 PSYTX W PT W E/M 30 MIN: CPT | Mod: ,,, | Performed by: PSYCHIATRY & NEUROLOGY

## 2023-09-29 PROCEDURE — 25000003 PHARM REV CODE 250: Performed by: INTERNAL MEDICINE

## 2023-09-29 RX ORDER — DIAZEPAM 5 MG/1
5 TABLET ORAL 2 TIMES DAILY
Status: DISCONTINUED | OUTPATIENT
Start: 2023-09-29 | End: 2023-10-02

## 2023-09-29 RX ORDER — QUETIAPINE FUMARATE 100 MG/1
300 TABLET, FILM COATED ORAL 2 TIMES DAILY
Status: DISCONTINUED | OUTPATIENT
Start: 2023-09-29 | End: 2023-10-02

## 2023-09-29 RX ADMIN — AMLODIPINE BESYLATE 5 MG: 5 TABLET ORAL at 08:09

## 2023-09-29 RX ADMIN — LOSARTAN POTASSIUM 25 MG: 25 TABLET, FILM COATED ORAL at 08:09

## 2023-09-29 RX ADMIN — HYDROXYZINE PAMOATE 50 MG: 50 CAPSULE ORAL at 08:09

## 2023-09-29 RX ADMIN — QUETIAPINE FUMARATE 300 MG: 100 TABLET ORAL at 08:09

## 2023-09-29 RX ADMIN — DIAZEPAM 10 MG: 5 TABLET ORAL at 08:09

## 2023-09-29 RX ADMIN — DIAZEPAM 5 MG: 5 TABLET ORAL at 08:09

## 2023-09-29 NOTE — PROGRESS NOTES
"PSYCHIATRY DAILY INPATIENT PROGRESS NOTE  SUBSEQUENT HOSPITAL VISIT    ENCOUNTER DATE: 9/29/2023  SITE[facility] [place]    DATE OF ADMISSION: 9/23/2023 10:08 AM  LENGTH OF STAY: 6 days      CHIEF COMPLAINT   Mary Sanches is a 53 y.o. female, seen during daily booker rounds on the inpatient unit.  Mary Sanches presented with the chief complaint of  psychosis, "I really like this lady Sindy.. but I never put my hands anyone."      The patient was seen and examined. The chart was reviewed.     Reviewed notes from Rns and MD and labs from the last 24 hours.    The patient's case was discussed with the treatment team/care providers today including Rns    Staff reports severe (requiring PRN medications for non redirectable, agitated behavior in order to prevent harm to self or others) behavioral or management issues.     The patient has been compliant with treatment.      Subjective 09/29/2023    Pt continues to report improvement regarding anxiety. Reports sleep continues to improve. During assessment, patient does not make any blatantly delusional statements- When asked if she is still concerned that devices were implanted into her body, the patient laughed, stating "No I don't know what I was thinking, I was going crazy." Patient remains fairly circumstantial during assessment requiring frequent redirection.     Again discussed potential dangers of home medication regimen. Patient verbalized understanding, however she also states she will likely continue this regimen once discharged.     Continues to deny S/S of benzo withdrawal.     Denies SE to current medication regimen.     Psychiatric ROS (observed, reported, or endorsed/denied):  Depressed mood - fluctuating  Interest/pleasure/anhedonia: No  Guilt/hopelessness/worthlessness - No  Changes in Sleep - No  Changes in Appetite - No  Changes in Concentration - No  Changes in Energy - No  PMA/R- No  Suicidal- active/passive ideations - No  Homicidal " ideations: active/passive ideations - No    Hallucinations - less  Delusions - less  Disorganized behavior - less  Disorganized speech - fluctuating  Negative symptoms - No    Elevated mood - No  Decreased need for sleep - No  Grandiosity - No  Racing thoughts - less  Impulsivity - less  Irritability- less  Increased energy - No  Distractibility - Continuing  Increase in goal-directed activity or PMA- Continuing    Symptoms of OSKAR - fluctuating  Symptoms of Panic Disorder- No  Symptoms of PTSD - No        Overall progress: Patient is showing mild improvement       Psychotherapy:  Target symptoms: anxiety , substance abuse, mood disorder, psychosis  Why chosen therapy is appropriate versus another modality: relevant to diagnosis, patient responds to this modality, evidence based practice  Outcome monitoring methods: self-report, observation  Therapeutic intervention type: insight oriented psychotherapy, behavior modifying psychotherapy, supportive psychotherapy, interactive psychotherapy  Topics discussed/themes: building skills sets for symptom management, symptom recognition  The patient's response to the intervention is accepting. The patient's progress toward treatment goals is limited.   Duration of intervention: 16 minutes      Medical ROS  General ROS: negative  Ophthalmic ROS: negative  ENT ROS: negative  Allergy and Immunology ROS: negative  Hematological and Lymphatic ROS: negative  Endocrine ROS: negative  Respiratory ROS: no cough, shortness of breath, or wheezing  Cardiovascular ROS: no chest pain or dyspnea on exertion  Gastrointestinal ROS: + Nausea- Improving  Genito-Urinary ROS: no dysuria, trouble voiding, or hematuria  Musculoskeletal ROS: + back pain, chronic  Neurological ROS: no TIA or stroke symptoms  Dermatological ROS: negative      PAST MEDICAL HISTORY   Past Medical History:   Diagnosis Date    Anxiety     Chronic back pain     Chronic hepatitis C 6/19/2020    Depression     Hallucination   "   History of psychiatric hospitalization     Hx of psychiatric care     Hyperthyroidism 12/29/2021    Migraines     Neuropathy     Obesity     Psychiatric exam requested by authority     Psychiatric problem     Psychosis     Sacroiliitis     Schizoaffective disorder     Scoliosis     Sleep difficulties     Therapy     Tobacco use            PSYCHOTROPIC MEDICATIONS   Scheduled Meds:   amLODIPine  5 mg Oral Daily    diazePAM  10 mg Oral BID    losartan  25 mg Oral Daily    mineral oil-hydrophil petrolat   Topical (Top) BID    QUEtiapine  300 mg Oral QHS     Continuous Infusions:  PRN Meds:.acetaminophen, aluminum-magnesium hydroxide-simethicone, benzonatate, benztropine mesylate, hydrOXYzine pamoate, loperamide, LORazepam, nicotine, OLANZapine **AND** OLANZapine, ondansetron, promethazine        EXAMINATION    VITALS   Vitals:    09/27/23 1919 09/28/23 0753 09/28/23 1911 09/29/23 0720   BP: (!) 161/98 (!) 147/74 126/60 134/64   BP Location: Left arm Left arm Left arm Left arm   Patient Position: Sitting Sitting Sitting Sitting   Pulse: 65 72 69 76   Resp: 16 18 20 20   Temp: 99 °F (37.2 °C) 97.9 °F (36.6 °C) 97.7 °F (36.5 °C) 98.5 °F (36.9 °C)   TempSrc: Oral Oral Oral Oral   SpO2: 95% 98% 98% 95%   Weight:       Height:           Body mass index is 27.28 kg/m².    CONSTITUTIONAL  General Appearance: unremarkable, age appropriate, obese     MUSCULOSKELETAL  Muscle Strength and Tone:no dyskinesia, no tremor, no tic  Abnormal Involuntary Movements: No  Gait and Station: non-ataxic     PSYCHIATRIC   Level of Consciousness: awake and alert   Orientation: person, place, time, and situation  Grooming: Disheveled and Hospital garb  Psychomotor Behavior: normal, cooperative, friendly and cooperative, psychomotor agitation, restless and fidgety , eye contact normal  Speech: normal tone, increased rate, normal pitch, pressured, spontaneous  Language: grossly intact, able to name, able to repeat  Mood: "Better"    Affect:  " Consistent with mood, Congruent with thought,    Thought Process: Circumstantial    Associations: Less circumstantial  Thought Content: jaquelin delusions, denies SI, and denies HI  Perceptions: denies AH and denies  VH  Memory: Able to recall past events, Remote intact, and Recent intact  Attention:Decreased and Easily distracted  Fund of Knowledge: unable to assess  Estimate if Intelligence:  Unable to determine based on work/education history, vocabulary and mental status exam  Insight: limited awareness of illness, poor awareness of illness  Judgment: impaired due to psychosis    DIAGNOSTIC TESTING   Laboratory Results  No results found for this or any previous visit (from the past 24 hour(s)).      MEDICAL DECISION MAKING      ASSESSMENT:   Bipolar I Disorder mre manic, severe with psychotic features  R/o SAD; r/o SIM/PD  Unspecified Anxiety Disorder     Polysubstance dependence  Nicotine Dependence     Psychosocial stressors     HTN  Chronic pain        PROBLEM LIST AND MANAGEMENT PLANS     Mood/psychosis: pt counseled  -start Serqouel 50 mg po q HS- increase to 50 mg po BID today-Increase to 100 mg BID-Increase to 150 mg BID today with plans to continue titration as tolerated/indicated-Increase PM dose to 300 mg this evening, AM dose to 200 mg tomorrow- Increase to 300 mg BID     Anxiety: pt counseled  -seroquel off-label as above  -Vistaril prn     Polysubstance dependence: pt counseled  -started/continue  Valium 10 mg po TID to prevent w/d- will taper as able-Increase valium to 10 mg QID w/ plan to taper as able. -Taper to TID -Taper to 10 mg BID today-Taper to 5 mg BID  -Initiate ativan 1 mg TID PRN for breakthrough symptoms/ciwa >9  .  Nicotine Dependence: pt counseled  -start nicotine 14 patch dermal q day     Psychosocial stressors: pt counseled  -SW consulted to assist with resources     HTN: pt counseled  -MEd consulted -Norvasc and losartan ordered     Chronic pain: pt counseled  -MEd consulted        Discussed diagnosis, risks and benefits of proposed treatment vs alternative treatments vs no treatment, potential side effects of these treatments and the inherent unpredictability of treatment. The patient expresses understanding of the above and displays the capacity to agree with this treatment given said understanding. Patient also agrees that, currently, the benefits outweigh the risks and would like to pursue/continue treatment at this time.    Any medications being used off-label were discussed with the patient inclusive of the evidence base for the use of the medications and consent was obtained for the off-label use of the medication.       DISCHARGE PLANNING  Expected Disposition Plan: Home or Self Care      NEED FOR CONTINUED HOSPITALIZATION  Psychiatric illness continues to pose a potential threat to life or bodily function, of self or others, thereby requiring the need for continued inpatient psychiatric hospitalization: Yes, due to: significant psychotic thought disorder, aggressive neuroleptic titration, and gravely disabled, as evidenced by:  Ongoing concerns with grave disability with patient unable to perform basic feeding, hygiene and dressing activities without significant constant support. and Ongoing concerns with perceptual aberrancy and paranoid persecutory delusions leading to potential harm of self or others.    Protective inpatient pyschiatric hospitalization required while a safe disposition plan is enacted: Yes    Patient stabilized and ready for discharge from inpatient psychiatric unit: No        STAFF:   Denny Morton NP  Psychiatry

## 2023-09-29 NOTE — PLAN OF CARE
POC reviewed this shift and is on going. Patient is cooperative, anxious, showing pressured speech, and paranoid. Endorses Delusions. Denies Suicidal Ideation, Homicidal Ideation, Auditory Hallucinations, Visual Hallucinations, Tactile Hallucinations, and Gustatory Hallucinations. Interacts well with select peers. Remains delusional. Pt continues to be medication compliant and staff will continue to monitor pt closely while on the unit.

## 2023-09-30 PROBLEM — F31.13 BIPOLAR I DISORDER, CURRENT OR MOST RECENT EPISODE MANIC, SEVERE: Status: ACTIVE | Noted: 2023-09-30

## 2023-09-30 PROCEDURE — 25000003 PHARM REV CODE 250: Performed by: PSYCHIATRY & NEUROLOGY

## 2023-09-30 PROCEDURE — 99232 SBSQ HOSP IP/OBS MODERATE 35: CPT | Mod: HCNC,,, | Performed by: PSYCHIATRY & NEUROLOGY

## 2023-09-30 PROCEDURE — 25000003 PHARM REV CODE 250: Performed by: INTERNAL MEDICINE

## 2023-09-30 PROCEDURE — 99232 PR SUBSEQUENT HOSPITAL CARE,LEVL II: ICD-10-PCS | Mod: HCNC,,, | Performed by: PSYCHIATRY & NEUROLOGY

## 2023-09-30 PROCEDURE — 11400000 HC PSYCH PRIVATE ROOM

## 2023-09-30 RX ADMIN — ACETAMINOPHEN 650 MG: 325 TABLET ORAL at 10:09

## 2023-09-30 RX ADMIN — LOSARTAN POTASSIUM 25 MG: 25 TABLET, FILM COATED ORAL at 08:09

## 2023-09-30 RX ADMIN — DIAZEPAM 5 MG: 5 TABLET ORAL at 08:09

## 2023-09-30 RX ADMIN — QUETIAPINE FUMARATE 300 MG: 100 TABLET ORAL at 08:09

## 2023-09-30 RX ADMIN — HYDROXYZINE PAMOATE 50 MG: 50 CAPSULE ORAL at 08:09

## 2023-09-30 RX ADMIN — AMLODIPINE BESYLATE 5 MG: 5 TABLET ORAL at 08:09

## 2023-09-30 NOTE — PLAN OF CARE
Problem: Adult Behavioral Health Plan of Care  Goal: Plan of Care Review  Outcome: Ongoing, Progressing  Goal: Patient-Specific Goal (Individualization)  Outcome: Ongoing, Progressing  Goal: Adheres to Safety Considerations for Self and Others  Outcome: Ongoing, Progressing  Goal: Absence of New-Onset Illness or Injury  Outcome: Ongoing, Progressing  Goal: Optimized Coping Skills in Response to Life Stressors  Outcome: Ongoing, Progressing     Problem: Violence Risk or Actual  Goal: Anger and Impulse Control  Outcome: Ongoing, Progressing     Problem: Behavior Regulation Impairment (Psychotic Signs/Symptoms)  Goal: Improved Behavioral Control (Psychotic Signs/Symptoms)  Outcome: Ongoing, Progressing     Problem: Cognitive Impairment (Psychotic Signs/Symptoms)  Goal: Optimal Cognitive Function (Psychotic Signs/Symptoms)  Outcome: Ongoing, Progressing     Problem: Decreased Participation and Engagement (Psychotic Signs/Symptoms)  Goal: Increased Participation and Engagement (Psychotic Signs/Symptoms)  Outcome: Ongoing, Progressing     Problem: Mood Impairment (Psychotic Signs/Symptoms)  Goal: Improved Mood Symptoms (Psychotic Signs/Symptoms)  Outcome: Ongoing, Progressing     Problem: Psychomotor Impairment (Psychotic Signs/Symptoms)  Goal: Improved Psychomotor Symptoms (Psychotic Signs/Symptoms)  Outcome: Ongoing, Progressing     Problem: Sensory Perception Impairment (Psychotic Signs/Symptoms)  Goal: Decreased Sensory Symptoms (Psychotic Signs/Symptoms)  Outcome: Ongoing, Progressing     Problem: Sleep Disturbance (Psychotic Signs/Symptoms)  Goal: Improved Sleep (Psychotic Signs/Symptoms)  Outcome: Ongoing, Progressing     Problem: Social, Occupational or Functional Impairment (Psychotic Signs/Symptoms)  Goal: Enhanced Social, Occupational or Functional Skills (Psychotic Signs/Symptoms)  Outcome: Ongoing, Progressing     Problem: Activity and Energy Impairment (Anxiety Signs/Symptoms)  Goal: Optimized Energy Level  (Anxiety Signs/Symptoms)  Outcome: Ongoing, Progressing     Problem: Cognitive Impairment (Anxiety Signs/Symptoms)  Goal: Optimized Cognitive Function (Anxiety Signs/Symptoms)  Outcome: Ongoing, Progressing     Problem: Mood Impairment (Anxiety Signs/Symptoms)  Goal: Improved Mood Symptoms (Anxiety Signs/Symptoms)  Outcome: Ongoing, Progressing     Problem: Sleep Impairment (Anxiety Signs/Symptoms)  Goal: Improved Sleep (Anxiety Signs/Symptoms)  Outcome: Ongoing, Progressing     Problem: Social, Occupational or Functional Impairment (Anxiety Signs/Symptoms)  Goal: Enhanced Social, Occupational or Functional Skills (Anxiety Signs/Symptoms)  Outcome: Ongoing, Progressing     Problem: Somatic Disturbance (Anxiety Signs/Symptoms)  Goal: Improved Somatic Symptoms (Anxiety Signs/Symptoms)  Outcome: Ongoing, Progressing     Problem: Activity and Energy Impairment (Excessive Substance Use)  Goal: Optimized Energy Level (Excessive Substance Use)  Outcome: Ongoing, Progressing     Problem: Behavior Regulation Impairment (Excessive Substance Use)  Goal: Improved Behavioral Control (Excessive Substance Use)  Outcome: Ongoing, Progressing     Problem: Decreased Participation and Engagement (Excessive Substance Use)  Goal: Increased Participation and Engagement (Excessive Substance Use)  Outcome: Ongoing, Progressing     Problem: Physiologic Impairment (Excessive Substance Use)  Goal: Improved Physiologic Symptoms (Excessive Substance Use)  Outcome: Ongoing, Progressing     Problem: Social, Occupational or Functional Impairment (Excessive Substance Use)  Goal: Enhanced Social, Occupational or Functional Skills (Excessive Substance Use)  Outcome: Ongoing, Progressing     Problem: Activity and Energy Impairment (Depressive Signs/Symptoms)  Goal: Optimized Energy Level (Depressive Signs/Symptoms)  Outcome: Ongoing, Progressing     Problem: Cognitive Impairment (Depressive Signs/Symptoms)  Goal: Optimized Cognitive Function  Outcome:  Ongoing, Progressing     Problem: Decreased Participation/Engagement (Depressive Signs/Symptoms)  Goal: Increased Participation and Engagement (Depressive Signs/Symptoms)  Outcome: Ongoing, Progressing     Problem: Feelings of Worthlessness, Hopelessness or Excessive Guilt (Depressive Signs/Symptoms)  Goal: Enhanced Self-Esteem and Confidence (Depressive Signs/Symptoms)  Outcome: Ongoing, Progressing     Problem: Mood Impairment (Depressive Signs/Symptoms)  Goal: Improved Mood Symptoms (Depressive Signs/Symptoms)  Outcome: Ongoing, Progressing     Problem: Nutrition Imbalance (Depressive Signs/Symptoms)  Goal: Optimized Nutrition Intake  Outcome: Ongoing, Progressing     Problem: Psychomotor Impairment (Depressive Signs/Symptoms)  Goal: Improved Psychomotor Symptoms (Depressive Signs/Symptoms)  Outcome: Ongoing, Progressing     Problem: Sleep Disturbance (Depressive Signs/Symptoms)  Goal: Improved Sleep (Depressive Signs/Symptoms)  Outcome: Ongoing, Progressing     Problem: Social, Occupational or Functional Impairment (Depressive Signs/Symptoms)  Goal: Enhanced Social, Occupational or Functional Skills (Depressive Signs/Symptoms)  Outcome: Ongoing, Progressing

## 2023-09-30 NOTE — PROGRESS NOTES
"PSYCHIATRY DAILY INPATIENT PROGRESS NOTE  SUBSEQUENT HOSPITAL VISIT    ENCOUNTER DATE: 9/30/2023  SITE: [facility]    DATE OF ADMISSION: 9/23/2023 10:08 AM  LENGTH OF STAY: 7 days      CHIEF COMPLAINT   Mary Sanches is a 53 y.o. female, seen during daily booker rounds on the inpatient unit.  Mary Sanches presented with the chief complaint of  psychosis, "I really like this lady Sindy.. but I never put my hands anyone."      The patient was seen and examined. The chart was reviewed.     Reviewed notes from Rns and MD and labs from the last 24 hours.    The patient's case was discussed with the treatment team/care providers today including Rns    Staff reports severe (requiring PRN medications for non redirectable, agitated behavior in order to prevent harm to self or others) behavioral or management issues.     The patient has been compliant with treatment.      Subjective 09/30/2023    Pt continues to report improvement regarding anxiety. Reports sleep continues to improve. Patient remains fairly circumstantial during assessment requiring frequent redirection.     Pt reports she is feeling "pretty good." States she finds the medication is working well. "You know something? HE is trying to get me off my medication after 30 years. The other doctor, he wants to change my alprazolam to valium. How is somebody just gonna pull somebody off a medication you have been taking since you was a child? I think he wants me to walk out on no medication, but it's like he has no clue. My anxiety is so bad. He maintained it but now he wants to take me off of it. How is that?" Pt is difficult to redirect on this. Insight remains poor.     Again discussed potential dangers of home medication regimen. Patient verbalized understanding, however she also states she will likely continue this regimen once discharged.     Continues to deny S/S of benzo withdrawal.     Denies SE to current medication regimen.     Psychiatric " ROS (observed, reported, or endorsed/denied):  Depressed mood - fluctuating  Interest/pleasure/anhedonia: No  Guilt/hopelessness/worthlessness - No  Changes in Sleep - No  Changes in Appetite - No  Changes in Concentration - No  Changes in Energy - No  PMA/R- No  Suicidal- active/passive ideations - No  Homicidal ideations: active/passive ideations - No    Hallucinations - less  Delusions - less  Disorganized behavior - less  Disorganized speech - fluctuating  Negative symptoms - No    Elevated mood - No  Decreased need for sleep - No  Grandiosity - No  Racing thoughts - less  Impulsivity - less  Irritability- less  Increased energy - No  Distractibility - Continuing  Increase in goal-directed activity or PMA- Continuing    Symptoms of OSKAR - fluctuating  Symptoms of Panic Disorder- No  Symptoms of PTSD - No        Overall progress: Patient is showing mild improvement       Psychotherapy:  Target symptoms: anxiety , substance abuse, mood disorder, psychosis  Why chosen therapy is appropriate versus another modality: relevant to diagnosis, patient responds to this modality, evidence based practice  Outcome monitoring methods: self-report, observation  Therapeutic intervention type: insight oriented psychotherapy, behavior modifying psychotherapy, supportive psychotherapy, interactive psychotherapy  Topics discussed/themes: building skills sets for symptom management, symptom recognition  The patient's response to the intervention is accepting. The patient's progress toward treatment goals is limited.   Duration of intervention: 16 minutes      Medical ROS  General ROS: negative  Ophthalmic ROS: negative  ENT ROS: negative  Allergy and Immunology ROS: negative  Hematological and Lymphatic ROS: negative  Endocrine ROS: negative  Respiratory ROS: no cough, shortness of breath, or wheezing  Cardiovascular ROS: no chest pain or dyspnea on exertion  Gastrointestinal ROS: + Nausea- Improving  Genito-Urinary ROS: no dysuria,  trouble voiding, or hematuria  Musculoskeletal ROS: + back pain, chronic  Neurological ROS: no TIA or stroke symptoms  Dermatological ROS: negative      PAST MEDICAL HISTORY   Past Medical History:   Diagnosis Date    Anxiety     Chronic back pain     Chronic hepatitis C 6/19/2020    Depression     Hallucination     History of psychiatric hospitalization     Hx of psychiatric care     Hyperthyroidism 12/29/2021    Migraines     Neuropathy     Obesity     Psychiatric exam requested by authority     Psychiatric problem     Psychosis     Sacroiliitis     Schizoaffective disorder     Scoliosis     Sleep difficulties     Therapy     Tobacco use            PSYCHOTROPIC MEDICATIONS   Scheduled Meds:   amLODIPine  5 mg Oral Daily    diazePAM  5 mg Oral BID    losartan  25 mg Oral Daily    mineral oil-hydrophil petrolat   Topical (Top) BID    QUEtiapine  300 mg Oral BID     Continuous Infusions:  PRN Meds:.acetaminophen, aluminum-magnesium hydroxide-simethicone, benzonatate, benztropine mesylate, hydrOXYzine pamoate, loperamide, LORazepam, nicotine, OLANZapine **AND** OLANZapine, ondansetron, promethazine        EXAMINATION    VITALS   Vitals:    09/28/23 1911 09/29/23 0720 09/29/23 1905 09/30/23 0725   BP: 126/60 134/64 (!) 144/67 135/68   BP Location: Left arm Left arm Left arm Left arm   Patient Position: Sitting Sitting Sitting Sitting   Pulse: 69 76 66 73   Resp: 20 20 16 20   Temp: 97.7 °F (36.5 °C) 98.5 °F (36.9 °C) 98.2 °F (36.8 °C) 98.3 °F (36.8 °C)   TempSrc: Oral Oral Oral Oral   SpO2: 98% 95% 95% 95%   Weight:       Height:           Body mass index is 27.28 kg/m².    CONSTITUTIONAL  General Appearance: unremarkable, age appropriate, obese     MUSCULOSKELETAL  Muscle Strength and Tone:no dyskinesia, no tremor, no tic  Abnormal Involuntary Movements: No  Gait and Station: non-ataxic     PSYCHIATRIC   Level of Consciousness: awake and alert   Orientation: person, place, time, and situation  Grooming: Disheveled and  "[facility] gar  Psychomotor Behavior: normal, cooperative, friendly and cooperative, psychomotor agitation, restless and fidgety , eye contact normal  Speech: normal tone, increased rate, normal pitch, pressured, spontaneous  Language: grossly intact, able to name, able to repeat  Mood: "Better"    Affect:  Consistent with mood, Congruent with thought,    Thought Process: Circumstantial    Associations: Less circumstantial  Thought Content: jaquelin delusions, denies SI, and denies HI  Perceptions: denies AH and denies  VH  Memory: Able to recall past events, Remote intact, and Recent intact  Attention:Decreased and Easily distracted  Fund of Knowledge: unable to assess  Estimate if Intelligence:  Unable to determine based on work/education history, vocabulary and mental status exam  Insight: limited awareness of illness, poor awareness of illness  Judgment: impaired due to psychosis    DIAGNOSTIC TESTING   Laboratory Results  No results found for this or any previous visit (from the past 24 hour(s)).      MEDICAL DECISION MAKING      ASSESSMENT:   Bipolar I Disorder mre manic, severe with psychotic features  R/o SAD; r/o SIM/PD  Unspecified Anxiety Disorder     Polysubstance dependence  Nicotine Dependence     Psychosocial stressors     HTN  Chronic pain        PROBLEM LIST AND MANAGEMENT PLANS     Mood/psychosis: pt counseled  -start Serqouel 50 mg po q HS- increase to 50 mg po BID today-Increase to 100 mg BID-Increase to 150 mg BID today with plans to continue titration as tolerated/indicated-Increase PM dose to 300 mg this evening, AM dose to 200 mg tomorrow- Increase to 300 mg BID     Anxiety: pt counseled  -seroquel off-label as above  -Vistaril prn     Polysubstance dependence: pt counseled  -started/continue  Valium 10 mg po TID to prevent w/d- will taper as able-Increase valium to 10 mg QID w/ plan to taper as able. -Taper to TID -Taper to 10 mg BID today-Taper to 5 mg BID  -Initiate ativan 1 mg TID PRN for " breakthrough symptoms/ciwa >9  .  Nicotine Dependence: pt counseled  -start nicotine 14 patch dermal q day     Psychosocial stressors: pt counseled  -SW consulted to assist with resources     HTN: pt counseled  -MEd consulted -Norvasc and losartan ordered     Chronic pain: pt counseled  -MEd consulted       Discussed diagnosis, risks and benefits of proposed treatment vs alternative treatments vs no treatment, potential side effects of these treatments and the inherent unpredictability of treatment. The patient expresses understanding of the above and displays the capacity to agree with this treatment given said understanding. Patient also agrees that, currently, the benefits outweigh the risks and would like to pursue/continue treatment at this time.    Any medications being used off-label were discussed with the patient inclusive of the evidence base for the use of the medications and consent was obtained for the off-label use of the medication.       DISCHARGE PLANNING  Expected Disposition Plan: Home or Self Care      NEED FOR CONTINUED HOSPITALIZATION  Psychiatric illness continues to pose a potential threat to life or bodily function, of self or others, thereby requiring the need for continued inpatient psychiatric hospitalization: Yes, due to: significant psychotic thought disorder, aggressive neuroleptic titration, and gravely disabled, as evidenced by:  Ongoing concerns with grave disability with patient unable to perform basic feeding, hygiene and dressing activities without significant constant support. and Ongoing concerns with perceptual aberrancy and paranoid persecutory delusions leading to potential harm of self or others.    Protective inpatient pyschiatric hospitalization required while a safe disposition plan is enacted: Yes    Patient stabilized and ready for discharge from inpatient psychiatric unit: No        STAFF:   Cameron Longoria MD  Psychiatry

## 2023-09-30 NOTE — NURSING
POC reviewed this shift and is on going.  Pt calm and cooperative on unit and compliant with medications.  Pt sitting and watching TV in the dining area, interactions with peers are awkward.  Pt denies SI/HI/AVH.

## 2023-10-01 PROCEDURE — 99232 SBSQ HOSP IP/OBS MODERATE 35: CPT | Mod: HCNC,,, | Performed by: PSYCHIATRY & NEUROLOGY

## 2023-10-01 PROCEDURE — 25000003 PHARM REV CODE 250: Performed by: PSYCHIATRY & NEUROLOGY

## 2023-10-01 PROCEDURE — 11400000 HC PSYCH PRIVATE ROOM

## 2023-10-01 PROCEDURE — 99232 PR SUBSEQUENT HOSPITAL CARE,LEVL II: ICD-10-PCS | Mod: HCNC,,, | Performed by: PSYCHIATRY & NEUROLOGY

## 2023-10-01 PROCEDURE — 25000003 PHARM REV CODE 250: Performed by: INTERNAL MEDICINE

## 2023-10-01 RX ORDER — POLYETHYLENE GLYCOL 3350 17 G/17G
17 POWDER, FOR SOLUTION ORAL DAILY PRN
Status: DISCONTINUED | OUTPATIENT
Start: 2023-10-01 | End: 2023-10-03 | Stop reason: HOSPADM

## 2023-10-01 RX ADMIN — POLYETHYLENE GLYCOL (3350) 17 G: 17 POWDER, FOR SOLUTION ORAL at 11:10

## 2023-10-01 RX ADMIN — DIAZEPAM 5 MG: 5 TABLET ORAL at 08:10

## 2023-10-01 RX ADMIN — QUETIAPINE FUMARATE 300 MG: 100 TABLET ORAL at 08:10

## 2023-10-01 RX ADMIN — LOSARTAN POTASSIUM 25 MG: 25 TABLET, FILM COATED ORAL at 08:10

## 2023-10-01 RX ADMIN — HYDROXYZINE PAMOATE 50 MG: 50 CAPSULE ORAL at 08:10

## 2023-10-01 RX ADMIN — AMLODIPINE BESYLATE 5 MG: 5 TABLET ORAL at 08:10

## 2023-10-01 NOTE — NURSING
POC reviewed this shift and is on going.  Pt calm and cooperative on unit and compliant with medications.  Pt denies SI/HI/AVH.  Pt endorses depressed mood and anxiety, reports symptoms have improved since admission.

## 2023-10-01 NOTE — PLAN OF CARE
POC reviewed this shift and is on going. Patient is calm, cooperative, quiet, anxious, and sleeping. Denies Suicidal Ideation, Homicidal Ideation, Auditory Hallucinations, Visual Hallucinations, Tactile Hallucinations, Gustatory Hallucinations, and Delusions. Patient stays out on the floor most of the day, but went take a nap after lunch today. Patient communicates with her peers and staff at times. Pt continues to be medication compliant and staff will continue to monitor pt closely while on the unit.

## 2023-10-01 NOTE — PROGRESS NOTES
"PSYCHIATRY DAILY INPATIENT PROGRESS NOTE  SUBSEQUENT HOSPITAL VISIT    ENCOUNTER DATE: 10/1/2023  SITE: [facility]    DATE OF ADMISSION: 9/23/2023 10:08 AM  LENGTH OF STAY: 8 days      CHIEF COMPLAINT   Mary Sanches is a 53 y.o. female, seen during daily booker rounds on the inpatient unit.  Mary Sanches presented with the chief complaint of  psychosis, "I really like this lady Sindy.. but I never put my hands anyone."      The patient was seen and examined. The chart was reviewed.     Reviewed notes from Rns and MD and labs from the last 24 hours.    The patient's case was discussed with the treatment team/care providers today including Rns    Staff reports severe (requiring PRN medications for non redirectable, agitated behavior in order to prevent harm to self or others) behavioral or management issues.     The patient has been compliant with treatment.      Subjective 10/01/2023    Pt continues to report improvement regarding anxiety. Reports sleep continues to improve. Patient remains fairly circumstantial during assessment requiring frequent redirection.     Pt reports she is feeling "good" today. Reports she is sleeping well. Tolerating medication without issue. States she is feeling bloated today and has only had 3 Bms since admission. States she is constipated and requesting medicaiton for this.     Again discussed potential dangers of home medication regimen. Patient verbalized understanding, however she also states she will likely continue this regimen once discharged.     Continues to deny S/S of benzo withdrawal.     Denies SE to current medication regimen.     Psychiatric ROS (observed, reported, or endorsed/denied):  Depressed mood - fluctuating  Interest/pleasure/anhedonia: No  Guilt/hopelessness/worthlessness - No  Changes in Sleep - No  Changes in Appetite - No  Changes in Concentration - No  Changes in Energy - No  PMA/R- No  Suicidal- active/passive ideations - No  Homicidal " ideations: active/passive ideations - No    Hallucinations - less  Delusions - less  Disorganized behavior - less  Disorganized speech - fluctuating  Negative symptoms - No    Elevated mood - No  Decreased need for sleep - No  Grandiosity - No  Racing thoughts - less  Impulsivity - less  Irritability- less  Increased energy - No  Distractibility - Continuing  Increase in goal-directed activity or PMA- Continuing    Symptoms of OSKAR - fluctuating  Symptoms of Panic Disorder- No  Symptoms of PTSD - No        Overall progress: Patient is showing mild improvement       Psychotherapy:  Target symptoms: anxiety , substance abuse, mood disorder, psychosis  Why chosen therapy is appropriate versus another modality: relevant to diagnosis, patient responds to this modality, evidence based practice  Outcome monitoring methods: self-report, observation  Therapeutic intervention type: insight oriented psychotherapy, behavior modifying psychotherapy, supportive psychotherapy, interactive psychotherapy  Topics discussed/themes: building skills sets for symptom management, symptom recognition  The patient's response to the intervention is accepting. The patient's progress toward treatment goals is limited.   Duration of intervention: 16 minutes      Medical ROS  General ROS: negative  Ophthalmic ROS: negative  ENT ROS: negative  Allergy and Immunology ROS: negative  Hematological and Lymphatic ROS: negative  Endocrine ROS: negative  Respiratory ROS: no cough, shortness of breath, or wheezing  Cardiovascular ROS: no chest pain or dyspnea on exertion  Gastrointestinal ROS: + Nausea- Improving  Genito-Urinary ROS: no dysuria, trouble voiding, or hematuria  Musculoskeletal ROS: + back pain, chronic  Neurological ROS: no TIA or stroke symptoms  Dermatological ROS: negative      PAST MEDICAL HISTORY   Past Medical History:   Diagnosis Date    Anxiety     Bipolar I disorder, current or most recent episode manic, severe 9/30/2023    Chronic  back pain     Chronic hepatitis C 6/19/2020    Depression     Hallucination     History of psychiatric hospitalization     Hx of psychiatric care     Hyperthyroidism 12/29/2021    Migraines     Neuropathy     Obesity     Psychiatric exam requested by authority     Psychiatric problem     Psychosis     Sacroiliitis     Schizoaffective disorder     Scoliosis     Sleep difficulties     Therapy     Tobacco use            PSYCHOTROPIC MEDICATIONS   Scheduled Meds:   amLODIPine  5 mg Oral Daily    diazePAM  5 mg Oral BID    losartan  25 mg Oral Daily    mineral oil-hydrophil petrolat   Topical (Top) BID    QUEtiapine  300 mg Oral BID     Continuous Infusions:  PRN Meds:.acetaminophen, aluminum-magnesium hydroxide-simethicone, benzonatate, benztropine mesylate, hydrOXYzine pamoate, loperamide, LORazepam, nicotine, OLANZapine **AND** OLANZapine, ondansetron, promethazine        EXAMINATION    VITALS   Vitals:    09/29/23 1905 09/30/23 0725 09/30/23 1917 10/01/23 0732   BP: (!) 144/67 135/68 (!) 149/69 138/75   BP Location: Left arm Left arm Left arm Left arm   Patient Position: Sitting Sitting Sitting Sitting   Pulse: 66 73 75 73   Resp: 16 20 20 18   Temp: 98.2 °F (36.8 °C) 98.3 °F (36.8 °C) 98.1 °F (36.7 °C) 98.3 °F (36.8 °C)   TempSrc: Oral Oral Oral Oral   SpO2: 95% 95% 96% 95%   Weight:       Height:           Body mass index is 27.28 kg/m².    CONSTITUTIONAL  General Appearance: unremarkable, age appropriate, obese     MUSCULOSKELETAL  Muscle Strength and Tone:no dyskinesia, no tremor, no tic  Abnormal Involuntary Movements: No  Gait and Station: non-ataxic     PSYCHIATRIC   Level of Consciousness: awake and alert   Orientation: person, place, time, and situation  Grooming: Disheveled and Hospital garb  Psychomotor Behavior: normal, cooperative, friendly and cooperative, psychomotor agitation, restless and fidgety , eye contact normal  Speech: normal tone, increased rate, normal pitch, pressured,  "spontaneous  Language: grossly intact, able to name, able to repeat  Mood: "Better"    Affect:  Consistent with mood, Congruent with thought,    Thought Process: Circumstantial    Associations: Less circumstantial  Thought Content: jaquelin delusions, denies SI, and denies HI  Perceptions: denies AH and denies  VH  Memory: Able to recall past events, Remote intact, and Recent intact  Attention:Decreased and Easily distracted  Fund of Knowledge: unable to assess  Estimate if Intelligence:  Unable to determine based on work/education history, vocabulary and mental status exam  Insight: limited awareness of illness, poor awareness of illness  Judgment: impaired due to psychosis    DIAGNOSTIC TESTING   Laboratory Results  No results found for this or any previous visit (from the past 24 hour(s)).      MEDICAL DECISION MAKING      ASSESSMENT:   Bipolar I Disorder mre manic, severe with psychotic features  R/o SAD; r/o SIM/PD  Unspecified Anxiety Disorder     Polysubstance dependence  Nicotine Dependence     Psychosocial stressors     HTN  Chronic pain        PROBLEM LIST AND MANAGEMENT PLANS     Mood/psychosis: pt counseled  -start Serqouel 50 mg po q HS- increase to 50 mg po BID today-Increase to 100 mg BID-Increase to 150 mg BID today with plans to continue titration as tolerated/indicated-Increase PM dose to 300 mg this evening, AM dose to 200 mg tomorrow- Increase to 300 mg BID     Anxiety: pt counseled  -seroquel off-label as above  -Vistaril prn     Polysubstance dependence: pt counseled  -started/continue  Valium 10 mg po TID to prevent w/d- will taper as able-Increase valium to 10 mg QID w/ plan to taper as able. -Taper to TID -Taper to 10 mg BID today-Taper to 5 mg BID  -Initiate ativan 1 mg TID PRN for breakthrough symptoms/ciwa >9  .  Nicotine Dependence: pt counseled  -start nicotine 14 patch dermal q day     Psychosocial stressors: pt counseled  -SW consulted to assist with resources     HTN: pt counseled  -MEd " consulted -Norvasc and losartan ordered     Chronic pain: pt counseled  -MEd consulted       Discussed diagnosis, risks and benefits of proposed treatment vs alternative treatments vs no treatment, potential side effects of these treatments and the inherent unpredictability of treatment. The patient expresses understanding of the above and displays the capacity to agree with this treatment given said understanding. Patient also agrees that, currently, the benefits outweigh the risks and would like to pursue/continue treatment at this time.    Any medications being used off-label were discussed with the patient inclusive of the evidence base for the use of the medications and consent was obtained for the off-label use of the medication.       DISCHARGE PLANNING  Expected Disposition Plan: Home or Self Care      NEED FOR CONTINUED HOSPITALIZATION  Psychiatric illness continues to pose a potential threat to life or bodily function, of self or others, thereby requiring the need for continued inpatient psychiatric hospitalization: Yes, due to: significant psychotic thought disorder, aggressive neuroleptic titration, and gravely disabled, as evidenced by:  Ongoing concerns with grave disability with patient unable to perform basic feeding, hygiene and dressing activities without significant constant support. and Ongoing concerns with perceptual aberrancy and paranoid persecutory delusions leading to potential harm of self or others.    Protective inpatient pyschiatric hospitalization required while a safe disposition plan is enacted: Yes    Patient stabilized and ready for discharge from inpatient psychiatric unit: No        STAFF:   Cameron Longoria MD  Psychiatry

## 2023-10-02 PROCEDURE — 25000003 PHARM REV CODE 250: Performed by: PSYCHIATRY & NEUROLOGY

## 2023-10-02 PROCEDURE — 11400000 HC PSYCH PRIVATE ROOM

## 2023-10-02 PROCEDURE — 90833 PSYTX W PT W E/M 30 MIN: CPT | Mod: HCNC,,, | Performed by: PSYCHIATRY & NEUROLOGY

## 2023-10-02 PROCEDURE — 90833 PR PSYCHOTHERAPY W/PATIENT W/E&M, 30 MIN (ADD ON): ICD-10-PCS | Mod: HCNC,,, | Performed by: PSYCHIATRY & NEUROLOGY

## 2023-10-02 PROCEDURE — 25000003 PHARM REV CODE 250: Performed by: INTERNAL MEDICINE

## 2023-10-02 PROCEDURE — 99233 SBSQ HOSP IP/OBS HIGH 50: CPT | Mod: HCNC,,, | Performed by: PSYCHIATRY & NEUROLOGY

## 2023-10-02 PROCEDURE — 99233 PR SUBSEQUENT HOSPITAL CARE,LEVL III: ICD-10-PCS | Mod: HCNC,,, | Performed by: PSYCHIATRY & NEUROLOGY

## 2023-10-02 RX ORDER — DIAZEPAM 5 MG/1
5 TABLET ORAL DAILY
Status: COMPLETED | OUTPATIENT
Start: 2023-10-03 | End: 2023-10-03

## 2023-10-02 RX ORDER — QUETIAPINE FUMARATE 100 MG/1
400 TABLET, FILM COATED ORAL 2 TIMES DAILY
Status: DISCONTINUED | OUTPATIENT
Start: 2023-10-02 | End: 2023-10-03 | Stop reason: HOSPADM

## 2023-10-02 RX ADMIN — DIAZEPAM 5 MG: 5 TABLET ORAL at 08:10

## 2023-10-02 RX ADMIN — LOSARTAN POTASSIUM 25 MG: 25 TABLET, FILM COATED ORAL at 08:10

## 2023-10-02 RX ADMIN — ACETAMINOPHEN 650 MG: 325 TABLET ORAL at 10:10

## 2023-10-02 RX ADMIN — QUETIAPINE FUMARATE 300 MG: 100 TABLET ORAL at 08:10

## 2023-10-02 RX ADMIN — HYDROXYZINE PAMOATE 50 MG: 50 CAPSULE ORAL at 08:10

## 2023-10-02 RX ADMIN — AMLODIPINE BESYLATE 5 MG: 5 TABLET ORAL at 08:10

## 2023-10-02 RX ADMIN — QUETIAPINE FUMARATE 400 MG: 100 TABLET ORAL at 08:10

## 2023-10-02 NOTE — PROGRESS NOTES
"PSYCHIATRY DAILY INPATIENT PROGRESS NOTE  SUBSEQUENT HOSPITAL VISIT    ENCOUNTER DATE: 10/2/2023  SITE: [facility]    DATE OF ADMISSION: 9/23/2023 10:08 AM  LENGTH OF STAY: 9 days      CHIEF COMPLAINT   Mary Sanches is a 53 y.o. female, seen during daily booker rounds on the inpatient unit.  Mary Sanches presented with the chief complaint of  psychosis, "I really like this lady Sindy.. but I never put my hands anyone."      The patient was seen and examined. The chart was reviewed.     Reviewed notes from Rns and MD and labs from the last 24 hours.    The patient's case was discussed with the treatment team/care providers today including Rns, CTRS, NP, and SW    Staff reports less behavioral or management issues.     The patient has been compliant with treatment.      Subjective 10/02/2023    Per yesterday's notes: Pt continues to report improvement regarding anxiety. Reports sleep continues to improve. Patient remains fairly circumstantial during assessment requiring frequent redirection.   Pt reports she is feeling "good" today. Reports she is sleeping well. Tolerating medication without issue. States she is feeling bloated today and has only had 3 Bms since admission. States she is constipated and requesting medicaiton for this.   Again discussed potential dangers of home medication regimen. Patient verbalized understanding, however she also states she will likely continue this regimen once discharged.   Continues to deny S/S of benzo withdrawal.   Denies SE to current medication regimen.     Today, she reports that she is doing "better." She notes globally improving symptoms. She is less circumstantial and required less redirection. She is sleeping better.  -mood symptoms are steadily improving  -she denied psychosis (disorganization is improving and she denied AH today)  -med adjustments have been well tolerated     Psychiatric ROS (observed, reported, or endorsed/denied):  Depressed mood - " denies  Interest/pleasure/anhedonia: No  Guilt/hopelessness/worthlessness - No  Changes in Sleep - No  Changes in Appetite - No  Changes in Concentration - No  Changes in Energy - No  PMA/R- No  Suicidal- active/passive ideations - No  Homicidal ideations: active/passive ideations - No    Hallucinations - improving steadily  Delusions - improving steadily  Disorganized behavior - improving steadily  Disorganized speech - improving steadily  Negative symptoms - No    Elevated mood - No  Decreased need for sleep - No  Grandiosity - No  Racing thoughts - improving steadily  Impulsivity - improving steadily  Irritability- improving steadily  Increased energy - No  Distractibility - improving steadily  Increase in goal-directed activity or PMA- improving steadily    Symptoms of OSKAR - improving steadily  Symptoms of Panic Disorder- No  Symptoms of PTSD - No        Overall progress: Patient is showing moderate improvement      Psychotherapy:  Target symptoms: anxiety , substance abuse, mood disorder, psychosis  Why chosen therapy is appropriate versus another modality: relevant to diagnosis, patient responds to this modality, evidence based practice  Outcome monitoring methods: self-report, observation  Therapeutic intervention type: insight oriented psychotherapy, behavior modifying psychotherapy, supportive psychotherapy, interactive psychotherapy  Topics discussed/themes: building skills sets for symptom management, symptom recognition  The patient's response to the intervention is accepting. The patient's progress toward treatment goals is limited.   Duration of intervention: 16 minutes      Medical ROS  General ROS: negative  Ophthalmic ROS: negative  ENT ROS: negative  Allergy and Immunology ROS: negative  Hematological and Lymphatic ROS: negative  Endocrine ROS: negative  Respiratory ROS: no cough, shortness of breath, or wheezing  Cardiovascular ROS: no chest pain or dyspnea on exertion  Gastrointestinal ROS: +  Nausea- Improving  Genito-Urinary ROS: no dysuria, trouble voiding, or hematuria  Musculoskeletal ROS: + back pain, chronic  Neurological ROS: no TIA or stroke symptoms  Dermatological ROS: negative      PAST MEDICAL HISTORY   Past Medical History:   Diagnosis Date    Anxiety     Bipolar I disorder, current or most recent episode manic, severe 9/30/2023    Chronic back pain     Chronic hepatitis C 6/19/2020    Depression     Hallucination     History of psychiatric hospitalization     Hx of psychiatric care     Hyperthyroidism 12/29/2021    Migraines     Neuropathy     Obesity     Psychiatric exam requested by authority     Psychiatric problem     Psychosis     Sacroiliitis     Schizoaffective disorder     Scoliosis     Sleep difficulties     Therapy     Tobacco use            PSYCHOTROPIC MEDICATIONS   Scheduled Meds:   amLODIPine  5 mg Oral Daily    diazePAM  5 mg Oral BID    losartan  25 mg Oral Daily    mineral oil-hydrophil petrolat   Topical (Top) BID    QUEtiapine  300 mg Oral BID     Continuous Infusions:  PRN Meds:.acetaminophen, aluminum-magnesium hydroxide-simethicone, benzonatate, benztropine mesylate, hydrOXYzine pamoate, loperamide, LORazepam, nicotine, OLANZapine **AND** OLANZapine, ondansetron, polyethylene glycol, promethazine        EXAMINATION    VITALS   Vitals:    09/30/23 1917 10/01/23 0732 10/01/23 1924 10/02/23 0739   BP: (!) 149/69 138/75 (!) 95/52 (!) 104/54   BP Location: Left arm Left arm Left arm Left arm   Patient Position: Sitting Sitting Sitting Sitting   Pulse: 75 73 80 75   Resp: 20 18 16 18   Temp: 98.1 °F (36.7 °C) 98.3 °F (36.8 °C) 99 °F (37.2 °C) 98.6 °F (37 °C)   TempSrc: Oral Oral Oral Oral   SpO2: 96% 95% (!) 94% 96%   Weight:       Height:           Body mass index is 27.28 kg/m².    CONSTITUTIONAL  General Appearance: unremarkable, age appropriate, obese     MUSCULOSKELETAL  Muscle Strength and Tone:no dyskinesia, no tremor, no tic  Abnormal Involuntary Movements: No  Gait  "and Station: non-ataxic     PSYCHIATRIC   Level of Consciousness: awake and alert   Orientation: person, place, time, and situation  Grooming: Disheveled and Hospital garb  Psychomotor Behavior: normal, cooperative, friendly and cooperative, psychomotor agitation, restless and fidgety , eye contact normal  Speech: normal tone, increased rate, normal pitch, pressured, spontaneous  Language: grossly intact, able to name, able to repeat  Mood: "Better"    Affect:  Consistent with mood, Congruent with thought,    Thought Process: Circumstantial    Associations: Less circumstantial  Thought Content: jaquelin delusions, denies SI, and denies HI  Perceptions: denies AH and denies  VH  Memory: Able to recall past events, Remote intact, and Recent intact  Attention:Decreased and Easily distracted  Fund of Knowledge: unable to assess  Estimate if Intelligence:  Unable to determine based on work/education history, vocabulary and mental status exam  Insight: limited awareness of illness, poor awareness of illness  Judgment: impaired due to psychosis    DIAGNOSTIC TESTING   Laboratory Results  No results found for this or any previous visit (from the past 24 hour(s)).      MEDICAL DECISION MAKING      ASSESSMENT:   Bipolar I Disorder mre manic, severe with psychotic features  R/o SAD; r/o SIM/PD  Unspecified Anxiety Disorder     Polysubstance dependence  Nicotine Dependence     Psychosocial stressors     HTN  Chronic pain        PROBLEM LIST AND MANAGEMENT PLANS     Mood/psychosis: pt counseled  -start Serqouel 50 mg po q HS- increase to 50 mg po BID today-Increase to 100 mg BID-Increase to 150 mg BID today with plans to continue titration as tolerated/indicated-Increase PM dose to 300 mg this evening, AM dose to 200 mg tomorrow- Increase to 300 mg BID- increase to 400 mg po BID     Anxiety: pt counseled  -seroquel off-label as above  -Vistaril prn     Polysubstance dependence: pt counseled  -started/continue  Valium 10 mg po TID to " prevent w/d- will taper as able-Increase valium to 10 mg QID w/ plan to taper as able. -Taper to TID -Taper to 10 mg BID today-Taper to 5 mg BID- decrease to 5 mg po q day today and last dose on 10/3/23  -Initiate ativan 1 mg TID PRN for breakthrough symptoms/ciwa >9  .  Nicotine Dependence: pt counseled  -start nicotine 14 patch dermal q day     Psychosocial stressors: pt counseled  -SW consulted to assist with resources     HTN: pt counseled  -MEd consulted -Norvasc and losartan ordered     Chronic pain: pt counseled  -MEd consulted       Discussed diagnosis, risks and benefits of proposed treatment vs alternative treatments vs no treatment, potential side effects of these treatments and the inherent unpredictability of treatment. The patient expresses understanding of the above and displays the capacity to agree with this treatment given said understanding. Patient also agrees that, currently, the benefits outweigh the risks and would like to pursue/continue treatment at this time.    Any medications being used off-label were discussed with the patient inclusive of the evidence base for the use of the medications and consent was obtained for the off-label use of the medication.       DISCHARGE PLANNING  Expected Disposition Plan: Home or Self Care- discharge home tomorrow       NEED FOR CONTINUED HOSPITALIZATION  Psychiatric illness continues to pose a potential threat to life or bodily function, of self or others, thereby requiring the need for continued inpatient psychiatric hospitalization: Yes, due to: significant psychotic thought disorder, aggressive neuroleptic titration, and gravely disabled, as evidenced by:  Ongoing concerns with grave disability with patient unable to perform basic feeding, hygiene and dressing activities without significant constant support. and Ongoing concerns with perceptual aberrancy and paranoid persecutory delusions leading to potential harm of self or others.    Protective  inpatient pyschiatric hospitalization required while a safe disposition plan is enacted: Yes    Patient stabilized and ready for discharge from inpatient psychiatric unit: No        STAFF:   Usman Ellis MD  Psychiatry

## 2023-10-02 NOTE — NURSING
POC reviewed this shift and is on going.  Pt calm and cooperative on unit and compliant with medications.  Denies SI/HI/AVH.  Pt out of room and interactive with peers and staff.

## 2023-10-03 VITALS
BODY MASS INDEX: 27.29 KG/M2 | RESPIRATION RATE: 20 BRPM | HEIGHT: 63 IN | TEMPERATURE: 98 F | WEIGHT: 154 LBS | OXYGEN SATURATION: 97 % | SYSTOLIC BLOOD PRESSURE: 184 MMHG | HEART RATE: 69 BPM | DIASTOLIC BLOOD PRESSURE: 63 MMHG

## 2023-10-03 PROBLEM — F29 PSYCHOSIS: Status: RESOLVED | Noted: 2020-06-19 | Resolved: 2023-10-03

## 2023-10-03 PROCEDURE — 90833 PR PSYCHOTHERAPY W/PATIENT W/E&M, 30 MIN (ADD ON): ICD-10-PCS | Mod: HCNC,,, | Performed by: PSYCHIATRY & NEUROLOGY

## 2023-10-03 PROCEDURE — 25000003 PHARM REV CODE 250: Performed by: INTERNAL MEDICINE

## 2023-10-03 PROCEDURE — 25000003 PHARM REV CODE 250: Performed by: PSYCHIATRY & NEUROLOGY

## 2023-10-03 PROCEDURE — 90833 PSYTX W PT W E/M 30 MIN: CPT | Mod: HCNC,,, | Performed by: PSYCHIATRY & NEUROLOGY

## 2023-10-03 PROCEDURE — 99239 HOSP IP/OBS DSCHRG MGMT >30: CPT | Mod: HCNC,,, | Performed by: PSYCHIATRY & NEUROLOGY

## 2023-10-03 PROCEDURE — 99239 PR HOSPITAL DISCHARGE DAY,>30 MIN: ICD-10-PCS | Mod: HCNC,,, | Performed by: PSYCHIATRY & NEUROLOGY

## 2023-10-03 RX ORDER — LOSARTAN POTASSIUM 25 MG/1
25 TABLET ORAL DAILY
Qty: 30 TABLET | Refills: 2 | Status: SHIPPED | OUTPATIENT
Start: 2023-10-04 | End: 2024-10-03

## 2023-10-03 RX ORDER — AMLODIPINE BESYLATE 5 MG/1
5 TABLET ORAL DAILY
Qty: 30 TABLET | Refills: 2 | Status: SHIPPED | OUTPATIENT
Start: 2023-10-04 | End: 2024-10-03

## 2023-10-03 RX ORDER — QUETIAPINE FUMARATE 400 MG/1
400 TABLET, FILM COATED ORAL 2 TIMES DAILY
Qty: 60 TABLET | Refills: 2 | Status: SHIPPED | OUTPATIENT
Start: 2023-10-03 | End: 2024-10-02

## 2023-10-03 RX ADMIN — LOSARTAN POTASSIUM 25 MG: 25 TABLET, FILM COATED ORAL at 08:10

## 2023-10-03 RX ADMIN — AMLODIPINE BESYLATE 5 MG: 5 TABLET ORAL at 08:10

## 2023-10-03 RX ADMIN — DIAZEPAM 5 MG: 5 TABLET ORAL at 08:10

## 2023-10-03 RX ADMIN — ACETAMINOPHEN 650 MG: 325 TABLET ORAL at 01:10

## 2023-10-03 RX ADMIN — QUETIAPINE FUMARATE 400 MG: 100 TABLET ORAL at 08:10

## 2023-10-03 RX ADMIN — ACETAMINOPHEN 650 MG: 325 TABLET ORAL at 04:10

## 2023-10-03 NOTE — PLAN OF CARE
10/03/23 1030   Step 1: Warning Signs   Warning Sign 1 people that tell me what to do   Warning Sign 2 being depressed   Warning Sign 3 being stressed   Step 2: Internal coping strategies - Things I can do to take my mind off my problems without contacting another person:   Coping Strategy 1 dogs   Coping Strategy 2 cleaning up   Coping Strategy 3 deep breathe techniques   Step 3: People and social settings that provide distraction:   1. Name Sunday Sanches (father)       Phone [phone]   2. Name Errol Naranjo ()       Phone [phone]   3. Place park   4. Place [employer]    Step 4: People whom I can ask for help:   1. Person Sunday Sanches       Phone [phone]   2. Person Errol Naranjo       Phone [phone]   3. Person n/a   Step 5: Professionals or agencies I can contact during a crisis:   1. Clinician Name Dr. Orly Cagle MD       Phone [phone]   3. Suicide Prevention Lifeline: 988 Suicide Prevention Lifeline 988   4. [facility] Emergency Service       911       Emergency Services Phone Warm Line Wed-Sun 5pm -10pm [phone]   Step 6: Making the environment safer (plan for lethal means safety)   Safe Environment Plan use emergency button, keep windows and doors locked   Safe Environment Optional: What is most important to me and worth living for? dogs   Safety Plan Tasks   Provided a Hard Copy to the Patient Y   Explained How to Follow the Steps Y   Discussed Facilitators and Barriers Y

## 2023-10-03 NOTE — NURSING
AVS reviewed with pt and family. Pt agreed to adhere to all follow-up instructions and appointments. Pt denied SI/HI/AVH. Pt was escorted off unit per staff to private transport without incident.

## 2023-10-03 NOTE — DISCHARGE SUMMARY
"Discharge Summary  Psychiatry    Admit Date: 9/23/2023    Discharge Date and Time:  10/03/2023 9:03 AM    Attending Physician: Usman Ellis MD     Discharge Provider: Usman Ellis MD    Reason for Admission:   psychosis, "I really like this lady Sindy.. but I never put my hands anyone."    History of Present Illness:   The patient presented to the ER on 9/23/2023 . Per staff notes:  -Mary Sanches is a 53 y.o. female who presents to the ED with Psychiatric Evaluation (Per EMS pt called them stating she has implants in her body that she wants removed. Per EMS pt was yelling at people that are not there and having obvious visual hallucinations. Denies SI/HI)   Described as 53-year-old female with history of schizoaffective disorder presenting to the emergency department for a psychiatric evaluation.  She was allegedly telling EMS that she has implants in her body that she wants removed.  She told me that she has white dots popping up on her skin after she ingested what she thought was green tea but then her frontal or it may have cocaine in it but she believes it was something else  -Per EMS pt called them stating she has implants in her body that she wants removed. Per EMS pt was yelling at people that are not there and having obvious visual hallucinations. Denies SI/HI        Chart review: pt had a previous admission in 12/2019 for psychosis. She was started on Seroquel and required a valium taper for benzo dependence.      The patient was medically cleared and admitted to the [facility].     The patient presents psychotic with a disorganized thought process. Delusional themes notes with likely RIS.     She was an unreliable historian. Symptoms of psychosis and tiago noted.     UDS was positive for nemzos, barbituates, THC and amphetamines.         Symptoms of Depression: diminished mood - No, loss of interest/anhedonia - No;  recurrent - No, >14 days - No, diminished energy - No, change in sleep - " No, change in appetite - No, diminished concentration or cognition or indecisiveness - No, PMA/R -  No, excessive guilt or hopelessness or worthlessness - No, suicidal ideations - No     Changes in Sleep: trouble with initiation- Yes, maintenance, - No early morning awakening with inability to return to sleep - No, hypersomnolence - No     Suicidal- active/passive ideations - No, organized plans, future intentions - No     Homicidal ideations: active/passive ideations - No, organized plans, future intentions - No     Symptoms of psychosis: hallucinations - Yes, delusions - Yes, disorganized speech - Yes, disorganized behavior or abnormal motor behavior - Yes, or negative symptoms (diminshed emotional expression, avolition, anhedonia, alogia, asociality) - Yes, active phase symptoms >1 month - No, continuous signs of illness > 6 months - No, since onset of illness decreased level of functioning present - Yes     Symptoms of tiago or hypomania: elevated, expansive, or irritable mood with increased energy or activity - Yes; > 4 days -  unknown ,  >7 days -  unknown ; with inflated self-esteem or grandiosity - No, decreased need for sleep - Yes, increased rate of speech - Yes, FOI or racing thoughts - Yes, distractibility - Yes, increased goal directed activity or PMA - Yes, risky/disinhibited behavior - Yes     Symptoms of OSKAR: excessive anxiety/worry/fear, more days than not, about numerous issues - Yes, ongoing for >6 months -  unknown , difficult to control - Yes, with restlessness - Yes, fatigue - No, poor concentration - Yes, irritability - Yes, muscle tension - Yes, sleep disturbance - Yes; causes functionally impairing distress - No     Symptoms of Panic Disorder: recurrent panic attacks (palpitations/heart racing, sweating, shakiness, dyspnea, choking, chest pain/discomfort, Gi symptoms, dizzy/lightheadedness, hot/col flashes, paresthesias, derealization, fear of losing control or fear of dying or fear of  ""going crazy") - No, precipitated - No, un-precipitated - No, source of worry and/or behavioral changes secondary for 1 month or longer- No, agoraphobia - No     Symptoms of PTSD: h/o trauma exposure - No; re-experiencing/intrusive symptoms - No, avoidant behavior - No, 2 or more negative alterations in cognition or mood - No, 2 or more hyperarousal symptoms - No; with dissociative symptoms - No, ongoing for 1 or more  months - No     Symptoms of OCD: obsessions (recurrent thoughts/urges/images; intrusive and/or unwanted; uses other thoughts/actions to suppress) - No; compulsions (repetitive behaviors used to lower distress/anxiety/obsessions) - No, time-consuming (over 1 hour per day) or cause significant distress/impairment - - No     Symptoms of Anorexia: restriction of caloric intake leading to significantly low body weight - No, intense fear of gaining weight or persistent behavior that interferes with weight gain even thought at a significantly low weight - No, disturbance in the way in which one's body weight or shape is experienced, undue influence of body weight or shape on self evaluation, or persistent lack of recognition of the seriousness of the current low body weight - No     Symptoms of Bulimia: recurrent episodes of binge eating (definitely larger amount  than what others would eat and lack of a sense of control over eating during episode) - No, recurrent inappropriate compensatory behaviors in order to prevent weight gain (fasting, medications, exercise, vomiting) - No, binges and compensatory behaviors both occur on average at least once a week for 3 months - No, self evaluations is unduly influenced by body shape/weight- - No     Symptoms of Binge eating: recurrent episodes of binge eating (definitely larger amount than what others would eat and lack of a sense of control over eating during episode) - No, 3 or more of following (eating much more rapidly, eating until uncomfortably full, large " amounts when not hungry, eating alone because of embarrassed by how much,  feeling disgusted with oneself, depressed or very guilty afterward) - No, distress regarding binges - No, binges occur on average at least once a week for 3 months - No        Substance/s:  Taken in larger amounts or over longer periods than intended: No,  Persistent desire or unsuccessful attempts to cut down or stop: No,  Great deal of time spent seeking, using or recovering from: No,  Craving or strong desire to use: No,  Recurrent use despite failure to meet major role obligation: No,  Continued use despite persistent or recurrent social/interparsonal issues due to use: No,  Important social/work/recreational activities given up due to use: No,  Recurrent use in physically hazardous situations: No,  Continued use despite knowledge of persistent physical or psychological problem: No,  Tolerance (either increased need or diminished effect): No,  -pt was an unrelaible historian- _UDS as above; +uds 3 years ago (benzos, barbutuates and THC)    Procedures Performed: * No surgery found *    Hospital Course:    Patient was admitted to the inpatient psychiatry unit after being medically cleared in the ED. Chart and labs were reviewed. The patient was stabilized as follows:      Mood/psychosis: pt counseled  -start Serqouel 50 mg po q HS- increase to 50 mg po BID today-Increase to 100 mg BID-Increase to 150 mg BID today with plans to continue titration as tolerated/indicated-Increase PM dose to 300 mg this evening, AM dose to 200 mg tomorrow- Increase to 300 mg BID- increase to 400 mg po BID     Anxiety: pt counseled  -seroquel off-label as above  -Vistaril prn     Polysubstance dependence: pt counseled  -started/continue  Valium 10 mg po TID to prevent w/d- will taper as able-Increase valium to 10 mg QID w/ plan to taper as able. -Taper to TID -Taper to 10 mg BID today-Taper to 5 mg BID- decrease to 5 mg po q day and last dose on 10/3/23- stop;  taper completed without incident   .  Nicotine Dependence: pt counseled  -start nicotine 14 patch dermal q day     Psychosocial stressors: pt counseled  -SW consulted to assist with resources     HTN: pt counseled  -MEd consulted -Norvasc and losartan ordered     Chronic pain: pt counseled  -MEd consulted         During hospitalization, the patient was encouraged to go to both groups and individual counseling. Patient was monitored for any side effects. A meeting was held with multidisciplinary team prior to discharge and pt's diagnosis, current medications, and follow up were discussed. The patient has been compliant with treatment and can adequately attend to activities of daily living in an independent manner. The patient denies any side effects. The patient denies SI, HI, plan or intent for self harm or harm to others. The patient is no longer a danger to self or others nor gravely disabled disabled. Patient discharged  in stable condition with scheduled outpatient follow up.    AIMS score was 0    She denied withdrawals or cravings    The patient reports improved symptoms as documented below. The patient is requesting discharge. The patient is currently stable for discharge home and is able/willing to attend outpatient care. The patient is hopeful, future oriented and goal directed. The patient readily discusses both short and long term goals. The patient can identify positive coping skills and social support.     Psychiatric ROS (observed, reported, or endorsed/denied):  Depressed mood - denies  Interest/pleasure/anhedonia: No  Guilt/hopelessness/worthlessness - No  Changes in Sleep - No  Changes in Appetite - No  Changes in Concentration - No  Changes in Energy - No  PMA/R- No  Suicidal- active/passive ideations - No  Homicidal ideations: active/passive ideations - No     Hallucinations - improved  Delusions - improved  Disorganized behavior - improved  Disorganized speech - improved  Negative symptoms - No      Elevated mood - No  Decreased need for sleep - No  Grandiosity - No  Racing thoughts - improved  Impulsivity - improved  Irritability- improved  Increased energy - No  Distractibility - improved  Increase in goal-directed activity or PMA- improved     Symptoms of OSKAR - improved  Symptoms of Panic Disorder- No  Symptoms of PTSD - No          Discussed diagnosis, risks and benefits of proposed treatment vs alternative treatments vs no treatment, and potential side effects of these treatments.  The patient expresses understanding of the above and displays the capacity to agree with this treatment given said understanding.  Patient also agrees that, currently, the benefits outweigh the risks and would like to pursue treatment at this time.      Discharge MSE: stated age, casually dressed, well groomed.  No psychomotor agitation or retardation.  No abnormal involuntary movements.  Gait normal.  Speech normal, conversational.  Language fluent English. Mood fine.  Affect normal range, pleasant, euthymic.  Thought process linear.  Associations intact.  Denies suicidal or homicidal ideation.  Denies auditory hallucinations, paranoid ideation, ideas of reference.  Memory intact.  Attention intact.  Fund of knowledge intact.  Insight intact.  Judgment intact.  Alert and oriented to person, place, time.      Tobacco Usage:  Is patient a smoker? Yes  Does patient want prescription for Tobacco Cessation? No  Does patient want counseling for Tobacco Cessation? No    If patient would like to quit, then over the counter nicotine patch could be used. The patient could also follow up with his PCP or psychiatric provider for other alternatives.     Tobacco Use Treatment Practical Counseling (approximately 3 minutes)    Were the following discussed with the patient:    Recognizing danger situations:    A. Alcohol use during the first month after quitting - Yes   B. Being around smoke and/or smokers or time/situations when the patient  routinely smoked - Yes   C. Triggers and/or roadblocks are the same as danger situations - Yes    2.   Developing coping skills   A. Learning new ways to manage stress - Yes   B. Exercising and/or relaxation breathing - Yes   C. Changing routines - Yes   D. Distraction techniques to prevent tobacco use - Yes    3.   Basic information about quitting:   A. Benefits of quitting tobacco - Yes   B. How to quit techniques - Yes   C. Available resources to support quitting - Yes        Final Diagnoses:    Principal Problem: Bipolar I Disorder mre manic, severe with psychotic features   Secondary Diagnoses:   Unspecified Anxiety Disorder     Polysubstance dependence  Nicotine Dependence     Psychosocial stressors     HTN  Chronic pain    Labs:  Admission on 09/23/2023   Component Date Value Ref Range Status    Cholesterol 09/23/2023 151  120 - 199 mg/dL Final    Triglycerides 09/23/2023 110  30 - 150 mg/dL Final    HDL 09/23/2023 28 (L)  40 - 75 mg/dL Final    LDL Cholesterol 09/23/2023 101.0  63.0 - 159.0 mg/dL Final    HDL/Cholesterol Ratio 09/23/2023 18.5 (L)  20.0 - 50.0 % Final    Total Cholesterol/HDL Ratio 09/23/2023 5.4 (H)  2.0 - 5.0 Final    Non-HDL Cholesterol 09/23/2023 123  mg/dL Final    Hemoglobin A1C 09/23/2023 5.2  4.0 - 5.6 % Final    Estimated Avg Glucose 09/23/2023 103  68 - 131 mg/dL Final   Admission on 09/22/2023, Discharged on 09/23/2023   Component Date Value Ref Range Status    WBC 09/22/2023 11.02  3.90 - 12.70 K/uL Final    RBC 09/22/2023 4.05  4.00 - 5.40 M/uL Final    Hemoglobin 09/22/2023 13.0  12.0 - 16.0 g/dL Final    Hematocrit 09/22/2023 39.3  37.0 - 48.5 % Final    MCV 09/22/2023 97  82 - 98 fL Final    MCH 09/22/2023 32.1 (H)  27.0 - 31.0 pg Final    MCHC 09/22/2023 33.1  32.0 - 36.0 g/dL Final    RDW 09/22/2023 14.6 (H)  11.5 - 14.5 % Final    Platelets 09/22/2023 213  150 - 450 K/uL Final    MPV 09/22/2023 11.6  9.2 - 12.9 fL Final    Immature Granulocytes 09/22/2023 0.4  0.0 - 0.5 %  Final    Gran # (ANC) 09/22/2023 5.4  1.8 - 7.7 K/uL Final    Immature Grans (Abs) 09/22/2023 0.04  0.00 - 0.04 K/uL Final    Lymph # 09/22/2023 4.3  1.0 - 4.8 K/uL Final    Mono # 09/22/2023 1.1 (H)  0.3 - 1.0 K/uL Final    Eos # 09/22/2023 0.1  0.0 - 0.5 K/uL Final    Baso # 09/22/2023 0.07  0.00 - 0.20 K/uL Final    nRBC 09/22/2023 0  0 /100 WBC Final    Gran % 09/22/2023 49.0  38.0 - 73.0 % Final    Lymph % 09/22/2023 39.2  18.0 - 48.0 % Final    Mono % 09/22/2023 9.9  4.0 - 15.0 % Final    Eosinophil % 09/22/2023 0.9  0.0 - 8.0 % Final    Basophil % 09/22/2023 0.6  0.0 - 1.9 % Final    Differential Method 09/22/2023 Automated   Final    Sodium 09/22/2023 138  136 - 145 mmol/L Final    Potassium 09/22/2023 4.3  3.5 - 5.1 mmol/L Final    Chloride 09/22/2023 109  95 - 110 mmol/L Final    CO2 09/22/2023 18 (L)  23 - 29 mmol/L Final    Glucose 09/22/2023 104  70 - 110 mg/dL Final    BUN 09/22/2023 18  6 - 20 mg/dL Final    Creatinine 09/22/2023 0.9  0.5 - 1.4 mg/dL Final    Calcium 09/22/2023 9.7  8.7 - 10.5 mg/dL Final    Total Protein 09/22/2023 9.4 (H)  6.0 - 8.4 g/dL Final    Albumin 09/22/2023 3.4 (L)  3.5 - 5.2 g/dL Final    Total Bilirubin 09/22/2023 1.3 (H)  0.1 - 1.0 mg/dL Final    Alkaline Phosphatase 09/22/2023 116  55 - 135 U/L Final    AST 09/22/2023 148 (H)  10 - 40 U/L Final    ALT 09/22/2023 57 (H)  10 - 44 U/L Final    eGFR 09/22/2023 >60.0  >60 mL/min/1.73 m^2 Final    Anion Gap 09/22/2023 11  8 - 16 mmol/L Final    TSH 09/22/2023 1.611  0.400 - 4.000 uIU/mL Final    Specimen UA 09/22/2023 Urine, Clean Catch   Final    Color, UA 09/22/2023 Yellow  Yellow, Straw, Fatou Final    Appearance, UA 09/22/2023 Clear  Clear Final    pH, UA 09/22/2023 6.0  5.0 - 8.0 Final    Specific Gravity, UA 09/22/2023 >1.030 (A)  1.005 - 1.030 Final    Protein, UA 09/22/2023 2+ (A)  Negative Final    Glucose, UA 09/22/2023 Negative  Negative Final    Ketones, UA 09/22/2023 2+ (A)  Negative Final    Bilirubin (UA)  09/22/2023 Negative  Negative Final    Occult Blood UA 09/22/2023 2+ (A)  Negative Final    Nitrite, UA 09/22/2023 Negative  Negative Final    Leukocytes, UA 09/22/2023 Negative  Negative Final    Benzodiazepines 09/22/2023 Presumptive Positive (A)  Negative Final    Methadone metabolites 09/22/2023 Negative  Negative Final    Cocaine (Metab.) 09/22/2023 Negative  Negative Final    Opiate Scrn, Ur 09/22/2023 Negative  Negative Final    Barbiturate Screen, Ur 09/22/2023 Presumptive Positive (A)  Negative Final    Amphetamine Screen, Ur 09/22/2023 Presumptive Positive (A)  Negative Final    THC 09/22/2023 Presumptive Positive (A)  Negative Final    Phencyclidine 09/22/2023 Negative  Negative Final    Creatinine, Urine 09/22/2023 223.0  15.0 - 325.0 mg/dL Final    Toxicology Information 09/22/2023 SEE COMMENT   Final    Alcohol, Serum 09/22/2023 <10  <10 mg/dL Final    Acetaminophen (Tylenol), Serum 09/22/2023 <3.0 (L)  10.0 - 20.0 ug/mL Final    RBC, UA 09/22/2023 1  0 - 4 /hpf Final    WBC, UA 09/22/2023 2  0 - 5 /hpf Final    Bacteria 09/22/2023 Rare  None-Occ /hpf Final    Squam Epithel, UA 09/22/2023 4  /hpf Final    Hyaline Casts, UA 09/22/2023 0  0-1/lpf /lpf Final    Unclass Brynn UA 09/22/2023 1  None-Moderate Final    Microscopic Comment 09/22/2023 SEE COMMENT   Final         Discharged Condition: stable and improved; not currently a danger to self/others or gravely disabled    Disposition: Home or Self Care    Is patient being discharged on multiple neuroleptics? No    Follow Up/Patient Instructions:     Take all medications as prescribed.  Attend all psychiatric and medical follow up appointments.   Abstain from all drugs and alcohol.  Call the crisis line at: [phone] for help in a crisis and emergent situations or call 911 and Return to ED for any acute worsening of your condition including suicidal or homicidal ideations      No discharge procedures on file.        [place] [facility] [place]      Medications:  Reconciled Home Medications:      Medication List        START taking these medications      amLODIPine 5 MG tablet  Commonly known as: NORVASC  Take 1 tablet (5 mg total) by mouth once daily.  Start taking on: October 4, 2023     losartan 25 MG tablet  Commonly known as: COZAAR  Take 1 tablet (25 mg total) by mouth once daily.  Start taking on: October 4, 2023     QUEtiapine 400 MG tablet  Commonly known as: SEROQUEL  Take 1 tablet (400 mg total) by mouth 2 (two) times daily.            CONTINUE taking these medications      acetaminophen 500 MG tablet  Commonly known as: TYLENOL  Take 1-2 tablets (500-1,000 mg total) by mouth 3 (three) times daily as needed for Pain.            STOP taking these medications      ALPRAZolam 1 MG tablet  Commonly known as: XANAX     butalbital-acetaminophen-caffeine -40 mg -40 mg per tablet  Commonly known as: FIORICET, ESGIC     diphenhydrAMINE 50 MG capsule  Commonly known as: BENADRYL     doxycycline 100 MG tablet  Commonly known as: VIBRA-TABS     EScitalopram oxalate 20 MG tablet  Commonly known as: LEXAPRO     EUCRISA 2 % Oint  Generic drug: crisaborole     hydrOXYzine HCL 10 MG Tab  Commonly known as: ATARAX     mupirocin 2 % ointment  Commonly known as: BACTROBAN     OLANZapine 10 MG tablet  Commonly known as: ZyPREXA     pregabalin 100 MG capsule  Commonly known as: LYRICA     traMADoL 50 mg tablet  Commonly known as: ULTRAM     traZODone 100 MG tablet  Commonly known as: DESYREL     triamcinolone acetonide 0.1% 0.1 % cream  Commonly known as: KENALOG     vitamin D 1000 units Tab  Commonly known as: VITAMIN D3                Diet: regular     Activity as tolerated    Total time spent discharging patient: 35 minutes    Usman Ellis MD  Psychiatry

## 2023-10-03 NOTE — PLAN OF CARE
Patient remained calm and cooperative throughout the night. Patient remained in room. POC ongoing.

## 2023-10-03 NOTE — NURSING
Pt anxious after being told about discharge, approached nurses' station several times repeating same phrase. Pt was easily redirected and is now sitting in day room with peers. Pt is quiet and calm.

## 2023-10-03 NOTE — PROGRESS NOTES
Psychotherapy:  Target symptoms: anxiety , substance abuse, mood disorder, psychosis  Why chosen therapy is appropriate versus another modality: relevant to diagnosis, patient responds to this modality, evidence based practice  Outcome monitoring methods: self-report, observation  Therapeutic intervention type: insight oriented psychotherapy, behavior modifying psychotherapy, supportive psychotherapy, interactive psychotherapy  Topics discussed/themes: building skills sets for symptom management, symptom recognition  -safety planning and wrap up session  The patient's response to the intervention is accepting. The patient's progress toward treatment goals is fair.   Duration of intervention: 16 minutes  Usman Ellis MD  PSychiatry

## 2023-10-03 NOTE — PLAN OF CARE
10/03/23 1004   Medicare Message   Important Message from Medicare regarding Discharge Appeal Rights Given to patient/caregiver;Explained to patient/caregiver;Signed/date by patient/caregiver   Date IMM was signed 10/03/23   Time IMM was signed 0948   VU Message   Medicare Outpatient and Observation Notification regarding financial responsibility Given to patient/caregiver;Explained to patient/caregiver;Signed/date by patient/caregiver   Date VU was signed 10/03/23   Time VU was signed 0948

## 2023-10-04 ENCOUNTER — PATIENT OUTREACH (OUTPATIENT)
Dept: ADMINISTRATIVE | Facility: CLINIC | Age: 53
End: 2023-10-04
Payer: MEDICARE

## 2023-10-04 NOTE — PROGRESS NOTES
C3 nurse spoke with Mary Sanches for a TCC post hospital discharge follow up call. The patient reports does not have a scheduled HOSFU appointment. C3 nurse was unable to schedule [facility]. Patient advised to contact their PCP to schedule a HOSPFU within 5-7 days.

## 2023-10-30 ENCOUNTER — TELEPHONE (OUTPATIENT)
Dept: HEPATOLOGY | Facility: CLINIC | Age: 53
End: 2023-10-30
Payer: MEDICARE

## 2023-10-30 NOTE — TELEPHONE ENCOUNTER
Patient scheduled to see PA Scheuermann on 11/2/23.  I spoke with her.  She reports being recently discharged from the hospital.  She asked that appt be moved to 11/27/23; done and appt reminder notice mailed.

## 2023-11-29 ENCOUNTER — TELEPHONE (OUTPATIENT)
Dept: HEPATOLOGY | Facility: CLINIC | Age: 53
End: 2023-11-29
Payer: MEDICARE

## 2023-11-29 NOTE — TELEPHONE ENCOUNTER
Patient was a no-show for scheduled appt with PA Scheuermann on 11/27/23.  Attempt made to reach her for rescheduling.  I lvm and sent letter asking that she call hepatology back.

## 2023-12-01 ENCOUNTER — NURSE TRIAGE (OUTPATIENT)
Dept: ADMINISTRATIVE | Facility: CLINIC | Age: 53
End: 2023-12-01
Payer: MEDICARE

## 2023-12-01 NOTE — TELEPHONE ENCOUNTER
"Legs are ":killing" her and she can't walk. It's been like this for three weeks. Pain is 10/10. Both legs but more the right side. She fell of the scooter. Pain is in the thigh area. It has a smell to it.Care advice recommend pt go to Er. Pt verbalized understanding.   Reason for Disposition   Unable to walk    Additional Information   Negative: Looks like a broken bone or dislocated joint (e.g., crooked or deformed)   Negative: Sounds like a life-threatening emergency to the triager   Negative: Chest pain   Negative: Difficulty breathing   Negative: Entire foot is cool or blue in comparison to other side    Protocols used: Leg Pain-A-[place]    "

## 2023-12-02 NOTE — TELEPHONE ENCOUNTER
"Called and spoke to patient.     Pt reports standing and knee "gave out"last night and pt fell to the ground.  Denies dizziness, lightheadedness.    Reports eccymosis to various locations of bilateral LE. Denies abrasions, open wounds, erythema or edema    Reports knee's "going out, all my life, even as a little girl", and falling on knees often.   Reports decreased falls since using medical scooter (sitting) x 5 months.Has also fallen from the scooter in the recent past due to maneuvering and going too fast.    Requesting pain med for knee. Advised pt that an in person visit is advisable. Pt reports she is fine and will check back with Dr. Lozano if worsens.     Message forwarded to Dr. Lozano for review.       "

## 2023-12-07 ENCOUNTER — NURSE TRIAGE (OUTPATIENT)
Dept: ADMINISTRATIVE | Facility: CLINIC | Age: 53
End: 2023-12-07
Payer: MEDICARE

## 2023-12-07 NOTE — TELEPHONE ENCOUNTER
"[place]    PCP:  Dr. Ángel Lozano    C/O fall, bilat swollen foot, RLQ abdominal pain rated 10/10, unable to stand/walk, generalized weakness, and "inner thighs are black/blue".  She reports that she fell and injured herself.  She reports fell yesterday, the day before, and today (she reports fallen the last 4 days).  Denies dizziness and fever.  She reports bleeding stopped.  Per protocol, care advised is call  now.  Pt VU and refused care advised.  Care advice reinforced but pt continues to refuse care advice.  Advised to call for worsening/questions/concerns.  VU.    Reason for Disposition   SEVERE weakness (i.e., unable to walk or barely able to walk, requires support) and new-onset or worsening    Additional Information   Negative: Major injury from dangerous force (e.g., fall > 10 feet or 3 meters)   Negative: Major bleeding (e.g., actively dripping or spurting) and can't be stopped   Negative: Shock suspected (e.g., cold/pale/clammy skin, too weak to stand)   Negative: Difficult to awaken or acting confused (e.g., disoriented, slurred speech)    Protocols used: Falls and Cvirdwt-Y-DT    "

## 2023-12-20 ENCOUNTER — OFFICE VISIT (OUTPATIENT)
Dept: PHYSICAL MEDICINE AND REHAB | Facility: CLINIC | Age: 53
End: 2023-12-20
Payer: MEDICARE

## 2023-12-20 VITALS — WEIGHT: 160.25 LBS | BODY MASS INDEX: 28.39 KG/M2 | OXYGEN SATURATION: 99 % | HEIGHT: 63 IN

## 2023-12-20 DIAGNOSIS — G89.29 CHRONIC NECK PAIN: ICD-10-CM

## 2023-12-20 DIAGNOSIS — R29.6 FREQUENT FALLS: ICD-10-CM

## 2023-12-20 DIAGNOSIS — G89.29 CHRONIC MIDLINE LOW BACK PAIN WITH RIGHT-SIDED SCIATICA: Primary | ICD-10-CM

## 2023-12-20 DIAGNOSIS — M51.36 DDD (DEGENERATIVE DISC DISEASE), LUMBAR: ICD-10-CM

## 2023-12-20 DIAGNOSIS — M54.41 CHRONIC MIDLINE LOW BACK PAIN WITH RIGHT-SIDED SCIATICA: Primary | ICD-10-CM

## 2023-12-20 DIAGNOSIS — E66.9 OBESITY (BMI 30.0-34.9): ICD-10-CM

## 2023-12-20 DIAGNOSIS — M47.26 OSTEOARTHRITIS OF SPINE WITH RADICULOPATHY, LUMBAR REGION: ICD-10-CM

## 2023-12-20 DIAGNOSIS — M54.2 CHRONIC NECK PAIN: ICD-10-CM

## 2023-12-20 DIAGNOSIS — R26.9 GAIT DISORDER: ICD-10-CM

## 2023-12-20 DIAGNOSIS — M25.511 CHRONIC RIGHT SHOULDER PAIN: ICD-10-CM

## 2023-12-20 DIAGNOSIS — G89.29 CHRONIC RIGHT SHOULDER PAIN: ICD-10-CM

## 2023-12-20 PROCEDURE — 1159F PR MEDICATION LIST DOCUMENTED IN MEDICAL RECORD: ICD-10-PCS | Mod: HCNC,CPTII,S$GLB, | Performed by: PHYSICAL MEDICINE & REHABILITATION

## 2023-12-20 PROCEDURE — 3008F PR BODY MASS INDEX (BMI) DOCUMENTED: ICD-10-PCS | Mod: HCNC,CPTII,S$GLB, | Performed by: PHYSICAL MEDICINE & REHABILITATION

## 2023-12-20 PROCEDURE — 1159F MED LIST DOCD IN RCRD: CPT | Mod: HCNC,CPTII,S$GLB, | Performed by: PHYSICAL MEDICINE & REHABILITATION

## 2023-12-20 PROCEDURE — 4010F PR ACE/ARB THEARPY RXD/TAKEN: ICD-10-PCS | Mod: HCNC,CPTII,S$GLB, | Performed by: PHYSICAL MEDICINE & REHABILITATION

## 2023-12-20 PROCEDURE — 3044F PR MOST RECENT HEMOGLOBIN A1C LEVEL <7.0%: ICD-10-PCS | Mod: HCNC,CPTII,S$GLB, | Performed by: PHYSICAL MEDICINE & REHABILITATION

## 2023-12-20 PROCEDURE — 99214 PR OFFICE/OUTPT VISIT, EST, LEVL IV, 30-39 MIN: ICD-10-PCS | Mod: HCNC,S$GLB,, | Performed by: PHYSICAL MEDICINE & REHABILITATION

## 2023-12-20 PROCEDURE — 99999 PR PBB SHADOW E&M-EST. PATIENT-LVL II: CPT | Mod: PBBFAC,HCNC,, | Performed by: PHYSICAL MEDICINE & REHABILITATION

## 2023-12-20 PROCEDURE — 4010F ACE/ARB THERAPY RXD/TAKEN: CPT | Mod: HCNC,CPTII,S$GLB, | Performed by: PHYSICAL MEDICINE & REHABILITATION

## 2023-12-20 PROCEDURE — 3008F BODY MASS INDEX DOCD: CPT | Mod: HCNC,CPTII,S$GLB, | Performed by: PHYSICAL MEDICINE & REHABILITATION

## 2023-12-20 PROCEDURE — 99999 PR PBB SHADOW E&M-EST. PATIENT-LVL II: ICD-10-PCS | Mod: PBBFAC,HCNC,, | Performed by: PHYSICAL MEDICINE & REHABILITATION

## 2023-12-20 PROCEDURE — 3044F HG A1C LEVEL LT 7.0%: CPT | Mod: HCNC,CPTII,S$GLB, | Performed by: PHYSICAL MEDICINE & REHABILITATION

## 2023-12-20 PROCEDURE — 99214 OFFICE O/P EST MOD 30 MIN: CPT | Mod: HCNC,S$GLB,, | Performed by: PHYSICAL MEDICINE & REHABILITATION

## 2023-12-20 RX ORDER — TRAMADOL HYDROCHLORIDE 50 MG/1
50 TABLET ORAL EVERY 6 HOURS PRN
Qty: 120 TABLET | Refills: 3 | Status: SHIPPED | OUTPATIENT
Start: 2023-12-20 | End: 2024-04-18

## 2023-12-20 RX ORDER — DICLOFENAC SODIUM 10 MG/G
2 GEL TOPICAL 3 TIMES DAILY PRN
Qty: 200 G | Refills: 3 | Status: SHIPPED | OUTPATIENT
Start: 2023-12-20 | End: 2024-01-19

## 2023-12-20 NOTE — PROGRESS NOTES
Subjective:       Patient ID: Mary Sanches is a 53 y.o. female.    Chief Complaint: No chief complaint on file.    HPI       HISTORY OF PRESENT ILLNESS:  Ms. Sanches is a 53-year-old white female with osteoarthritis who is followed up in the [facility] Clinic for chronic low back pain with lumbar radiculopathy and chronic neck pain.  She had also history of chronic left shoulder pain after left proximal radial neck and head nondisplaced fracture due to falling in 05/2021, status post conservative treatment.  Her last visit to the clinic was on 2/8/2022. She was started on pregabalin and maintained on  p.r.n. tramadol and diclofenac gel.  A mobility assessment was done and she was prescribed an electric scooter.    The patient is coming to the clinic for follow-up. Her low back pain has been much better. It is an intermittent aching and tightness pain in the lumbar spine and across her back.  She used to have occasional shooting pain to the left foot but this nearly subsided.  Her maximum pain is 2-3/10 and minimum 0/10.  Today, it is 0/10.  The patient has chronic left lower extremity weakness. She reports occasional falls.  She has occasional urinary urgency. She denies any bowel incontinence.  She denies leg claudications.    Her neck pain has been better.  It is an intermittent aching pain in the cervical spine. She denies any radiation to her arms.  Her maximum pain is 3-4/10 and minimum 1-2/10; today it is 1-2/10. She has bilateral hand  weakness due to old surgeries.  She has chronic gait imbalance.  She uses a Rollator walker at times. She uses an electric scooter for long distances.    Her left shoulder pain has been under good control.      The patient is taking:    - tramadol 50 mg tablets, 1 tablets 3-4 per day  - Voltaren gel  as needed..  She was previously on gabapentin but it was stopped due to swelling  She was previously on pregabalin but ran out.      Past Medical History:    Diagnosis Date    Anxiety     Bipolar I disorder, current or most recent episode manic, severe 9/30/2023    Chronic back pain     Chronic hepatitis C 6/19/2020    Depression     Hallucination     History of psychiatric hospitalization     Hx of psychiatric care     Hyperthyroidism 12/29/2021    Migraines     Neuropathy     Obesity     Psychiatric exam requested by authority     Psychiatric problem     Psychosis     Sacroiliitis     Schizoaffective disorder     Scoliosis     Sleep difficulties     Therapy     Tobacco use      Review of patient's allergies indicates:   Allergen Reactions    Cranberry      Kidney Pain    Gabapentin Swelling     Throat Swelling, Hard to breathe    Ibuprofen Swelling     Stomach Bloated, Swelling Throat    Meloxicam Swelling     Swelling of the Throat    Toradol [ketorolac] Swelling     Throat Swelling difficulty breathing    Amoxicillin Hives    Cyclobenzaprine     Duloxetine            Review of Systems   Constitutional:  Positive for fatigue. Negative for chills and fever.   HENT:  Negative for trouble swallowing.    Eyes:  Negative for visual disturbance.   Respiratory:  Negative for shortness of breath.    Cardiovascular:  Negative for chest pain.   Gastrointestinal:  Positive for abdominal pain and nausea. Negative for constipation and vomiting.   Genitourinary:  Positive for difficulty urinating.   Musculoskeletal:  Positive for arthralgias, back pain, gait problem and neck pain. Negative for joint swelling.   Skin:  Negative for rash.   Neurological:  Positive for weakness. Negative for dizziness and headaches.   Psychiatric/Behavioral:  Negative for behavioral problems and sleep disturbance.        Objective:      Physical Exam  Constitutional:       Appearance: She is well-developed.      Comments: Coming to the clinic in a manual wheelchair propelled by      HENT:      Head: Normocephalic and atraumatic.   Neck:      Comments: +ve moderate tenderness cervical  spine.  Musculoskeletal:      Cervical back: Normal range of motion. Tenderness present.      Comments:   BUE:  ROM:   RUE: decreased at shoulder.   LUE: full.  Strength:    RUE: 3/5 at shoulder abduction, 4 elbow flexion, 4 elbow extension, 4 hand .   LUE: 4/5 at shoulder abduction, 4 elbow flexion, 4 elbow extension, 4 hand .  Sensation to pinprick:   RUE: decreased.   LUE: decreased.  DTR:    RUE: +1 biceps, +2 triceps.   LUE:  +1 biceps, +2 triceps.  Mullins:   RUE: -ve.   LUE: -ve.      BLE:  ROM:    RLE: full.     LLE:full.  Knee crepitus:    RLE: +ve.     LLE: +ve.  Strength:    RLE: 3/5 at hip flexion, 4 knee extension, 4 ankle DF, 4 PF.   LLE: 4/5 at hip flexion, 4 knee extension, 4 ankle DF, 4 PF.  Sensation to pinprick:     RLE: hypersensitive.     LLE: hypersensitive.  DTR:     RLE: +1 knee, +1 ankle.    LLE: +1 knee, +1 ankle.  Clonus:    Rt ankle: -ve.    Lt ankle: -ve.    SLR (sitting):      RLE: -ve.      LLE: +ve.     +ve mild tenderness lumbar spine.     Skin:     General: Skin is warm.   Neurological:      Mental Status: She is alert and oriented to person, place, and time.                 Assessment:       1. Chronic midline low back pain with right-sided sciatica    2. Osteoarthritis of spine with radiculopathy, lumbar region    3. Chronic neck pain    4. DDD (degenerative disc disease), lumbar    5. Frequent falls    6. Chronic right shoulder pain    7. Obesity (BMI 30.0-34.9)    8. Gait disorder        Plan:       - I will hold off on restarting Lyrica due to minimal radicular symptoms.  - Continue traMADol (ULTRAM) 50 mg tablet; Take 1 tablet (50 mg total) by mouth 4 times daily as needed.  - Regular home exercise program was encouraged.  - Follow up in about 4 months (around 4/20/2024).    This was a 30 minute visit, 50% of which was spent educating the patient about the diagnosis and the treatment plan.      This note was partly generated with [employer] voice recognition software. I  apologize for any possible typographical errors.

## 2023-12-27 ENCOUNTER — TELEPHONE (OUTPATIENT)
Dept: NEUROLOGY | Facility: CLINIC | Age: 53
End: 2023-12-27
Payer: MEDICARE

## 2023-12-27 NOTE — TELEPHONE ENCOUNTER
----- Message from Tone Jennings LPN sent at 12/27/2023  4:09 PM CST -----  Contact: Pt [phone]  Good afternoon,    Looks like Dr Andrews is managing pt's tramadol Rx. She has not seen PCP since 2021 so we cannot accommodate request without an appointment.     Also, do not see any records of hospital visit on 12/23.  ----- Message -----  From: Susana Little MA  Sent: 12/27/2023   1:30 PM CST  To: Tone Jennings LPN    She has not seen  since 2021 , I dont know if he will enter orders or not   ----- Message -----  From: Felisa Moore  Sent: 12/27/2023   1:07 PM CST  To: Marta KRAUS Staff    Pt was discharged from [facility] on 12/23/24 and needs a hospital f/u. She also is asking for HH to help her get food and water. She stated that her weight is fluctuating. She also stated that she is in a lot of pain. She stated that the traMADoL (ULTRAM) 50 mg tablet is to strong. She stated that it is messing with her stomach.        [facility] - [place] - [address] [place] [address] [address]  [address] [place] [address] 3  [place] [address]  Phone: [phone] Fax: [phone]      Please call and advise

## 2024-01-06 ENCOUNTER — NURSE TRIAGE (OUTPATIENT)
Dept: ADMINISTRATIVE | Facility: CLINIC | Age: 54
End: 2024-01-06
Payer: MEDICARE

## 2024-01-06 DIAGNOSIS — M54.41 CHRONIC MIDLINE LOW BACK PAIN WITH RIGHT-SIDED SCIATICA: ICD-10-CM

## 2024-01-06 DIAGNOSIS — Z74.09 IMPAIRED MOBILITY AND ADLS: Primary | ICD-10-CM

## 2024-01-06 DIAGNOSIS — Z78.9 IMPAIRED MOBILITY AND ADLS: Primary | ICD-10-CM

## 2024-01-06 DIAGNOSIS — R53.81 PHYSICAL DECONDITIONING: ICD-10-CM

## 2024-01-06 DIAGNOSIS — R29.898 WEAKNESS OF BOTH LOWER EXTREMITIES: ICD-10-CM

## 2024-01-06 DIAGNOSIS — G89.29 CHRONIC MIDLINE LOW BACK PAIN WITH RIGHT-SIDED SCIATICA: ICD-10-CM

## 2024-01-06 DIAGNOSIS — Z74.09 REQUIRES SCOOTER FOR MOBILITY: ICD-10-CM

## 2024-01-06 DIAGNOSIS — M54.2 CHRONIC NECK PAIN: ICD-10-CM

## 2024-01-06 DIAGNOSIS — G89.29 CHRONIC NECK PAIN: ICD-10-CM

## 2024-01-06 DIAGNOSIS — R29.898 UPPER EXTREMITY WEAKNESS: ICD-10-CM

## 2024-01-06 NOTE — TELEPHONE ENCOUNTER
Patient is calling,states she needs a hospital bed. Hurt my upper thigh, got a scooter but needs a bed also. Offered to triage several times, patient has hx of chronic pain. Managed by Dr. Andrews. Patient C/O of her pain, asked patient if she needed to go to ED, declines. States she always has pain but she needs bed. Clarification of only calling for request for bed and no other needs at this time. Advised I would send message to provider, would not be seen until Monday. Verb understanding.       Reason for Disposition   Nursing judgment or information in reference    Additional Information   Negative: Nursing judgment, per information in Reference   Negative: Nursing judgment or information in reference   Negative: Nursing judgment or information in reference   Negative: Nursing judgment or information in reference   Negative: Nursing judgment or information in reference   Negative: Nursing judgment or information in reference   Negative: Nursing judgment or information in reference   Negative: Nursing judgment or information in reference   Negative: Nursing judgment or information in reference   Negative: Nursing judgment or information in reference    Protocols used: No Guideline Sfbrfungy-I-RA

## 2024-01-12 ENCOUNTER — TELEPHONE (OUTPATIENT)
Dept: NEUROLOGY | Facility: CLINIC | Age: 54
End: 2024-01-12
Payer: MEDICARE

## 2024-01-13 NOTE — TELEPHONE ENCOUNTER
A prescription for electric bed was written with the appropriate justification.  It will be faxed to [employer].

## 2024-01-13 NOTE — TELEPHONE ENCOUNTER
----- Message from Teagan Ortiz MA sent at 1/10/2024  1:03 PM CST -----  Regarding: FW: Order needed  Contact: Martin @ [phone]    ----- Message -----  From: Melecio Owens  Sent: 1/10/2024  12:08 PM CST  To: Darryl KRAUS Staff  Subject: Order needed                                     Raquel Salazar is calling to request an order a new hospital bed for the pts house. Please fax the order to [employer]. [employer]'s Fax number: [phone] and main phone number line to speak with someone is [phone]. It needs to be as descriptive as possible as to why the pt needs this hospital bed for Martin to approve the request Raquel stated. Please call Raquel to discuss further         Case number for Martin in case the provider needs to reach [employer]: [phone]

## 2024-01-19 ENCOUNTER — TELEPHONE (OUTPATIENT)
Dept: NEUROLOGY | Facility: CLINIC | Age: 54
End: 2024-01-19
Payer: MEDICARE

## 2024-01-19 DIAGNOSIS — Z78.9 IMPAIRED MOBILITY AND ADLS: ICD-10-CM

## 2024-01-19 DIAGNOSIS — M54.41 CHRONIC MIDLINE LOW BACK PAIN WITH RIGHT-SIDED SCIATICA: Primary | ICD-10-CM

## 2024-01-19 DIAGNOSIS — M54.2 CHRONIC NECK PAIN: ICD-10-CM

## 2024-01-19 DIAGNOSIS — G89.29 CHRONIC MIDLINE LOW BACK PAIN WITH RIGHT-SIDED SCIATICA: Primary | ICD-10-CM

## 2024-01-19 DIAGNOSIS — R53.81 PHYSICAL DECONDITIONING: ICD-10-CM

## 2024-01-19 DIAGNOSIS — Z74.09 IMPAIRED MOBILITY AND ADLS: ICD-10-CM

## 2024-01-19 DIAGNOSIS — G89.29 CHRONIC NECK PAIN: ICD-10-CM

## 2024-01-19 NOTE — TELEPHONE ENCOUNTER
I called the patient.  She has severe low back pain and generalized weakness.  She he is asking for a hospital bed.  I told her I already filled the paperwork for a hospital bed and faxed it to the [facility].  She needs therapy but is homebound and has no transportation.  I told her I will place orders for home health therapy (physical therapy, occupational therapy,  and an aide).

## 2024-01-19 NOTE — TELEPHONE ENCOUNTER
----- Message from Teagan Ortiz MA sent at 1/19/2024 10:34 AM CST -----  Regarding: FW: Advise  Contact: [phone]    ----- Message -----  From: Courtney Che  Sent: 1/19/2024  10:19 AM CST  To: Darryl KRAUS Staff  Subject: Advise                                           Pt is calling to speak w  about a bad fall she had off the scooter and she stated that she needs to speak w him about getting a bed for her front room that is more convenient for her. She also stated that she is in severe pain where she can't lift her leg up. Please give pt a call to discuss.

## 2024-01-24 ENCOUNTER — TELEPHONE (OUTPATIENT)
Dept: NEUROLOGY | Facility: CLINIC | Age: 54
End: 2024-01-24
Payer: MEDICARE

## 2024-01-24 NOTE — TELEPHONE ENCOUNTER
----- Message from Silvia Hernandez MA sent at 1/24/2024  1:46 PM CST -----  Please advise  ----- Message -----  From: Lashae Ogden  Sent: 1/24/2024   8:48 AM CST  To: Darryl Keys from [facility] is calling to inform the doctor he thinks that the patient may have a factor of the knee he wants to know if the patient can get an x-ray before he starts treating her he is asking for a call back at [phone]

## 2024-01-24 NOTE — TELEPHONE ENCOUNTER
I called Massimo from home health physical therapy.  He said that the patient had a fall on tile at home couple weeks ago.  She has been complaining of increasing knee pain, up to 10/10.  Knee movement and pressure on the patella is very painful.  She also has bruising.  I told Massimo she should go to emergency department where she can be assessed.  If there is a fracture of the knee, Orthopedics can be involved on the spot to treated.  Once cleared, we can reinstate home health therapy.

## 2024-01-29 ENCOUNTER — TELEPHONE (OUTPATIENT)
Dept: NEUROLOGY | Facility: CLINIC | Age: 54
End: 2024-01-29
Payer: MEDICARE

## 2024-01-29 NOTE — TELEPHONE ENCOUNTER
"----- Message from Teagan Ortiz MA sent at 1/29/2024  2:20 PM CST -----  Regarding: FW: pt advice  Contact: [phone]    ----- Message -----  From: Ira Che  Sent: 1/29/2024   2:10 PM CST  To: Darryl KRAUS Staff  Subject: pt advice                                        Name Of Caller: self      Contact Preference?:  [phone]     What is the nature of the call?: requesting a call back regarding an update on her getting another bed. She states that she was in an accident and she can't move her right leg and the bed is uncomfortable      Additional Notes:    "Thank you for all that you do for our patients"          "

## 2024-01-29 NOTE — TELEPHONE ENCOUNTER
I called the patient.    She said her current bed is too small.  Her feet dangle.  I told her I already sent all the paperwork for the new bed.  She should called the wheelchair vendor or the insurance.  She voiced understanding

## 2024-02-09 ENCOUNTER — TELEPHONE (OUTPATIENT)
Dept: PHYSICAL MEDICINE AND REHAB | Facility: CLINIC | Age: 54
End: 2024-02-09

## 2024-02-09 NOTE — TELEPHONE ENCOUNTER
----- Message from Lorin Koenig sent at 2/9/2024 11:30 AM CST -----  Regarding: Callback  Contact: [phone]  Patient calling requesting a callback from nurse or provider in regards to the status of the hospital bed that the provider was suppose to order.  Please call back as soon as possible.

## 2024-02-10 ENCOUNTER — NURSE TRIAGE (OUTPATIENT)
Dept: ADMINISTRATIVE | Facility: CLINIC | Age: 54
End: 2024-02-10
Payer: MEDICARE

## 2024-02-10 NOTE — TELEPHONE ENCOUNTER
Pt calling with c/o needing a bed delivered to her house. She said that the Dr was suppose to order and she was trying to see if it was done. Pt told its the weekend and that this is something that needs to be handled M-F through the office. Pt told and she will reach back out but I told her that I will route the message to provider as well. Pt also told its [facility] and unsure if they are open on Monday but will send the message. Pt to call back if any other questions or  concerns              Reason for Disposition   [1] Caller requesting NON-URGENT health information AND [2] PCP's office is the best resource    Protocols used: Information Only Call - No Triage-A-

## 2024-03-21 ENCOUNTER — PATIENT OUTREACH (OUTPATIENT)
Dept: ADMINISTRATIVE | Facility: HOSPITAL | Age: 54
End: 2024-03-21
Payer: MEDICARE

## 2024-03-21 NOTE — LETTER
March 21, 2024    Mary Sanches  [address]  [place] [address]             [facility]  Ángel Lozano, DO   [address] [facility]  Phone: [phone]  Fax: [phone]   Dear MsMichael Sanches:        We at [facility] care about your health, and we noticed it has been over a year since you have had your last annual physical exam. To help you get the most out of each of your visits, we will review your information to make sure you are up to date on all of your recommended tests and/or procedures.    We have Dr. Lozano listed as your primary care provider. If Dr. Lozano is no longer your primary care provider, please contact us so that we may update our records accordingly.    At your earliest convenience, please call our clinic at [phone] to schedule an appointment, or you can schedule an appointment via the [facility] website. We look forward to seeing you.     If you have any questions or concerns, please don't hesitate to call.    Sincerely,    Your Primary Care Team

## 2024-04-23 ENCOUNTER — OFFICE VISIT (OUTPATIENT)
Dept: PHYSICAL MEDICINE AND REHAB | Facility: CLINIC | Age: 54
End: 2024-04-23
Payer: MEDICARE

## 2024-04-23 VITALS — WEIGHT: 160.25 LBS | BODY MASS INDEX: 28.39 KG/M2 | OXYGEN SATURATION: 98 % | HEIGHT: 63 IN

## 2024-04-23 DIAGNOSIS — E66.9 OBESITY (BMI 30.0-34.9): ICD-10-CM

## 2024-04-23 DIAGNOSIS — M54.41 CHRONIC MIDLINE LOW BACK PAIN WITH RIGHT-SIDED SCIATICA: Primary | ICD-10-CM

## 2024-04-23 DIAGNOSIS — R26.9 GAIT DISORDER: ICD-10-CM

## 2024-04-23 DIAGNOSIS — M25.511 CHRONIC RIGHT SHOULDER PAIN: ICD-10-CM

## 2024-04-23 DIAGNOSIS — M54.2 CHRONIC NECK PAIN: ICD-10-CM

## 2024-04-23 DIAGNOSIS — G89.29 CHRONIC RIGHT SHOULDER PAIN: ICD-10-CM

## 2024-04-23 DIAGNOSIS — G89.29 CHRONIC NECK PAIN: ICD-10-CM

## 2024-04-23 DIAGNOSIS — G89.29 CHRONIC MIDLINE LOW BACK PAIN WITH RIGHT-SIDED SCIATICA: Primary | ICD-10-CM

## 2024-04-23 DIAGNOSIS — M51.36 DDD (DEGENERATIVE DISC DISEASE), LUMBAR: ICD-10-CM

## 2024-04-23 DIAGNOSIS — R29.6 FREQUENT FALLS: ICD-10-CM

## 2024-04-23 DIAGNOSIS — M47.26 OSTEOARTHRITIS OF SPINE WITH RADICULOPATHY, LUMBAR REGION: ICD-10-CM

## 2024-04-23 PROCEDURE — 1159F MED LIST DOCD IN RCRD: CPT | Mod: CPTII,S$GLB,, | Performed by: PHYSICAL MEDICINE & REHABILITATION

## 2024-04-23 PROCEDURE — 99999 PR PBB SHADOW E&M-EST. PATIENT-LVL II: CPT | Mod: PBBFAC,,, | Performed by: PHYSICAL MEDICINE & REHABILITATION

## 2024-04-23 PROCEDURE — 3008F BODY MASS INDEX DOCD: CPT | Mod: CPTII,S$GLB,, | Performed by: PHYSICAL MEDICINE & REHABILITATION

## 2024-04-23 PROCEDURE — 99213 OFFICE O/P EST LOW 20 MIN: CPT | Mod: S$GLB,,, | Performed by: PHYSICAL MEDICINE & REHABILITATION

## 2024-04-23 RX ORDER — TRAMADOL HYDROCHLORIDE 50 MG/1
50 TABLET ORAL EVERY 6 HOURS PRN
Qty: 120 TABLET | Refills: 3 | Status: SHIPPED | OUTPATIENT
Start: 2024-04-23 | End: 2024-08-21

## 2024-04-23 NOTE — PROGRESS NOTES
Subjective:       Patient ID: Mary Sanches is a 54 y.o. female.    Chief Complaint: No chief complaint on file.    HPI       HISTORY OF PRESENT ILLNESS:  Ms. Sanches is a 54-year-old white female with osteoarthritis who is followed up in the Physical Medicine Clinic for chronic low back pain with lumbar radiculopathy and chronic neck pain.  She had also history of chronic left shoulder pain after left proximal radial neck and head nondisplaced fracture due to falling in 05/2021, status post conservative treatment.  Her last visit to the clinic was on12/20/2023. She was maintained on  p.r.n. tramadol and diclofenac gel.      The patient is coming to the clinic for follow-up. Her low back pain has been under good control. It is an intermittent aching and tightness pain in the lumbar spine and across her back.  She has occasional shooting pain to the left foot.  Her maximum pain is 2-3/10 and minimum 0/10.  Today, it is 1-2/10.  The patient has chronic left lower extremity weakness.   She has occasional urinary urgency. She denies any bowel incontinence.  She denies leg claudications.    Her neck pain has been worse.  It is an intermittent aching pain in the cervical spine. She denies any radiation to her arms.  Her maximum pain is 6/10 and minimum 1-2/10; today it is 1-2/10. She has bilateral hand  weakness due to old surgeries.  She has chronic gait imbalance.  She uses a Rollator walker at times. She uses an electric scooter for long distances. She just got her replacement.    The patient is taking:  - tramadol 50 mg tablets, 1 tablets 3-4 per day  - Voltaren gel  as needed..  She was previously on gabapentin but it was stopped due to swelling  She was previously on pregabalin but it was held due to minimal radicular symptoms      Past Medical History:   Diagnosis Date    Anxiety     Bipolar I disorder, current or most recent episode manic, severe 9/30/2023    Chronic back pain     Chronic hepatitis C  6/19/2020    Depression     Hallucination     History of psychiatric hospitalization     Hx of psychiatric care     Hyperthyroidism 12/29/2021    Migraines     Neuropathy     Obesity     Psychiatric exam requested by authority     Psychiatric problem     Psychosis     Sacroiliitis     Schizoaffective disorder     Scoliosis     Sleep difficulties     Therapy     Tobacco use      Review of patient's allergies indicates:   Allergen Reactions    Cranberry      Kidney Pain    Gabapentin Swelling     Throat Swelling, Hard to breathe    Ibuprofen Swelling     Stomach Bloated, Swelling Throat    Meloxicam Swelling     Swelling of the Throat    Toradol [ketorolac] Swelling     Throat Swelling difficulty breathing    Amoxicillin Hives    Cyclobenzaprine     Duloxetine            Review of Systems   Constitutional:  Positive for fatigue. Negative for chills and fever.   HENT:  Negative for trouble swallowing.    Eyes:  Negative for visual disturbance.   Respiratory:  Negative for shortness of breath.    Cardiovascular:  Negative for chest pain.   Gastrointestinal:  Positive for nausea. Negative for constipation and vomiting.   Genitourinary:  Positive for difficulty urinating.   Musculoskeletal:  Positive for arthralgias, back pain, gait problem and neck pain. Negative for joint swelling.   Skin:  Negative for rash.   Neurological:  Positive for weakness and headaches. Negative for dizziness.   Psychiatric/Behavioral:  Negative for behavioral problems and sleep disturbance.        Objective:      Physical Exam  Constitutional:       Appearance: She is well-developed.      Comments: Coming to the clinic in a manual wheelchair propelled by      HENT:      Head: Normocephalic and atraumatic.   Neck:      Comments: +ve moderate tenderness cervical spine.  Musculoskeletal:      Cervical back: Normal range of motion. Tenderness present.      Comments:   BUE:  ROM:   RUE: decreased at shoulder.   LUE: full.  Strength:    RUE:  3/5 at shoulder abduction, 4 elbow flexion, 4 elbow extension, 4 hand .   LUE: 4/5 at shoulder abduction, 4 elbow flexion, 4 elbow extension, 4 hand .  Sensation to pinprick:   RUE: decreased.   LUE: decreased.        BLE:  ROM:    RLE: full.     LLE:full.  Knee crepitus:    RLE: +ve.     LLE: +ve.  Strength:    RLE: 3/5 at hip flexion, 4 knee extension, 4 ankle DF, 4 PF.   LLE: 4+/5 at hip flexion, 4 knee extension, 4 ankle DF, 4 PF.  Sensation to pinprick:     RLE: hypersensitive.     LLE: hypersensitive.    SLR (sitting):      RLE: +ve.      LLE: +ve.          Skin:     General: Skin is warm.   Neurological:      Mental Status: She is alert and oriented to person, place, and time.                 Assessment:       1. Chronic midline low back pain with right-sided sciatica    2. Osteoarthritis of spine with radiculopathy, lumbar region    3. Chronic neck pain    4. DDD (degenerative disc disease), lumbar    5. Chronic right shoulder pain    6. Frequent falls    7. Gait disorder    8. Obesity (BMI 30.0-34.9)        Plan:       - I will continue to hold off on restarting Lyrica due to minimal radicular symptoms.  - Continue traMADol (ULTRAM) 50 mg tablet; Take 1 tablet (50 mg total) by mouth 4 times daily as needed.  - Regular home exercise program was encouraged.  - Weight loss was encouraged.  - Follow up in about 4 months (around 8/23/2024).      This note was partly generated with [employer] voice recognition software. I apologize for any possible typographical errors.

## 2024-04-24 NOTE — PLAN OF CARE
Important Medication Changes:  none    Further testing to be done: 24 hour monitor to evaluate your average heart function    Next follow up visit:  As needed    Avoid added salt, processed foods  Stay well hydrated  Exercise/walking    HERE IS SOME IMPORTANT INFORMATION FOR OUR PATIENTS    If you need refills- call your pharmacist and they will contact our office.    If you have medical concerns call    [phone]. ([facility])                                                           [phone]  ([place])                                                           [phone]  ([place])                                                           [phone]  ([place])                 If you have a question during office hours, or need to make an appointment call [phone]. You will eventually speak with my nurses. If you need to speak to me directly, let them know and I will get back to you as soon as possible.  Better yet, you can consider signing up for [employer], our popular online communication portal to schedule an appointment, ask for a refill, see your results, or message our staff. Go to: [email or web address]      Yves Garland MD  Thank you for allowing us to be a part of your care today.  We strive to provide the best care for you today and in the future.  Here are a few important reminders:    Refills  If you are in need of a refill please call your pharmacy.  Please plan ahead.  It can take up to 2 business days to complete refills.    Test Results  Our goal is to report all test results within 24-48 hours, unless otherwise specified by your physician.  If you have not received your test results within the time frame specified by your physician, please call the office at [phone] and ask to speak with a member of your care team.   You can also receive your test results on-line quickly and easily by enrolling in [employer].  Enroll in [employer] by visiting [email or web address] or ask  CSW attempted to make contact with patient's father, Ten Sanches regarding collateral but was unable to make contact. CSW will re attempt at a later date. [phone]    one of our patient service representatives for assistance.     Forms (including disability)  Please complete your portion of any forms prior to bringing them into the office.  Include a telephone number you can be reached at during normal business hours.  Your care provider may require you to be seen prior to completing the forms.  Please allow a minimum of 7 to 14 days for any forms to be completed.

## 2024-05-09 NOTE — ADDENDUM NOTE
Addended by: TERENCE SAUNDERS on: 1/12/2024 07:25 PM     Modules accepted: Orders     Diabetes Diabetes/Apixaban/Eliquis

## 2024-06-18 NOTE — PROGRESS NOTES
"Mary Sanches presented for a  Medicare AWV and comprehensive Health Risk Assessment today. The following components were reviewed and updated:    · Medical history  · Family History  · Social history  · Allergies and Current Medications  · Health Risk Assessment  · Health Maintenance  · Care Team     ** See Completed Assessments for Annual Wellness Visit within the encounter summary.**       The following assessments were completed:  · Living Situation  · CAGE  · Depression Screening  · Timed Get Up and Go  · Whisper Test  · Cognitive Function Screening      ·   · Nutrition Screening  · ADL Screening  · PAQ Screening    Vitals:    06/19/20 0944   BP: 132/86   BP Location: Left arm   Pulse: 60   Weight: 96.5 kg (212 lb 11.9 oz)   Height: 5' 3" (1.6 m)     Body mass index is 37.69 kg/m².  Physical Exam  Vitals signs and nursing note reviewed.   Constitutional:       Appearance: She is well-developed.   HENT:      Head: Normocephalic.   Cardiovascular:      Rate and Rhythm: Normal rate and regular rhythm.   Pulmonary:      Effort: Pulmonary effort is normal.      Breath sounds: Normal breath sounds.   Abdominal:      General: Bowel sounds are normal.      Palpations: Abdomen is soft.   Musculoskeletal: Normal range of motion.   Skin:     General: Skin is warm and dry.   Neurological:      Mental Status: She is alert and oriented to person, place, and time.      Motor: No abnormal muscle tone.           Diagnoses and health risks identified today and associated recommendations/orders:    1. Encounter for preventive health examination  Here for Health Risk Assessment/Annual Wellness Visit.  Health maintenance reviewed and updated. Follow up in one year.  Declined Mammogram, reports that [facility] is sending her a Fit Kit  Reports she received Pneumonia vaccine at [facility] and will check date.    2. Migraine without aura and with status migrainosus, not intractable  Chronic, stable on current PRN medication. Followed by " PCP.    3. Mood disorder  Chronic, stable on current medications. PHQ-2 score 0. Followed by PCP, Psychiatry.    4. Schizoaffective disorder, unspecified type  Chronic, stable on current medications. PHQ-2 score 0. Followed by PCP, Psychiatry.    5. Anxiety disorder, unspecified type  Chronic, stable on current medications. PHQ-2 score 0. Followed by PCP, Psychiatry.    6. Sedative dependence  Chronic, stable. Followed by PCP, Psychiatry.    7. Chronic hepatitis C without hepatic coma  Chronic, currently undergoing treatment. Followed by PCP, Hepatology.    8. Liver fibrosis  Chronic, stable. Followed by PCP, Hepatology.    9. Severe obesity with body mass index (BMI) of 35.0 to 35.9 and comorbidity  Chronic, weight trending upward. Followed by PCP.    10. Sacroiliitis  Chronic, stable on current PRN medication. Followed by PCP, Physical Medicine..    11. Osteoarthritis of spine with radiculopathy, lumbar region  Chronic, stable on current PRN medication. Followed by PCP, Physical Medicine..    12. Abnormality of gait and mobility  Chronic, no reported falls, no assistive device for ambulation. Followed by PCP, Physical Medicine.    13. Other reduced mobility  Chronic, no reported falls, no assistive device for ambulation. Followed by PCP, Physical Medicine.      Provided Mary with a 5-10 year written screening schedule and personal prevention plan. Recommendations were developed using the USPSTF age appropriate recommendations. Education, counseling, and referrals were provided as needed. After Visit Summary printed and given to patient which includes a list of additional screenings\tests needed.    Follow up in about 8 weeks (around 8/17/2020).with PCP    Winifred Benítez NP   POST-OP DIAGNOSIS:  Obstructive sleep apnea 18-Jun-2024 07:20:38  Surya Garcia

## 2024-08-20 ENCOUNTER — OFFICE VISIT (OUTPATIENT)
Dept: PHYSICAL MEDICINE AND REHAB | Facility: CLINIC | Age: 54
End: 2024-08-20
Payer: MEDICARE

## 2024-08-20 VITALS — WEIGHT: 160.25 LBS | OXYGEN SATURATION: 98 % | HEIGHT: 63 IN | BODY MASS INDEX: 28.39 KG/M2

## 2024-08-20 DIAGNOSIS — M54.41 CHRONIC MIDLINE LOW BACK PAIN WITH RIGHT-SIDED SCIATICA: Primary | ICD-10-CM

## 2024-08-20 DIAGNOSIS — G89.29 CHRONIC NECK PAIN: ICD-10-CM

## 2024-08-20 DIAGNOSIS — M54.2 CHRONIC NECK PAIN: ICD-10-CM

## 2024-08-20 DIAGNOSIS — R26.9 GAIT DISORDER: ICD-10-CM

## 2024-08-20 DIAGNOSIS — M51.36 DDD (DEGENERATIVE DISC DISEASE), LUMBAR: ICD-10-CM

## 2024-08-20 DIAGNOSIS — R29.6 FREQUENT FALLS: ICD-10-CM

## 2024-08-20 DIAGNOSIS — G89.29 CHRONIC MIDLINE LOW BACK PAIN WITH RIGHT-SIDED SCIATICA: Primary | ICD-10-CM

## 2024-08-20 DIAGNOSIS — M25.511 CHRONIC RIGHT SHOULDER PAIN: ICD-10-CM

## 2024-08-20 DIAGNOSIS — M47.26 OSTEOARTHRITIS OF SPINE WITH RADICULOPATHY, LUMBAR REGION: ICD-10-CM

## 2024-08-20 DIAGNOSIS — E66.9 OBESITY (BMI 30.0-34.9): ICD-10-CM

## 2024-08-20 DIAGNOSIS — G89.29 CHRONIC RIGHT SHOULDER PAIN: ICD-10-CM

## 2024-08-20 PROCEDURE — 1159F MED LIST DOCD IN RCRD: CPT | Mod: HCNC,CPTII,S$GLB, | Performed by: PHYSICAL MEDICINE & REHABILITATION

## 2024-08-20 PROCEDURE — 99214 OFFICE O/P EST MOD 30 MIN: CPT | Mod: HCNC,S$GLB,, | Performed by: PHYSICAL MEDICINE & REHABILITATION

## 2024-08-20 PROCEDURE — 99999 PR PBB SHADOW E&M-EST. PATIENT-LVL II: CPT | Mod: PBBFAC,HCNC,, | Performed by: PHYSICAL MEDICINE & REHABILITATION

## 2024-08-20 PROCEDURE — 3008F BODY MASS INDEX DOCD: CPT | Mod: HCNC,CPTII,S$GLB, | Performed by: PHYSICAL MEDICINE & REHABILITATION

## 2024-08-20 RX ORDER — BUTALBITAL, ACETAMINOPHEN AND CAFFEINE 50; 325; 40 MG/1; MG/1; MG/1
1 TABLET ORAL 2 TIMES DAILY PRN
Qty: 60 TABLET | Refills: 0 | Status: SHIPPED | OUTPATIENT
Start: 2024-08-20 | End: 2024-09-19

## 2024-08-20 NOTE — PROGRESS NOTES
Subjective:       Patient ID: Mary Sanches is a 54 y.o. female.    Chief Complaint: No chief complaint on file.    HPI       HISTORY OF PRESENT ILLNESS:  Ms. Sanches is a 54-year-old white female with osteoarthritis who is followed up in the Physical Medicine Clinic for chronic low back pain with lumbar radiculopathy and chronic neck pain.  She had also history of chronic left shoulder pain after left proximal radial neck and head nondisplaced fracture due to falling in 05/2021, status post conservative treatment.  Her last visit to the clinic was on 4/23/2024. She was maintained on  p.r.n. tramadol and diclofenac gel.      The patient is coming to the clinic for follow-up. Her low back pain has been worse. It is an almost constant aching and tightness pain in the lumbar spine and across her back.  She has occasional shooting pain to the left foot.  Her maximum pain is 9-10/10 and minimum 3-4/10.  Today, it is 7-8/10.  The patient has chronic left lower extremity weakness.   She denies any bowel or bladder incontinence.  She denies leg claudications.    Her neck pain has been worse.  It is an intermittent aching pain in the cervical spine and across her upper back. She denies any radiation to her arms.  Her maximum pain is 5/10 and minimum 3/10; today it is 5/10. She has bilateral hand  weakness due to old surgeries.  She has chronic gait imbalance.  She uses a Rollator walker at times. She uses an electric scooter for long distances.   The patient is taking:  - tramadol 50 mg tablets, 1 tablets 3-4 per day  - Voltaren gel  as needed..  She was previously on gabapentin but it was stopped due to swelling  She was previously on pregabalin but it was held due to minimal radicular symptoms      Past Medical History:   Diagnosis Date    Anxiety     Bipolar I disorder, current or most recent episode manic, severe 9/30/2023    Chronic back pain     Chronic hepatitis C 6/19/2020    Depression     Hallucination      History of psychiatric hospitalization     Hx of psychiatric care     Hyperthyroidism 12/29/2021    Migraines     Neuropathy     Obesity     Psychiatric exam requested by authority     Psychiatric problem     Psychosis     Sacroiliitis     Schizoaffective disorder     Scoliosis     Sleep difficulties     Therapy     Tobacco use      Review of patient's allergies indicates:   Allergen Reactions    Cranberry      Kidney Pain    Gabapentin Swelling     Throat Swelling, Hard to breathe    Ibuprofen Swelling     Stomach Bloated, Swelling Throat    Meloxicam Swelling     Swelling of the Throat    Toradol [ketorolac] Swelling     Throat Swelling difficulty breathing    Amoxicillin Hives    Cyclobenzaprine     Duloxetine            Review of Systems   Constitutional:  Positive for fatigue. Negative for chills and fever.   HENT:  Negative for trouble swallowing.    Eyes:  Negative for visual disturbance.   Respiratory:  Negative for shortness of breath.    Cardiovascular:  Negative for chest pain.   Gastrointestinal:  Positive for nausea and vomiting. Negative for constipation.   Genitourinary:  Positive for difficulty urinating.   Musculoskeletal:  Positive for arthralgias, back pain, gait problem and neck pain. Negative for joint swelling.   Neurological:  Positive for weakness and headaches. Negative for dizziness.   Psychiatric/Behavioral:  Negative for behavioral problems and sleep disturbance.        Objective:      Physical Exam  Constitutional:       Appearance: She is well-developed.      Comments: Coming to the clinic in a manual wheelchair propelled by      HENT:      Head: Normocephalic and atraumatic.   Neck:      Comments: +ve moderate tenderness cervical spine.  Musculoskeletal:      Cervical back: Normal range of motion. Tenderness present.      Comments:   BUE:  ROM:   RUE: decreased at shoulder.   LUE: full.  Strength:    RUE: 4/5 at shoulder abduction, 4 elbow flexion, 4 elbow extension, 4 hand  .   LUE: 4/5 at shoulder abduction, 4 elbow flexion, 4 elbow extension, 4 hand .  Sensation to pinprick:   RUE: decreased.   LUE: decreased.        BLE:  ROM:    RLE: full.     LLE:full.  Knee crepitus:    RLE: +ve.     LLE: +ve.  Strength:    RLE: 3/5 at hip flexion, 4+ knee extension, 4+ ankle DF, 4+ PF.   LLE: 4+/5 at hip flexion, 4+ knee extension, 4+ ankle DF, 4+ PF.  Sensation to pinprick:     RLE: hypersensitive.     LLE: hypersensitive.    SLR (sitting):      RLE: -ve.      LLE: +ve.     +ve moderate tenderness lumbar spine.         Skin:     General: Skin is warm.   Neurological:      Mental Status: She is alert and oriented to person, place, and time.                 Assessment:       1. Chronic midline low back pain with right-sided sciatica    2. Osteoarthritis of spine with radiculopathy, lumbar region    3. Chronic neck pain    4. DDD (degenerative disc disease), lumbar    5. Chronic right shoulder pain    6. Frequent falls    7. Gait disorder    8. Obesity (BMI 30.0-34.9)        Plan:       - I will continue to hold off on restarting Lyrica due to minimal radicular symptoms.  - Continue traMADol (ULTRAM) 50 mg tablet; Take 1 tablet (50 mg total) by mouth 4 times daily as needed.  - Tylenol 500 mg, 1-2 po tid prn for mild pain.  - Regular home exercise program was encouraged.  - Weight loss was encouraged.  - Follow up in about 4 months (around 12/20/2024).      This note was partly generated with lark voice recognition software. I apologize for any possible typographical errors.

## 2024-08-29 ENCOUNTER — TELEPHONE (OUTPATIENT)
Dept: INTERNAL MEDICINE | Facility: CLINIC | Age: 54
End: 2024-08-29
Payer: MEDICARE

## 2024-08-29 NOTE — TELEPHONE ENCOUNTER
----- Message from Pastora Mosqueda sent at 8/29/2024  1:36 PM CDT -----  Name of Who is Calling:KARIME BOOKER [[id number]]        What is the request in detail:Pt would like a callback from the office to [facility] appt pt wants to change PCP to female.Please advise thank you       Can the clinic reply by [facility]:NO        What Number to Call Back [facility]:Telephone Information:  Mobile          [phone]

## 2024-09-05 RX ORDER — TRAMADOL HYDROCHLORIDE 50 MG/1
50 TABLET ORAL EVERY 6 HOURS PRN
Qty: 120 TABLET | Refills: 3 | Status: SHIPPED | OUTPATIENT
Start: 2024-09-14 | End: 2025-01-12

## 2024-09-05 NOTE — TELEPHONE ENCOUNTER
----- Message from Fern Casey sent at 9/5/2024 12:29 PM CDT -----  Regarding: Refill Request  Contact: pt [phone]  Shekhar Salazar is calling to request 5 day refill on traMADoL (ULTRAM) 50 mg tablet Due to patient losing the medication during a significant fall in her home.

## 2024-09-09 ENCOUNTER — PATIENT OUTREACH (OUTPATIENT)
Dept: ADMINISTRATIVE | Facility: HOSPITAL | Age: 54
End: 2024-09-09
Payer: MEDICARE

## 2024-09-09 NOTE — PROGRESS NOTES
Population Health Chart Review & Patient Outreach Details      Additional [facility] Health Notes:               Updates Requested / Reviewed:      Care Everywhere, , and Immunizations Reconciliation Completed or Queried: [facility] Topics Overdue:      [facility] Score: 2     Colon Cancer Screening  Mammogram                       Health Maintenance Topic(s) Outreach Outcomes & Actions Taken:    Primary Care Appt - Outreach Outcomes & Actions Taken  : Primary Care Appt Scheduled

## 2024-09-22 RX ORDER — BUTALBITAL, ACETAMINOPHEN AND CAFFEINE 50; 325; 40 MG/1; MG/1; MG/1
TABLET ORAL
Qty: 60 TABLET | Refills: 0 | Status: SHIPPED | OUTPATIENT
Start: 2024-09-22

## 2024-10-11 RX ORDER — BUTALBITAL, ACETAMINOPHEN AND CAFFEINE 50; 325; 40 MG/1; MG/1; MG/1
TABLET ORAL
Qty: 60 TABLET | Refills: 0 | Status: SHIPPED | OUTPATIENT
Start: 2024-10-23

## 2024-10-28 DIAGNOSIS — G89.29 CHRONIC MIDLINE LOW BACK PAIN WITH RIGHT-SIDED SCIATICA: Primary | ICD-10-CM

## 2024-10-28 DIAGNOSIS — M54.41 CHRONIC MIDLINE LOW BACK PAIN WITH RIGHT-SIDED SCIATICA: Primary | ICD-10-CM

## 2024-10-28 DIAGNOSIS — M54.2 CHRONIC NECK PAIN: ICD-10-CM

## 2024-10-28 DIAGNOSIS — G89.29 CHRONIC NECK PAIN: ICD-10-CM

## 2024-10-28 RX ORDER — TRAMADOL HYDROCHLORIDE 50 MG/1
50 TABLET ORAL EVERY 6 HOURS PRN
Qty: 120 TABLET | Refills: 3 | Status: SHIPPED | OUTPATIENT
Start: 2024-11-09 | End: 2025-03-09

## 2024-11-25 ENCOUNTER — TELEPHONE (OUTPATIENT)
Dept: PHYSICAL MEDICINE AND REHAB | Facility: CLINIC | Age: 54
End: 2024-11-25
Payer: MEDICARE

## 2024-11-25 NOTE — TELEPHONE ENCOUNTER
"----- Message from Tracy sent at 11/25/2024 12:30 PM CST -----  Regarding: Orders requested  Contact: [phone]  Hi,     Who called: The pt       Reason: The pt called to request a call to discuss scheduling a sooner appt due to her appt being cancelled in October and discuss getting an order for a "Heat treatment place". Pls call the pt at  [phone] or [phone](, Chang)      Provider's name:Dr. Andrews      Additional Information:Thank you.  "

## 2025-01-13 DIAGNOSIS — Z00.00 ENCOUNTER FOR MEDICARE ANNUAL WELLNESS EXAM: ICD-10-CM

## 2025-01-30 DIAGNOSIS — M54.2 CHRONIC NECK PAIN: ICD-10-CM

## 2025-01-30 DIAGNOSIS — G89.29 CHRONIC MIDLINE LOW BACK PAIN WITH RIGHT-SIDED SCIATICA: ICD-10-CM

## 2025-01-30 DIAGNOSIS — G89.29 CHRONIC NECK PAIN: ICD-10-CM

## 2025-01-30 DIAGNOSIS — M54.41 CHRONIC MIDLINE LOW BACK PAIN WITH RIGHT-SIDED SCIATICA: ICD-10-CM

## 2025-01-30 RX ORDER — TRAMADOL HYDROCHLORIDE 50 MG/1
50 TABLET ORAL EVERY 6 HOURS PRN
Qty: 120 TABLET | Refills: 3 | Status: SHIPPED | OUTPATIENT
Start: 2025-01-30 | End: 2025-05-30

## 2025-01-30 NOTE — TELEPHONE ENCOUNTER
----- Message from Matilda sent at 1/28/2025  9:42 AM CST -----  Pt calling in regards to her medication not being strong enough for her pain in her right     thigh , also would like home health to come to her house     traMADoL (ULTRAM) 50 mg tablet        [facility] - [place] - [address]  [address]  [place] [address]  Phone: [phone] Fax: [phone]        Confirmed patient's contact info below:  Contact Name: Mary Sanches  Phone Number: [phone]

## 2025-02-14 NOTE — TELEPHONE ENCOUNTER
I called the number provided do a peer to peer review regarding the patient's power mobility device.  However the number is not a working one.  I called the patient and ask her to talk to her insurance and let me know what is required to cover the patient's power mobility device.     177.8

## 2025-03-10 ENCOUNTER — TELEPHONE (OUTPATIENT)
Dept: NEUROLOGY | Facility: CLINIC | Age: 55
End: 2025-03-10
Payer: MEDICARE

## 2025-03-10 DIAGNOSIS — R53.81 PHYSICAL DECONDITIONING: Primary | ICD-10-CM

## 2025-03-10 DIAGNOSIS — G89.29 CHRONIC MIDLINE LOW BACK PAIN WITH RIGHT-SIDED SCIATICA: ICD-10-CM

## 2025-03-10 DIAGNOSIS — Z91.89 AT HIGH RISK FOR SKIN BREAKDOWN: ICD-10-CM

## 2025-03-10 DIAGNOSIS — M54.41 CHRONIC MIDLINE LOW BACK PAIN WITH RIGHT-SIDED SCIATICA: ICD-10-CM

## 2025-03-10 DIAGNOSIS — E66.9 OBESITY (BMI 30-39.9): ICD-10-CM

## 2025-03-10 NOTE — TELEPHONE ENCOUNTER
I called the patient.    She said she wanted a better bed in her insurance covers it.  After further inquiry, she is at high-risk for bedsores and she needs an alternating air mattress.  I told her I will write the prescription and faxed it to a medical equipment company ([facility]).

## 2025-03-10 NOTE — TELEPHONE ENCOUNTER
----- Message from Nurse Pau sent at 3/10/2025 10:10 AM CDT -----  Regarding: FW: Callback  Contact: [phone]  Please advise.  ----- Message -----  From: Lorin Koenig  Sent: 3/10/2025   9:47 AM CDT  To: Darryl KRAUS Staff  Subject: Callback                                         Patient calling requesting a callback from nurse or provider in regards to seeing if the provider can get her a better bed because the one she have is very uncomfortable. She also said she need to get her pelvic bone checked out. Please call back as soon as possible.

## 2025-03-24 ENCOUNTER — TELEPHONE (OUTPATIENT)
Dept: PHYSICAL MEDICINE AND REHAB | Facility: CLINIC | Age: 55
End: 2025-03-24
Payer: MEDICARE

## 2025-03-24 ENCOUNTER — OFFICE VISIT (OUTPATIENT)
Dept: PHYSICAL MEDICINE AND REHAB | Facility: CLINIC | Age: 55
End: 2025-03-24
Payer: MEDICARE

## 2025-03-24 VITALS — HEIGHT: 63 IN | WEIGHT: 160.25 LBS | BODY MASS INDEX: 28.39 KG/M2

## 2025-03-24 DIAGNOSIS — M54.41 CHRONIC MIDLINE LOW BACK PAIN WITH RIGHT-SIDED SCIATICA: Primary | ICD-10-CM

## 2025-03-24 DIAGNOSIS — G89.29 CHRONIC NECK PAIN: ICD-10-CM

## 2025-03-24 DIAGNOSIS — M47.26 OSTEOARTHRITIS OF SPINE WITH RADICULOPATHY, LUMBAR REGION: ICD-10-CM

## 2025-03-24 DIAGNOSIS — G89.29 CHRONIC RIGHT SHOULDER PAIN: ICD-10-CM

## 2025-03-24 DIAGNOSIS — R26.9 GAIT DISORDER: ICD-10-CM

## 2025-03-24 DIAGNOSIS — E66.811 OBESITY (BMI 30.0-34.9): ICD-10-CM

## 2025-03-24 DIAGNOSIS — M51.362 DEGENERATION OF INTERVERTEBRAL DISC OF LUMBAR REGION WITH DISCOGENIC BACK PAIN AND LOWER EXTREMITY PAIN: ICD-10-CM

## 2025-03-24 DIAGNOSIS — R29.6 FREQUENT FALLS: ICD-10-CM

## 2025-03-24 DIAGNOSIS — M25.511 CHRONIC RIGHT SHOULDER PAIN: ICD-10-CM

## 2025-03-24 DIAGNOSIS — G89.29 CHRONIC MIDLINE LOW BACK PAIN WITH RIGHT-SIDED SCIATICA: Primary | ICD-10-CM

## 2025-03-24 DIAGNOSIS — M54.2 CHRONIC NECK PAIN: ICD-10-CM

## 2025-03-24 PROCEDURE — 3008F BODY MASS INDEX DOCD: CPT | Mod: HCNC,CPTII,S$GLB, | Performed by: PHYSICAL MEDICINE & REHABILITATION

## 2025-03-24 PROCEDURE — 99999 PR PBB SHADOW E&M-EST. PATIENT-LVL III: CPT | Mod: PBBFAC,HCNC,, | Performed by: PHYSICAL MEDICINE & REHABILITATION

## 2025-03-24 PROCEDURE — 1159F MED LIST DOCD IN RCRD: CPT | Mod: HCNC,CPTII,S$GLB, | Performed by: PHYSICAL MEDICINE & REHABILITATION

## 2025-03-24 PROCEDURE — 99214 OFFICE O/P EST MOD 30 MIN: CPT | Mod: HCNC,S$GLB,, | Performed by: PHYSICAL MEDICINE & REHABILITATION

## 2025-03-24 RX ORDER — TRAMADOL HYDROCHLORIDE 50 MG/1
50 TABLET ORAL EVERY 6 HOURS PRN
Qty: 120 TABLET | Refills: 3 | Status: SHIPPED | OUTPATIENT
Start: 2025-03-24 | End: 2025-07-22

## 2025-03-24 RX ORDER — PREGABALIN 50 MG/1
50 CAPSULE ORAL 2 TIMES DAILY
Qty: 90 CAPSULE | Refills: 2 | Status: SHIPPED | OUTPATIENT
Start: 2025-03-24 | End: 2025-06-22

## 2025-03-24 NOTE — TELEPHONE ENCOUNTER
Spoke with patient and informed her that her appoinment for today is scheduled for 1:00pm instead of 1:40pm. Pt verbalized understanding.

## 2025-03-24 NOTE — PROGRESS NOTES
Subjective:       Patient ID: Mary Sanches is a 55 y.o. female.    Chief Complaint: No chief complaint on file.    HPI     Ms. Sanches is a 55-year-old white female with osteoarthritis who is followed up in the Physical Medicine Clinic for chronic low back pain with lumbar radiculopathy and chronic neck pain.  She had also history of chronic left shoulder pain after left proximal radial neck and head nondisplaced fracture due to falling in 05/2021, status post conservative treatment.  Her last visit to the clinic was on 8/20/2024. She was maintained on  p.r.n. tramadol p.r.n. Tylenol and diclofenac gel.      The patient is coming to the clinic for follow-up. Her low back pain has been worse. It is an almost constant aching and tightness pain in the lumbar spine and across her back.  She has occasional shooting pain to the right foot.  Her maximum pain is 9-10/10 and minimum 5-/10.  Today, it is 7-8/10.  The patient has chronic right lower extremity weakness.   She denies any bowel or bladder incontinence.  She denies leg claudications but she does not walk long enough.  She has been complaining recently of pain in her right buttock and groin region.  It is aggravated by pressure by walking    Her neck pain has been worse.  It is a constant aching pain in the cervical spine and across her upper back. She denies any radiation to her arms.  Her maximum pain is 5-6/10 and minimum 1-2/10; today it is 5-6/10. She has bilateral hand  weakness due to old surgeries.  She has chronic gait imbalance.  She uses a Rollator walker at times. She uses an electric scooter for long distances.     The patient is taking:  - tramadol 50 mg tablets, 1 tablets 3-4 per day  - Voltaren gel  as needed..  She was previously on gabapentin but it was stopped due to swelling  She was previously on pregabalin but it was held due to minimal radicular symptoms      Past Medical History:   Diagnosis Date    Anxiety     Bipolar I disorder,  current or most recent episode manic, severe 9/30/2023    Chronic back pain     Chronic hepatitis C 6/19/2020    Depression     Hallucination     History of psychiatric hospitalization     Hx of psychiatric care     Hyperthyroidism 12/29/2021    Migraines     Neuropathy     Obesity     Psychiatric exam requested by authority     Psychiatric problem     Psychosis     Sacroiliitis     Schizoaffective disorder     Scoliosis     Sleep difficulties     Therapy     Tobacco use      Review of patient's allergies indicates:   Allergen Reactions    Cranberry      Kidney Pain    Gabapentin Swelling     Throat Swelling, Hard to breathe    Ibuprofen Swelling     Stomach Bloated, Swelling Throat    Meloxicam Swelling     Swelling of the Throat    Toradol [ketorolac] Swelling     Throat Swelling difficulty breathing    Amoxicillin Hives    Cyclobenzaprine     Duloxetine            Review of Systems   Constitutional:  Positive for chills and fatigue. Negative for fever.   HENT:  Negative for trouble swallowing.    Eyes:  Negative for visual disturbance.   Respiratory:  Negative for shortness of breath.    Cardiovascular:  Negative for chest pain.   Gastrointestinal:  Positive for nausea and vomiting. Negative for constipation.   Genitourinary:  Positive for difficulty urinating.   Musculoskeletal:  Positive for arthralgias, back pain, gait problem and neck pain. Negative for joint swelling.   Neurological:  Positive for weakness. Negative for dizziness and headaches.   Psychiatric/Behavioral:  Positive for dysphoric mood and sleep disturbance. Negative for behavioral problems.        Objective:      Physical Exam  Constitutional:       Appearance: She is well-developed.      Comments: Coming to the clinic in a manual wheelchair propelled by      HENT:      Head: Normocephalic and atraumatic.   Neck:      Comments: +ve moderate tenderness cervical spine.  Musculoskeletal:      Cervical back: Normal range of motion.  Tenderness present.      Comments:   BUE:  ROM:   RUE: decreased at shoulder.   LUE: full.  Strength:    RUE: 4/5 at shoulder abduction, 4 elbow flexion, 4 elbow extension, 4 hand .   LUE: 4/5 at shoulder abduction, 4 elbow flexion, 4 elbow extension, 4 hand .  Sensation to pinprick:   RUE: decreased.   LUE: decreased.        BLE:  ROM:    RLE: full.     LLE:full.  Knee crepitus:    RLE: +ve.     LLE: +ve.  Strength:    RLE: 3/5 at hip flexion, 4- knee extension, 4 ankle DF, 4 PF.   LLE: 4/5 at hip flexion, 4 knee extension, 4 ankle DF, 4 PF.  Sensation to pinprick:     RLE: hypersensitive.     LLE: hypersensitive.    SLR (sitting):      RLE: -ve.      LLE: +ve.     +ve moderate tenderness lumbar spine.  +ve moderate tenderness right ischium.             Skin:     General: Skin is warm.   Neurological:      Mental Status: She is alert and oriented to person, place, and time.                 Assessment:       1. Chronic midline low back pain with right-sided sciatica    2. Osteoarthritis of spine with radiculopathy, lumbar region    3. Degeneration of intervertebral disc of lumbar region with discogenic back pain and lower extremity pain    4. Chronic neck pain    5. Chronic right shoulder pain    6. Frequent falls    7. Gait disorder    8. Obesity (BMI 30.0-34.9)        Plan:     - X-Ray Lumbar Spine Ap And Lateral; Future  - X-Ray Hip 2 or 3 views Right with Pelvis when performed; Future  - Start pregabalin (LYRICA) 50 MG capsule; Take 1 capsule (50 mg total) by mouth 2 (two) times daily. In 1-2 weeks, if no relief, may increase dose to 3 times per day.   - Continue traMADoL (ULTRAM) 50 mg tablet; Take 1 tablet (50 mg total) by mouth every 6 (six) hours as needed for Pain.  - Continue Tylenol 500 mg, 1-2 po tid prn for mild pain.  - The patient is not able to commit to a course of Physical Therapy at this point.  - Regular home exercise program was encouraged.  - Follow up in about 4 months (around  7/24/2025).    This was a 30 minute visit (including review of records), 50% of which was spent educating the patient about the diagnosis and the treatment plan.    This note was partly generated with [employer] voice recognition software. I apologize for any possible typographical errors.

## 2025-03-26 ENCOUNTER — TELEPHONE (OUTPATIENT)
Dept: PHYSICAL MEDICINE AND REHAB | Facility: CLINIC | Age: 55
End: 2025-03-26
Payer: MEDICARE

## 2025-03-26 NOTE — TELEPHONE ENCOUNTER
----- Message from Audrey sent at 3/25/2025  2:59 PM CDT -----  Contact: [phone] Patient  .1MEDICALADVICE Patient is calling for Medical Advice regarding: Pt in need of paper order for blood work - Pt would like to complete at [facility] - due to road work going on [place]. Pt requesting orders via USPS mail.How long has patient had these symptoms:Pharmacy name and phone#: Patient wants a call back or [facility]: call back Comments:Please advise patient replies from provider may take up to 48 hours.

## 2025-04-07 ENCOUNTER — HOSPITAL ENCOUNTER (OUTPATIENT)
Dept: RADIOLOGY | Facility: HOSPITAL | Age: 55
Discharge: HOME | End: 2025-04-07
Attending: PHYSICAL MEDICINE & REHABILITATION
Payer: MEDICARE

## 2025-04-07 DIAGNOSIS — M54.41 CHRONIC MIDLINE LOW BACK PAIN WITH RIGHT-SIDED SCIATICA: ICD-10-CM

## 2025-04-07 DIAGNOSIS — G89.29 CHRONIC MIDLINE LOW BACK PAIN WITH RIGHT-SIDED SCIATICA: ICD-10-CM

## 2025-04-07 PROCEDURE — 73502 X-RAY EXAM HIP UNI 2-3 VIEWS: CPT | Mod: TC,HCNC,RT

## 2025-04-07 PROCEDURE — 73502 X-RAY EXAM HIP UNI 2-3 VIEWS: CPT | Mod: 26,HCNC,RT, | Performed by: RADIOLOGY

## 2025-04-07 NOTE — ED NOTES
Patient medicated as ordered, Dr Smith stated he has beds available for placement and patient is too manic at this time to worry about UA   The patient is a 5y11m Female complaining of abdominal pain.

## 2025-04-09 ENCOUNTER — TELEPHONE (OUTPATIENT)
Dept: PHYSICAL MEDICINE AND REHAB | Facility: CLINIC | Age: 55
End: 2025-04-09
Payer: MEDICARE

## 2025-04-09 DIAGNOSIS — G89.29 CHRONIC RIGHT HIP PAIN: Primary | ICD-10-CM

## 2025-04-09 DIAGNOSIS — M25.551 CHRONIC RIGHT HIP PAIN: Primary | ICD-10-CM

## 2025-04-09 NOTE — TELEPHONE ENCOUNTER
"The patient had x-rays of the right hip showing " Obliquely oriented defect in the right femoral head with partial absence of the right femoral head and absence of the right femoral neck. This is associated with proximal migration of the residual femur relative to the acetabulum. The appearance is likely postsurgical in nature and should be correlated for any relevant surgical history.Otherwise the pelvis is intact."  Called the patient and informed her about these findings.  She denied having any previous right hip surgery.  I told her I will refer to orthopedic surgery for further evaluation and treatment.      ----- Message from Med Assistant Vega sent at 4/9/2025  1:10 PM CDT -----  Regarding: FW: Results  Contact: [phone]  Please advise  ----- Message -----  From: Sarah Keenan  Sent: 4/9/2025  12:02 PM CDT  To: Darryl KRAUS Staff  Subject: Results                                          Pt is calling to speak to someone in the office to discuss test results. Pt is asking for a return call soon. Thanks. Test Results for: X-rayWhen test was done: 4/7/25 Best call back number:  [phone]  "

## 2025-04-11 ENCOUNTER — TELEPHONE (OUTPATIENT)
Dept: ORTHOPEDICS | Facility: CLINIC | Age: 55
End: 2025-04-11
Payer: MEDICARE

## 2025-04-11 NOTE — TELEPHONE ENCOUNTER
I called the patient today regarding her appointment (moved back from 7/10 to 7/15) due to Dr. Flores in surgery. I left a message for the patient to call me back. I left my name and phone number.

## 2025-04-18 ENCOUNTER — NURSE TRIAGE (OUTPATIENT)
Dept: ADMINISTRATIVE | Facility: CLINIC | Age: 55
End: 2025-04-18
Payer: MEDICARE

## 2025-04-18 NOTE — TELEPHONE ENCOUNTER
Pt reports has chronic back pain that causes leg pain and she is now having a severe headache where she isn't able to function, feeling disoriented. Pt advised to call 911 now per protocol, but pt refused this dispo, stated she will have her friend bring her to the ED now. Pt encouraged to call back with any worsening symptoms or questions. She verbalized understanding.    Reason for Disposition   Difficult to awaken or acting confused (e.g., disoriented, slurred speech)    Protocols used: Headache-A-AH

## 2025-05-01 ENCOUNTER — OFFICE VISIT (OUTPATIENT)
Dept: INTERNAL MEDICINE | Facility: CLINIC | Age: 55
End: 2025-05-01
Payer: MEDICARE

## 2025-05-01 VITALS
BODY MASS INDEX: 28.39 KG/M2 | HEART RATE: 83 BPM | WEIGHT: 160.25 LBS | SYSTOLIC BLOOD PRESSURE: 122 MMHG | OXYGEN SATURATION: 98 % | TEMPERATURE: 98 F | RESPIRATION RATE: 18 BRPM | HEIGHT: 63 IN | DIASTOLIC BLOOD PRESSURE: 82 MMHG

## 2025-05-01 DIAGNOSIS — F25.9 SCHIZOAFFECTIVE DISORDER, UNSPECIFIED TYPE: ICD-10-CM

## 2025-05-01 DIAGNOSIS — B86 SCABIES INFESTATION: Primary | ICD-10-CM

## 2025-05-01 PROCEDURE — 99999 PR PBB SHADOW E&M-EST. PATIENT-LVL IV: CPT | Mod: PBBFAC,HCNC,, | Performed by: NURSE PRACTITIONER

## 2025-05-01 PROCEDURE — 3079F DIAST BP 80-89 MM HG: CPT | Mod: CPTII,HCNC,S$GLB, | Performed by: NURSE PRACTITIONER

## 2025-05-01 PROCEDURE — 3074F SYST BP LT 130 MM HG: CPT | Mod: CPTII,HCNC,S$GLB, | Performed by: NURSE PRACTITIONER

## 2025-05-01 PROCEDURE — 1159F MED LIST DOCD IN RCRD: CPT | Mod: CPTII,HCNC,S$GLB, | Performed by: NURSE PRACTITIONER

## 2025-05-01 PROCEDURE — 99214 OFFICE O/P EST MOD 30 MIN: CPT | Mod: HCNC,S$GLB,, | Performed by: NURSE PRACTITIONER

## 2025-05-01 PROCEDURE — 3008F BODY MASS INDEX DOCD: CPT | Mod: CPTII,HCNC,S$GLB, | Performed by: NURSE PRACTITIONER

## 2025-05-01 PROCEDURE — 1160F RVW MEDS BY RX/DR IN RCRD: CPT | Mod: CPTII,HCNC,S$GLB, | Performed by: NURSE PRACTITIONER

## 2025-05-01 RX ORDER — OLANZAPINE 15 MG/1
TABLET, FILM COATED ORAL
COMMUNITY
Start: 2025-02-28

## 2025-05-01 RX ORDER — ESCITALOPRAM OXALATE 20 MG/1
TABLET ORAL
COMMUNITY
Start: 2024-12-30

## 2025-05-01 RX ORDER — ALPRAZOLAM 1 MG/1
TABLET ORAL
COMMUNITY
Start: 2025-04-28

## 2025-05-01 RX ORDER — HYDROXYZINE HYDROCHLORIDE 25 MG/1
25 TABLET, FILM COATED ORAL 3 TIMES DAILY
COMMUNITY
Start: 2025-02-28

## 2025-05-01 RX ORDER — PERMETHRIN 50 MG/G
CREAM TOPICAL ONCE
Qty: 120 G | Refills: 1 | Status: SHIPPED | OUTPATIENT
Start: 2025-05-01 | End: 2025-05-01

## 2025-05-01 NOTE — PROGRESS NOTES
Subjective:       Patient ID: Mary Sanches is a 55 y.o. female.    Chief Complaint: Rash and Insect Bite    History of Present Illness    CHIEF COMPLAINT:  Patient presents today for evaluation of a diffuse rash with severe itching which has been present for the last 2 weeks.     SKIN SYMPTOMS:  She reports a diffuse rash affecting her  scalp, posterior neck, legs, between her fingers torso, back and buttocks. The itching is worse at night, causing morning discomfort. Scratching leads to bleeding especially in the scalp. She has not taken any OTC anti itch creams or pills.     CURRENT MANAGEMENT:  She washes clothes in cold water, believing it helps with skin inflammation, and uses Polly dishwashing liquid as detergent. Her  changes bed sheets every other day.    ROS:  Integumentary: +skin lesion, +rash, +itching  Objective:      Physical Exam  Vitals reviewed.   Constitutional:       Appearance: Normal appearance.   HENT:      Head: Normocephalic and atraumatic.   Eyes:      Conjunctiva/sclera: Conjunctivae normal.      Pupils: Pupils are equal, round, and reactive to light.   Cardiovascular:      Rate and Rhythm: Normal rate and regular rhythm.   Pulmonary:      Effort: Pulmonary effort is normal.      Breath sounds: Normal breath sounds.   Abdominal:      General: Bowel sounds are normal.      Palpations: Abdomen is soft.   Musculoskeletal:         General: Normal range of motion.      Cervical back: Normal range of motion and neck supple.   Skin:     General: Skin is warm.      Comments: Erythematous, papular rash throughout body,   Neurological:      General: No focal deficit present.   Psychiatric:         Mood and Affect: Mood normal.         Assessment:       1. Scabies infestation  - permethrin (ELIMITE) 5 % cream; Apply topically once. Apply topically to entire body for 1 dose  Dispense: 120 g; Refill: 1    2. Schizoaffective disorder, unspecified type  -Managed by external psychiatrist.    -currently on olanzapine 15 mg, hydroxyzine 25 mg t.i.d., Lexapro 20 mg q.day quetiapine 400 mg b.i.d.      Plan:       Assessment & Plan    IMPRESSION:  Diagnosed scabies based on reported symptoms including widespread itching, rash, and worsening of symptoms at night.  Determined treatment plan involving topical medication and environmental interventions to eradicate mites.    PLAN SUMMARY:  - Diagnosed with scabies based on clinical presentation  - Prescribed topical scabicide cream with application instructions  - Advised on washing and cleaning of clothing, bedding, and furniture  - Explained cause and nature of scabies infection  - Contact office if symptoms persist after second application  - Second application may be needed if symptoms persist after 14 days    SCABIES:  - Diagnosed the patient with scabies based on clinical presentation including nocturnal pruritus, characteristic rash on legs, interdigital itching, and bumps on skin.  - Prescribed topical scabicide cream to be applied from neck to soles of feet, including hairline, neck, scalp, temple, forehead, and external genital area (avoiding internal vaginal application).  - Patient should leave medication on for 8-14 hours (preferably overnight) before washing off.  - Discussed that one application is typically curative, but a second application may be needed if symptoms persist after 14 days.  - Instructed patient to wash all clothing, bedding, towels, and linens in hot water and dry in a hot dryer, vacuum carpets, rugs, and upholstered furniture, and dry clean items that cannot be washed.  - Advised to contact the office if symptoms do not improve after second application.  - Prescription sent to [facility].   - Relayed information to her  who was able to demonstrated understanding.       This note was generated with the assistance of ambient listening technology. Verbal consent was obtained by the patient and accompanying visitor(s)  for the recording of patient appointment to facilitate this note. I attest to having reviewed and edited the generated note for accuracy, though some syntax or spelling errors may persist. Please contact the author of this note for any clarification.

## 2025-05-08 ENCOUNTER — TELEPHONE (OUTPATIENT)
Dept: INTERNAL MEDICINE | Facility: CLINIC | Age: 55
End: 2025-05-08
Payer: MEDICARE

## 2025-05-08 NOTE — TELEPHONE ENCOUNTER
Vm left for patient that she doesn't need to dispose of mattress. Told to follow instructions given in visit.

## 2025-05-13 ENCOUNTER — TELEPHONE (OUTPATIENT)
Dept: PHYSICAL MEDICINE AND REHAB | Facility: CLINIC | Age: 55
End: 2025-05-13
Payer: MEDICARE

## 2025-05-13 NOTE — TELEPHONE ENCOUNTER
I called the patient.    She said her back pain got worse after trying to sit on manual wheelchair and falling backward.  She said the wheelchair is in disrepair.  She also has a scooter.  I told her insurance may not cover a manual wheelchair if she already has a scooter.  She can call her insurance and check and then let me know.  She was on pregabalin but stopped it due to nausea.  Her pain has been worse.  He is on tramadol 50 mg p.r.n., usually 2-3 times per day.  I told her in the prescription is written for up to 4 tablets per day.  If her pain is worse she can use the higher frequency.

## 2025-05-13 NOTE — TELEPHONE ENCOUNTER
----- Message from Nurse Mai sent at 5/13/2025  9:42 AM CDT -----    ----- Message -----  From: Matilda Benson  Sent: 5/13/2025   9:17 AM CDT  To: Darryl KRAUS Staff    Pt calling in regards to medication ,pregabalin (LYRICA) 50 MG capsule says its helped but she has broken out with bumps all over her body , also hasn't been able to eat,  can't keep her food down ,keeps an upset stomach ,  pt also had a fall on yesterday , pt  hurt her back and side , pt would like a new chair . Pt was taking (permethrin (ELIMITE) 5 % cream) for her bumps traMADoL (ULTRAM) 50 mg tablet- would like an increase on this medication Confirmed patient's contact info below:Contact Name: Mary SanchesPhone Number: [phone]

## 2025-05-16 ENCOUNTER — TELEPHONE (OUTPATIENT)
Facility: CLINIC | Age: 55
End: 2025-05-16
Payer: MEDICARE

## 2025-05-16 DIAGNOSIS — M54.41 CHRONIC MIDLINE LOW BACK PAIN WITH RIGHT-SIDED SCIATICA: ICD-10-CM

## 2025-05-16 DIAGNOSIS — G89.29 CHRONIC MIDLINE LOW BACK PAIN WITH RIGHT-SIDED SCIATICA: ICD-10-CM

## 2025-05-16 DIAGNOSIS — Z78.9 IMPAIRED MOBILITY AND ADLS: ICD-10-CM

## 2025-05-16 DIAGNOSIS — Z74.09 IMPAIRED MOBILITY AND ADLS: ICD-10-CM

## 2025-05-16 DIAGNOSIS — R53.81 PHYSICAL DECONDITIONING: Primary | ICD-10-CM

## 2025-05-16 NOTE — TELEPHONE ENCOUNTER
The patient sent a message indicating she needs a new manual wheelchair.  Her current manual wheelchair is broken.  She uses it to go around the house for her activities of daily living and mobility.  Her  also pushes it for longer distances outdoors.  I told the patient will write a prescription and faxed it to [facility].

## 2025-05-16 NOTE — TELEPHONE ENCOUNTER
----- Message from Med Assistant Vega sent at 5/16/2025  8:22 AM CDT -----  Regarding: FW: Order  Contact: Pt  [phone]  Please advise  ----- Message -----  From: Sheyla Clark  Sent: 5/13/2025   3:45 PM CDT  To: Darryl KRAUS Staff  Subject: Order                                            Pt is calling to get a order put in to get a new wheelchair that lock please call

## 2025-05-19 DIAGNOSIS — G89.29 CHRONIC MIDLINE LOW BACK PAIN WITH RIGHT-SIDED SCIATICA: ICD-10-CM

## 2025-05-19 DIAGNOSIS — M54.41 CHRONIC MIDLINE LOW BACK PAIN WITH RIGHT-SIDED SCIATICA: ICD-10-CM

## 2025-05-19 DIAGNOSIS — Z78.9 IMPAIRED MOBILITY AND ADLS: Primary | ICD-10-CM

## 2025-05-19 DIAGNOSIS — E66.9 OBESITY (BMI 30-39.9): ICD-10-CM

## 2025-05-19 DIAGNOSIS — R53.81 PHYSICAL DECONDITIONING: ICD-10-CM

## 2025-05-19 DIAGNOSIS — Z74.09 IMPAIRED MOBILITY AND ADLS: Primary | ICD-10-CM

## 2025-05-19 NOTE — PROGRESS NOTES
A prescription for a rollator walker was generated to help improve gait safety. The patient's gait is impaired.  This may not be sufficiently resolved by the use of a cane due to the need for bilateral support.  This should help the patient improve the ability to perform mobility related activities of daily living.  The built-in seat can also minimize the risk of falls.

## 2025-05-26 ENCOUNTER — TELEPHONE (OUTPATIENT)
Dept: ADMINISTRATIVE | Facility: CLINIC | Age: 55
End: 2025-05-26
Payer: MEDICARE

## 2025-05-31 ENCOUNTER — TELEPHONE (OUTPATIENT)
Dept: HOME HEALTH SERVICES | Facility: CLINIC | Age: 55
End: 2025-05-31
Payer: MEDICARE

## 2025-06-02 ENCOUNTER — NURSE TRIAGE (OUTPATIENT)
Dept: ADMINISTRATIVE | Facility: CLINIC | Age: 55
End: 2025-06-02
Payer: MEDICARE

## 2025-06-02 ENCOUNTER — TELEPHONE (OUTPATIENT)
Dept: ADMINISTRATIVE | Facility: CLINIC | Age: 55
End: 2025-06-02
Payer: MEDICARE

## 2025-06-24 ENCOUNTER — OFFICE VISIT (OUTPATIENT)
Dept: PHYSICAL MEDICINE AND REHAB | Facility: CLINIC | Age: 55
End: 2025-06-24
Payer: MEDICARE

## 2025-06-24 VITALS — OXYGEN SATURATION: 98 % | WEIGHT: 153.69 LBS | BODY MASS INDEX: 27.23 KG/M2 | HEIGHT: 63 IN

## 2025-06-24 DIAGNOSIS — M51.362 DEGENERATION OF INTERVERTEBRAL DISC OF LUMBAR REGION WITH DISCOGENIC BACK PAIN AND LOWER EXTREMITY PAIN: ICD-10-CM

## 2025-06-24 DIAGNOSIS — G89.29 CHRONIC NECK PAIN: ICD-10-CM

## 2025-06-24 DIAGNOSIS — R26.9 GAIT DISORDER: ICD-10-CM

## 2025-06-24 DIAGNOSIS — M54.41 CHRONIC MIDLINE LOW BACK PAIN WITH RIGHT-SIDED SCIATICA: Primary | ICD-10-CM

## 2025-06-24 DIAGNOSIS — M25.551 CHRONIC RIGHT HIP PAIN: ICD-10-CM

## 2025-06-24 DIAGNOSIS — G89.29 CHRONIC RIGHT SHOULDER PAIN: ICD-10-CM

## 2025-06-24 DIAGNOSIS — G89.29 CHRONIC MIDLINE LOW BACK PAIN WITH RIGHT-SIDED SCIATICA: Primary | ICD-10-CM

## 2025-06-24 DIAGNOSIS — G89.29 CHRONIC RIGHT HIP PAIN: ICD-10-CM

## 2025-06-24 DIAGNOSIS — M25.511 CHRONIC RIGHT SHOULDER PAIN: ICD-10-CM

## 2025-06-24 DIAGNOSIS — M47.26 OSTEOARTHRITIS OF SPINE WITH RADICULOPATHY, LUMBAR REGION: ICD-10-CM

## 2025-06-24 DIAGNOSIS — E66.811 OBESITY (BMI 30.0-34.9): ICD-10-CM

## 2025-06-24 DIAGNOSIS — M54.2 CHRONIC NECK PAIN: ICD-10-CM

## 2025-06-24 DIAGNOSIS — M54.2 CERVICALGIA: ICD-10-CM

## 2025-06-24 DIAGNOSIS — M54.9 DORSALGIA, UNSPECIFIED: ICD-10-CM

## 2025-06-24 DIAGNOSIS — M54.16 LUMBAR RADICULOPATHY, CHRONIC: ICD-10-CM

## 2025-06-24 PROCEDURE — 1159F MED LIST DOCD IN RCRD: CPT | Mod: CPTII,S$GLB,, | Performed by: PHYSICAL MEDICINE & REHABILITATION

## 2025-06-24 PROCEDURE — 99215 OFFICE O/P EST HI 40 MIN: CPT | Mod: S$GLB,,, | Performed by: PHYSICAL MEDICINE & REHABILITATION

## 2025-06-24 PROCEDURE — 3008F BODY MASS INDEX DOCD: CPT | Mod: CPTII,S$GLB,, | Performed by: PHYSICAL MEDICINE & REHABILITATION

## 2025-06-24 PROCEDURE — 99999 PR PBB SHADOW E&M-EST. PATIENT-LVL III: CPT | Mod: PBBFAC,,, | Performed by: PHYSICAL MEDICINE & REHABILITATION

## 2025-06-24 RX ORDER — PREGABALIN 100 MG/1
100 CAPSULE ORAL 2 TIMES DAILY
Qty: 90 CAPSULE | Refills: 2 | Status: SHIPPED | OUTPATIENT
Start: 2025-06-24 | End: 2025-12-23
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
## 2025-07-03 ENCOUNTER — TELEPHONE (OUTPATIENT)
Dept: PHYSICAL MEDICINE AND REHAB | Facility: CLINIC | Age: 55
End: 2025-07-03
Payer: MEDICARE

## 2025-07-03 NOTE — TELEPHONE ENCOUNTER
Copied from CRM #[id number]. Topic: General Inquiry - Patient Advice  >> Jul 2, 2025  2:05 PM Ava wrote:  Name of Caller: Ms. Sanches    Nature of Call: Pt's foot has been swollen or the past three days      Best Call Back Number: Confirmed contact info below:  Contact Name: Mary Sanches  Phone Number: [phone]      Additional Information: na
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
## 2025-07-15 ENCOUNTER — LAB VISIT (OUTPATIENT)
Dept: LAB | Facility: HOSPITAL | Age: 55
End: 2025-07-15
Attending: ORTHOPAEDIC SURGERY
Payer: MEDICARE

## 2025-07-15 ENCOUNTER — OFFICE VISIT (OUTPATIENT)
Dept: ORTHOPEDICS | Facility: CLINIC | Age: 55
End: 2025-07-15
Payer: MEDICARE

## 2025-07-15 VITALS — WEIGHT: 154.13 LBS | HEIGHT: 63 IN | BODY MASS INDEX: 27.31 KG/M2

## 2025-07-15 DIAGNOSIS — S72.001S CLOSED RIGHT HIP FRACTURE, SEQUELA: Primary | ICD-10-CM

## 2025-07-15 DIAGNOSIS — G89.29 CHRONIC RIGHT HIP PAIN: Primary | ICD-10-CM

## 2025-07-15 DIAGNOSIS — S72.001S CLOSED RIGHT HIP FRACTURE, SEQUELA: ICD-10-CM

## 2025-07-15 DIAGNOSIS — G89.29 CHRONIC RIGHT HIP PAIN: ICD-10-CM

## 2025-07-15 DIAGNOSIS — M25.551 CHRONIC RIGHT HIP PAIN: Primary | ICD-10-CM

## 2025-07-15 DIAGNOSIS — M25.551 CHRONIC RIGHT HIP PAIN: ICD-10-CM

## 2025-07-15 LAB
CRP SERPL-MCNC: 11.6 MG/L
ERYTHROCYTE [SEDIMENTATION RATE] IN BLOOD BY PHOTOMETRIC METHOD: 103 MM/HR

## 2025-07-15 PROCEDURE — 1159F MED LIST DOCD IN RCRD: CPT | Mod: CPTII,HCNC,S$GLB, | Performed by: ORTHOPAEDIC SURGERY

## 2025-07-15 PROCEDURE — 3008F BODY MASS INDEX DOCD: CPT | Mod: CPTII,HCNC,S$GLB, | Performed by: ORTHOPAEDIC SURGERY

## 2025-07-15 PROCEDURE — 99999 PR PBB SHADOW E&M-EST. PATIENT-LVL III: CPT | Mod: PBBFAC,HCNC,, | Performed by: ORTHOPAEDIC SURGERY

## 2025-07-15 PROCEDURE — 99204 OFFICE O/P NEW MOD 45 MIN: CPT | Mod: HCNC,S$GLB,, | Performed by: ORTHOPAEDIC SURGERY

## 2025-07-15 PROCEDURE — 36415 COLL VENOUS BLD VENIPUNCTURE: CPT | Mod: HCNC

## 2025-07-16 ENCOUNTER — HOSPITAL ENCOUNTER (OUTPATIENT)
Dept: VASCULAR SURGERY | Facility: CLINIC | Age: 55
Discharge: HOME | End: 2025-07-16
Attending: ORTHOPAEDIC SURGERY
Payer: MEDICARE

## 2025-07-16 DIAGNOSIS — G89.29 CHRONIC RIGHT HIP PAIN: ICD-10-CM

## 2025-07-16 DIAGNOSIS — M25.551 CHRONIC RIGHT HIP PAIN: ICD-10-CM

## 2025-07-16 PROCEDURE — 93923 UPR/LXTR ART STDY 3+ LVLS: CPT | Mod: HCNC,S$GLB,, | Performed by: SURGERY

## 2025-07-17 ENCOUNTER — TELEPHONE (OUTPATIENT)
Dept: SPORTS MEDICINE | Facility: CLINIC | Age: 55
End: 2025-07-17
Payer: MEDICARE

## 2025-07-17 NOTE — TELEPHONE ENCOUNTER
I called the patient today regarding her voice message. I told the patient that per Dr. Flores, her CRP/ESR were elevated but there is nothing that needs to be done right now and he will discuss it with her at the next visit. The patient verbalized understanding and has no further questions.             ----- Message from Luis Flores MD sent at 7/16/2025  5:16 PM CDT -----  Regarding: follow up  Please let her know that the blood flow studies for her legs was normal   Her CRP and ESR were elevated but nothing to do right now, we will discuss at her follow up visit. No pain in her hip so I dont think it is infected.

## 2025-07-22 ENCOUNTER — LAB VISIT (OUTPATIENT)
Dept: LAB | Facility: HOSPITAL | Age: 55
End: 2025-07-22
Payer: MEDICARE

## 2025-07-22 ENCOUNTER — OFFICE VISIT (OUTPATIENT)
Dept: INTERNAL MEDICINE | Facility: CLINIC | Age: 55
End: 2025-07-22
Payer: MEDICARE

## 2025-07-22 VITALS
OXYGEN SATURATION: 98 % | WEIGHT: 141.13 LBS | RESPIRATION RATE: 19 BRPM | SYSTOLIC BLOOD PRESSURE: 128 MMHG | HEIGHT: 62 IN | DIASTOLIC BLOOD PRESSURE: 64 MMHG | TEMPERATURE: 97 F | BODY MASS INDEX: 25.97 KG/M2 | HEART RATE: 60 BPM

## 2025-07-22 DIAGNOSIS — R11.10 VOMITING, UNSPECIFIED VOMITING TYPE, UNSPECIFIED WHETHER NAUSEA PRESENT: ICD-10-CM

## 2025-07-22 DIAGNOSIS — K92.1 MELENA: ICD-10-CM

## 2025-07-22 DIAGNOSIS — R63.4 UNINTENTIONAL WEIGHT LOSS: ICD-10-CM

## 2025-07-22 DIAGNOSIS — R10.13 EPIGASTRIC PAIN: ICD-10-CM

## 2025-07-22 DIAGNOSIS — R11.0 NAUSEA: Primary | ICD-10-CM

## 2025-07-22 DIAGNOSIS — K21.00 GASTROESOPHAGEAL REFLUX DISEASE WITH ESOPHAGITIS, UNSPECIFIED WHETHER HEMORRHAGE: ICD-10-CM

## 2025-07-22 LAB
ABSOLUTE EOSINOPHIL (OHS): 0.21 K/UL
ABSOLUTE MONOCYTE (OHS): 0.59 K/UL (ref 0.3–1)
ABSOLUTE NEUTROPHIL COUNT (OHS): 3.26 K/UL (ref 1.8–7.7)
BASOPHILS # BLD AUTO: 0.05 K/UL
BASOPHILS NFR BLD AUTO: 0.7 %
ERYTHROCYTE [DISTWIDTH] IN BLOOD BY AUTOMATED COUNT: 14.4 % (ref 11.5–14.5)
HCT VFR BLD AUTO: 38 % (ref 37–48.5)
HGB BLD-MCNC: 12.3 GM/DL (ref 12–16)
IMM GRANULOCYTES # BLD AUTO: 0.01 K/UL (ref 0–0.04)
IMM GRANULOCYTES NFR BLD AUTO: 0.1 % (ref 0–0.5)
LYMPHOCYTES # BLD AUTO: 3.13 K/UL (ref 1–4.8)
MCH RBC QN AUTO: 29.9 PG (ref 27–31)
MCHC RBC AUTO-ENTMCNC: 32.4 G/DL (ref 32–36)
MCV RBC AUTO: 92 FL (ref 82–98)
NUCLEATED RBC (/100WBC) (OHS): 0 /100 WBC
PLATELET # BLD AUTO: 322 K/UL (ref 150–450)
PMV BLD AUTO: 12 FL (ref 9.2–12.9)
RBC # BLD AUTO: 4.12 M/UL (ref 4–5.4)
RELATIVE EOSINOPHIL (OHS): 2.9 %
RELATIVE LYMPHOCYTE (OHS): 43.2 % (ref 18–48)
RELATIVE MONOCYTE (OHS): 8.1 % (ref 4–15)
RELATIVE NEUTROPHIL (OHS): 45 % (ref 38–73)
WBC # BLD AUTO: 7.25 K/UL (ref 3.9–12.7)

## 2025-07-22 PROCEDURE — 99214 OFFICE O/P EST MOD 30 MIN: CPT | Mod: HCNC,S$GLB,, | Performed by: NURSE PRACTITIONER

## 2025-07-22 PROCEDURE — 85025 COMPLETE CBC W/AUTO DIFF WBC: CPT | Mod: HCNC

## 2025-07-22 PROCEDURE — 1159F MED LIST DOCD IN RCRD: CPT | Mod: CPTII,HCNC,S$GLB, | Performed by: NURSE PRACTITIONER

## 2025-07-22 PROCEDURE — 3078F DIAST BP <80 MM HG: CPT | Mod: CPTII,HCNC,S$GLB, | Performed by: NURSE PRACTITIONER

## 2025-07-22 PROCEDURE — 1160F RVW MEDS BY RX/DR IN RCRD: CPT | Mod: CPTII,HCNC,S$GLB, | Performed by: NURSE PRACTITIONER

## 2025-07-22 PROCEDURE — 36415 COLL VENOUS BLD VENIPUNCTURE: CPT | Mod: HCNC,PO

## 2025-07-22 PROCEDURE — 99999 PR PBB SHADOW E&M-EST. PATIENT-LVL IV: CPT | Mod: PBBFAC,HCNC,, | Performed by: NURSE PRACTITIONER

## 2025-07-22 PROCEDURE — 3008F BODY MASS INDEX DOCD: CPT | Mod: CPTII,HCNC,S$GLB, | Performed by: NURSE PRACTITIONER

## 2025-07-22 PROCEDURE — 3074F SYST BP LT 130 MM HG: CPT | Mod: CPTII,HCNC,S$GLB, | Performed by: NURSE PRACTITIONER

## 2025-07-22 RX ORDER — PANTOPRAZOLE SODIUM 20 MG/1
20 TABLET, DELAYED RELEASE ORAL DAILY
Qty: 90 TABLET | Refills: 3 | Status: SHIPPED | OUTPATIENT
Start: 2025-07-22 | End: 2026-07-22

## 2025-07-22 NOTE — PROGRESS NOTES
Subjective:       Patient ID: Mary Sanches is a 55 y.o. female.    Chief Complaint: Nausea and Emesis    History of Present Illness      Patient presents today with nausea and vomiting. He reports persistent nausea and vomiting for 2 months with progressive worsening. He experiences vomiting with every meal, consisting only of food contents without blood. He has experienced significant weight loss due to reduced oral intake secondary to ongoing nausea. He denies concurrent diarrhea. He also reports recent constipation with dark-colored stools within the past week, though stool color has been improving and appearing lighter in recent bowel movements. His acid reflux symptoms have been present for the last two months, characterized by sensation of acid coming up. History of ulcers during younger years, broken hip, and acid reflux.    ROS:  Constitutional: +weight loss, +loss in appetite  Gastrointestinal: +nausea, +vomiting, -diarrhea, +constipation, +blood in stool, +indigestion  Endocrine: +excessive thirst    Objective:      Physical Exam  Vitals reviewed.   Constitutional:       Appearance: Normal appearance.   HENT:      Head: Normocephalic and atraumatic.   Eyes:      Conjunctiva/sclera: Conjunctivae normal.      Pupils: Pupils are equal, round, and reactive to light.   Cardiovascular:      Rate and Rhythm: Normal rate and regular rhythm.   Pulmonary:      Effort: Pulmonary effort is normal.      Breath sounds: Normal breath sounds.   Abdominal:      General: Bowel sounds are normal.      Palpations: Abdomen is soft.          Comments: Pain with palpation in highlighted region   Musculoskeletal:         General: Normal range of motion.      Cervical back: Normal range of motion and neck supple.   Skin:     General: Skin is warm.   Neurological:      General: No focal deficit present.   Psychiatric:         Mood and Affect: Mood normal.         Assessment:       1. Nausea  - pantoprazole (PROTONIX) 20 MG  tablet; Take 1 tablet (20 mg total) by mouth once daily.  Dispense: 90 tablet; Refill: 3  - Ambulatory referral/consult to Endo Procedure ; Future    2. Melena  - H. pylori antigen, stool; Future  - pantoprazole (PROTONIX) 20 MG tablet; Take 1 tablet (20 mg total) by mouth once daily.  Dispense: 90 tablet; Refill: 3  - CBC Auto Differential; Future  - Ambulatory referral/consult to Endo Procedure ; Future    3. Vomiting, unspecified vomiting type, unspecified whether nausea present  - pantoprazole (PROTONIX) 20 MG tablet; Take 1 tablet (20 mg total) by mouth once daily.  Dispense: 90 tablet; Refill: 3    4. Epigastric pain  - Ambulatory referral/consult to Endo Procedure ; Future      Plan:       Assessment & Plan       IMPRESSION:  Concerned about potential GI bleed due to report of dark-colored stool, indicating possible blood in upper GI tract.  History of ulcers as potential contributing factor to current symptoms.  Recommend colonoscopy and endoscopy to evaluate GI tract.    PLAN SUMMARY:  - Referred for colonoscopy to investigate potential GI bleed  - Referred for endoscopy (EGD) to evaluate upper GI tract  - Ordered stool sample for H pylori testing  - Ordered CBC to assess for ongoing bleed and potential anemia  - Follow-up after completion of colonoscopy, endoscopy, and lab results    R11.10 VOMITING, UNSPECIFIED:  - Patient reports vomiting every time they drink, ongoing for 2 months.  - Vomiting is not accompanied by blood, only food is brought up.  - Prescribed antiemetic medication to be taken daily to alleviate symptoms.    R11.0 NAUSEA:  - Patient reports ongoing nausea for 2 months, which has affected appetite and contributed to weight loss.  - Prescribed antiemetic medication to be taken daily.    K92.2 GASTROINTESTINAL HEMORRHAGE, UNSPECIFIED:  - Patient reports dark-colored stool within the last week, indicating possible GI bleed higher up in the GI tract.  - Referred  the patient for colonoscopy to investigate potential GI bleed.  - Described colonoscopy procedure, including bowel preparation, sedation, and camera exam of colon.    R63.4 ABNORMAL WEIGHT LOSS:  - Patient has lost weight over the last 2 months, associated with nausea and lack of appetite.    K21.9 GASTRO-ESOPHAGEAL REFLUX DISEASE WITHOUT ESOPHAGITIS:  - Patient reports symptoms consistent with reflux, such as acid regurgitation, noted over the last 2 months.  - Referred the patient for endoscopy (EGD) to evaluate upper GI tract.    Z87.11 PERSONAL HISTORY OF PEPTIC ULCER DISEASE:  - Patient has a history of ulcers from when they were younger, which may be related to current symptoms.  - This history further supports the need for the endoscopy (EGD) to examine the upper GI tract, including stomach.    This note was generated with the assistance of ambient listening technology. Verbal consent was obtained by the patient and accompanying visitor(s) for the recording of patient appointment to facilitate this note. I attest to having reviewed and edited the generated note for accuracy, though some syntax or spelling errors may persist. Please contact the author of this note for any clarification.

## 2025-07-25 ENCOUNTER — TELEPHONE (OUTPATIENT)
Dept: PHYSICAL MEDICINE AND REHAB | Facility: CLINIC | Age: 55
End: 2025-07-25
Payer: MEDICARE

## 2025-07-25 DIAGNOSIS — M54.41 CHRONIC MIDLINE LOW BACK PAIN WITH RIGHT-SIDED SCIATICA: ICD-10-CM

## 2025-07-25 DIAGNOSIS — G89.29 CHRONIC MIDLINE LOW BACK PAIN WITH RIGHT-SIDED SCIATICA: ICD-10-CM

## 2025-07-25 DIAGNOSIS — M54.2 CHRONIC NECK PAIN: ICD-10-CM

## 2025-07-25 DIAGNOSIS — G89.29 CHRONIC NECK PAIN: ICD-10-CM

## 2025-07-25 RX ORDER — TRAMADOL HYDROCHLORIDE 50 MG/1
50 TABLET, FILM COATED ORAL EVERY 6 HOURS PRN
Qty: 120 TABLET | Refills: 1 | Status: SHIPPED | OUTPATIENT
Start: 2025-07-25 | End: 2025-11-22

## 2025-07-25 NOTE — TELEPHONE ENCOUNTER
Copied from CRM #[id number]. Topic: Medications - Medication Status Check   >> Jul 25, 2025  1:34 PM Matilda wrote:  Pt calling in regards to her medication , says pharmacy says they haven't received her script ,     pt can't keep her food down every time she eats she throws up even when she drinks     traMADoL (ULTRAM) 50 mg tablet    Confirmed patient's contact info below:  Contact Name: Mary Sanches  Phone Number: [phone]      [facility] - [place] - [facility] [address]  [address] [place] [address]  [place] [id number]  Phone: [phone] Fax: [phone]

## 2025-07-28 ENCOUNTER — TELEPHONE (OUTPATIENT)
Dept: INTERNAL MEDICINE | Facility: CLINIC | Age: 55
End: 2025-07-28
Payer: MEDICARE

## 2025-07-28 NOTE — TELEPHONE ENCOUNTER
----- Message from Marcia Pereira NP sent at 7/28/2025  2:51 PM CDT -----  Please inform pt her CBC was normal.     JA  ----- Message -----  From: Lab, Background User  Sent: 7/22/2025  12:36 PM CDT  To: Marcia Pereira NP

## 2025-08-07 ENCOUNTER — NURSE TRIAGE (OUTPATIENT)
Dept: ADMINISTRATIVE | Facility: CLINIC | Age: 55
End: 2025-08-07
Payer: MEDICARE

## 2025-08-07 NOTE — TELEPHONE ENCOUNTER
Pt calling with concerns that recent medications are making it hard for her to eat. She reports she is feeling well currently but would like to make an appointment to establish care at the [facility] location. I have attempted to set her up an appointment but there is no availabilities until October. Informed her when creating an appointment the expectation is to be evaluated within 3 days. She was provided the scheduling phone number and I have also informed her I would route a message to her provider.    Reason for Disposition   Caller requesting an appointment, triage offered and declined    Protocols used: PCP Call - No Triage-A-

## 2025-08-08 ENCOUNTER — LAB VISIT (OUTPATIENT)
Dept: LAB | Facility: HOSPITAL | Age: 55
End: 2025-08-08
Attending: STUDENT IN AN ORGANIZED HEALTH CARE EDUCATION/TRAINING PROGRAM
Payer: MEDICARE

## 2025-08-08 ENCOUNTER — OFFICE VISIT (OUTPATIENT)
Dept: INTERNAL MEDICINE | Facility: CLINIC | Age: 55
End: 2025-08-08
Payer: MEDICARE

## 2025-08-08 VITALS
DIASTOLIC BLOOD PRESSURE: 86 MMHG | OXYGEN SATURATION: 99 % | SYSTOLIC BLOOD PRESSURE: 130 MMHG | HEIGHT: 63 IN | HEART RATE: 68 BPM | BODY MASS INDEX: 24.99 KG/M2

## 2025-08-08 DIAGNOSIS — G89.29 CHRONIC RIGHT HIP PAIN: ICD-10-CM

## 2025-08-08 DIAGNOSIS — E05.00 GRAVES DISEASE: ICD-10-CM

## 2025-08-08 DIAGNOSIS — B18.2 CHRONIC HEPATITIS C WITHOUT HEPATIC COMA: ICD-10-CM

## 2025-08-08 DIAGNOSIS — E04.1 THYROID NODULE: ICD-10-CM

## 2025-08-08 DIAGNOSIS — M54.41 CHRONIC MIDLINE LOW BACK PAIN WITH RIGHT-SIDED SCIATICA: ICD-10-CM

## 2025-08-08 DIAGNOSIS — F31.13 BIPOLAR I DISORDER, CURRENT OR MOST RECENT EPISODE MANIC, SEVERE: ICD-10-CM

## 2025-08-08 DIAGNOSIS — R79.9 ABNORMAL FINDING OF BLOOD CHEMISTRY, UNSPECIFIED: ICD-10-CM

## 2025-08-08 DIAGNOSIS — Z76.89 ENCOUNTER TO ESTABLISH CARE: Primary | ICD-10-CM

## 2025-08-08 DIAGNOSIS — K74.00 LIVER FIBROSIS: ICD-10-CM

## 2025-08-08 DIAGNOSIS — G89.29 CHRONIC MIDLINE LOW BACK PAIN WITH RIGHT-SIDED SCIATICA: ICD-10-CM

## 2025-08-08 DIAGNOSIS — Z76.89 ENCOUNTER TO ESTABLISH CARE: ICD-10-CM

## 2025-08-08 DIAGNOSIS — K76.9 LIVER LESION: ICD-10-CM

## 2025-08-08 DIAGNOSIS — M25.551 CHRONIC RIGHT HIP PAIN: ICD-10-CM

## 2025-08-08 LAB
ALBUMIN SERPL BCP-MCNC: 3.2 G/DL (ref 3.5–5.2)
ALP SERPL-CCNC: 101 UNIT/L (ref 40–150)
ALT SERPL W/O P-5'-P-CCNC: 14 UNIT/L (ref 0–55)
ANION GAP (OHS): 11 MMOL/L (ref 8–16)
AST SERPL-CCNC: 36 UNIT/L (ref 0–50)
BILIRUB SERPL-MCNC: 0.6 MG/DL (ref 0.1–1)
BUN SERPL-MCNC: 7 MG/DL (ref 6–20)
CALCIUM SERPL-MCNC: 10 MG/DL (ref 8.7–10.5)
CHLORIDE SERPL-SCNC: 101 MMOL/L (ref 95–110)
CHOLEST SERPL-MCNC: 109 MG/DL (ref 120–199)
CHOLEST/HDLC SERPL: 5.5 {RATIO} (ref 2–5)
CO2 SERPL-SCNC: 27 MMOL/L (ref 23–29)
CREAT SERPL-MCNC: 0.7 MG/DL (ref 0.5–1.4)
EAG (OHS): 91 MG/DL (ref 68–131)
GFR SERPLBLD CREATININE-BSD FMLA CKD-EPI: >60 ML/MIN/1.73/M2
GLUCOSE SERPL-MCNC: 102 MG/DL (ref 70–110)
HBA1C MFR BLD: 4.8 % (ref 4–5.6)
HDLC SERPL-MCNC: 20 MG/DL (ref 40–75)
HDLC SERPL: 18.3 % (ref 20–50)
LDLC SERPL CALC-MCNC: 69.2 MG/DL (ref 63–159)
NONHDLC SERPL-MCNC: 89 MG/DL
POTASSIUM SERPL-SCNC: 3.9 MMOL/L (ref 3.5–5.1)
PROT SERPL-MCNC: 8.8 GM/DL (ref 6–8.4)
SODIUM SERPL-SCNC: 139 MMOL/L (ref 136–145)
T4 FREE SERPL-MCNC: 1.61 NG/DL (ref 0.71–1.51)
TRIGL SERPL-MCNC: 99 MG/DL (ref 30–150)
TSH SERPL-ACNC: <0.01 UIU/ML (ref 0.4–4)

## 2025-08-08 PROCEDURE — 83036 HEMOGLOBIN GLYCOSYLATED A1C: CPT | Mod: HCNC

## 2025-08-08 PROCEDURE — 3079F DIAST BP 80-89 MM HG: CPT | Mod: CPTII,HCNC,S$GLB, | Performed by: STUDENT IN AN ORGANIZED HEALTH CARE EDUCATION/TRAINING PROGRAM

## 2025-08-08 PROCEDURE — 84439 ASSAY OF FREE THYROXINE: CPT | Mod: HCNC

## 2025-08-08 PROCEDURE — 3075F SYST BP GE 130 - 139MM HG: CPT | Mod: CPTII,HCNC,S$GLB, | Performed by: STUDENT IN AN ORGANIZED HEALTH CARE EDUCATION/TRAINING PROGRAM

## 2025-08-08 PROCEDURE — 36415 COLL VENOUS BLD VENIPUNCTURE: CPT | Mod: HCNC

## 2025-08-08 PROCEDURE — 99214 OFFICE O/P EST MOD 30 MIN: CPT | Mod: HCNC,S$GLB,, | Performed by: STUDENT IN AN ORGANIZED HEALTH CARE EDUCATION/TRAINING PROGRAM

## 2025-08-08 PROCEDURE — 1159F MED LIST DOCD IN RCRD: CPT | Mod: CPTII,HCNC,S$GLB, | Performed by: STUDENT IN AN ORGANIZED HEALTH CARE EDUCATION/TRAINING PROGRAM

## 2025-08-08 PROCEDURE — 99999 PR PBB SHADOW E&M-EST. PATIENT-LVL IV: CPT | Mod: PBBFAC,HCNC,, | Performed by: STUDENT IN AN ORGANIZED HEALTH CARE EDUCATION/TRAINING PROGRAM

## 2025-08-08 PROCEDURE — 3044F HG A1C LEVEL LT 7.0%: CPT | Mod: CPTII,HCNC,S$GLB, | Performed by: STUDENT IN AN ORGANIZED HEALTH CARE EDUCATION/TRAINING PROGRAM

## 2025-08-08 PROCEDURE — 3008F BODY MASS INDEX DOCD: CPT | Mod: CPTII,HCNC,S$GLB, | Performed by: STUDENT IN AN ORGANIZED HEALTH CARE EDUCATION/TRAINING PROGRAM

## 2025-08-08 PROCEDURE — 80061 LIPID PANEL: CPT | Mod: HCNC

## 2025-08-08 PROCEDURE — 80053 COMPREHEN METABOLIC PANEL: CPT | Mod: HCNC

## 2025-08-08 PROCEDURE — 84443 ASSAY THYROID STIM HORMONE: CPT | Mod: HCNC

## 2025-08-08 PROCEDURE — 1160F RVW MEDS BY RX/DR IN RCRD: CPT | Mod: CPTII,HCNC,S$GLB, | Performed by: STUDENT IN AN ORGANIZED HEALTH CARE EDUCATION/TRAINING PROGRAM

## 2025-08-08 NOTE — TELEPHONE ENCOUNTER
Spoke with patient, she requested to change PCP. Patient scheduled with provider on Terrance Ledesma

## 2025-08-11 ENCOUNTER — TELEPHONE (OUTPATIENT)
Dept: INTERNAL MEDICINE | Facility: CLINIC | Age: 55
End: 2025-08-11
Payer: MEDICARE

## 2025-08-11 ENCOUNTER — RESULTS FOLLOW-UP (OUTPATIENT)
Dept: INTERNAL MEDICINE | Facility: CLINIC | Age: 55
End: 2025-08-11
Payer: MEDICARE

## 2025-08-11 DIAGNOSIS — E04.1 THYROID NODULE: Primary | ICD-10-CM

## 2025-08-12 ENCOUNTER — TELEPHONE (OUTPATIENT)
Dept: ENDOCRINOLOGY | Facility: CLINIC | Age: 55
End: 2025-08-12
Payer: MEDICARE

## 2025-08-15 ENCOUNTER — OFFICE VISIT (OUTPATIENT)
Dept: ENDOCRINOLOGY | Facility: CLINIC | Age: 55
End: 2025-08-15
Payer: MEDICARE

## 2025-08-15 VITALS
BODY MASS INDEX: 24.99 KG/M2 | SYSTOLIC BLOOD PRESSURE: 117 MMHG | DIASTOLIC BLOOD PRESSURE: 70 MMHG | WEIGHT: 141.13 LBS | HEART RATE: 68 BPM

## 2025-08-15 DIAGNOSIS — E04.1 THYROID NODULE: ICD-10-CM

## 2025-08-15 DIAGNOSIS — E05.00 GRAVES DISEASE: ICD-10-CM

## 2025-08-15 PROCEDURE — 99999 PR PBB SHADOW E&M-EST. PATIENT-LVL III: CPT | Mod: PBBFAC,HCNC,,

## 2025-08-15 RX ORDER — METHIMAZOLE 5 MG/1
10 TABLET ORAL DAILY
Qty: 60 TABLET | Refills: 11 | Status: SHIPPED | OUTPATIENT
Start: 2025-08-15 | End: 2026-08-15

## 2025-08-18 ENCOUNTER — OFFICE VISIT (OUTPATIENT)
Dept: HEPATOLOGY | Facility: CLINIC | Age: 55
End: 2025-08-18
Attending: INTERNAL MEDICINE
Payer: MEDICARE

## 2025-08-18 ENCOUNTER — LAB VISIT (OUTPATIENT)
Dept: LAB | Facility: HOSPITAL | Age: 55
End: 2025-08-18
Attending: INTERNAL MEDICINE
Payer: MEDICARE

## 2025-08-18 VITALS
BODY MASS INDEX: 23.36 KG/M2 | HEIGHT: 63 IN | HEART RATE: 59 BPM | WEIGHT: 131.81 LBS | OXYGEN SATURATION: 96 % | SYSTOLIC BLOOD PRESSURE: 148 MMHG | TEMPERATURE: 96 F | DIASTOLIC BLOOD PRESSURE: 69 MMHG

## 2025-08-18 DIAGNOSIS — B18.2 CHRONIC HEPATITIS C WITHOUT HEPATIC COMA: Primary | ICD-10-CM

## 2025-08-18 DIAGNOSIS — K74.69 OTHER CIRRHOSIS OF LIVER: ICD-10-CM

## 2025-08-18 LAB
ABSOLUTE EOSINOPHIL (OHS): 0.2 K/UL
ABSOLUTE MONOCYTE (OHS): 0.54 K/UL (ref 0.3–1)
ABSOLUTE NEUTROPHIL COUNT (OHS): 5.75 K/UL (ref 1.8–7.7)
AFP SERPL-MCNC: 3.1 NG/ML
ALBUMIN SERPL BCP-MCNC: 2.7 G/DL (ref 3.5–5.2)
ALP SERPL-CCNC: 93 UNIT/L (ref 40–150)
ALT SERPL W/O P-5'-P-CCNC: <8 UNIT/L (ref 0–55)
ANION GAP (OHS): 8 MMOL/L (ref 8–16)
AST SERPL-CCNC: 23 UNIT/L (ref 0–50)
BASOPHILS # BLD AUTO: 0.02 K/UL
BASOPHILS NFR BLD AUTO: 0.2 %
BILIRUB SERPL-MCNC: 0.7 MG/DL (ref 0.1–1)
BUN SERPL-MCNC: 8 MG/DL (ref 6–20)
CALCIUM SERPL-MCNC: 9.3 MG/DL (ref 8.7–10.5)
CHLORIDE SERPL-SCNC: 102 MMOL/L (ref 95–110)
CO2 SERPL-SCNC: 30 MMOL/L (ref 23–29)
CREAT SERPL-MCNC: 0.6 MG/DL (ref 0.5–1.4)
ERYTHROCYTE [DISTWIDTH] IN BLOOD BY AUTOMATED COUNT: 13.4 % (ref 11.5–14.5)
GFR SERPLBLD CREATININE-BSD FMLA CKD-EPI: >60 ML/MIN/1.73/M2
GLUCOSE SERPL-MCNC: 100 MG/DL (ref 70–110)
HCT VFR BLD AUTO: 36 % (ref 37–48.5)
HGB BLD-MCNC: 12.1 GM/DL (ref 12–16)
IMM GRANULOCYTES # BLD AUTO: 0.03 K/UL (ref 0–0.04)
IMM GRANULOCYTES NFR BLD AUTO: 0.3 % (ref 0–0.5)
INR PPP: 1.2 (ref 0.8–1.2)
LYMPHOCYTES # BLD AUTO: 2.07 K/UL (ref 1–4.8)
MCH RBC QN AUTO: 29.9 PG (ref 27–31)
MCHC RBC AUTO-ENTMCNC: 33.6 G/DL (ref 32–36)
MCV RBC AUTO: 89 FL (ref 82–98)
NUCLEATED RBC (/100WBC) (OHS): 0 /100 WBC
PLATELET # BLD AUTO: 213 K/UL (ref 150–450)
PMV BLD AUTO: 11.7 FL (ref 9.2–12.9)
POTASSIUM SERPL-SCNC: 3.4 MMOL/L (ref 3.5–5.1)
PROT SERPL-MCNC: 7.7 GM/DL (ref 6–8.4)
PROTHROMBIN TIME: 12.8 SECONDS (ref 9–12.5)
RBC # BLD AUTO: 4.05 M/UL (ref 4–5.4)
RELATIVE EOSINOPHIL (OHS): 2.3 %
RELATIVE LYMPHOCYTE (OHS): 24 % (ref 18–48)
RELATIVE MONOCYTE (OHS): 6.3 % (ref 4–15)
RELATIVE NEUTROPHIL (OHS): 66.9 % (ref 38–73)
SODIUM SERPL-SCNC: 140 MMOL/L (ref 136–145)
WBC # BLD AUTO: 8.61 K/UL (ref 3.9–12.7)

## 2025-08-18 PROCEDURE — 99214 OFFICE O/P EST MOD 30 MIN: CPT | Mod: HCNC,S$GLB,, | Performed by: INTERNAL MEDICINE

## 2025-08-18 PROCEDURE — 36415 COLL VENOUS BLD VENIPUNCTURE: CPT | Mod: HCNC

## 2025-08-18 PROCEDURE — 82105 ALPHA-FETOPROTEIN SERUM: CPT | Mod: HCNC

## 2025-08-18 PROCEDURE — 1159F MED LIST DOCD IN RCRD: CPT | Mod: CPTII,HCNC,S$GLB, | Performed by: INTERNAL MEDICINE

## 2025-08-18 PROCEDURE — 85610 PROTHROMBIN TIME: CPT | Mod: HCNC

## 2025-08-18 PROCEDURE — 3078F DIAST BP <80 MM HG: CPT | Mod: CPTII,HCNC,S$GLB, | Performed by: INTERNAL MEDICINE

## 2025-08-18 PROCEDURE — 3008F BODY MASS INDEX DOCD: CPT | Mod: CPTII,HCNC,S$GLB, | Performed by: INTERNAL MEDICINE

## 2025-08-18 PROCEDURE — 99999 PR PBB SHADOW E&M-EST. PATIENT-LVL IV: CPT | Mod: PBBFAC,HCNC,, | Performed by: INTERNAL MEDICINE

## 2025-08-18 PROCEDURE — 82247 BILIRUBIN TOTAL: CPT | Mod: HCNC

## 2025-08-18 PROCEDURE — 3044F HG A1C LEVEL LT 7.0%: CPT | Mod: CPTII,HCNC,S$GLB, | Performed by: INTERNAL MEDICINE

## 2025-08-18 PROCEDURE — 85025 COMPLETE CBC W/AUTO DIFF WBC: CPT | Mod: HCNC

## 2025-08-18 PROCEDURE — 3077F SYST BP >= 140 MM HG: CPT | Mod: CPTII,HCNC,S$GLB, | Performed by: INTERNAL MEDICINE

## 2025-08-18 RX ORDER — PERMETHRIN 50 MG/G
CREAM TOPICAL
COMMUNITY
Start: 2025-05-31

## 2025-08-26 ENCOUNTER — HOSPITAL ENCOUNTER (OUTPATIENT)
Dept: RADIOLOGY | Facility: HOSPITAL | Age: 55
Discharge: HOME | End: 2025-08-26
Attending: INTERNAL MEDICINE
Payer: MEDICARE

## 2025-08-26 ENCOUNTER — HOSPITAL ENCOUNTER (OUTPATIENT)
Dept: RADIOLOGY | Facility: HOSPITAL | Age: 55
Discharge: HOME | End: 2025-08-26
Attending: STUDENT IN AN ORGANIZED HEALTH CARE EDUCATION/TRAINING PROGRAM
Payer: MEDICARE

## 2025-08-26 ENCOUNTER — OFFICE VISIT (OUTPATIENT)
Dept: ORTHOPEDICS | Facility: CLINIC | Age: 55
End: 2025-08-26
Payer: MEDICARE

## 2025-08-26 VITALS — WEIGHT: 131.81 LBS | HEIGHT: 63 IN | BODY MASS INDEX: 23.36 KG/M2

## 2025-08-26 DIAGNOSIS — S72.001S CLOSED RIGHT HIP FRACTURE, SEQUELA: Primary | ICD-10-CM

## 2025-08-26 DIAGNOSIS — E04.1 THYROID NODULE: ICD-10-CM

## 2025-08-26 DIAGNOSIS — K74.69 OTHER CIRRHOSIS OF LIVER: ICD-10-CM

## 2025-08-26 PROCEDURE — A9585 GADOBUTROL INJECTION: HCPCS | Mod: HCNC | Performed by: INTERNAL MEDICINE

## 2025-08-26 PROCEDURE — 76536 US EXAM OF HEAD AND NECK: CPT | Mod: 26,HCNC,, | Performed by: RADIOLOGY

## 2025-08-26 PROCEDURE — 74183 MRI ABD W/O CNTR FLWD CNTR: CPT | Mod: TC,HCNC

## 2025-08-26 PROCEDURE — 74183 MRI ABD W/O CNTR FLWD CNTR: CPT | Mod: 26,HCNC,, | Performed by: RADIOLOGY

## 2025-08-26 PROCEDURE — 76536 US EXAM OF HEAD AND NECK: CPT | Mod: TC,HCNC

## 2025-08-26 PROCEDURE — 99999 PR PBB SHADOW E&M-EST. PATIENT-LVL III: CPT | Mod: PBBFAC,HCNC,, | Performed by: ORTHOPAEDIC SURGERY

## 2025-08-26 PROCEDURE — 25500020 PHARM REV CODE 255: Mod: HCNC | Performed by: INTERNAL MEDICINE

## 2025-08-26 RX ORDER — GADOBUTROL 604.72 MG/ML
10 INJECTION INTRAVENOUS
Status: COMPLETED | OUTPATIENT
Start: 2025-08-26 | End: 2025-08-26

## 2025-08-26 RX ADMIN — GADOBUTROL 10 ML: 604.72 INJECTION INTRAVENOUS at 07:08

## 2025-09-05 ENCOUNTER — TELEPHONE (OUTPATIENT)
Dept: INTERNAL MEDICINE | Facility: CLINIC | Age: 55
End: 2025-09-05
Payer: MEDICARE

## 2025-09-05 ENCOUNTER — TELEPHONE (OUTPATIENT)
Dept: PHYSICAL MEDICINE AND REHAB | Facility: CLINIC | Age: 55
End: 2025-09-05
Payer: MEDICARE

## 2025-09-05 DIAGNOSIS — M54.41 CHRONIC MIDLINE LOW BACK PAIN WITH RIGHT-SIDED SCIATICA: Primary | ICD-10-CM

## 2025-09-05 DIAGNOSIS — G89.29 CHRONIC NECK PAIN: ICD-10-CM

## 2025-09-05 DIAGNOSIS — M54.9 DORSALGIA, UNSPECIFIED: ICD-10-CM

## 2025-09-05 DIAGNOSIS — M51.362 DEGENERATION OF INTERVERTEBRAL DISC OF LUMBAR REGION WITH DISCOGENIC BACK PAIN AND LOWER EXTREMITY PAIN: ICD-10-CM

## 2025-09-05 DIAGNOSIS — M47.26 OSTEOARTHRITIS OF SPINE WITH RADICULOPATHY, LUMBAR REGION: ICD-10-CM

## 2025-09-05 DIAGNOSIS — M54.2 CERVICALGIA: ICD-10-CM

## 2025-09-05 DIAGNOSIS — G89.29 CHRONIC MIDLINE LOW BACK PAIN WITH RIGHT-SIDED SCIATICA: Primary | ICD-10-CM

## 2025-09-05 DIAGNOSIS — M54.16 LUMBAR RADICULOPATHY, CHRONIC: ICD-10-CM

## 2025-09-05 DIAGNOSIS — M54.2 CHRONIC NECK PAIN: ICD-10-CM
